# Patient Record
Sex: FEMALE | Race: WHITE | Employment: OTHER | URBAN - METROPOLITAN AREA
[De-identification: names, ages, dates, MRNs, and addresses within clinical notes are randomized per-mention and may not be internally consistent; named-entity substitution may affect disease eponyms.]

---

## 2017-11-05 ENCOUNTER — APPOINTMENT (OUTPATIENT)
Dept: GENERAL RADIOLOGY | Facility: CLINIC | Age: 82
End: 2017-11-05
Payer: MEDICARE

## 2017-11-05 ENCOUNTER — HOSPITAL ENCOUNTER (EMERGENCY)
Facility: CLINIC | Age: 82
Discharge: HOME | End: 2017-11-05
Attending: EMERGENCY MEDICINE
Payer: MEDICARE

## 2017-11-05 VITALS
SYSTOLIC BLOOD PRESSURE: 162 MMHG | RESPIRATION RATE: 16 BRPM | TEMPERATURE: 98.4 F | BODY MASS INDEX: 27.86 KG/M2 | OXYGEN SATURATION: 95 % | WEIGHT: 163.2 LBS | HEIGHT: 64 IN | DIASTOLIC BLOOD PRESSURE: 70 MMHG | HEART RATE: 75 BPM

## 2017-11-05 DIAGNOSIS — N39.0 URINARY TRACT INFECTION WITHOUT HEMATURIA, SITE UNSPECIFIED: Primary | ICD-10-CM

## 2017-11-05 LAB
-: ABNORMAL
ABSOLUTE EOS #: 0.3 K/UL (ref 0–0.4)
ABSOLUTE IMMATURE GRANULOCYTE: ABNORMAL K/UL (ref 0–0.3)
ABSOLUTE LYMPH #: 1.2 K/UL (ref 1–4.8)
ABSOLUTE MONO #: 0.6 K/UL (ref 0.1–1.2)
AMORPHOUS: ABNORMAL
ANION GAP SERPL CALCULATED.3IONS-SCNC: 12 MMOL/L (ref 9–17)
BACTERIA: ABNORMAL
BASOPHILS # BLD: 0 %
BASOPHILS ABSOLUTE: 0 K/UL (ref 0–0.2)
BILIRUBIN URINE: NEGATIVE
BUN BLDV-MCNC: 26 MG/DL (ref 8–23)
BUN/CREAT BLD: ABNORMAL (ref 9–20)
CALCIUM SERPL-MCNC: 8.7 MG/DL (ref 8.6–10.4)
CASTS UA: ABNORMAL /LPF (ref 0–2)
CHLORIDE BLD-SCNC: 102 MMOL/L (ref 98–107)
CO2: 26 MMOL/L (ref 20–31)
COLOR: YELLOW
COMMENT UA: ABNORMAL
CREAT SERPL-MCNC: 0.8 MG/DL (ref 0.5–0.9)
CRYSTALS, UA: ABNORMAL /HPF
DIFFERENTIAL TYPE: ABNORMAL
EOSINOPHILS RELATIVE PERCENT: 4 %
EPITHELIAL CELLS UA: ABNORMAL /HPF (ref 0–5)
GFR AFRICAN AMERICAN: >60 ML/MIN
GFR NON-AFRICAN AMERICAN: >60 ML/MIN
GFR SERPL CREATININE-BSD FRML MDRD: ABNORMAL ML/MIN/{1.73_M2}
GFR SERPL CREATININE-BSD FRML MDRD: ABNORMAL ML/MIN/{1.73_M2}
GLUCOSE BLD-MCNC: 165 MG/DL (ref 70–99)
GLUCOSE URINE: ABNORMAL
HCT VFR BLD CALC: 35.2 % (ref 36–46)
HEMOGLOBIN: 11.7 G/DL (ref 12–16)
IMMATURE GRANULOCYTES: ABNORMAL %
KETONES, URINE: NEGATIVE
LEUKOCYTE ESTERASE, URINE: ABNORMAL
LYMPHOCYTES # BLD: 18 %
MCH RBC QN AUTO: 28.8 PG (ref 26–34)
MCHC RBC AUTO-ENTMCNC: 33.2 G/DL (ref 31–37)
MCV RBC AUTO: 86.8 FL (ref 80–100)
MONOCYTES # BLD: 10 %
MUCUS: ABNORMAL
NITRITE, URINE: NEGATIVE
OTHER OBSERVATIONS UA: ABNORMAL
PDW BLD-RTO: 15.3 % (ref 12.5–15.4)
PH UA: 5.5 (ref 5–8)
PLATELET # BLD: 220 K/UL (ref 140–450)
PLATELET ESTIMATE: ABNORMAL
PMV BLD AUTO: 8.1 FL (ref 6–12)
POTASSIUM SERPL-SCNC: 4 MMOL/L (ref 3.7–5.3)
PROTEIN UA: ABNORMAL
RBC # BLD: 4.05 M/UL (ref 4–5.2)
RBC # BLD: ABNORMAL 10*6/UL
RBC UA: ABNORMAL /HPF (ref 0–2)
RENAL EPITHELIAL, UA: ABNORMAL /HPF
SEG NEUTROPHILS: 68 %
SEGMENTED NEUTROPHILS ABSOLUTE COUNT: 4.5 K/UL (ref 1.8–7.7)
SODIUM BLD-SCNC: 140 MMOL/L (ref 135–144)
SPECIFIC GRAVITY UA: 1.02 (ref 1–1.03)
TRICHOMONAS: ABNORMAL
TURBIDITY: ABNORMAL
URINE HGB: NEGATIVE
UROBILINOGEN, URINE: NORMAL
WBC # BLD: 6.6 K/UL (ref 3.5–11)
WBC # BLD: ABNORMAL 10*3/UL
WBC UA: ABNORMAL /HPF (ref 0–5)
YEAST: ABNORMAL

## 2017-11-05 PROCEDURE — 85025 COMPLETE CBC W/AUTO DIFF WBC: CPT

## 2017-11-05 PROCEDURE — 87086 URINE CULTURE/COLONY COUNT: CPT

## 2017-11-05 PROCEDURE — 74022 RADEX COMPL AQT ABD SERIES: CPT

## 2017-11-05 PROCEDURE — 36415 COLL VENOUS BLD VENIPUNCTURE: CPT

## 2017-11-05 PROCEDURE — 99283 EMERGENCY DEPT VISIT LOW MDM: CPT

## 2017-11-05 PROCEDURE — 80048 BASIC METABOLIC PNL TOTAL CA: CPT

## 2017-11-05 PROCEDURE — 6360000002 HC RX W HCPCS: Performed by: EMERGENCY MEDICINE

## 2017-11-05 PROCEDURE — 81001 URINALYSIS AUTO W/SCOPE: CPT

## 2017-11-05 PROCEDURE — 96372 THER/PROPH/DIAG INJ SC/IM: CPT

## 2017-11-05 RX ORDER — CEFTRIAXONE 500 MG/1
250 INJECTION, POWDER, FOR SOLUTION INTRAMUSCULAR; INTRAVENOUS ONCE
Status: COMPLETED | OUTPATIENT
Start: 2017-11-05 | End: 2017-11-05

## 2017-11-05 RX ADMIN — CEFTRIAXONE SODIUM 250 MG: 500 INJECTION, POWDER, FOR SOLUTION INTRAMUSCULAR; INTRAVENOUS at 14:36

## 2017-11-05 NOTE — ED TRIAGE NOTES
Pt presents to the ED with c/o \"possible infection\". She reports sx last night of chills overnight and increased urinary frequency/ urge. Pt denies any pain. Denies any acute injury. Pt is alert and oriented x4. Ambulatory. Respirations even and non-labored. Will continue to monitor.

## 2017-11-05 NOTE — ED NOTES
Discharge instructions reviewed and follow-up information discussed.      Melody Jerry RN  11/05/17 8595

## 2017-11-05 NOTE — ED PROVIDER NOTES
Result Value Ref Range    Color, UA YELLOW YEL    Turbidity UA SLIGHTLY CLOUDY (A) CLEAR    Glucose, Ur 2+ (A) NEG    Bilirubin Urine NEGATIVE NEG    Ketones, Urine NEGATIVE NEG    Specific Gravity, UA 1.025 1.005 - 1.030    Urine Hgb NEGATIVE NEG    pH, UA 5.5 5.0 - 8.0    Protein, UA TRACE (A) NEG    Urobilinogen, Urine Normal NORM    Nitrite, Urine NEGATIVE NEG    Leukocyte Esterase, Urine TRACE (A) NEG    Urinalysis Comments NOT REPORTED    CBC Auto Differential   Result Value Ref Range    WBC 6.6 3.5 - 11.0 k/uL    RBC 4.05 4.0 - 5.2 m/uL    Hemoglobin 11.7 (L) 12.0 - 16.0 g/dL    Hematocrit 35.2 (L) 36 - 46 %    MCV 86.8 80 - 100 fL    MCH 28.8 26 - 34 pg    MCHC 33.2 31 - 37 g/dL    RDW 15.3 12.5 - 15.4 %    Platelets 076 726 - 725 k/uL    MPV 8.1 6.0 - 12.0 fL    Differential Type NOT REPORTED     Seg Neutrophils 68 %    Lymphocytes 18 %    Monocytes 10 %    Eosinophils % 4 %    Basophils 0 %    Immature Granulocytes NOT REPORTED 0 %    Segs Absolute 4.50 1.8 - 7.7 k/uL    Absolute Lymph # 1.20 1.0 - 4.8 k/uL    Absolute Mono # 0.60 0.1 - 1.2 k/uL    Absolute Eos # 0.30 0.0 - 0.4 k/uL    Basophils # 0.00 0.0 - 0.2 k/uL    Absolute Immature Granulocyte NOT REPORTED 0.00 - 0.30 k/uL    WBC Morphology NOT REPORTED     RBC Morphology NOT REPORTED     Platelet Estimate NOT REPORTED    Basic Metabolic Panel   Result Value Ref Range    Glucose 165 (H) 70 - 99 mg/dL    BUN 26 (H) 8 - 23 mg/dL    CREATININE 0.80 0.50 - 0.90 mg/dL    Bun/Cre Ratio NOT REPORTED 9 - 20    Calcium 8.7 8.6 - 10.4 mg/dL    Sodium 140 135 - 144 mmol/L    Potassium 4.0 3.7 - 5.3 mmol/L    Chloride 102 98 - 107 mmol/L    CO2 26 20 - 31 mmol/L    Anion Gap 12 9 - 17 mmol/L    GFR Non-African American >60 >60 mL/min    GFR African American >60 >60 mL/min    GFR Comment          GFR Staging NOT REPORTED    Microscopic Urinalysis   Result Value Ref Range    -          WBC, UA 5 TO 10 0 - 5 /HPF    RBC, UA 0 TO 2 0 - 2 /HPF    Casts UA NOT REPORTED 0 - 2 /LPF    Crystals UA NOT REPORTED NONE /HPF    Epithelial Cells UA 10 TO 20 0 - 5 /HPF    Renal Epithelial, Urine NOT REPORTED 0 /HPF    Bacteria, UA MODERATE (A) NONE    Mucus, UA NOT REPORTED NONE    Trichomonas, UA NOT REPORTED NONE    Amorphous, UA 1+ (A) NONE    Other Observations UA Culture ordered based on defined criteria. (A) NREQ    Yeast, UA NOT REPORTED NONE         EMERGENCY DEPARTMENT COURSE:   Vitals:    Vitals:    11/05/17 1332   BP: (!) 162/70   Pulse: 75   Resp: 16   Temp: 98.4 °F (36.9 °C)   TempSrc: Oral   SpO2: 95%   Weight: 74 kg (163 lb 3.2 oz)   Height: 5' 4\" (1.626 m)     -------------------------  BP: (!) 162/70, Temp: 98.4 °F (36.9 °C), Pulse: 75, Resp: 16      Re-evaluation Notes    The findings of the urinalysis, coupled with the patient's urinary symptoms and chills prompt me to treat her with antibiotics. She says she does not wish take any oral antibiotics because it worsens or diarrhea. She would like an injection of Rocephin and then will see her doctor in the next day to see if they need to extend her treatment. This has been done before apparently. The patient is discharged in good condition. FINAL IMPRESSION      1.  Urinary tract infection without hematuria, site unspecified          DISPOSITION/PLAN   DISPOSITION Discharge - Pending Orders Complete    Condition on Disposition    good    PATIENT REFERRED TO:  Aileen Hardwick MD  [address] [phone]    In 1 day        DISCHARGE MEDICATIONS:  New Prescriptions    No medications on file       (Please note that portions of this note were completed with a voice recognition program.  Efforts were made to edit the dictations but occasionally words are mis-transcribed.)    Swann MD   Attending Emergency Physician       Rey Mcgowan MD  11/05/17 1982

## 2017-11-06 LAB
CULTURE: NORMAL
CULTURE: NORMAL
Lab: NORMAL
SPECIMEN DESCRIPTION: NORMAL
SPECIMEN DESCRIPTION: NORMAL
STATUS: NORMAL

## 2018-03-26 ENCOUNTER — APPOINTMENT (OUTPATIENT)
Dept: GENERAL RADIOLOGY | Facility: CLINIC | Age: 83
End: 2018-03-26
Payer: MEDICARE

## 2018-03-26 ENCOUNTER — APPOINTMENT (OUTPATIENT)
Dept: ULTRASOUND IMAGING | Facility: CLINIC | Age: 83
End: 2018-03-26
Payer: MEDICARE

## 2018-03-26 ENCOUNTER — HOSPITAL ENCOUNTER (EMERGENCY)
Facility: CLINIC | Age: 83
Discharge: HOME | End: 2018-03-26
Attending: EMERGENCY MEDICINE
Payer: MEDICARE

## 2018-03-26 VITALS
WEIGHT: 163 LBS | HEIGHT: 64 IN | HEART RATE: 75 BPM | TEMPERATURE: 98 F | SYSTOLIC BLOOD PRESSURE: 167 MMHG | BODY MASS INDEX: 27.83 KG/M2 | DIASTOLIC BLOOD PRESSURE: 111 MMHG | OXYGEN SATURATION: 96 % | RESPIRATION RATE: 22 BRPM

## 2018-03-26 DIAGNOSIS — M25.561 ACUTE PAIN OF RIGHT KNEE: Primary | ICD-10-CM

## 2018-03-26 LAB
ABSOLUTE EOS #: 0.2 K/UL (ref 0–0.4)
ABSOLUTE IMMATURE GRANULOCYTE: ABNORMAL K/UL (ref 0–0.3)
ABSOLUTE LYMPH #: 1 K/UL (ref 1–4.8)
ABSOLUTE MONO #: 0.7 K/UL (ref 0.1–1.2)
ANION GAP SERPL CALCULATED.3IONS-SCNC: 14 MMOL/L (ref 9–17)
BASOPHILS # BLD: 0 % (ref 0–2)
BASOPHILS ABSOLUTE: 0 K/UL (ref 0–0.2)
BUN BLDV-MCNC: 22 MG/DL (ref 8–23)
BUN/CREAT BLD: ABNORMAL (ref 9–20)
C-REACTIVE PROTEIN: 0.5 MG/L (ref 0–5)
CALCIUM SERPL-MCNC: 9 MG/DL (ref 8.6–10.4)
CHLORIDE BLD-SCNC: 101 MMOL/L (ref 98–107)
CO2: 26 MMOL/L (ref 20–31)
CREAT SERPL-MCNC: 0.7 MG/DL (ref 0.5–0.9)
D-DIMER QUANTITATIVE: 0.86 MG/L FEU
DIFFERENTIAL TYPE: ABNORMAL
EOSINOPHILS RELATIVE PERCENT: 2 % (ref 1–4)
GFR AFRICAN AMERICAN: >60 ML/MIN
GFR NON-AFRICAN AMERICAN: >60 ML/MIN
GFR SERPL CREATININE-BSD FRML MDRD: ABNORMAL ML/MIN/{1.73_M2}
GFR SERPL CREATININE-BSD FRML MDRD: ABNORMAL ML/MIN/{1.73_M2}
GLUCOSE BLD-MCNC: 226 MG/DL (ref 70–99)
HCT VFR BLD CALC: 37.7 % (ref 36–46)
HEMOGLOBIN: 12.1 G/DL (ref 12–16)
IMMATURE GRANULOCYTES: ABNORMAL %
LYMPHOCYTES # BLD: 14 % (ref 24–44)
MCH RBC QN AUTO: 28.6 PG (ref 26–34)
MCHC RBC AUTO-ENTMCNC: 32.2 G/DL (ref 31–37)
MCV RBC AUTO: 89 FL (ref 80–100)
MONOCYTES # BLD: 10 % (ref 2–11)
NRBC AUTOMATED: ABNORMAL PER 100 WBC
PDW BLD-RTO: 14.7 % (ref 12.5–15.4)
PLATELET # BLD: 214 K/UL (ref 140–450)
PLATELET ESTIMATE: ABNORMAL
PMV BLD AUTO: 8.3 FL (ref 6–12)
POTASSIUM SERPL-SCNC: 4.2 MMOL/L (ref 3.7–5.3)
RBC # BLD: 4.24 M/UL (ref 4–5.2)
RBC # BLD: ABNORMAL 10*6/UL
SEG NEUTROPHILS: 74 % (ref 36–66)
SEGMENTED NEUTROPHILS ABSOLUTE COUNT: 5.3 K/UL (ref 1.8–7.7)
SODIUM BLD-SCNC: 141 MMOL/L (ref 135–144)
WBC # BLD: 7.2 K/UL (ref 3.5–11)
WBC # BLD: ABNORMAL 10*3/UL

## 2018-03-26 PROCEDURE — 93971 EXTREMITY STUDY: CPT

## 2018-03-26 PROCEDURE — 73562 X-RAY EXAM OF KNEE 3: CPT

## 2018-03-26 PROCEDURE — 80048 BASIC METABOLIC PNL TOTAL CA: CPT

## 2018-03-26 PROCEDURE — 36415 COLL VENOUS BLD VENIPUNCTURE: CPT

## 2018-03-26 PROCEDURE — 99284 EMERGENCY DEPT VISIT MOD MDM: CPT

## 2018-03-26 PROCEDURE — 85379 FIBRIN DEGRADATION QUANT: CPT

## 2018-03-26 PROCEDURE — 86140 C-REACTIVE PROTEIN: CPT

## 2018-03-26 PROCEDURE — 85025 COMPLETE CBC W/AUTO DIFF WBC: CPT

## 2018-03-26 RX ORDER — PREDNISONE 20 MG/1
20 TABLET ORAL DAILY
Qty: 5 TABLET | Refills: 0 | Status: SHIPPED | OUTPATIENT
Start: 2018-03-26 | End: 2018-03-31

## 2018-03-26 ASSESSMENT — PAIN DESCRIPTION - LOCATION: LOCATION: KNEE

## 2018-03-26 ASSESSMENT — PAIN DESCRIPTION - FREQUENCY: FREQUENCY: INTERMITTENT

## 2018-03-26 ASSESSMENT — PAIN DESCRIPTION - ORIENTATION: ORIENTATION: RIGHT

## 2018-03-26 ASSESSMENT — PAIN SCALES - GENERAL: PAINLEVEL_OUTOF10: 8

## 2018-03-26 ASSESSMENT — PAIN DESCRIPTION - DESCRIPTORS: DESCRIPTORS: ACHING;POUNDING

## 2018-03-26 NOTE — ED PROVIDER NOTES
PO    Take 25 mg by mouth 2 times daily     MULTIPLE VITAMINS-MINERALS (THERAPEUTIC MULTIVITAMIN-MINERALS) TABLET    Take 1 tablet by mouth daily    OMEGA-3 FATTY ACIDS (FISH OIL PO)    Take by mouth    POTASSIUM CHLORIDE SA (K-DUR;KLOR-CON M) 10 MEQ TABLET    Take 1 tablet by mouth 2 times daily for 14 days    SITAGLIPTIN (JANUVIA) 100 MG TABLET    Take 100 mg by mouth daily       ALLERGIES     has No Known Allergies. FAMILY HISTORY     has no family status information on file. family history is not on file. SOCIAL HISTORY      reports that she has never smoked. She has never used smokeless tobacco. She reports that she does not drink alcohol or use drugs. PHYSICAL EXAM    (up to 7 for level 4, 8 or more for level 5)   INITIAL VITALS:  height is 5' 4\" (1.626 m) and weight is 73.9 kg (163 lb). Her oral temperature is 98 °F (36.7 °C). Her blood pressure is 167/111 (abnormal) and her pulse is 75. Her respiration is 22 and oxygen saturation is 96%. Physical Exam   Constitutional: She appears well-developed and well-nourished. HENT:   Head: Normocephalic and atraumatic. Eyes: Conjunctivae are normal.   Neck: Normal range of motion. Neck supple. Cardiovascular: Normal rate, regular rhythm and normal heart sounds. Pulmonary/Chest: Effort normal and breath sounds normal. No respiratory distress. She has no wheezes. She has no rales. Abdominal: Soft. Bowel sounds are normal. She exhibits no distension. There is no tenderness. There is no rebound and no guarding. Genitourinary:   Genitourinary Comments: The patient is noted to have a nonthrombosed external hemorrhoid   Musculoskeletal: Normal range of motion. The patient is noted to have swelling and tenderness to the right knee has trace edema of both lower extremities no calf swelling or tenderness appreciated   Neurological: She is alert. GCS of 15 with no focal deficits appreciated   Skin: Skin is warm and dry. No rash noted. Initial    FINDINGS:  Alignment is anatomic.  No fractures or destructive bony abnormalities are  seen.  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  A joint effusion  is noted.  Vascular calcifications.  Chondrocalcinosis seen in both menisci.                   LABS:  Results for orders placed or performed during the hospital encounter of 03/26/18   D-Dimer, Quantitative   Result Value Ref Range    D-Dimer, Quant 0.86 mg/L FEU   CBC Auto Differential   Result Value Ref Range    WBC 7.2 3.5 - 11.0 k/uL    RBC 4.24 4.0 - 5.2 m/uL    Hemoglobin 12.1 12.0 - 16.0 g/dL    Hematocrit 37.7 36 - 46 %    MCV 89.0 80 - 100 fL    MCH 28.6 26 - 34 pg    MCHC 32.2 31 - 37 g/dL    RDW 14.7 12.5 - 15.4 %    Platelets 679 740 - 283 k/uL    MPV 8.3 6.0 - 12.0 fL    NRBC Automated NOT REPORTED per 100 WBC    Differential Type NOT REPORTED     Seg Neutrophils 74 (H) 36 - 66 %    Lymphocytes 14 (L) 24 - 44 %    Monocytes 10 2 - 11 %    Eosinophils % 2 1 - 4 %    Basophils 0 0 - 2 %    Immature Granulocytes NOT REPORTED 0 %    Segs Absolute 5.30 1.8 - 7.7 k/uL    Absolute Lymph # 1.00 1.0 - 4.8 k/uL    Absolute Mono # 0.70 0.1 - 1.2 k/uL    Absolute Eos # 0.20 0.0 - 0.4 k/uL    Basophils # 0.00 0.0 - 0.2 k/uL    Absolute Immature Granulocyte NOT REPORTED 0.00 - 0.30 k/uL    WBC Morphology NOT REPORTED     RBC Morphology NOT REPORTED     Platelet Estimate NOT REPORTED    Basic Metabolic Panel   Result Value Ref Range    Glucose 226 (H) 70 - 99 mg/dL    BUN 22 8 - 23 mg/dL    CREATININE 0.70 0.50 - 0.90 mg/dL    Bun/Cre Ratio NOT REPORTED 9 - 20    Calcium 9.0 8.6 - 10.4 mg/dL    Sodium 141 135 - 144 mmol/L    Potassium 4.2 3.7 - 5.3 mmol/L    Chloride 101 98 - 107 mmol/L    CO2 26 20 - 31 mmol/L    Anion Gap 14 9 - 17 mmol/L    GFR Non-African American >60 >60 mL/min    GFR African American >60 >60 mL/min    GFR Comment          GFR Staging NOT REPORTED    C-Reactive Protein   Result Value Ref Range    CRP 0.5 0.0 - 5.0 mg/L The patient is noted to have an elevated d-dimer. Ultrasound shows no evidence for DVT    EMERGENCY DEPARTMENT COURSE:   Vitals:    Vitals:    03/26/18 1512   BP: (!) 167/111   Pulse: 75   Resp: 22   Temp: 98 °F (36.7 °C)   TempSrc: Oral   SpO2: 96%   Weight: 73.9 kg (163 lb)   Height: 5' 4\" (1.626 m)     -------------------------  BP: (!) 167/111, Temp: 98 °F (36.7 °C), Pulse: 75, Resp: 22      RE-EVALUATION:  The patient presents with right knee pain. I we will go ahead and Ace wrap the right knee. I'm recommending rest ice elevation as possible. Return to the ER for increasing pain numbness weakness or other concerns otherwise to follow-up with her family doctor or with her orthopedist within the next few days  At this time the patient is without objective evidence of an acute process requiring hospitalization or inpatient management. They have remained hemodynamically stable throughout their entire ED visit and are stable for discharge with outpatient follow-up. The plan has been discussed in detail and they are aware of the specific conditions for emergent return, as well as the importance of follow-up    I have reviewed the disposition diagnosis with the patient and or their family/guardian. I have answered their questions and given discharge instructions. They voiced understanding of these instructions and did not have any further questions or complaints. PROCEDURES:  None    FINAL IMPRESSION      1.  Acute pain of right knee          DISPOSITION/PLAN   DISPOSITION        CONDITION ON DISPOSITION:   Stable    PATIENT REFERRED TO:  Maryjo Bourne MD  [address] [phone]    Call in 2 days        DISCHARGE MEDICATIONS:  New Prescriptions    PREDNISONE (DELTASONE) 20 MG TABLET    Take 1 tablet by mouth daily for 5 days       (Please note that portions of this note were completed with a voice recognition program.  Efforts were made to edit the dictations but occasionally words are mis-transcribed.)    Blackmon MD, F.A.C.E.P.   Attending Emergency Medicine Physician       Ilir Seay MD  03/26/18 1718       Ilir Seay MD  03/26/18 1721

## 2020-03-31 ENCOUNTER — APPOINTMENT (OUTPATIENT)
Dept: GENERAL RADIOLOGY | Facility: CLINIC | Age: 85
End: 2020-03-31
Payer: MEDICARE

## 2020-03-31 ENCOUNTER — HOSPITAL ENCOUNTER (EMERGENCY)
Facility: CLINIC | Age: 85
Discharge: HOME | End: 2020-03-31
Attending: EMERGENCY MEDICINE
Payer: MEDICARE

## 2020-03-31 VITALS
HEART RATE: 72 BPM | WEIGHT: 163 LBS | SYSTOLIC BLOOD PRESSURE: 162 MMHG | OXYGEN SATURATION: 96 % | BODY MASS INDEX: 27.98 KG/M2 | RESPIRATION RATE: 17 BRPM | DIASTOLIC BLOOD PRESSURE: 65 MMHG | TEMPERATURE: 97.6 F

## 2020-03-31 LAB
ABSOLUTE EOS #: 0.1 K/UL (ref 0–0.4)
ABSOLUTE IMMATURE GRANULOCYTE: ABNORMAL K/UL (ref 0–0.3)
ABSOLUTE LYMPH #: 0.9 K/UL (ref 1–4.8)
ABSOLUTE MONO #: 0.6 K/UL (ref 0.1–1.2)
ANION GAP SERPL CALCULATED.3IONS-SCNC: 13 MMOL/L (ref 9–17)
BASOPHILS # BLD: 0 % (ref 0–2)
BASOPHILS ABSOLUTE: 0 K/UL (ref 0–0.2)
BNP INTERPRETATION: ABNORMAL
BUN BLDV-MCNC: 25 MG/DL (ref 8–23)
BUN/CREAT BLD: ABNORMAL (ref 9–20)
CALCIUM SERPL-MCNC: 9.5 MG/DL (ref 8.6–10.4)
CHLORIDE BLD-SCNC: 100 MMOL/L (ref 98–107)
CO2: 26 MMOL/L (ref 20–31)
CREAT SERPL-MCNC: 0.9 MG/DL (ref 0.5–0.9)
DIFFERENTIAL TYPE: ABNORMAL
EOSINOPHILS RELATIVE PERCENT: 2 % (ref 1–4)
GFR AFRICAN AMERICAN: >60 ML/MIN
GFR NON-AFRICAN AMERICAN: 59 ML/MIN
GFR SERPL CREATININE-BSD FRML MDRD: ABNORMAL ML/MIN/{1.73_M2}
GFR SERPL CREATININE-BSD FRML MDRD: ABNORMAL ML/MIN/{1.73_M2}
GLUCOSE BLD-MCNC: 293 MG/DL (ref 70–99)
HCT VFR BLD CALC: 37.4 % (ref 36–46)
HEMOGLOBIN: 12 G/DL (ref 12–16)
IMMATURE GRANULOCYTES: ABNORMAL %
LYMPHOCYTES # BLD: 18 % (ref 24–44)
MCH RBC QN AUTO: 29.2 PG (ref 26–34)
MCHC RBC AUTO-ENTMCNC: 32 G/DL (ref 31–37)
MCV RBC AUTO: 91.3 FL (ref 80–100)
MONOCYTES # BLD: 12 % (ref 2–11)
NRBC AUTOMATED: ABNORMAL PER 100 WBC
PDW BLD-RTO: 15.3 % (ref 12.5–15.4)
PLATELET # BLD: 185 K/UL (ref 140–450)
PLATELET ESTIMATE: ABNORMAL
PMV BLD AUTO: 8.4 FL (ref 6–12)
POTASSIUM SERPL-SCNC: 4.2 MMOL/L (ref 3.7–5.3)
PRO-BNP: 1751 PG/ML
RBC # BLD: 4.1 M/UL (ref 4–5.2)
RBC # BLD: ABNORMAL 10*6/UL
SEG NEUTROPHILS: 68 % (ref 36–66)
SEGMENTED NEUTROPHILS ABSOLUTE COUNT: 3.4 K/UL (ref 1.8–7.7)
SODIUM BLD-SCNC: 139 MMOL/L (ref 135–144)
TROPONIN INTERP: ABNORMAL
TROPONIN INTERP: ABNORMAL
TROPONIN T: ABNORMAL NG/ML
TROPONIN T: ABNORMAL NG/ML
TROPONIN, HIGH SENSITIVITY: 26 NG/L (ref 0–14)
TROPONIN, HIGH SENSITIVITY: 26 NG/L (ref 0–14)
WBC # BLD: 5.1 K/UL (ref 3.5–11)
WBC # BLD: ABNORMAL 10*3/UL

## 2020-03-31 PROCEDURE — 71045 X-RAY EXAM CHEST 1 VIEW: CPT

## 2020-03-31 PROCEDURE — 85025 COMPLETE CBC W/AUTO DIFF WBC: CPT

## 2020-03-31 PROCEDURE — 83880 ASSAY OF NATRIURETIC PEPTIDE: CPT

## 2020-03-31 PROCEDURE — 84484 ASSAY OF TROPONIN QUANT: CPT

## 2020-03-31 PROCEDURE — 6370000000 HC RX 637 (ALT 250 FOR IP): Performed by: EMERGENCY MEDICINE

## 2020-03-31 PROCEDURE — 80048 BASIC METABOLIC PNL TOTAL CA: CPT

## 2020-03-31 PROCEDURE — 36415 COLL VENOUS BLD VENIPUNCTURE: CPT

## 2020-03-31 PROCEDURE — 99284 EMERGENCY DEPT VISIT MOD MDM: CPT

## 2020-03-31 PROCEDURE — 93005 ELECTROCARDIOGRAM TRACING: CPT | Performed by: EMERGENCY MEDICINE

## 2020-03-31 RX ORDER — FUROSEMIDE 20 MG/1
20 TABLET ORAL DAILY
Qty: 7 TABLET | Refills: 0 | Status: SHIPPED | OUTPATIENT
Start: 2020-03-31

## 2020-03-31 RX ORDER — FUROSEMIDE 20 MG/1
20 TABLET ORAL ONCE
Status: COMPLETED | OUTPATIENT
Start: 2020-03-31 | End: 2020-03-31

## 2020-03-31 RX ORDER — POTASSIUM CHLORIDE 20 MEQ/1
20 TABLET, EXTENDED RELEASE ORAL DAILY
Qty: 7 TABLET | Refills: 0 | Status: SHIPPED | OUTPATIENT
Start: 2020-03-31

## 2020-03-31 RX ADMIN — FUROSEMIDE 20 MG: 20 TABLET ORAL at 21:01

## 2020-03-31 ASSESSMENT — PAIN DESCRIPTION - DESCRIPTORS: DESCRIPTORS: BURNING

## 2020-03-31 ASSESSMENT — PAIN DESCRIPTION - ONSET: ONSET: ON-GOING

## 2020-03-31 ASSESSMENT — PAIN DESCRIPTION - PAIN TYPE: TYPE: ACUTE PAIN

## 2020-03-31 ASSESSMENT — PAIN SCALES - GENERAL: PAINLEVEL_OUTOF10: 1

## 2020-03-31 ASSESSMENT — PAIN DESCRIPTION - FREQUENCY: FREQUENCY: INTERMITTENT

## 2020-03-31 ASSESSMENT — PAIN DESCRIPTION - ORIENTATION: ORIENTATION: LEFT;RIGHT

## 2020-03-31 ASSESSMENT — PAIN DESCRIPTION - LOCATION: LOCATION: LEG

## 2020-03-31 ASSESSMENT — PAIN DESCRIPTION - PROGRESSION: CLINICAL_PROGRESSION: GRADUALLY WORSENING

## 2020-03-31 NOTE — ED PROVIDER NOTES
[facility] ED  [address] [facility]  Phone: [address]      Pt Name: Mima Leiva  MRN: [id number]  [place] 10/23/1932  Date of evaluation: 3/31/2020    CHIEF COMPLAINT       Chief Complaint   Patient presents with    Leg Swelling     bilateral started 3 to 4 days ago with fluid filled vesciles pt. denies cp or sob. HISTORY OF PRESENT ILLNESS    Mima Leiva is a 80 y.o. female who presents to the emergency department with a 3 to 4-day history of peripheral edema worsening and vesicles on her right leg. She is a diabetic concerned about infection. History of congestive heart failure used to be on a diuretic but not currently. Describes a burning sensation in both of her legs. Denies any chest pain or shortness of breath. No other symptoms. The vesicle on her right leg feels and deflates. Better in the morning after she has been lying flat. REVIEW OF SYSTEMS       Constitutional: No fevers or chills   HEENT: No sore throat, rhinorrhea, or earache   Eyes: No blurry vision or double vision no drainage   Cardiovascular: No chest pain or tachycardia   Respiratory: No wheezing or shortness of breath no cough   Gastrointestinal: No nausea, vomiting, diarrhea, constipation, or abdominal pain   : No hematuria or dysuria   Musculoskeletal: Positive peripheral edema with burning sensation  Skin: Vesicle right lower extremity  Neurological: No focal neurologic complaints, paresthesias, weakness, or headache     PAST MEDICAL HISTORY    has a past medical history of Arthritis, Atrial fibrillation ([address] Cancer ([address] CHF (congestive heart failure) ([address] Diabetes mellitus ([address] Diverticulitis, Hypertension, Osteoporosis, and Sepsis ([address] SURGICAL HISTORY      has a past surgical history that includes Femur Surgery; Colon surgery; Cholecystectomy; and Appendectomy.     CURRENT MEDICATIONS       Previous Medications    CYANOCOBALAMIN (VITAMIN B-12 SL) Place under the tongue three times a week    GLIMEPIRIDE PO    Take by mouth daily     HYDRALAZINE HCL PO    Take 50 mg by mouth daily     LOSARTAN POTASSIUM PO    Take 50 mg by mouth daily     METOPROLOL TARTRATE PO    Take 25 mg by mouth 2 times daily     MULTIPLE VITAMINS-MINERALS (THERAPEUTIC MULTIVITAMIN-MINERALS) TABLET    Take 1 tablet by mouth daily    OMEGA-3 FATTY ACIDS (FISH OIL PO)    Take by mouth       ALLERGIES     has No Known Allergies. FAMILY HISTORY     has no family status information on file. family history is not on file. SOCIAL HISTORY      reports that she has never smoked. She has never used smokeless tobacco. She reports that she does not drink alcohol or use drugs. PHYSICAL EXAM       ED Triage Vitals [03/31/20 1925]   BP Temp Temp Source Pulse Resp SpO2 Height Weight   (!) 165/73 97.6 °F (36.4 °C) Temporal 77 17 96 % -- 163 lb (73.9 kg)       Constitutional: Alert, oriented x3, nontoxic, answering questions appropriately, acting properly for age, in no acute distress   HEENT: Extraocular muscles intact, mucus membranes moist,  Neck: Trachea midline   Cardiovascular: Irregular rhythm and normal rate no S3, S4, or murmurs   Respiratory: Clear to auscultation bilaterally no wheezes, rhonchi, rales, no respiratory distress no tachypnea no retractions no hypoxia  Gastrointestinal: Soft, nontender, nondistended, positive bowel sounds. No rebound, rigidity, or guarding. Musculoskeletal: Bilateral lower extremity peripheral edema no asymmetry. Proximal leg distally bilaterally. Pedal pulses intact and capillary refill less than 2 seconds. There is a 5 cm fluid-filled vesicle on the inner aspect of the midshaft of the tibia without signs of infection. Clear fluid. No lymphangitis. No erythema. No increased warmth.   Tense  Neurologic: Moving all 4 extremities without difficulty there are no gross focal neurologic deficits   Skin: Warm and dry       DIFFERENTIAL DIAGNOSIS/ 8 - 23 mg/dL    CREATININE 0.90 0.50 - 0.90 mg/dL    Bun/Cre Ratio NOT REPORTED 9 - 20    Calcium 9.5 8.6 - 10.4 mg/dL    Sodium 139 135 - 144 mmol/L    Potassium 4.2 3.7 - 5.3 mmol/L    Chloride 100 98 - 107 mmol/L    CO2 26 20 - 31 mmol/L    Anion Gap 13 9 - 17 mmol/L    GFR Non-African American 59 (L) >60 mL/min    GFR African American >60 >60 mL/min    GFR Comment          GFR Staging NOT REPORTED    Troponin   Result Value Ref Range    Troponin, High Sensitivity 26 (H) 0 - 14 ng/L    Troponin T NOT REPORTED <0.03 ng/mL    Troponin Interp NOT REPORTED    Troponin   Result Value Ref Range    Troponin, High Sensitivity 26 (H) 0 - 14 ng/L    Troponin T NOT REPORTED <0.03 ng/mL    Troponin Interp NOT REPORTED    EKG 12 Lead   Result Value Ref Range    Ventricular Rate 58 BPM    Atrial Rate 80 BPM    QRS Duration 144 ms    Q-T Interval 450 ms    QTc Calculation (Bazett) 441 ms    R Axis 180 degrees    T Axis 75 degrees   EKG 12 Lead   Result Value Ref Range    Ventricular Rate 58 BPM    Atrial Rate 65 BPM    QRS Duration 144 ms    Q-T Interval 470 ms    QTc Calculation (Bazett) 461 ms    R Axis -50 degrees    T Axis 69 degrees       Not indicated unless otherwise documented above    RADIOLOGY:   I reviewed the radiologist interpretations:    XR CHEST PORTABLE   Final Result   Cardiomegaly with pulmonary vascular congestion and trace right pleural fluid.              Not indicated unless otherwise documented above    EMERGENCY DEPARTMENT COURSE:     The patient was given the following medications:  Orders Placed This Encounter   Medications    furosemide (LASIX) tablet 20 mg    furosemide (LASIX) 20 MG tablet     Sig: Take 1 tablet by mouth daily     Dispense:  7 tablet     Refill:  0    potassium chloride (KLOR-CON M) 20 MEQ extended release tablet     Sig: Take 1 tablet by mouth daily     Dispense:  7 tablet     Refill:  0        Vitals:   -------------------------  BP (!) 162/65   Pulse 72   Temp 97.6 °F (36.4 °C) (Temporal)   Resp 17   Wt 73.9 kg (163 lb)   SpO2 96%   BMI 27.98 kg/m²     8:30 PM resting no acute distress first troponin slightly elevated 26 denies chest pain or shortness of breath. EKG unremarkable. BNP will take several hours but will not preclude her from going home chest x-ray shows some vascular congestion and trace pleural effusion on the right    955 PM repeat troponin unchanged as well as repeat EKG. will place on a short course of Lasix and potassium supplementation. Close follow-up with family physician. She is in no acute distress. No tachypnea retractions or hypoxia. BNP still pending but will not change disposition. Watch for signs of infection regarding vesicle. Return if worsening symptoms or other concerns. I have reviewed the disposition diagnosis with the patient and or their family/guardian. I have answered their questions and given discharge instructions. They voiced understanding of these instructions and did not have any furtherquestions or complaints. CRITICAL CARE:    None    CONSULTS:    None    PROCEDURES:    None      OARRS Report if indicated             FINAL IMPRESSION      1.  Peripheral edema          DISPOSITION/PLAN   DISPOSITION Decision To Discharge 03/31/2020 09:52:51 PM        CONDITION ON DISPOSITION: STABLE       PATIENT REFERRED TO:  Cisco Montaño MD  [address] [place] [address]  [phone]    Schedule an appointment as soon as possible for a visit in 1 day        DISCHARGE MEDICATIONS:  New Prescriptions    FUROSEMIDE (LASIX) 20 MG TABLET    Take 1 tablet by mouth daily    POTASSIUM CHLORIDE (KLOR-CON M) 20 MEQ EXTENDED RELEASE TABLET    Take 1 tablet by mouth daily       (Please note that portions of thisnote were completed with a voice recognition program.  Efforts were made to edit the dictations but occasionally words are mis-transcribed.)    Flaherty DO  Attending Emergency Physician        Olya Ohara DO  03/31/20 3104

## 2020-04-01 LAB
EKG ATRIAL RATE: 65 BPM
EKG ATRIAL RATE: 80 BPM
EKG Q-T INTERVAL: 450 MS
EKG Q-T INTERVAL: 470 MS
EKG QRS DURATION: 144 MS
EKG QRS DURATION: 144 MS
EKG QTC CALCULATION (BAZETT): 441 MS
EKG QTC CALCULATION (BAZETT): 461 MS
EKG R AXIS: -50 DEGREES
EKG R AXIS: 180 DEGREES
EKG T AXIS: 69 DEGREES
EKG T AXIS: 75 DEGREES
EKG VENTRICULAR RATE: 58 BPM
EKG VENTRICULAR RATE: 58 BPM

## 2020-04-01 NOTE — ED NOTES
Pt. Medicated as ordered. Pt. Updated on poc again 2nd trop and ekg around 2130. Pt. Denies any concerns. Will continue to monitor.      Ramez Amador RN  03/31/20 2103

## 2020-04-15 ENCOUNTER — HOSPITAL ENCOUNTER (OUTPATIENT)
Age: 85
Discharge: HOME | End: 2020-04-15
Payer: MEDICARE

## 2020-04-15 LAB
ALBUMIN SERPL-MCNC: 3.4 G/DL (ref 3.5–5.2)
ALBUMIN/GLOBULIN RATIO: 1.2 (ref 1–2.5)
ALP BLD-CCNC: 144 U/L (ref 35–104)
ALT SERPL-CCNC: 19 U/L (ref 5–33)
ANION GAP SERPL CALCULATED.3IONS-SCNC: 12 MMOL/L (ref 9–17)
AST SERPL-CCNC: 28 U/L
BILIRUB SERPL-MCNC: 0.27 MG/DL (ref 0.3–1.2)
BUN BLDV-MCNC: 43 MG/DL (ref 8–23)
BUN/CREAT BLD: ABNORMAL (ref 9–20)
CALCIUM SERPL-MCNC: 8.8 MG/DL (ref 8.6–10.4)
CHLORIDE BLD-SCNC: 98 MMOL/L (ref 98–107)
CO2: 27 MMOL/L (ref 20–31)
CREAT SERPL-MCNC: 1.12 MG/DL (ref 0.5–0.9)
GFR AFRICAN AMERICAN: 56 ML/MIN
GFR NON-AFRICAN AMERICAN: 46 ML/MIN
GFR SERPL CREATININE-BSD FRML MDRD: ABNORMAL ML/MIN/{1.73_M2}
GFR SERPL CREATININE-BSD FRML MDRD: ABNORMAL ML/MIN/{1.73_M2}
GLUCOSE BLD-MCNC: 298 MG/DL (ref 70–99)
POTASSIUM SERPL-SCNC: 4.9 MMOL/L (ref 3.7–5.3)
SODIUM BLD-SCNC: 137 MMOL/L (ref 135–144)
TOTAL PROTEIN: 6.3 G/DL (ref 6.4–8.3)

## 2020-04-15 PROCEDURE — 36415 COLL VENOUS BLD VENIPUNCTURE: CPT

## 2020-04-15 PROCEDURE — 80053 COMPREHEN METABOLIC PANEL: CPT

## 2022-01-28 ENCOUNTER — HOSPITAL ENCOUNTER (EMERGENCY)
Facility: CLINIC | Age: 87
Discharge: HOME | End: 2022-01-28
Attending: EMERGENCY MEDICINE
Payer: MEDICARE

## 2022-01-28 VITALS
SYSTOLIC BLOOD PRESSURE: 149 MMHG | HEART RATE: 94 BPM | OXYGEN SATURATION: 94 % | DIASTOLIC BLOOD PRESSURE: 86 MMHG | RESPIRATION RATE: 16 BRPM

## 2022-01-28 DIAGNOSIS — Z91.14 HISTORY OF MEDICATION NONCOMPLIANCE: ICD-10-CM

## 2022-01-28 DIAGNOSIS — M79.89 LEG SWELLING: ICD-10-CM

## 2022-01-28 DIAGNOSIS — R60.9 PERIPHERAL EDEMA: Primary | ICD-10-CM

## 2022-01-28 LAB
ABSOLUTE EOS #: 0.1 K/UL (ref 0–0.4)
ABSOLUTE IMMATURE GRANULOCYTE: ABNORMAL K/UL (ref 0–0.3)
ABSOLUTE LYMPH #: 0.6 K/UL (ref 1–4.8)
ABSOLUTE MONO #: 0.6 K/UL (ref 0.1–1.2)
ALBUMIN SERPL-MCNC: 3 G/DL (ref 3.5–5.2)
ALBUMIN/GLOBULIN RATIO: 1.2 (ref 1–2.5)
ALP BLD-CCNC: 153 U/L (ref 35–104)
ALT SERPL-CCNC: 7 U/L (ref 5–33)
ANION GAP SERPL CALCULATED.3IONS-SCNC: ABNORMAL MMOL/L (ref 9–17)
ANION GAP: 8 MMOL/L (ref 7–16)
AST SERPL-CCNC: 27 U/L
BASOPHILS # BLD: 0 % (ref 0–2)
BASOPHILS ABSOLUTE: 0 K/UL (ref 0–0.2)
BILIRUB SERPL-MCNC: 0.5 MG/DL (ref 0.3–1.2)
BNP INTERPRETATION: ABNORMAL
BUN BLDV-MCNC: ABNORMAL MG/DL (ref 8–23)
BUN/CREAT BLD: ABNORMAL (ref 9–20)
CALCIUM SERPL-MCNC: 8.2 MG/DL (ref 8.6–10.4)
CHLORIDE BLD-SCNC: ABNORMAL MMOL/L (ref 98–107)
CO2: ABNORMAL MMOL/L (ref 20–31)
CREAT SERPL-MCNC: ABNORMAL MG/DL (ref 0.5–0.9)
DIFFERENTIAL TYPE: ABNORMAL
EOSINOPHILS RELATIVE PERCENT: 1 % (ref 1–4)
GFR AFRICAN AMERICAN: ABNORMAL ML/MIN
GFR NON-AFRICAN AMERICAN: ABNORMAL ML/MIN
GFR NON-AFRICAN AMERICAN: ABNORMAL ML/MIN
GFR SERPL CREATININE-BSD FRML MDRD: ABNORMAL ML/MIN
GFR SERPL CREATININE-BSD FRML MDRD: ABNORMAL ML/MIN/{1.73_M2}
GLUCOSE BLD-MCNC: 194 MG/DL (ref 74–100)
GLUCOSE BLD-MCNC: ABNORMAL MG/DL (ref 70–99)
HCT VFR BLD CALC: 34.2 % (ref 36–46)
HEMOGLOBIN: 10.9 G/DL (ref 12–16)
IMMATURE GRANULOCYTES: ABNORMAL %
LYMPHOCYTES # BLD: 12 % (ref 24–44)
MCH RBC QN AUTO: 29.5 PG (ref 26–34)
MCHC RBC AUTO-ENTMCNC: 32 G/DL (ref 31–37)
MCV RBC AUTO: 92 FL (ref 80–100)
MONOCYTES # BLD: 12 % (ref 2–11)
NRBC AUTOMATED: ABNORMAL PER 100 WBC
PDW BLD-RTO: 16 % (ref 12.5–15.4)
PLATELET # BLD: 188 K/UL (ref 140–450)
PLATELET ESTIMATE: ABNORMAL
PMV BLD AUTO: 8.5 FL (ref 6–12)
POC BUN: 25 MG/DL (ref 8–26)
POC CHLORIDE: 109 MMOL/L (ref 98–107)
POC CREATININE: 1.34 MG/DL (ref 0.51–1.19)
POC POTASSIUM: 4.5 MMOL/L (ref 3.5–4.5)
POC SODIUM: 142 MMOL/L (ref 138–146)
POC TCO2: 26 MMOL/L (ref 22–30)
POTASSIUM SERPL-SCNC: ABNORMAL MMOL/L (ref 3.7–5.3)
PRO-BNP: 3915 PG/ML
RBC # BLD: 3.71 M/UL (ref 4–5.2)
RBC # BLD: ABNORMAL 10*6/UL
SEG NEUTROPHILS: 75 % (ref 36–66)
SEGMENTED NEUTROPHILS ABSOLUTE COUNT: 3.6 K/UL (ref 1.8–7.7)
SODIUM BLD-SCNC: ABNORMAL MMOL/L (ref 135–144)
TOTAL PROTEIN: 5.6 G/DL (ref 6.4–8.3)
WBC # BLD: 4.9 K/UL (ref 3.5–11)
WBC # BLD: ABNORMAL 10*3/UL

## 2022-01-28 PROCEDURE — 96376 TX/PRO/DX INJ SAME DRUG ADON: CPT

## 2022-01-28 PROCEDURE — 6360000002 HC RX W HCPCS: Performed by: EMERGENCY MEDICINE

## 2022-01-28 PROCEDURE — 84520 ASSAY OF UREA NITROGEN: CPT

## 2022-01-28 PROCEDURE — 82565 ASSAY OF CREATININE: CPT

## 2022-01-28 PROCEDURE — 99282 EMERGENCY DEPT VISIT SF MDM: CPT

## 2022-01-28 PROCEDURE — 83880 ASSAY OF NATRIURETIC PEPTIDE: CPT

## 2022-01-28 PROCEDURE — 80053 COMPREHEN METABOLIC PANEL: CPT

## 2022-01-28 PROCEDURE — 85025 COMPLETE CBC W/AUTO DIFF WBC: CPT

## 2022-01-28 PROCEDURE — 96374 THER/PROPH/DIAG INJ IV PUSH: CPT

## 2022-01-28 PROCEDURE — 80051 ELECTROLYTE PANEL: CPT

## 2022-01-28 PROCEDURE — 36415 COLL VENOUS BLD VENIPUNCTURE: CPT

## 2022-01-28 PROCEDURE — 82947 ASSAY GLUCOSE BLOOD QUANT: CPT

## 2022-01-28 RX ORDER — FUROSEMIDE 10 MG/ML
20 INJECTION INTRAMUSCULAR; INTRAVENOUS ONCE
Status: COMPLETED | OUTPATIENT
Start: 2022-01-28 | End: 2022-01-28

## 2022-01-28 RX ADMIN — FUROSEMIDE 20 MG: 10 INJECTION, SOLUTION INTRAVENOUS at 17:03

## 2022-01-28 RX ADMIN — FUROSEMIDE 20 MG: 10 INJECTION, SOLUTION INTRAVENOUS at 17:53

## 2022-01-28 NOTE — ED PROVIDER NOTES
[facility]  [address] [facility]  Phone: 57 Maude Gabriel      Pt Name: Lisa Muhammad  MRN: [id number]  [place] 10/23/1932  Date of evaluation: 1/28/2022    CHIEF COMPLAINT       Chief Complaint   Patient presents with    Leg Swelling     gradually over the last few weeks, hx of existing edema, noncompliant and irregular with lasix    Abdominal Pain     tenderness to lower abd and bloating        HISTORY OF PRESENT ILLNESS    Lisa Muhammad is a 80 y.o. female who presents leg swelling over the last 2 to 3 weeks. The patient admits that she has not been taking her Lasix as she has been prescribed she states the last time she took it was days ago and prior to that was approximately a week. He has not seen her family doctor concerning this chronic problem. Daughter states that she is concerned that there could be some swelling abdomen and that she has had a history of hemicolectomy when she was approximately 48years old. She has had no vomiting or diarrhea. The daughter relates that she has been wearing her high Jobst stockings  religiously and now notes swelling more in her thighs and lower abdomen. REVIEW OF SYSTEMS     Constitutional: No fevers or chills   HEENT: No sore throat, rhinorrhea, or earache   Eyes: No blurry vision or double vision no drainage   Cardiovascular: No chest pain or tachycardia   Respiratory: No wheezing or shortness of breath no cough   Gastrointestinal: No nausea, vomiting, diarrhea, constipation, or abdominal pain   : No hematuria or dysuria   Musculoskeletal: See above  Skin: No rash   Neurological: No focal neurologic complaints, paresthesias, weakness, or headache   PAST MEDICAL HISTORY    has a past medical history of Arthritis, Atrial fibrillation ([address] Cancer ([address] CHF (congestive heart failure) ([address] Diabetes mellitus ([address] Diverticulitis, Hypertension, Osteoporosis, and Sepsis ([address]     SURGICAL HISTORY      has a past surgical history that includes Femur Surgery; Colon surgery; Cholecystectomy; and Appendectomy. CURRENT MEDICATIONS       Previous Medications    CYANOCOBALAMIN (VITAMIN B-12 SL)    Place under the tongue three times a week    FUROSEMIDE (LASIX) 20 MG TABLET    Take 1 tablet by mouth daily    GLIMEPIRIDE PO    Take by mouth daily     HYDRALAZINE HCL PO    Take 50 mg by mouth daily     LOSARTAN POTASSIUM PO    Take 50 mg by mouth daily     METOPROLOL TARTRATE PO    Take 25 mg by mouth 2 times daily     MULTIPLE VITAMINS-MINERALS (THERAPEUTIC MULTIVITAMIN-MINERALS) TABLET    Take 1 tablet by mouth daily    OMEGA-3 FATTY ACIDS (FISH OIL PO)    Take by mouth    POTASSIUM CHLORIDE (KLOR-CON M) 20 MEQ EXTENDED RELEASE TABLET    Take 1 tablet by mouth daily       ALLERGIES     has No Known Allergies. FAMILY HISTORY     has no family status information on file. family history is not on file. SOCIAL HISTORY      reports that she has never smoked. She has never used smokeless tobacco. She reports that she does not drink alcohol and does not use drugs. PHYSICAL EXAM       ED Triage Vitals [01/28/22 1637]   BP Temp Temp src Pulse Resp SpO2 Height Weight   (!) 149/86 -- -- 94 16 94 % -- --     Constitutional: Alert, oriented x3, nontoxic, answering questions appropriately, acting properly for age, in no acute distress   HEENT: Extraocular muscles intact, mucus membranes moist, TMs clear bilaterally, no posterior pharyngeal erythema or exudates, Pupils equal, round, reactive to light,   Neck: Trachea midline   Cardiovascular: Regular rhythm and rate no murmurs   Respiratory: Clear to auscultation bilaterally no wheezes, rhonchi, rales, no respiratory distress no tachypnea no retractions no hypoxia  Gastrointestinal: Soft, nontender, nondistended, positive bowel sounds. No rebound, rigidity, or guarding.    Musculoskeletal: There is symmetrical swelling of both thighs and lower abdomen but no overlying abrasions contusions ecchymosis or secondary dermal or dermatitis. Neurologic: Moving all 4 extremities without difficulty there are no gross focal neurologic deficits   Skin: Warm and dry     DIFFERENTIAL DIAGNOSIS/ MDM:     I believe that she has significant peripheral edema she has had in the past we will give her Lasix we will check a BNP and electrolytes I do not feel that there is an intra-abdominal process exacerbating this problem I think it is more of a lack of compliance.     DIAGNOSTIC RESULTS     EKG: All EKG's are interpreted by the Emergency Department Physician who either signs or Co-signs this chart in the absence of a cardiologist.        Not indicated unless otherwise documented above    LABS:  Results for orders placed or performed during the hospital encounter of 01/28/22   CBC Auto Differential   Result Value Ref Range    WBC 4.9 3.5 - 11.0 k/uL    RBC 3.71 (L) 4.0 - 5.2 m/uL    Hemoglobin 10.9 (L) 12.0 - 16.0 g/dL    Hematocrit 34.2 (L) 36 - 46 %    MCV 92.0 80 - 100 fL    MCH 29.5 26 - 34 pg    MCHC 32.0 31 - 37 g/dL    RDW 16.0 (H) 12.5 - 15.4 %    Platelets 326 907 - 597 k/uL    MPV 8.5 6.0 - 12.0 fL    NRBC Automated NOT REPORTED per 100 WBC    Differential Type NOT REPORTED     Seg Neutrophils 75 (H) 36 - 66 %    Lymphocytes 12 (L) 24 - 44 %    Monocytes 12 (H) 2 - 11 %    Eosinophils % 1 1 - 4 %    Basophils 0 0 - 2 %    Immature Granulocytes NOT REPORTED 0 %    Segs Absolute 3.60 1.8 - 7.7 k/uL    Absolute Lymph # 0.60 (L) 1.0 - 4.8 k/uL    Absolute Mono # 0.60 0.1 - 1.2 k/uL    Absolute Eos # 0.10 0.0 - 0.4 k/uL    Basophils Absolute 0.00 0.0 - 0.2 k/uL    Absolute Immature Granulocyte NOT REPORTED 0.00 - 0.30 k/uL    WBC Morphology NOT REPORTED     RBC Morphology NOT REPORTED     Platelet Estimate NOT REPORTED    Comprehensive Metabolic Panel w/ Reflex to MG   Result Value Ref Range    Glucose NOT REPORTED 70 - 99 mg/dL    BUN NOT REPORTED 8 - 23 mg/dL    CREATININE NOT REPORTED 0.50 - 0.90 mg/dL    Bun/Cre Ratio NOT REPORTED 9 - 20    Calcium 8.2 (L) 8.6 - 10.4 mg/dL    Sodium NOT REPORTED 135 - 144 mmol/L    Potassium NOT REPORTED 3.7 - 5.3 mmol/L    Chloride NOT REPORTED 98 - 107 mmol/L    CO2 NOT REPORTED 20 - 31 mmol/L    Anion Gap Can not be calculated 9 - 17 mmol/L    Alkaline Phosphatase 153 (H) 35 - 104 U/L    ALT 7 5 - 33 U/L    AST 27 <32 U/L    Total Bilirubin 0.50 0.3 - 1.2 mg/dL    Total Protein 5.6 (L) 6.4 - 8.3 g/dL    Albumin 3.0 (L) 3.5 - 5.2 g/dL    Albumin/Globulin Ratio 1.2 1.0 - 2.5    GFR Non- Can not be calculated >60 mL/min    GFR  Can not be calculated >60 mL/min    GFR Comment          GFR Staging NOT REPORTED    Brain Natriuretic Peptide   Result Value Ref Range    Pro-BNP 3,915 (H) <300 pg/mL    BNP Interpretation NOT REPORTED    ELECTROLYTES PLUS   Result Value Ref Range    POC Sodium 142 138 - 146 mmol/L    POC Potassium 4.5 3.5 - 4.5 mmol/L    POC Chloride 109 (H) 98 - 107 mmol/L    POC TCO2 26 22 - 30 mmol/L    Anion Gap 8 7 - 16 mmol/L   Creatinine W/GFR Point of Care   Result Value Ref Range    POC Creatinine 1.34 (H) 0.51 - 1.19 mg/dL    GFR Comment NOT REPORTED >60 mL/min    GFR Non- NOT REPORTED >60 mL/min    GFR Comment         POCT urea (BUN)   Result Value Ref Range    POC BUN 25 8 - 26 mg/dL   POCT Glucose   Result Value Ref Range    POC Glucose 194 (H) 74 - 100 mg/dL       Not indicated unless otherwise documented above    RADIOLOGY:   I reviewed the radiologist interpretations:    No orders to display       Not indicated unless otherwise documented above    EMERGENCY DEPARTMENT COURSE:     The patient was given the following medications:  Orders Placed This Encounter   Medications    furosemide (LASIX) injection 20 mg    furosemide (LASIX) injection 20 mg        Vitals:   -------------------------  BP (!) 149/86   Pulse 94   Resp 16   SpO2 94%         I have reviewed the disposition diagnosis with the patient and or their family/guardian. I have answered their questions and given discharge instructions. They voiced understanding of these instructions and did not have any furtherquestions or complaints. CRITICAL CARE:    None    CONSULTS:    None    PROCEDURES:    None      OARRS Report if indicated             FINAL IMPRESSION      1. Peripheral edema    2. Leg swelling    3.  History of medication noncompliance          DISPOSITION/PLAN   DISPOSITION Decision To Discharge 01/28/2022 05:52:45 PM        CONDITION ON DISPOSITION: STABLE       PATIENT REFERRED TO:  Liang Alcaraz MD  [address] [place] [address]  [phone]    In 2 days        DISCHARGE MEDICATIONS:  New Prescriptions    No medications on file       (Please note that portions of thisnote were completed with a voice recognition program.  Efforts were made to edit the dictations but occasionally words are mis-transcribed.)    Onofre Tellez MD,, MD  Attending Emergency Physician        Onofre Tellez MD  01/28/22 8683

## 2022-02-09 ENCOUNTER — APPOINTMENT (OUTPATIENT)
Dept: CT IMAGING | Facility: CLINIC | Age: 87
End: 2022-02-09
Payer: MEDICARE

## 2022-02-09 ENCOUNTER — HOSPITAL ENCOUNTER (EMERGENCY)
Facility: CLINIC | Age: 87
Discharge: HOME | End: 2022-02-09
Attending: EMERGENCY MEDICINE
Payer: MEDICARE

## 2022-02-09 ENCOUNTER — APPOINTMENT (OUTPATIENT)
Dept: GENERAL RADIOLOGY | Facility: CLINIC | Age: 87
End: 2022-02-09
Payer: MEDICARE

## 2022-02-09 VITALS
TEMPERATURE: 98 F | HEART RATE: 76 BPM | DIASTOLIC BLOOD PRESSURE: 88 MMHG | WEIGHT: 160 LBS | OXYGEN SATURATION: 96 % | HEIGHT: 64 IN | RESPIRATION RATE: 20 BRPM | SYSTOLIC BLOOD PRESSURE: 166 MMHG | BODY MASS INDEX: 27.31 KG/M2

## 2022-02-09 DIAGNOSIS — I50.9 ACUTE DECOMPENSATED HEART FAILURE (HCC): Primary | ICD-10-CM

## 2022-02-09 LAB
ABSOLUTE EOS #: 0 K/UL (ref 0–0.4)
ABSOLUTE IMMATURE GRANULOCYTE: ABNORMAL K/UL (ref 0–0.3)
ABSOLUTE LYMPH #: 0.5 K/UL (ref 1–4.8)
ABSOLUTE MONO #: 0.5 K/UL (ref 0.1–1.2)
ALBUMIN SERPL-MCNC: 3.1 G/DL (ref 3.5–5.2)
ALBUMIN/GLOBULIN RATIO: 1.3 (ref 1–2.5)
ALP BLD-CCNC: 146 U/L (ref 35–104)
ALT SERPL-CCNC: 14 U/L (ref 5–33)
ANION GAP SERPL CALCULATED.3IONS-SCNC: 13 MMOL/L (ref 9–17)
AST SERPL-CCNC: 22 U/L
BASOPHILS # BLD: 1 % (ref 0–2)
BASOPHILS ABSOLUTE: 0 K/UL (ref 0–0.2)
BILIRUB SERPL-MCNC: 0.4 MG/DL (ref 0.3–1.2)
BNP INTERPRETATION: ABNORMAL
BUN BLDV-MCNC: 25 MG/DL (ref 8–23)
BUN/CREAT BLD: ABNORMAL (ref 9–20)
CALCIUM SERPL-MCNC: 8.1 MG/DL (ref 8.6–10.4)
CHLORIDE BLD-SCNC: 108 MMOL/L (ref 98–107)
CO2: 24 MMOL/L (ref 20–31)
CREAT SERPL-MCNC: 0.9 MG/DL (ref 0.5–0.9)
DIFFERENTIAL TYPE: ABNORMAL
EOSINOPHILS RELATIVE PERCENT: 1 % (ref 1–4)
GFR AFRICAN AMERICAN: >60 ML/MIN
GFR NON-AFRICAN AMERICAN: 59 ML/MIN
GFR SERPL CREATININE-BSD FRML MDRD: ABNORMAL ML/MIN/{1.73_M2}
GFR SERPL CREATININE-BSD FRML MDRD: ABNORMAL ML/MIN/{1.73_M2}
GLUCOSE BLD-MCNC: 216 MG/DL (ref 70–99)
HCT VFR BLD CALC: 33.6 % (ref 36–46)
HEMOGLOBIN: 10.6 G/DL (ref 12–16)
IMMATURE GRANULOCYTES: ABNORMAL %
LYMPHOCYTES # BLD: 12 % (ref 24–44)
MCH RBC QN AUTO: 28.5 PG (ref 26–34)
MCHC RBC AUTO-ENTMCNC: 31.5 G/DL (ref 31–37)
MCV RBC AUTO: 90.5 FL (ref 80–100)
MONOCYTES # BLD: 12 % (ref 2–11)
NRBC AUTOMATED: ABNORMAL PER 100 WBC
PDW BLD-RTO: 15 % (ref 12.5–15.4)
PLATELET # BLD: 217 K/UL (ref 140–450)
PLATELET ESTIMATE: ABNORMAL
PMV BLD AUTO: 8.2 FL (ref 6–12)
POTASSIUM SERPL-SCNC: 4.4 MMOL/L (ref 3.7–5.3)
PRO-BNP: 3922 PG/ML
RBC # BLD: 3.72 M/UL (ref 4–5.2)
RBC # BLD: ABNORMAL 10*6/UL
SARS-COV-2, RAPID: NOT DETECTED
SEG NEUTROPHILS: 74 % (ref 36–66)
SEGMENTED NEUTROPHILS ABSOLUTE COUNT: 3.4 K/UL (ref 1.8–7.7)
SODIUM BLD-SCNC: 145 MMOL/L (ref 135–144)
SPECIMEN DESCRIPTION: NORMAL
TOTAL PROTEIN: 5.5 G/DL (ref 6.4–8.3)
TROPONIN INTERP: ABNORMAL
TROPONIN T: ABNORMAL NG/ML
TROPONIN, HIGH SENSITIVITY: 41 NG/L (ref 0–14)
WBC # BLD: 4.6 K/UL (ref 3.5–11)
WBC # BLD: ABNORMAL 10*3/UL

## 2022-02-09 PROCEDURE — 74176 CT ABD & PELVIS W/O CONTRAST: CPT

## 2022-02-09 PROCEDURE — 71045 X-RAY EXAM CHEST 1 VIEW: CPT

## 2022-02-09 PROCEDURE — 83880 ASSAY OF NATRIURETIC PEPTIDE: CPT

## 2022-02-09 PROCEDURE — 96374 THER/PROPH/DIAG INJ IV PUSH: CPT

## 2022-02-09 PROCEDURE — 80053 COMPREHEN METABOLIC PANEL: CPT

## 2022-02-09 PROCEDURE — 36415 COLL VENOUS BLD VENIPUNCTURE: CPT

## 2022-02-09 PROCEDURE — 99283 EMERGENCY DEPT VISIT LOW MDM: CPT

## 2022-02-09 PROCEDURE — 85025 COMPLETE CBC W/AUTO DIFF WBC: CPT

## 2022-02-09 PROCEDURE — 6360000002 HC RX W HCPCS: Performed by: EMERGENCY MEDICINE

## 2022-02-09 PROCEDURE — 87635 SARS-COV-2 COVID-19 AMP PRB: CPT

## 2022-02-09 PROCEDURE — 84484 ASSAY OF TROPONIN QUANT: CPT

## 2022-02-09 PROCEDURE — 93005 ELECTROCARDIOGRAM TRACING: CPT | Performed by: EMERGENCY MEDICINE

## 2022-02-09 RX ORDER — FUROSEMIDE 10 MG/ML
40 INJECTION INTRAMUSCULAR; INTRAVENOUS ONCE
Status: COMPLETED | OUTPATIENT
Start: 2022-02-09 | End: 2022-02-09

## 2022-02-09 RX ADMIN — FUROSEMIDE 40 MG: 10 INJECTION, SOLUTION INTRAMUSCULAR; INTRAVENOUS at 17:22

## 2022-02-09 ASSESSMENT — ENCOUNTER SYMPTOMS
SORE THROAT: 0
DIARRHEA: 0
ABDOMINAL PAIN: 1
ABDOMINAL DISTENTION: 1
VOMITING: 0
SHORTNESS OF BREATH: 1

## 2022-02-09 NOTE — ED PROVIDER NOTES
[facility]  [facility]  Phone: [phone]        [facility] ED  EMERGENCY DEPARTMENT ENCOUNTER      Pt Name: Lala Dillard  MRN: [id number]  [place] 10/23/1932  Date of evaluation: 2/9/2022  Provider: Tirso Hawk DO    CHIEF COMPLAINT       Chief Complaint   Patient presents with    Abdominal Pain    Leg Swelling         HISTORY OF PRESENT ILLNESS   (Location/Symptom, Timing/Onset,Context/Setting, Quality, Duration, Modifying Factors, Severity)  Note limiting factors. Lala Dillard is a 80 y.o. female who presents to the emergency department for the evaluation of some abdominal bloating and tenderness per daughter who states she has a pediatrician. The mother was brought here approximately 2 weeks ago where she was evaluated for lower extremity edema as well as some swelling in the thighs and abdomen. Work-up revealed evidence of fluid overload and she was instructed to resume her Lasix which she had not been taking regularly as prescribed. Patient has not yet followed up with primary care physician. Patient has no history of heart failure. She does complain of some intermittent shortness of breath. No cough or fever. Denies chest pain. Complains of intermittent lower abdominal pain with chronic, nonbloody/nonbilious/normal color diarrhea. No dark tarry stools. She has nausea but no vomiting. Patient does have lower extremity edema and has been taking her Lasix more religiously. Patient has known history of A. fib. Nursing Notes were reviewed. REVIEW OF SYSTEMS    (2-9systems for level 4, 10 or more for level 5)     Review of Systems   Constitutional: Negative for fever. HENT: Negative for sore throat. Respiratory: Positive for shortness of breath. Cardiovascular: Positive for leg swelling. Negative for chest pain. Gastrointestinal: Positive for abdominal distention and abdominal pain. Negative for diarrhea and vomiting. Genitourinary: Negative for dysuria. Skin: Negative for rash. Neurological: Negative for weakness. All other systems reviewed and are negative. Except asnoted above the remainder of the review of systems was reviewed and negative. PAST MEDICAL HISTORY     Past Medical History:   Diagnosis Date    Arthritis     [facility]     r/t sepsis     Cancer ([facility]     colon, skin    CHF (congestive heart failure) (HCC)     Diabetes mellitus ([facility]     Diverticulitis     Hypertension     Osteoporosis     Sepsis ([facility]          SURGICAL HISTORY       Past Surgical History:   Procedure Laterality Date    APPENDECTOMY      CHOLECYSTECTOMY      COLON SURGERY      FEMUR SURGERY      s/p fracture         CURRENT MEDICATIONS     Previous Medications    CYANOCOBALAMIN (VITAMIN B-12 SL)    Place under the tongue three times a week    FUROSEMIDE (LASIX) 20 MG TABLET    Take 1 tablet by mouth daily    GLIMEPIRIDE PO    Take by mouth daily     HYDRALAZINE HCL PO    Take 50 mg by mouth daily     LOSARTAN POTASSIUM PO    Take 50 mg by mouth daily     METOPROLOL TARTRATE PO    Take 25 mg by mouth 2 times daily     MULTIPLE VITAMINS-MINERALS (THERAPEUTIC MULTIVITAMIN-MINERALS) TABLET    Take 1 tablet by mouth daily    OMEGA-3 FATTY ACIDS (FISH OIL PO)    Take by mouth    POTASSIUM CHLORIDE (KLOR-CON M) 20 MEQ EXTENDED RELEASE TABLET    Take 1 tablet by mouth daily       ALLERGIES     Patient has no known allergies. FAMILY HISTORY     No family history on file.        SOCIAL HISTORY       Social History     Socioeconomic History    Marital status:      Spouse name: Not on file    Number of children: Not on file    Years of education: Not on file    Highest education level: Not on file   Occupational History    Not on file   Tobacco Use    Smoking status: Never Smoker    Smokeless tobacco: Never Used   Substance and Sexual Activity    Alcohol use: No    Drug use: No    Sexual activity: Not on file   Other Topics Concern    Not on file   Social History Narrative    Not on file     Social Determinants of Health     Financial Resource Strain:     Difficulty of Paying Living Expenses: Not on file   Food Insecurity:     Worried About Running Out of Food in the Last Year: Not on file    Ricarda of Food in the Last Year: Not on file   Transportation Needs:     Lack of Transportation (Medical): Not on file    Lack of Transportation (Non-Medical): Not on file   Physical Activity:     Days of Exercise per Week: Not on file    Minutes of Exercise per Session: Not on file   Stress:     Feeling of Stress : Not on file   Social Connections:     Frequency of Communication with Friends and Family: Not on file    Frequency of Social Gatherings with Friends and Family: Not on file    Attends Denominational Services: Not on file    Active Member of [address] [place] [address] [employer] or Organizations: Not on file    Attends Club or Organization Meetings: Not on file    Marital Status: Not on file   Intimate Partner Violence:     Fear of Current or Ex-Partner: Not on file    Emotionally Abused: Not on file    Physically Abused: Not on file    Sexually Abused: Not on file   Housing Stability:     Unable to Pay for Housing in the Last Year: Not on file    Number of [place] in the Last Year: Not on file    Unstable Housing in the Last Year: Not on file       SCREENINGS    Alfredo Coma Scale  Eye Opening: Spontaneous  Best Verbal Response: Oriented  Best Motor Response: Obeys commands  [place] Coma Scale Score: 15        PHYSICAL EXAM    (up to 7 for level 4, 8 or more for level 5)     ED Triage Vitals   BP Temp Temp src Pulse Resp SpO2 Height Weight   -- -- -- -- -- -- -- --       Physical Exam  Vitals and nursing note reviewed. Constitutional:       General: She is not in acute distress. Appearance: Normal appearance. She is not ill-appearing or toxic-appearing. HENT:      Head: Normocephalic and atraumatic. Nose: Nose normal. No congestion. Mouth/Throat:      Mouth: Mucous membranes are moist.   Eyes:      General:         Right eye: No discharge. Left eye: No discharge. Conjunctiva/sclera: Conjunctivae normal.   Cardiovascular:      Rate and Rhythm: Normal rate. Rhythm irregular. Pulses: Normal pulses. Heart sounds: Normal heart sounds. No murmur heard. Pulmonary:      Effort: Pulmonary effort is normal. No respiratory distress. Breath sounds: Normal breath sounds. No wheezing. Abdominal:      General: Abdomen is flat and protuberant. Bowel sounds are normal. There is distension. Palpations: Abdomen is soft. Tenderness: There is abdominal tenderness in the suprapubic area. There is no right CVA tenderness, left CVA tenderness, guarding or rebound. Negative signs include McBurney's sign. Comments: Slightly protuberant/distended abdomen noted. There is some suprapubic tenderness. Bowel sounds are grossly normal.   Musculoskeletal:         General: No deformity or signs of injury. Normal range of motion. Cervical back: Normal range of motion. Skin:     General: Skin is warm and dry. Capillary Refill: Capillary refill takes less than 2 seconds. Findings: No rash. Neurological:      General: No focal deficit present. Mental Status: She is alert. Mental status is at baseline. Motor: No weakness. Comments: Speaking normally. No facial asymmetry. Moving all 4 extremities. Normal gait. Psychiatric:         Mood and Affect: Mood normal.         EMERGENCY DEPARTMENT COURSE and DIFFERENTIAL DIAGNOSIS/MDM:   Vitals:    Vitals:    02/09/22 1546   BP: (!) 158/79   Pulse: 76   Resp: 20   Temp: 98 °F (36.7 °C)   TempSrc: Oral   SpO2: 94%   Weight: 72.6 kg (160 lb)   Height: 5' 4\" (1.626 m)       Patient presents to the emergency department with a complaint described above.   Vital signs were grossly normal, she is nontoxic and resting comfortably. She had 3+ pitting edema bilaterally in the lower extremities. Brawny and appears chronic. She has a slightly distended/protuberant abdomen with a little suprapubic tenderness but no rigidity or guarding. She has history of hemicolectomy secondary to cancer many years ago. At this time the differential diagnosis is broad I did review the work-up from the last time she was in, it appears that they did a heart failure work-up, diagnosed her with acute on chronic heart failure and sent her home with instructions to take her Lasix as prescribed which she had not been doing. At this point she states she has been doing it without much improvement. They demonstrate most concern over the abdomen. Patient has no chest pain.   We will do twelve-lead EKG, routine blood work, cardiac enzymes, will get a chest x-ray and I am going to get a CT of the abdomen and pelvis without contrast and I will then reevaluate      DIAGNOSTIC RESULTS     LABS:  Labs Reviewed   CBC WITH AUTO DIFFERENTIAL - Abnormal; Notable for the following components:       Result Value    RBC 3.72 (*)     Hemoglobin 10.6 (*)     Hematocrit 33.6 (*)     Seg Neutrophils 74 (*)     Lymphocytes 12 (*)     Monocytes 12 (*)     Absolute Lymph # 0.50 (*)     All other components within normal limits   COMPREHENSIVE METABOLIC PANEL - Abnormal; Notable for the following components:    Glucose 216 (*)     BUN 25 (*)     Calcium 8.1 (*)     Sodium 145 (*)     Chloride 108 (*)     Alkaline Phosphatase 146 (*)     Total Protein 5.5 (*)     Albumin 3.1 (*)     GFR Non- 59 (*)     All other components within normal limits   BRAIN NATRIURETIC PEPTIDE - Abnormal; Notable for the following components:    Pro-BNP 3,922 (*)     All other components within normal limits   TROPONIN - Abnormal; Notable for the following components:    Troponin, High Sensitivity 41 (*)     All other components within normal limits   COVID-19, RAPID   URINALYSIS WITH MICROSCOPIC       All other labs were within normal range or not returned as of this dictation. RADIOLOGY:  XR CHEST PORTABLE   Final Result   1. Cardiomegaly, with mild CHF   2. Small right pleural effusion         CT ABDOMEN PELVIS WO CONTRAST Additional Contrast? None   Final Result   Diverticulosis without obvious evidence of diverticulitis      Anasarca and mild ascites      Chronic or recurrent right pleural effusion      Fluid-filled loops of mildly dilated small bowel possibly reflecting   enteritis. Developing partial small bowel obstruction cannot be excluded. Limited exam.  Patient motion artifact degrading image resolution               ED Course as of 02/09/22 1840   Wed Feb 09, 2022   1621 Twelve lead EKG interpreted by myself:  A 12 lead EKG done at 1601, interpreted by myself, showed a irregularly irregular rhythm at a rate of 61bpm.  The AR interval was not calculated. The QRS complex was normal.  There was no ST segment elevation or depression, T wave inversion not present. QRS progression through precordial leads was abnormal.  Interpretation: a fib, no evidence of acute ischemia or infarct [TS]   1652 Patient's laboratory results have some abnormalities but are grossly unchanged when compared with previous. She has a mild elevation in BUN which is actually improved. Her calcium is low which is not changed. Stable chronic anemia noted. [TS]   3768 Chest x-ray shows cardiomegaly with a small right pleural effusion [TS]   1653 Patient CT scan does reveal some dilated small bowel loops, it appears to be enteritis but a developing small bowel obstruction cannot be excluded. Anasarca with ascites noted [TS]   1700 It is unclear how regularly she has been taking her Lasix at home. Her daughter states that the patient takes it every other day and the patient states that she could take it with better regularity.   I did talk to her about the fact that I do think she needs to be admitted as she seems to have worsening heart failure when compared with her last visit. She is a little bit unsure if she wants to do that. They are going to talk about it. I have ordered some Lasix and will reevaluate [TS]   1728 Patient is amenable to admission, she is requesting [facility] as she has significant concerns about being admitted with other Covid patients. She states that if they cannot accept her at that hospital she is amenable to [facility] but she would like us to try [place] first [TS]   1838 I spoke with the hospitalist at [facility], he stated that he felt the patient had 2 large effusions to come to the hospital was requesting us to talk to another hospital.  I spoke with the radiologist, he states that there is no significant amount of fluid in the abdomen and no paracentesis would be indicated. He states that there is no large pleural effusion and only diagnostic thoracentesis would make much sense. I discussed this with the patient and her family but they stated at this point that she feels like she would just like to go home. Her daughter is a physician, she is going to increase from 20 mg of Lasix every other day to 20 mg of Lasix daily, she takes potassium daily to help supplement potassium loss, they are going to follow-up with their PCP and cardiologist within the next few days and she clearly understands that if the patient is worse they need to return immediately    At this time the patient is without objective evidence of an acute process requiring hospitalization or inpatient management. They have remained hemodynamically stable and are stable for discharge with outpatient follow-up. Standard anticipatory guidance given to patient upon discharge. Have given them a specific time frame in which to follow-up and who to follow-up with.   I have also advised them that they should return to the emergency department if they get worse, or not getting better or develop any new or concerning symptoms. Patient demonstrates understanding. [TS]      ED Course User Index  [TS] Shikha Bush DO         PROCEDURES:  Unless otherwise noted below, none     Procedures    FINAL IMPRESSION      1.  Acute decompensated heart failure [facility])          DISPOSITION/PLAN   DISPOSITION Decision To Discharge 02/09/2022 06:39:40 PM      PATIENT REFERRED TO:  Lupillo Salinas MD  [address] [place] [id number]  [phone]    In 3 days        DISCHARGE MEDICATIONS:  New Prescriptions    No medications on file          (Please note that portions of this note were completed with a voice recognition program.  Efforts were made to edit the dictations but occasionally words are mis-transcribed.)    Shikha Bush DO (electronically signed)  Board Certified Emergency Physician         Shikha Bush DO  02/09/22 1241

## 2022-02-09 NOTE — ED NOTES
Access called, informed to have Dr. Angelia Rogers paged for Dr. Yue Gonzalez RN  02/09/22 [place]  02/09/22 6324

## 2022-02-10 LAB
EKG ATRIAL RATE: 300 BPM
EKG Q-T INTERVAL: 448 MS
EKG QRS DURATION: 144 MS
EKG QTC CALCULATION (BAZETT): 450 MS
EKG R AXIS: -100 DEGREES
EKG T AXIS: 68 DEGREES
EKG VENTRICULAR RATE: 61 BPM

## 2022-10-20 ENCOUNTER — HOSPITAL ENCOUNTER (EMERGENCY)
Facility: CLINIC | Age: 87
Discharge: HOME | End: 2022-10-20
Attending: EMERGENCY MEDICINE
Payer: MEDICARE

## 2022-10-20 VITALS
HEIGHT: 64 IN | TEMPERATURE: 98.2 F | RESPIRATION RATE: 16 BRPM | BODY MASS INDEX: 25.61 KG/M2 | HEART RATE: 89 BPM | DIASTOLIC BLOOD PRESSURE: 91 MMHG | WEIGHT: 150 LBS | OXYGEN SATURATION: 97 % | SYSTOLIC BLOOD PRESSURE: 166 MMHG

## 2022-10-20 DIAGNOSIS — N39.0 ACUTE UTI: ICD-10-CM

## 2022-10-20 DIAGNOSIS — R53.83 OTHER FATIGUE: Primary | ICD-10-CM

## 2022-10-20 LAB
ABSOLUTE EOS #: 0.1 K/UL (ref 0–0.4)
ABSOLUTE LYMPH #: 1 K/UL (ref 1–4.8)
ABSOLUTE MONO #: 0.7 K/UL (ref 0.1–1.2)
ANION GAP SERPL CALCULATED.3IONS-SCNC: 10 MMOL/L (ref 9–17)
BACTERIA: ABNORMAL
BASOPHILS # BLD: 0 % (ref 0–2)
BASOPHILS ABSOLUTE: 0 K/UL (ref 0–0.2)
BILIRUBIN URINE: NEGATIVE
BUN BLDV-MCNC: 25 MG/DL (ref 8–23)
CALCIUM SERPL-MCNC: 8.1 MG/DL (ref 8.6–10.4)
CHLORIDE BLD-SCNC: 109 MMOL/L (ref 98–107)
CO2: 24 MMOL/L (ref 20–31)
COLOR: YELLOW
CREAT SERPL-MCNC: 0.9 MG/DL (ref 0.5–0.9)
EOSINOPHILS RELATIVE PERCENT: 2 % (ref 1–4)
EPITHELIAL CELLS UA: ABNORMAL /HPF (ref 0–5)
GFR SERPL CREATININE-BSD FRML MDRD: >60 ML/MIN/1.73M2
GLUCOSE BLD-MCNC: 166 MG/DL (ref 70–99)
GLUCOSE URINE: NEGATIVE
HCT VFR BLD CALC: 36.5 % (ref 36–46)
HEMOGLOBIN: 12 G/DL (ref 12–16)
KETONES, URINE: NEGATIVE
LEUKOCYTE ESTERASE, URINE: ABNORMAL
LYMPHOCYTES # BLD: 17 % (ref 24–44)
MCH RBC QN AUTO: 30.5 PG (ref 26–34)
MCHC RBC AUTO-ENTMCNC: 32.8 G/DL (ref 31–37)
MCV RBC AUTO: 93 FL (ref 80–100)
MONOCYTES # BLD: 13 % (ref 2–11)
NITRITE, URINE: NEGATIVE
OTHER OBSERVATIONS UA: ABNORMAL
PDW BLD-RTO: 16.7 % (ref 12.5–15.4)
PH UA: 5.5 (ref 5–8)
PLATELET # BLD: 189 K/UL (ref 140–450)
PMV BLD AUTO: 8.8 FL (ref 6–12)
POTASSIUM SERPL-SCNC: 4.3 MMOL/L (ref 3.7–5.3)
PROTEIN UA: ABNORMAL
RBC # BLD: 3.92 M/UL (ref 4–5.2)
RBC UA: ABNORMAL /HPF (ref 0–2)
SEG NEUTROPHILS: 68 % (ref 36–66)
SEGMENTED NEUTROPHILS ABSOLUTE COUNT: 3.8 K/UL (ref 1.8–7.7)
SODIUM BLD-SCNC: 143 MMOL/L (ref 135–144)
SPECIFIC GRAVITY UA: 1.02 (ref 1–1.03)
TROPONIN, HIGH SENSITIVITY: 37 NG/L (ref 0–14)
TURBIDITY: CLEAR
URINE HGB: ABNORMAL
UROBILINOGEN, URINE: NORMAL
WBC # BLD: 5.7 K/UL (ref 3.5–11)
WBC UA: ABNORMAL /HPF (ref 0–5)

## 2022-10-20 PROCEDURE — 84484 ASSAY OF TROPONIN QUANT: CPT

## 2022-10-20 PROCEDURE — 81001 URINALYSIS AUTO W/SCOPE: CPT

## 2022-10-20 PROCEDURE — 87088 URINE BACTERIA CULTURE: CPT

## 2022-10-20 PROCEDURE — 36415 COLL VENOUS BLD VENIPUNCTURE: CPT

## 2022-10-20 PROCEDURE — 6360000002 HC RX W HCPCS: Performed by: EMERGENCY MEDICINE

## 2022-10-20 PROCEDURE — 96372 THER/PROPH/DIAG INJ SC/IM: CPT

## 2022-10-20 PROCEDURE — 87086 URINE CULTURE/COLONY COUNT: CPT

## 2022-10-20 PROCEDURE — 85025 COMPLETE CBC W/AUTO DIFF WBC: CPT

## 2022-10-20 PROCEDURE — 99284 EMERGENCY DEPT VISIT MOD MDM: CPT

## 2022-10-20 PROCEDURE — 80048 BASIC METABOLIC PNL TOTAL CA: CPT

## 2022-10-20 PROCEDURE — 87186 SC STD MICRODIL/AGAR DIL: CPT

## 2022-10-20 RX ORDER — CEFTRIAXONE 1 G/1
1000 INJECTION, POWDER, FOR SOLUTION INTRAMUSCULAR; INTRAVENOUS ONCE
Status: COMPLETED | OUTPATIENT
Start: 2022-10-20 | End: 2022-10-20

## 2022-10-20 RX ADMIN — CEFTRIAXONE SODIUM 1000 MG: 1 INJECTION, POWDER, FOR SOLUTION INTRAMUSCULAR; INTRAVENOUS at 18:32

## 2022-10-20 NOTE — DISCHARGE INSTRUCTIONS
Return immediately if any worsening symptoms or any other concerns    Tell us how we did visit: [email or web address]   and let us know about your experience

## 2022-10-20 NOTE — ED PROVIDER NOTES
[facility] ED  [address] [facility]  Phone: 20 Maude Gabriel      Pt Name: Corinna Rivera  MRN: [id number]  [place] 10/23/1932  Date of evaluation: 10/20/2022    CHIEF COMPLAINT       Chief Complaint   Patient presents with    Fatigue     States she felt like this last time her K was low. HISTORY OF PRESENT ILLNESS    Corinna Rivera is a 80 y.o. female who presents to the emergency department with fatigue and shakiness. She says she feels like her insides are shaking. She has chronic bowel issues. She was on spironolactone and Lasix and had issues with her potassium in the past.  Daughter is with her who is a pediatrician. Patient denies any chest pain or shortness of breath. No fevers or chills. Chronic diarrhea. No other symptoms. REVIEW OF SYSTEMS       Constitutional: No fevers or chills positive shakiness  HEENT: No sore throat, rhinorrhea, or earache   Eyes: No blurry vision or double vision no drainage   Cardiovascular: No chest pain or tachycardia   Respiratory: No wheezing or shortness of breath no cough   Gastrointestinal: No nausea, vomiting, no constipation, chronic abdominal pain  : No hematuria or dysuria   Musculoskeletal: Chronic edema no pain  Skin: No rash   Neurological: No focal neurologic complaints, paresthesias, weakness, or headache     PAST MEDICAL HISTORY    has a past medical history of Arthritis, Atrial fibrillation ([address] Cancer ([address] CHF (congestive heart failure) ([address] Diabetes mellitus ([address] Diverticulitis, Hypertension, Osteoporosis, and Sepsis ([address] SURGICAL HISTORY      has a past surgical history that includes Femur Surgery; Colon surgery; Cholecystectomy; and Appendectomy.     CURRENT MEDICATIONS       Previous Medications    CYANOCOBALAMIN (VITAMIN B-12 SL)    Place under the tongue three times a week    FUROSEMIDE (LASIX) 20 MG TABLET    Take 1 tablet by mouth daily    GLIMEPIRIDE PO    Take by mouth daily HYDRALAZINE HCL PO    Take 50 mg by mouth daily     LOSARTAN POTASSIUM PO    Take 50 mg by mouth daily     METOPROLOL TARTRATE PO    Take 25 mg by mouth 2 times daily     MULTIPLE VITAMINS-MINERALS (THERAPEUTIC MULTIVITAMIN-MINERALS) TABLET    Take 1 tablet by mouth daily    OMEGA-3 FATTY ACIDS (FISH OIL PO)    Take by mouth    POTASSIUM CHLORIDE (KLOR-CON M) 20 MEQ EXTENDED RELEASE TABLET    Take 1 tablet by mouth daily       ALLERGIES     has No Known Allergies. FAMILY HISTORY     has no family status information on file. family history is not on file. SOCIAL HISTORY      reports that she has never smoked. She has never used smokeless tobacco. She reports that she does not drink alcohol and does not use drugs. PHYSICAL EXAM       ED Triage Vitals [10/20/22 1703]   BP Temp Temp Source Heart Rate Resp SpO2 Height Weight   (!) 166/91 98.2 °F (36.8 °C) Oral 89 16 97 % 5' 4\" (1.626 m) 150 lb (68 kg)       Constitutional: Alert, oriented x3, nontoxic, answering questions appropriately, acting properly for age, in no acute distress   HEENT: Extraocular muscles intact, mucus membranes moist,  no posterior pharyngeal erythema or exudates, Pupils equal, round, reactive to light,   Neck: Trachea midline   Cardiovascular: Regular rhythm and rate no murmurs   Respiratory: Clear to auscultation bilaterally no wheezes, rhonchi, rales, no respiratory distress no tachypnea no retractions no hypoxia  Gastrointestinal: Soft, nontender, nondistended, positive bowel sounds. No rebound, rigidity, or guarding. Musculoskeletal: Bilateral pedal edema left greater than right  Neurologic: Moving all 4 extremities without difficulty there are no gross focal neurologic deficits   Skin: Warm and dry       DIFFERENTIAL DIAGNOSIS/ MDM:     Labs. Urinalysis.     DIAGNOSTIC RESULTS     EKG: All EKG's are interpreted by the Emergency Department Physician who either signs or Co-signs this chart in the absence of a cardiologist.        Not indicated unless otherwise documented above    LABS:  Results for orders placed or performed during the hospital encounter of 10/20/22   CBC with Auto Differential   Result Value Ref Range    WBC 5.7 3.5 - 11.0 k/uL    RBC 3.92 (L) 4.0 - 5.2 m/uL    Hemoglobin 12.0 12.0 - 16.0 g/dL    Hematocrit 36.5 36 - 46 %    MCV 93.0 80 - 100 fL    MCH 30.5 26 - 34 pg    MCHC 32.8 31 - 37 g/dL    RDW 16.7 (H) 12.5 - 15.4 %    Platelets 346 091 - 627 k/uL    MPV 8.8 6.0 - 12.0 fL    Seg Neutrophils 68 (H) 36 - 66 %    Lymphocytes 17 (L) 24 - 44 %    Monocytes 13 (H) 2 - 11 %    Eosinophils % 2 1 - 4 %    Basophils 0 0 - 2 %    Segs Absolute 3.80 1.8 - 7.7 k/uL    Absolute Lymph # 1.00 1.0 - 4.8 k/uL    Absolute Mono # 0.70 0.1 - 1.2 k/uL    Absolute Eos # 0.10 0.0 - 0.4 k/uL    Basophils Absolute 0.00 0.0 - 0.2 k/uL   Basic Metabolic Panel   Result Value Ref Range    Glucose 166 (H) 70 - 99 mg/dL    BUN 25 (H) 8 - 23 mg/dL    Creatinine 0.90 0.50 - 0.90 mg/dL    Est, Glom Filt Rate >60 >60 mL/min/1.73m2    Calcium 8.1 (L) 8.6 - 10.4 mg/dL    Sodium 143 135 - 144 mmol/L    Potassium 4.3 3.7 - 5.3 mmol/L    Chloride 109 (H) 98 - 107 mmol/L    CO2 24 20 - 31 mmol/L    Anion Gap 10 9 - 17 mmol/L   Urinalysis with Reflex to Culture    Specimen: Urine, clean catch   Result Value Ref Range    Color, UA Yellow Yellow    Turbidity UA Clear Clear    Glucose, Ur NEGATIVE NEGATIVE    Bilirubin Urine NEGATIVE NEGATIVE    Ketones, Urine NEGATIVE NEGATIVE    Specific Gravity, UA 1.025 1.005 - 1.030    Urine Hgb SMALL (A) NEGATIVE    pH, UA 5.5 5.0 - 8.0    Protein, UA 1+ (A) NEGATIVE    Urobilinogen, Urine Normal Normal    Nitrite, Urine NEGATIVE NEGATIVE    Leukocyte Esterase, Urine SMALL (A) NEGATIVE   Troponin   Result Value Ref Range    Troponin, High Sensitivity 37 (H) 0 - 14 ng/L   Microscopic Urinalysis   Result Value Ref Range    WBC, UA 20 TO 50 0 - 5 /HPF    RBC, UA 2 TO 5 0 - 2 /HPF    Epithelial Cells UA 5 TO 10 0 - 5 /HPF    Bacteria, UA MANY (A) None    Other Observations UA Culture ordered based on defined criteria. (A) NOT REQ. Not indicated unless otherwise documented above    RADIOLOGY:   I reviewed the radiologist interpretations:    No orders to display       Not indicated unless otherwise documented above    EMERGENCY DEPARTMENT COURSE:     The patient was given the following medications:  Orders Placed This Encounter   Medications    cefTRIAXone (ROCEPHIN) injection 1,000 mg     Order Specific Question:   Antimicrobial Indications     Answer:   Urinary Tract Infection        Vitals:   -------------------------  BP (!) 166/91   Pulse 89   Temp 98.2 °F (36.8 °C) (Oral)   Resp 16   Ht 5' 4\" (1.626 m)   Wt 68 kg (150 lb)   SpO2 97%   BMI 25.75 kg/m²     6:15 PM no acute distress. Urine shows many bacteria 20-50 white blood cells troponin slightly elevated normal white blood cell count no anemia. Electrolytes unremarkable with exception of a slightly low calcium of 8.1 potassium is normal 4.3. Will discharge after dose of Rocephin. Talked with the patient's daughter who is pediatrician says that she does not do well with p.o. antibiotics because of her stomach. We will try an injection and discharged home. The patient understands that at this time there is no evidence for a more malignant underlying process, but also understands that early in the process of an illness or injury, an emergency department workup can be falsely reassuring. Routine discharge counseling was given, and it is understood that worsening, changing or persistent symptoms should prompt an immediate call or follow up with their primary physician or return to the emergency department. The importance of appropriate follow up was also discussed. I have reviewed the disposition diagnosis. I have answered the questions and given discharge instructions.   There was voiced understanding of these instructions and no further questions or complaints. CRITICAL CARE:    None    CONSULTS:    None    PROCEDURES:    None      OARRS Report if indicated             FINAL IMPRESSION      1. Other fatigue    2.  Acute UTI          DISPOSITION/PLAN   DISPOSITION Discharge - Pending Orders Complete 10/20/2022 06:15:16 PM        CONDITION ON DISPOSITION: STABLE       PATIENT REFERRED TO:  Annika Obando MD  [address] [place] [address]  [phone]    Schedule an appointment as soon as possible for a visit       DISCHARGE MEDICATIONS:  New Prescriptions    No medications on file       (Please note that portions of this note were completed with a voice recognition program.  Efforts were made to edit the dictations but occasionally words are mis-transcribed.)    Krishna Puente DO   Attending Emergency Physician      Krishna Puente [place]  10/20/22 7761

## 2022-10-20 NOTE — ED NOTES
Pt presented to the ED c/o fatigue and being shaky for the past few days. Denies N/V/D. Pt alert and oriented.       Magen Harris RN  10/20/22 3760

## 2022-10-22 LAB
CULTURE: ABNORMAL
SPECIMEN DESCRIPTION: ABNORMAL

## 2023-01-01 ENCOUNTER — APPOINTMENT (OUTPATIENT)
Dept: GENERAL RADIOLOGY | Facility: CLINIC | Age: 88
DRG: 207 | End: 2023-01-01
Payer: MEDICARE

## 2023-01-01 ENCOUNTER — APPOINTMENT (OUTPATIENT)
Dept: GENERAL RADIOLOGY | Age: 88
DRG: 207 | End: 2023-01-01
Payer: MEDICARE

## 2023-01-01 ENCOUNTER — APPOINTMENT (OUTPATIENT)
Dept: CT IMAGING | Age: 88
DRG: 207 | End: 2023-01-01
Payer: MEDICARE

## 2023-01-01 ENCOUNTER — APPOINTMENT (OUTPATIENT)
Dept: INTERVENTIONAL RADIOLOGY/VASCULAR | Age: 88
DRG: 207 | End: 2023-01-01
Payer: MEDICARE

## 2023-01-01 ENCOUNTER — HOSPITAL ENCOUNTER (INPATIENT)
Age: 88
LOS: 16 days | DRG: 207 | End: 2023-02-07
Attending: EMERGENCY MEDICINE | Admitting: FAMILY MEDICINE
Payer: MEDICARE

## 2023-01-01 VITALS
BODY MASS INDEX: 25.11 KG/M2 | WEIGHT: 147.1 LBS | SYSTOLIC BLOOD PRESSURE: 112 MMHG | DIASTOLIC BLOOD PRESSURE: 62 MMHG | TEMPERATURE: 98.6 F | HEIGHT: 64 IN | RESPIRATION RATE: 20 BRPM | HEART RATE: 90 BPM | OXYGEN SATURATION: 96 %

## 2023-01-01 DIAGNOSIS — I83.019 VENOUS ULCER OF RIGHT LEG (HCC): ICD-10-CM

## 2023-01-01 DIAGNOSIS — U07.1 COVID-19: ICD-10-CM

## 2023-01-01 DIAGNOSIS — E83.42 HYPOMAGNESEMIA: ICD-10-CM

## 2023-01-01 DIAGNOSIS — L97.919 VENOUS ULCER OF RIGHT LEG (HCC): ICD-10-CM

## 2023-01-01 DIAGNOSIS — I50.9 ACUTE ON CHRONIC CONGESTIVE HEART FAILURE, UNSPECIFIED HEART FAILURE TYPE (HCC): Primary | ICD-10-CM

## 2023-01-01 LAB
ABSOLUTE EOS #: 0 K/UL (ref 0–0.4)
ABSOLUTE EOS #: 0 K/UL (ref 0–0.44)
ABSOLUTE EOS #: 0.08 K/UL (ref 0–0.4)
ABSOLUTE EOS #: 0.08 K/UL (ref 0–0.44)
ABSOLUTE EOS #: <0.03 K/UL (ref 0–0.44)
ABSOLUTE IMMATURE GRANULOCYTE: 0 K/UL (ref 0–0.3)
ABSOLUTE IMMATURE GRANULOCYTE: 0.05 K/UL (ref 0–0.3)
ABSOLUTE IMMATURE GRANULOCYTE: 0.08 K/UL (ref 0–0.3)
ABSOLUTE IMMATURE GRANULOCYTE: 0.09 K/UL (ref 0–0.3)
ABSOLUTE IMMATURE GRANULOCYTE: 0.09 K/UL (ref 0–0.3)
ABSOLUTE IMMATURE GRANULOCYTE: 0.1 K/UL (ref 0–0.3)
ABSOLUTE IMMATURE GRANULOCYTE: 0.1 K/UL (ref 0–0.3)
ABSOLUTE IMMATURE GRANULOCYTE: 0.11 K/UL (ref 0–0.3)
ABSOLUTE IMMATURE GRANULOCYTE: 0.12 K/UL (ref 0–0.3)
ABSOLUTE IMMATURE GRANULOCYTE: 0.23 K/UL (ref 0–0.3)
ABSOLUTE LYMPH #: 0.08 K/UL (ref 1–4.8)
ABSOLUTE LYMPH #: 0.17 K/UL (ref 1–4.8)
ABSOLUTE LYMPH #: 0.24 K/UL (ref 1.1–3.7)
ABSOLUTE LYMPH #: 0.32 K/UL (ref 1.1–3.7)
ABSOLUTE LYMPH #: 0.32 K/UL (ref 1–4.8)
ABSOLUTE LYMPH #: 0.33 K/UL (ref 1–4.8)
ABSOLUTE LYMPH #: 0.35 K/UL (ref 1–4.8)
ABSOLUTE LYMPH #: 0.41 K/UL (ref 1.1–3.7)
ABSOLUTE LYMPH #: 0.42 K/UL (ref 1.1–3.7)
ABSOLUTE LYMPH #: 0.46 K/UL (ref 1–4.8)
ABSOLUTE LYMPH #: 0.47 K/UL (ref 1.1–3.7)
ABSOLUTE LYMPH #: 0.5 K/UL (ref 1.1–3.7)
ABSOLUTE LYMPH #: 0.71 K/UL (ref 1.1–3.7)
ABSOLUTE LYMPH #: 0.75 K/UL (ref 1–4.8)
ABSOLUTE MONO #: 0.32 K/UL (ref 0.2–0.8)
ABSOLUTE MONO #: 0.33 K/UL (ref 0.2–0.8)
ABSOLUTE MONO #: 0.38 K/UL (ref 0.1–0.8)
ABSOLUTE MONO #: 0.42 K/UL (ref 0.2–0.8)
ABSOLUTE MONO #: 0.44 K/UL (ref 0.2–0.8)
ABSOLUTE MONO #: 0.46 K/UL (ref 0.2–0.8)
ABSOLUTE MONO #: 0.51 K/UL (ref 0.1–1.2)
ABSOLUTE MONO #: 0.55 K/UL (ref 0.2–0.8)
ABSOLUTE MONO #: 0.55 K/UL (ref 0.2–0.8)
ABSOLUTE MONO #: 0.59 K/UL (ref 0.1–1.2)
ABSOLUTE MONO #: 0.64 K/UL (ref 0.1–1.2)
ABSOLUTE MONO #: 0.68 K/UL (ref 0.2–0.8)
ABSOLUTE MONO #: 0.69 K/UL (ref 0.1–1.2)
ABSOLUTE MONO #: 0.74 K/UL (ref 0.1–1.2)
ABSOLUTE MONO #: 0.95 K/UL (ref 0.1–1.2)
ABSOLUTE MONO #: 1.14 K/UL (ref 0.1–1.2)
ACTION: NORMAL
ACTION: NORMAL
ALBUMIN SERPL-MCNC: 2.2 G/DL (ref 3.5–5.2)
ALBUMIN SERPL-MCNC: 2.2 G/DL (ref 3.5–5.2)
ALBUMIN SERPL-MCNC: 2.5 G/DL (ref 3.5–5.2)
ALBUMIN SERPL-MCNC: 2.8 G/DL (ref 3.5–5.2)
ALBUMIN/GLOBULIN RATIO: 1.2 (ref 1–2.5)
ALP BLD-CCNC: 107 U/L (ref 35–104)
ALP BLD-CCNC: 148 U/L (ref 35–104)
ALP BLD-CCNC: 96 U/L (ref 35–104)
ALP SERPL-CCNC: 221 U/L (ref 35–104)
ALT SERPL-CCNC: 14 U/L (ref 5–33)
ALT SERPL-CCNC: 16 U/L (ref 5–33)
ALT SERPL-CCNC: 25 U/L (ref 5–33)
ALT SERPL-CCNC: 61 U/L (ref 5–33)
AMMONIA: 14 UMOL/L (ref 11–51)
AMORPHOUS: ABNORMAL
ANION GAP SERPL CALCULATED.3IONS-SCNC: 10 MMOL/L (ref 9–17)
ANION GAP SERPL CALCULATED.3IONS-SCNC: 10 MMOL/L (ref 9–17)
ANION GAP SERPL CALCULATED.3IONS-SCNC: 11 MMOL/L (ref 9–17)
ANION GAP SERPL CALCULATED.3IONS-SCNC: 12 MMOL/L (ref 9–17)
ANION GAP SERPL CALCULATED.3IONS-SCNC: 12 MMOL/L (ref 9–17)
ANION GAP SERPL CALCULATED.3IONS-SCNC: 13 MMOL/L (ref 9–17)
ANION GAP SERPL CALCULATED.3IONS-SCNC: 15 MMOL/L (ref 9–17)
ANION GAP SERPL CALCULATED.3IONS-SCNC: 17 MMOL/L (ref 9–17)
ANION GAP SERPL CALCULATED.3IONS-SCNC: 4 MMOL/L (ref 9–17)
ANION GAP SERPL CALCULATED.3IONS-SCNC: 4 MMOL/L (ref 9–17)
ANION GAP SERPL CALCULATED.3IONS-SCNC: 5 MMOL/L (ref 9–17)
ANION GAP SERPL CALCULATED.3IONS-SCNC: 6 MMOL/L (ref 9–17)
ANION GAP SERPL CALCULATED.3IONS-SCNC: 7 MMOL/L (ref 9–17)
ANION GAP SERPL CALCULATED.3IONS-SCNC: 7 MMOL/L (ref 9–17)
ANION GAP SERPL CALCULATED.3IONS-SCNC: 8 MMOL/L (ref 9–17)
AST SERPL-CCNC: 25 U/L
AST SERPL-CCNC: 30 U/L
AST SERPL-CCNC: 33 U/L
AST SERPL-CCNC: 36 U/L
BACTERIA: ABNORMAL
BASOPHILS # BLD: 0 %
BASOPHILS # BLD: 0 % (ref 0–2)
BASOPHILS # BLD: 1 %
BASOPHILS ABSOLUTE: 0 K/UL (ref 0–0.2)
BASOPHILS ABSOLUTE: 0.05 K/UL (ref 0–0.2)
BASOPHILS ABSOLUTE: 0.09 K/UL (ref 0–0.2)
BASOPHILS ABSOLUTE: <0.03 K/UL (ref 0–0.2)
BILIRUB SERPL-MCNC: 0.3 MG/DL (ref 0.3–1.2)
BILIRUB SERPL-MCNC: 0.4 MG/DL (ref 0.3–1.2)
BILIRUB SERPL-MCNC: 0.6 MG/DL (ref 0.3–1.2)
BILIRUB SERPL-MCNC: 0.7 MG/DL (ref 0.3–1.2)
BILIRUBIN DIRECT: 0.3 MG/DL
BILIRUBIN URINE: NEGATIVE
BILIRUBIN URINE: NEGATIVE
BILIRUBIN, INDIRECT: 0.4 MG/DL (ref 0–1)
BUN BLDV-MCNC: 36 MG/DL (ref 8–23)
BUN BLDV-MCNC: 38 MG/DL (ref 8–23)
BUN BLDV-MCNC: 38 MG/DL (ref 8–23)
BUN BLDV-MCNC: 39 MG/DL (ref 8–23)
BUN BLDV-MCNC: 40 MG/DL (ref 8–23)
BUN BLDV-MCNC: 41 MG/DL (ref 8–23)
BUN BLDV-MCNC: 42 MG/DL (ref 8–23)
BUN BLDV-MCNC: 43 MG/DL (ref 8–23)
BUN BLDV-MCNC: 46 MG/DL (ref 8–23)
BUN BLDV-MCNC: 47 MG/DL (ref 8–23)
BUN BLDV-MCNC: 49 MG/DL (ref 8–23)
BUN SERPL-MCNC: 38 MG/DL (ref 8–23)
BUN SERPL-MCNC: 46 MG/DL (ref 8–23)
BUN SERPL-MCNC: 50 MG/DL (ref 8–23)
BUN SERPL-MCNC: 55 MG/DL (ref 8–23)
BUN SERPL-MCNC: 58 MG/DL (ref 8–23)
BUN SERPL-MCNC: 59 MG/DL (ref 8–23)
BUN/CREAT BLD: 37 (ref 9–20)
BUN/CREAT BLD: 39 (ref 9–20)
BUN/CREAT BLD: 39 (ref 9–20)
BUN/CREAT BLD: 43 (ref 9–20)
BUN/CREAT BLD: 49 (ref 9–20)
BUN/CREAT BLD: 51 (ref 9–20)
BUN/CREAT BLD: 51 (ref 9–20)
BUN/CREAT BLD: 52 (ref 9–20)
BUN/CREAT BLD: 53 (ref 9–20)
BUN/CREAT BLD: 54 (ref 9–20)
BUN/CREAT BLD: 56 (ref 9–20)
BUN/CREAT BLD: 56 (ref 9–20)
BUN/CREAT BLD: 59 (ref 9–20)
BUN/CREAT BLD: 59 (ref 9–20)
BUN/CREAT BLD: 60 (ref 9–20)
BUN/CREAT BLD: 60 (ref 9–20)
C-REACTIVE PROTEIN: 22.2 MG/L (ref 0–5)
C-REACTIVE PROTEIN: 37.7 MG/L (ref 0–5)
CALCIUM SERPL-MCNC: 6.7 MG/DL (ref 8.6–10.4)
CALCIUM SERPL-MCNC: 6.7 MG/DL (ref 8.6–10.4)
CALCIUM SERPL-MCNC: 7 MG/DL (ref 8.6–10.4)
CALCIUM SERPL-MCNC: 7.8 MG/DL (ref 8.6–10.4)
CALCIUM SERPL-MCNC: 8 MG/DL (ref 8.6–10.4)
CALCIUM SERPL-MCNC: 8.2 MG/DL (ref 8.6–10.4)
CALCIUM SERPL-MCNC: 8.2 MG/DL (ref 8.6–10.4)
CALCIUM SERPL-MCNC: 8.3 MG/DL (ref 8.6–10.4)
CALCIUM SERPL-MCNC: 8.3 MG/DL (ref 8.6–10.4)
CALCIUM SERPL-MCNC: 8.4 MG/DL (ref 8.6–10.4)
CALCIUM SERPL-MCNC: 8.5 MG/DL (ref 8.6–10.4)
CALCIUM SERPL-MCNC: 8.6 MG/DL (ref 8.6–10.4)
CALCIUM SERPL-MCNC: 8.7 MG/DL (ref 8.6–10.4)
CALCIUM SERPL-MCNC: 8.8 MG/DL (ref 8.6–10.4)
CALCIUM SERPL-MCNC: 8.9 MG/DL (ref 8.6–10.4)
CHLORIDE BLD-SCNC: 102 MMOL/L (ref 98–107)
CHLORIDE BLD-SCNC: 103 MMOL/L (ref 98–107)
CHLORIDE BLD-SCNC: 107 MMOL/L (ref 98–107)
CHLORIDE BLD-SCNC: 107 MMOL/L (ref 98–107)
CHLORIDE BLD-SCNC: 108 MMOL/L (ref 98–107)
CHLORIDE BLD-SCNC: 110 MMOL/L (ref 98–107)
CHLORIDE BLD-SCNC: 110 MMOL/L (ref 98–107)
CHLORIDE BLD-SCNC: 112 MMOL/L (ref 98–107)
CHLORIDE BLD-SCNC: 112 MMOL/L (ref 98–107)
CHLORIDE BLD-SCNC: 113 MMOL/L (ref 98–107)
CHLORIDE BLD-SCNC: 113 MMOL/L (ref 98–107)
CHLORIDE SERPL-SCNC: 112 MMOL/L (ref 98–107)
CHLORIDE SERPL-SCNC: 112 MMOL/L (ref 98–107)
CHLORIDE SERPL-SCNC: 113 MMOL/L (ref 98–107)
CHLORIDE SERPL-SCNC: 113 MMOL/L (ref 98–107)
CHLORIDE SERPL-SCNC: 117 MMOL/L (ref 98–107)
CHLORIDE SERPL-SCNC: 118 MMOL/L (ref 98–107)
CK SERPL-CCNC: 33 U/L (ref 26–192)
CO2 SERPL-SCNC: 20 MMOL/L (ref 20–31)
CO2 SERPL-SCNC: 21 MMOL/L (ref 20–31)
CO2 SERPL-SCNC: 22 MMOL/L (ref 20–31)
CO2 SERPL-SCNC: 24 MMOL/L (ref 20–31)
CO2 SERPL-SCNC: 26 MMOL/L (ref 20–31)
CO2 SERPL-SCNC: 26 MMOL/L (ref 20–31)
CO2: 17 MMOL/L (ref 20–31)
CO2: 17 MMOL/L (ref 20–31)
CO2: 18 MMOL/L (ref 20–31)
CO2: 20 MMOL/L (ref 20–31)
CO2: 22 MMOL/L (ref 20–31)
CO2: 23 MMOL/L (ref 20–31)
CO2: 24 MMOL/L (ref 20–31)
CO2: 24 MMOL/L (ref 20–31)
CO2: 26 MMOL/L (ref 20–31)
COLOR: YELLOW
COLOR: YELLOW
CREAT SERPL-MCNC: 0.63 MG/DL (ref 0.5–0.9)
CREAT SERPL-MCNC: 0.64 MG/DL (ref 0.5–0.9)
CREAT SERPL-MCNC: 0.64 MG/DL (ref 0.5–0.9)
CREAT SERPL-MCNC: 0.75 MG/DL (ref 0.5–0.9)
CREAT SERPL-MCNC: 0.78 MG/DL (ref 0.5–0.9)
CREAT SERPL-MCNC: 0.8 MG/DL (ref 0.5–0.9)
CREAT SERPL-MCNC: 0.83 MG/DL (ref 0.5–0.9)
CREAT SERPL-MCNC: 0.85 MG/DL (ref 0.5–0.9)
CREAT SERPL-MCNC: 0.89 MG/DL (ref 0.5–0.9)
CREAT SERPL-MCNC: 0.98 MG/DL (ref 0.5–0.9)
CREAT SERPL-MCNC: 0.99 MG/DL (ref 0.5–0.9)
CREAT SERPL-MCNC: 1 MG/DL (ref 0.5–0.9)
CREAT SERPL-MCNC: 1 MG/DL (ref 0.5–0.9)
CREAT SERPL-MCNC: 1.04 MG/DL (ref 0.5–0.9)
CREAT SERPL-MCNC: 1.2 MG/DL (ref 0.5–0.9)
CREAT SERPL-MCNC: 1.25 MG/DL (ref 0.5–0.9)
CREAT SERPL-MCNC: 1.26 MG/DL (ref 0.5–0.9)
D-DIMER QUANTITATIVE: 1.81 MG/L FEU (ref 0–0.59)
DATE AND TIME: NORMAL
DATE AND TIME: NORMAL
EKG ATRIAL RATE: 258 BPM
EKG Q-T INTERVAL: 338 MS
EKG Q-T INTERVAL: 370 MS
EKG Q-T INTERVAL: 440 MS
EKG Q-T INTERVAL: 456 MS
EKG QRS DURATION: 114 MS
EKG QRS DURATION: 114 MS
EKG QRS DURATION: 138 MS
EKG QRS DURATION: 148 MS
EKG QTC CALCULATION (BAZETT): 453 MS
EKG QTC CALCULATION (BAZETT): 455 MS
EKG QTC CALCULATION (BAZETT): 457 MS
EKG QTC CALCULATION (BAZETT): 495 MS
EKG R AXIS: -38 DEGREES
EKG R AXIS: -81 DEGREES
EKG R AXIS: -82 DEGREES
EKG R AXIS: -87 DEGREES
EKG T AXIS: 117 DEGREES
EKG T AXIS: 122 DEGREES
EKG T AXIS: 67 DEGREES
EKG T AXIS: 90 DEGREES
EKG VENTRICULAR RATE: 109 BPM
EKG VENTRICULAR RATE: 64 BPM
EKG VENTRICULAR RATE: 71 BPM
EKG VENTRICULAR RATE: 92 BPM
EOSINOPHILS RELATIVE PERCENT: 0 % (ref 1–4)
EOSINOPHILS RELATIVE PERCENT: 1 % (ref 1–4)
EOSINOPHILS RELATIVE PERCENT: 1 % (ref 1–4)
EPITHELIAL CELLS UA: ABNORMAL /HPF (ref 0–5)
EPITHELIAL CELLS UA: ABNORMAL /HPF (ref 0–5)
ESTIMATED AVERAGE GLUCOSE: 203 MG/DL
FIO2: 30
FIO2: 35
FIO2: 60
GFR SERPL CREATININE-BSD FRML MDRD: 41 ML/MIN/1.73M2
GFR SERPL CREATININE-BSD FRML MDRD: 41 ML/MIN/1.73M2
GFR SERPL CREATININE-BSD FRML MDRD: 43 ML/MIN/1.73M2
GFR SERPL CREATININE-BSD FRML MDRD: 51 ML/MIN/1.73M2
GFR SERPL CREATININE-BSD FRML MDRD: 54 ML/MIN/1.73M2
GFR SERPL CREATININE-BSD FRML MDRD: 55 ML/MIN/1.73M2
GFR SERPL CREATININE-BSD FRML MDRD: >60 ML/MIN/1.73M2
GLUCOSE BLD-MCNC: 104 MG/DL (ref 65–105)
GLUCOSE BLD-MCNC: 122 MG/DL (ref 70–99)
GLUCOSE BLD-MCNC: 124 MG/DL (ref 65–105)
GLUCOSE BLD-MCNC: 134 MG/DL (ref 65–105)
GLUCOSE BLD-MCNC: 134 MG/DL (ref 65–105)
GLUCOSE BLD-MCNC: 143 MG/DL (ref 65–105)
GLUCOSE BLD-MCNC: 149 MG/DL (ref 65–105)
GLUCOSE BLD-MCNC: 176 MG/DL (ref 65–105)
GLUCOSE BLD-MCNC: 178 MG/DL (ref 65–105)
GLUCOSE BLD-MCNC: 179 MG/DL (ref 70–99)
GLUCOSE BLD-MCNC: 182 MG/DL (ref 65–105)
GLUCOSE BLD-MCNC: 182 MG/DL (ref 70–99)
GLUCOSE BLD-MCNC: 183 MG/DL (ref 70–99)
GLUCOSE BLD-MCNC: 185 MG/DL (ref 65–105)
GLUCOSE BLD-MCNC: 186 MG/DL (ref 65–105)
GLUCOSE BLD-MCNC: 189 MG/DL (ref 65–105)
GLUCOSE BLD-MCNC: 190 MG/DL (ref 65–105)
GLUCOSE BLD-MCNC: 193 MG/DL (ref 65–105)
GLUCOSE BLD-MCNC: 196 MG/DL (ref 65–105)
GLUCOSE BLD-MCNC: 196 MG/DL (ref 65–105)
GLUCOSE BLD-MCNC: 201 MG/DL (ref 65–105)
GLUCOSE BLD-MCNC: 203 MG/DL (ref 70–99)
GLUCOSE BLD-MCNC: 206 MG/DL (ref 65–105)
GLUCOSE BLD-MCNC: 208 MG/DL (ref 65–105)
GLUCOSE BLD-MCNC: 208 MG/DL (ref 70–99)
GLUCOSE BLD-MCNC: 209 MG/DL (ref 65–105)
GLUCOSE BLD-MCNC: 211 MG/DL (ref 65–105)
GLUCOSE BLD-MCNC: 212 MG/DL (ref 65–105)
GLUCOSE BLD-MCNC: 218 MG/DL (ref 65–105)
GLUCOSE BLD-MCNC: 219 MG/DL (ref 65–105)
GLUCOSE BLD-MCNC: 219 MG/DL (ref 65–105)
GLUCOSE BLD-MCNC: 220 MG/DL (ref 65–105)
GLUCOSE BLD-MCNC: 220 MG/DL (ref 70–99)
GLUCOSE BLD-MCNC: 221 MG/DL (ref 70–99)
GLUCOSE BLD-MCNC: 225 MG/DL (ref 65–105)
GLUCOSE BLD-MCNC: 233 MG/DL (ref 65–105)
GLUCOSE BLD-MCNC: 233 MG/DL (ref 65–105)
GLUCOSE BLD-MCNC: 235 MG/DL (ref 65–105)
GLUCOSE BLD-MCNC: 235 MG/DL (ref 65–105)
GLUCOSE BLD-MCNC: 236 MG/DL (ref 65–105)
GLUCOSE BLD-MCNC: 240 MG/DL (ref 65–105)
GLUCOSE BLD-MCNC: 241 MG/DL (ref 65–105)
GLUCOSE BLD-MCNC: 242 MG/DL (ref 65–105)
GLUCOSE BLD-MCNC: 245 MG/DL (ref 70–99)
GLUCOSE BLD-MCNC: 247 MG/DL (ref 65–105)
GLUCOSE BLD-MCNC: 249 MG/DL (ref 65–105)
GLUCOSE BLD-MCNC: 251 MG/DL (ref 65–105)
GLUCOSE BLD-MCNC: 253 MG/DL (ref 65–105)
GLUCOSE BLD-MCNC: 255 MG/DL (ref 65–105)
GLUCOSE BLD-MCNC: 258 MG/DL (ref 65–105)
GLUCOSE BLD-MCNC: 258 MG/DL (ref 65–105)
GLUCOSE BLD-MCNC: 259 MG/DL (ref 65–105)
GLUCOSE BLD-MCNC: 263 MG/DL (ref 65–105)
GLUCOSE BLD-MCNC: 263 MG/DL (ref 74–100)
GLUCOSE BLD-MCNC: 264 MG/DL (ref 70–99)
GLUCOSE BLD-MCNC: 266 MG/DL (ref 65–105)
GLUCOSE BLD-MCNC: 267 MG/DL (ref 65–105)
GLUCOSE BLD-MCNC: 269 MG/DL (ref 65–105)
GLUCOSE BLD-MCNC: 273 MG/DL (ref 65–105)
GLUCOSE BLD-MCNC: 275 MG/DL (ref 65–105)
GLUCOSE BLD-MCNC: 275 MG/DL (ref 65–105)
GLUCOSE BLD-MCNC: 290 MG/DL (ref 65–105)
GLUCOSE BLD-MCNC: 297 MG/DL (ref 65–105)
GLUCOSE BLD-MCNC: 306 MG/DL (ref 65–105)
GLUCOSE BLD-MCNC: 308 MG/DL (ref 65–105)
GLUCOSE BLD-MCNC: 318 MG/DL (ref 65–105)
GLUCOSE BLD-MCNC: 324 MG/DL (ref 65–105)
GLUCOSE BLD-MCNC: 352 MG/DL (ref 65–105)
GLUCOSE BLD-MCNC: 358 MG/DL (ref 65–105)
GLUCOSE BLD-MCNC: 377 MG/DL (ref 70–99)
GLUCOSE SERPL-MCNC: 148 MG/DL (ref 70–99)
GLUCOSE SERPL-MCNC: 204 MG/DL (ref 70–99)
GLUCOSE SERPL-MCNC: 208 MG/DL (ref 70–99)
GLUCOSE SERPL-MCNC: 221 MG/DL (ref 70–99)
GLUCOSE SERPL-MCNC: 239 MG/DL (ref 70–99)
GLUCOSE SERPL-MCNC: 249 MG/DL (ref 70–99)
GLUCOSE URINE: NEGATIVE
GLUCOSE URINE: NEGATIVE
HBA1C MFR BLD: 8.7 % (ref 4–6)
HCO3 VENOUS: 18.9 MMOL/L (ref 22–29)
HCO3 VENOUS: 24.4 MMOL/L (ref 22–29)
HCO3 VENOUS: 26.6 MMOL/L (ref 22–29)
HCT VFR BLD AUTO: 33 % (ref 36.3–47.1)
HCT VFR BLD AUTO: 34.2 % (ref 36.3–47.1)
HCT VFR BLD AUTO: 34.6 % (ref 36.3–47.1)
HCT VFR BLD AUTO: 35.1 % (ref 36.3–47.1)
HCT VFR BLD AUTO: 35.2 % (ref 36.3–47.1)
HCT VFR BLD AUTO: 36.3 % (ref 36.3–47.1)
HCT VFR BLD CALC: 32.2 % (ref 36.3–47.1)
HCT VFR BLD CALC: 33.3 % (ref 36.3–47.1)
HCT VFR BLD CALC: 33.5 % (ref 36.3–47.1)
HCT VFR BLD CALC: 35.8 % (ref 36.3–47.1)
HCT VFR BLD CALC: 35.8 % (ref 36–46)
HCT VFR BLD CALC: 36 % (ref 36.3–47.1)
HCT VFR BLD CALC: 36.4 % (ref 36.3–47.1)
HCT VFR BLD CALC: 37 % (ref 36.3–47.1)
HCT VFR BLD CALC: 38.1 % (ref 36.3–47.1)
HCT VFR BLD CALC: 39.4 % (ref 36.3–47.1)
HCT VFR BLD CALC: 40.5 % (ref 36.3–47.1)
HEMOGLOBIN: 10.1 G/DL (ref 11.9–15.1)
HEMOGLOBIN: 10.3 G/DL (ref 11.9–15.1)
HEMOGLOBIN: 10.5 G/DL (ref 11.9–15.1)
HEMOGLOBIN: 11.3 G/DL (ref 11.9–15.1)
HEMOGLOBIN: 11.3 G/DL (ref 11.9–15.1)
HEMOGLOBIN: 11.4 G/DL (ref 11.9–15.1)
HEMOGLOBIN: 11.6 G/DL (ref 12–16)
HEMOGLOBIN: 11.7 G/DL (ref 11.9–15.1)
HEMOGLOBIN: 11.8 G/DL (ref 11.9–15.1)
HEMOGLOBIN: 12.3 G/DL (ref 11.9–15.1)
HEMOGLOBIN: 12.4 G/DL (ref 11.9–15.1)
HGB BLD-MCNC: 10.5 G/DL (ref 11.9–15.1)
HGB BLD-MCNC: 10.5 G/DL (ref 11.9–15.1)
HGB BLD-MCNC: 10.9 G/DL (ref 11.9–15.1)
HGB BLD-MCNC: 11 G/DL (ref 11.9–15.1)
HGB BLD-MCNC: 11.1 G/DL (ref 11.9–15.1)
HGB BLD-MCNC: 11.1 G/DL (ref 11.9–15.1)
IMMATURE GRANULOCYTES: 0 %
IMMATURE GRANULOCYTES: 1 %
IMMATURE GRANULOCYTES: 2 %
INR BLD: 1.2
KETONES, URINE: NEGATIVE
KETONES, URINE: NEGATIVE
LEUKOCYTE ESTERASE, URINE: NEGATIVE
LEUKOCYTE ESTERASE, URINE: NEGATIVE
LV EF: 23 %
LVEF MODALITY: NORMAL
LYMPHOCYTES # BLD: 1 % (ref 24–44)
LYMPHOCYTES # BLD: 2 % (ref 24–43)
LYMPHOCYTES # BLD: 2 % (ref 24–44)
LYMPHOCYTES # BLD: 3 % (ref 24–43)
LYMPHOCYTES # BLD: 3 % (ref 24–44)
LYMPHOCYTES # BLD: 4 % (ref 24–43)
LYMPHOCYTES # BLD: 4 % (ref 24–44)
LYMPHOCYTES # BLD: 5 % (ref 24–43)
LYMPHOCYTES # BLD: 5 % (ref 24–44)
LYMPHOCYTES # BLD: 7 % (ref 24–43)
LYMPHOCYTES # BLD: 8 % (ref 24–44)
MAGNESIUM: 0.8 MG/DL (ref 1.6–2.6)
MAGNESIUM: 1.4 MG/DL (ref 1.6–2.6)
MAGNESIUM: 1.7 MG/DL (ref 1.6–2.6)
MAGNESIUM: 2 MG/DL (ref 1.6–2.6)
MCH RBC QN AUTO: 27.4 PG (ref 25.2–33.5)
MCH RBC QN AUTO: 27.5 PG (ref 25.2–33.5)
MCH RBC QN AUTO: 27.6 PG (ref 25.2–33.5)
MCH RBC QN AUTO: 27.6 PG (ref 25.2–33.5)
MCH RBC QN AUTO: 27.8 PG (ref 25.2–33.5)
MCH RBC QN AUTO: 27.8 PG (ref 25.2–33.5)
MCH RBC QN AUTO: 27.9 PG (ref 25.2–33.5)
MCH RBC QN AUTO: 27.9 PG (ref 25.2–33.5)
MCH RBC QN AUTO: 28 PG (ref 25.2–33.5)
MCH RBC QN AUTO: 28.1 PG (ref 25.2–33.5)
MCH RBC QN AUTO: 28.1 PG (ref 25.2–33.5)
MCH RBC QN AUTO: 28.2 PG (ref 25.2–33.5)
MCH RBC QN AUTO: 28.3 PG (ref 25.2–33.5)
MCH RBC QN AUTO: 28.3 PG (ref 25.2–33.5)
MCH RBC QN AUTO: 28.4 PG (ref 26–34)
MCH RBC QN AUTO: 28.5 PG (ref 25.2–33.5)
MCH RBC QN AUTO: 28.5 PG (ref 25.2–33.5)
MCHC RBC AUTO-ENTMCNC: 30.3 G/DL (ref 28.4–34.8)
MCHC RBC AUTO-ENTMCNC: 30.3 G/DL (ref 28.4–34.8)
MCHC RBC AUTO-ENTMCNC: 30.6 G/DL (ref 28.4–34.8)
MCHC RBC AUTO-ENTMCNC: 30.9 G/DL (ref 28.4–34.8)
MCHC RBC AUTO-ENTMCNC: 31 G/DL (ref 28.4–34.8)
MCHC RBC AUTO-ENTMCNC: 31 G/DL (ref 28.4–34.8)
MCHC RBC AUTO-ENTMCNC: 31.1 G/DL (ref 28.4–34.8)
MCHC RBC AUTO-ENTMCNC: 31.2 G/DL (ref 28.4–34.8)
MCHC RBC AUTO-ENTMCNC: 31.3 G/DL (ref 28.4–34.8)
MCHC RBC AUTO-ENTMCNC: 31.4 G/DL (ref 28.4–34.8)
MCHC RBC AUTO-ENTMCNC: 31.5 G/DL (ref 28.4–34.8)
MCHC RBC AUTO-ENTMCNC: 31.6 G/DL (ref 28.4–34.8)
MCHC RBC AUTO-ENTMCNC: 31.6 G/DL (ref 28.4–34.8)
MCHC RBC AUTO-ENTMCNC: 31.7 G/DL (ref 28.4–34.8)
MCHC RBC AUTO-ENTMCNC: 31.8 G/DL (ref 28.4–34.8)
MCHC RBC AUTO-ENTMCNC: 32.3 G/DL (ref 31–37)
MCHC RBC AUTO-ENTMCNC: 32.5 G/DL (ref 28.4–34.8)
MCV RBC AUTO: 86.8 FL (ref 82.6–102.9)
MCV RBC AUTO: 87 FL (ref 82.6–102.9)
MCV RBC AUTO: 88 FL (ref 80–100)
MCV RBC AUTO: 88.6 FL (ref 82.6–102.9)
MCV RBC AUTO: 88.8 FL (ref 82.6–102.9)
MCV RBC AUTO: 89.1 FL (ref 82.6–102.9)
MCV RBC AUTO: 89.3 FL (ref 82.6–102.9)
MCV RBC AUTO: 89.6 FL (ref 82.6–102.9)
MCV RBC AUTO: 89.6 FL (ref 82.6–102.9)
MCV RBC AUTO: 89.7 FL (ref 82.6–102.9)
MCV RBC AUTO: 90 FL (ref 82.6–102.9)
MCV RBC AUTO: 90 FL (ref 82.6–102.9)
MCV RBC AUTO: 90.3 FL (ref 82.6–102.9)
MCV RBC AUTO: 90.7 FL (ref 82.6–102.9)
MCV RBC AUTO: 90.7 FL (ref 82.6–102.9)
MCV RBC AUTO: 91 FL (ref 82.6–102.9)
MCV RBC AUTO: 91.8 FL (ref 82.6–102.9)
MICROORGANISM SPEC CULT: NORMAL
MICROORGANISM SPEC CULT: NORMAL
MONOCYTES # BLD: 11 % (ref 3–12)
MONOCYTES # BLD: 3 % (ref 1–7)
MONOCYTES # BLD: 4 % (ref 1–7)
MONOCYTES # BLD: 5 % (ref 1–7)
MONOCYTES # BLD: 5 % (ref 1–7)
MONOCYTES # BLD: 5 % (ref 3–12)
MONOCYTES # BLD: 5 % (ref 3–12)
MONOCYTES # BLD: 6 % (ref 1–7)
MONOCYTES # BLD: 6 % (ref 3–12)
MONOCYTES # BLD: 7 % (ref 3–12)
MONOCYTES # BLD: 7 % (ref 3–12)
MONOCYTES # BLD: 8 % (ref 1–7)
MONOCYTES # BLD: 8 % (ref 3–12)
MORPHOLOGY: ABNORMAL
MYOGLOBIN SERPL-MCNC: 96 NG/ML (ref 25–58)
NEGATIVE BASE EXCESS, ART: 2 (ref 0–2)
NEGATIVE BASE EXCESS, ART: 3 (ref 0–2)
NEGATIVE BASE EXCESS, ART: 6 (ref 0–2)
NEGATIVE BASE EXCESS, VEN: 1 (ref 0–2)
NEGATIVE BASE EXCESS, VEN: 1 (ref 0–2)
NEGATIVE BASE EXCESS, VEN: 2 (ref 0–2)
NITRITE, URINE: NEGATIVE
NITRITE, URINE: NEGATIVE
NOTIFY: NORMAL
NRBC AUTOMATED: 0 PER 100 WBC
O2 DEVICE/FLOW/%: ABNORMAL
O2 SAT, VEN: 90 % (ref 60–85)
O2 SAT, VEN: 91 % (ref 60–85)
O2 SAT, VEN: 99 % (ref 60–85)
PARTIAL THROMBOPLASTIN TIME: 34.1 SEC (ref 23.9–33.8)
PATIENT TEMP: 37
PATIENT TEMP: 37
PCO2, VEN: 18.8 MM HG (ref 41–51)
PCO2, VEN: 49 MM HG (ref 41–51)
PCO2, VEN: 58.5 MM HG (ref 41–51)
PDW BLD-RTO: 14.5 % (ref 11.8–14.4)
PDW BLD-RTO: 14.7 % (ref 11.8–14.4)
PDW BLD-RTO: 14.8 % (ref 11.8–14.4)
PDW BLD-RTO: 14.9 % (ref 11.8–14.4)
PDW BLD-RTO: 15 % (ref 11.8–14.4)
PDW BLD-RTO: 15 % (ref 11.8–14.4)
PDW BLD-RTO: 15.3 % (ref 11.8–14.4)
PDW BLD-RTO: 15.4 % (ref 12.5–15.4)
PDW BLD-RTO: 15.7 % (ref 11.8–14.4)
PDW BLD-RTO: 16.2 % (ref 11.8–14.4)
PH UA: 5.5 (ref 5–8)
PH UA: 5.5 (ref 5–8)
PH VENOUS: 7.26 (ref 7.32–7.43)
PH VENOUS: 7.3 (ref 7.32–7.43)
PH VENOUS: 7.61 (ref 7.32–7.43)
PHOSPHATE SERPL-MCNC: 2.2 MG/DL (ref 2.6–4.5)
PHOSPHATE SERPL-MCNC: 2.8 MG/DL (ref 2.6–4.5)
PHOSPHATE SERPL-MCNC: 3.1 MG/DL (ref 2.6–4.5)
PHOSPHORUS: 1.9 MG/DL (ref 2.6–4.5)
PHOSPHORUS: 2.3 MG/DL (ref 2.6–4.5)
PHOSPHORUS: 2.8 MG/DL (ref 2.6–4.5)
PLATELET # BLD AUTO: 146 K/UL (ref 138–453)
PLATELET # BLD AUTO: 161 K/UL (ref 138–453)
PLATELET # BLD AUTO: 164 K/UL (ref 138–453)
PLATELET # BLD AUTO: 171 K/UL (ref 138–453)
PLATELET # BLD AUTO: 174 K/UL (ref 138–453)
PLATELET # BLD AUTO: 183 K/UL (ref 138–453)
PLATELET # BLD: 151 K/UL (ref 138–453)
PLATELET # BLD: 162 K/UL (ref 140–450)
PLATELET # BLD: 179 K/UL (ref 138–453)
PLATELET # BLD: 184 K/UL (ref 138–453)
PLATELET # BLD: 186 K/UL (ref 138–453)
PLATELET # BLD: 202 K/UL (ref 138–453)
PLATELET # BLD: 217 K/UL (ref 138–453)
PLATELET # BLD: 227 K/UL (ref 138–453)
PLATELET # BLD: 232 K/UL (ref 138–453)
PLATELET # BLD: 233 K/UL (ref 138–453)
PLATELET # BLD: 237 K/UL (ref 138–453)
PMV BLD AUTO: 10 FL (ref 8.1–13.5)
PMV BLD AUTO: 10.3 FL (ref 8.1–13.5)
PMV BLD AUTO: 10.4 FL (ref 8.1–13.5)
PMV BLD AUTO: 10.5 FL (ref 8.1–13.5)
PMV BLD AUTO: 10.6 FL (ref 8.1–13.5)
PMV BLD AUTO: 10.7 FL (ref 8.1–13.5)
PMV BLD AUTO: 11.2 FL (ref 8.1–13.5)
PMV BLD AUTO: 11.3 FL (ref 8.1–13.5)
PMV BLD AUTO: 11.5 FL (ref 8.1–13.5)
PMV BLD AUTO: 11.6 FL (ref 8.1–13.5)
PMV BLD AUTO: 11.6 FL (ref 8.1–13.5)
PMV BLD AUTO: 11.7 FL (ref 8.1–13.5)
PMV BLD AUTO: 11.9 FL (ref 8.1–13.5)
PMV BLD AUTO: 8.8 FL (ref 6–12)
PO2, VEN: 67.8 MM HG (ref 30–50)
PO2, VEN: 69.3 MM HG (ref 30–50)
PO2, VEN: 96.5 MM HG (ref 30–50)
POC HCO3: 22 MMOL/L (ref 21–28)
POC HCO3: 22.4 MMOL/L (ref 21–28)
POC HCO3: 22.5 MMOL/L (ref 21–28)
POC HCO3: 29.1 MMOL/L (ref 21–28)
POC IONIZED CALCIUM: 1.23 MMOL/L (ref 1.15–1.33)
POC IONIZED CALCIUM: 1.6 MMOL/L (ref 1.15–1.33)
POC O2 SATURATION: 100 % (ref 94–98)
POC O2 SATURATION: 90 % (ref 94–98)
POC O2 SATURATION: 99 % (ref 94–98)
POC O2 SATURATION: 99 % (ref 94–98)
POC PCO2: 128.5 MM HG (ref 35–48)
POC PCO2: 26.4 MM HG (ref 35–48)
POC PCO2: 34 MM HG (ref 35–48)
POC PCO2: 39.3 MM HG (ref 35–48)
POC PH: 6.96 (ref 7.35–7.45)
POC PH: 7.37 (ref 7.35–7.45)
POC PH: 7.42 (ref 7.35–7.45)
POC PH: 7.54 (ref 7.35–7.45)
POC PO2: 132.3 MM HG (ref 83–108)
POC PO2: 148.7 MM HG (ref 83–108)
POC PO2: 169.9 MM HG (ref 83–108)
POC PO2: 98.3 MM HG (ref 83–108)
POC POTASSIUM: 5.1 MMOL/L (ref 3.5–4.5)
POC TCO2: 22 MMOL/L (ref 22–30)
POSITIVE BASE EXCESS, ART: 0 (ref 0–3)
POTASSIUM SERPL-SCNC: 3.4 MMOL/L (ref 3.7–5.3)
POTASSIUM SERPL-SCNC: 3.6 MMOL/L (ref 3.7–5.3)
POTASSIUM SERPL-SCNC: 3.8 MMOL/L (ref 3.7–5.3)
POTASSIUM SERPL-SCNC: 4.1 MMOL/L (ref 3.7–5.3)
POTASSIUM SERPL-SCNC: 4.5 MMOL/L (ref 3.7–5.3)
POTASSIUM SERPL-SCNC: 4.6 MMOL/L (ref 3.7–5.3)
POTASSIUM SERPL-SCNC: 4.8 MMOL/L (ref 3.7–5.3)
POTASSIUM SERPL-SCNC: 4.9 MMOL/L (ref 3.7–5.3)
POTASSIUM SERPL-SCNC: 4.9 MMOL/L (ref 3.7–5.3)
POTASSIUM SERPL-SCNC: 5 MMOL/L (ref 3.7–5.3)
POTASSIUM SERPL-SCNC: 5.1 MMOL/L (ref 3.7–5.3)
POTASSIUM SERPL-SCNC: 5.3 MMOL/L (ref 3.7–5.3)
PRO-BNP: 4026 PG/ML
PRO-BNP: 4334 PG/ML
PRO-BNP: 7046 PG/ML
PRO-BNP: 7057 PG/ML
PROCALCITONIN: 0.13 NG/ML
PROCALCITONIN: 0.55 NG/ML
PROT SERPL-MCNC: 5.1 G/DL (ref 6.4–8.3)
PROTEIN UA: ABNORMAL
PROTEIN UA: ABNORMAL
PROTHROMBIN TIME: 14.9 SEC (ref 11.5–14.2)
RBC # BLD: 3.55 M/UL (ref 3.95–5.11)
RBC # BLD: 3.7 M/UL (ref 3.95–5.11)
RBC # BLD: 3.71 M/UL (ref 3.95–5.11)
RBC # BLD: 3.77 M/UL (ref 3.95–5.11)
RBC # BLD: 3.8 M/UL (ref 3.95–5.11)
RBC # BLD: 3.93 M/UL (ref 3.95–5.11)
RBC # BLD: 3.95 M/UL (ref 3.95–5.11)
RBC # BLD: 3.96 M/UL (ref 3.95–5.11)
RBC # BLD: 3.99 M/UL (ref 3.95–5.11)
RBC # BLD: 4 M/UL (ref 3.95–5.11)
RBC # BLD: 4.03 M/UL (ref 3.95–5.11)
RBC # BLD: 4.06 M/UL (ref 3.95–5.11)
RBC # BLD: 4.07 M/UL (ref 4–5.2)
RBC # BLD: 4.13 M/UL (ref 3.95–5.11)
RBC # BLD: 4.2 M/UL (ref 3.95–5.11)
RBC # BLD: 4.41 M/UL (ref 3.95–5.11)
RBC # BLD: 4.52 M/UL (ref 3.95–5.11)
RBC # BLD: ABNORMAL 10*6/UL
RBC UA: ABNORMAL /HPF (ref 0–2)
RBC UA: ABNORMAL /HPF (ref 0–2)
READ BACK: NO
READ BACK: YES
SAMPLE SITE: ABNORMAL
SARS-COV-2, RAPID: DETECTED
SEG NEUTROPHILS: 81 % (ref 36–65)
SEG NEUTROPHILS: 87 % (ref 36–65)
SEG NEUTROPHILS: 88 % (ref 36–65)
SEG NEUTROPHILS: 88 % (ref 36–65)
SEG NEUTROPHILS: 88 % (ref 36–66)
SEG NEUTROPHILS: 88 % (ref 36–66)
SEG NEUTROPHILS: 89 % (ref 36–66)
SEG NEUTROPHILS: 90 % (ref 36–65)
SEG NEUTROPHILS: 91 % (ref 36–66)
SEG NEUTROPHILS: 93 % (ref 36–66)
SEGMENTED NEUTROPHILS ABSOLUTE COUNT: 10.01 K/UL (ref 1.8–7.7)
SEGMENTED NEUTROPHILS ABSOLUTE COUNT: 10.13 K/UL (ref 1.8–7.7)
SEGMENTED NEUTROPHILS ABSOLUTE COUNT: 10.26 K/UL (ref 1.5–8.1)
SEGMENTED NEUTROPHILS ABSOLUTE COUNT: 10.46 K/UL (ref 1.8–7.7)
SEGMENTED NEUTROPHILS ABSOLUTE COUNT: 10.52 K/UL (ref 1.5–8.1)
SEGMENTED NEUTROPHILS ABSOLUTE COUNT: 7.56 K/UL (ref 1.8–7.7)
SEGMENTED NEUTROPHILS ABSOLUTE COUNT: 7.56 K/UL (ref 1.8–7.7)
SEGMENTED NEUTROPHILS ABSOLUTE COUNT: 7.72 K/UL (ref 1.8–7.7)
SEGMENTED NEUTROPHILS ABSOLUTE COUNT: 8 K/UL (ref 1.8–7.7)
SEGMENTED NEUTROPHILS ABSOLUTE COUNT: 8.27 K/UL (ref 1.8–7.7)
SEGMENTED NEUTROPHILS ABSOLUTE COUNT: 8.52 K/UL (ref 1.5–8.1)
SEGMENTED NEUTROPHILS ABSOLUTE COUNT: 8.61 K/UL (ref 1.5–8.1)
SEGMENTED NEUTROPHILS ABSOLUTE COUNT: 9.18 K/UL (ref 1.5–8.1)
SEGMENTED NEUTROPHILS ABSOLUTE COUNT: 9.33 K/UL (ref 1.5–8.1)
SEGMENTED NEUTROPHILS ABSOLUTE COUNT: 9.53 K/UL (ref 1.5–8.1)
SEGMENTED NEUTROPHILS ABSOLUTE COUNT: 9.85 K/UL (ref 1.8–7.7)
SERVICE CMNT-IMP: NORMAL
SERVICE CMNT-IMP: NORMAL
SODIUM BLD-SCNC: 133 MMOL/L (ref 135–144)
SODIUM BLD-SCNC: 136 MMOL/L (ref 135–144)
SODIUM BLD-SCNC: 137 MMOL/L (ref 135–144)
SODIUM BLD-SCNC: 138 MMOL/L (ref 135–144)
SODIUM BLD-SCNC: 140 MMOL/L (ref 135–144)
SODIUM BLD-SCNC: 141 MMOL/L (ref 135–144)
SODIUM BLD-SCNC: 142 MMOL/L (ref 135–144)
SODIUM BLD-SCNC: 143 MMOL/L (ref 135–144)
SODIUM BLD-SCNC: 143 MMOL/L (ref 135–144)
SODIUM SERPL-SCNC: 144 MMOL/L (ref 135–144)
SODIUM SERPL-SCNC: 145 MMOL/L (ref 135–144)
SODIUM SERPL-SCNC: 146 MMOL/L (ref 135–144)
SODIUM SERPL-SCNC: 147 MMOL/L (ref 135–144)
SODIUM SERPL-SCNC: 149 MMOL/L (ref 135–144)
SODIUM SERPL-SCNC: 151 MMOL/L (ref 135–144)
SPECIFIC GRAVITY UA: 1.02 (ref 1–1.03)
SPECIFIC GRAVITY UA: 1.03 (ref 1–1.03)
SPECIMEN DESCRIPTION: ABNORMAL
SPECIMEN DESCRIPTION: NORMAL
SPECIMEN DESCRIPTION: NORMAL
TOTAL PROTEIN: 4.1 G/DL (ref 6.4–8.3)
TOTAL PROTEIN: 4.4 G/DL (ref 6.4–8.3)
TOTAL PROTEIN: 5.2 G/DL (ref 6.4–8.3)
TRIGL SERPL-MCNC: 112 MG/DL
TROPONIN I SERPL DL<=0.01 NG/ML-MCNC: 105 NG/L (ref 0–14)
TROPONIN I SERPL DL<=0.01 NG/ML-MCNC: 125 NG/L (ref 0–14)
TROPONIN, HIGH SENSITIVITY: 47 NG/L (ref 0–14)
TROPONIN, HIGH SENSITIVITY: 72 NG/L (ref 0–14)
TROPONIN, HIGH SENSITIVITY: NORMAL NG/L (ref 0–14)
TURBIDITY: CLEAR
TURBIDITY: CLEAR
URINE HGB: ABNORMAL
URINE HGB: NEGATIVE
UROBILINOGEN, URINE: NORMAL
UROBILINOGEN, URINE: NORMAL
WBC # BLD AUTO: 10.5 K/UL (ref 3.5–11.3)
WBC # BLD AUTO: 11.6 K/UL (ref 3.5–11.3)
WBC # BLD AUTO: 8.3 K/UL (ref 3.5–11.3)
WBC # BLD AUTO: 8.5 K/UL (ref 3.5–11.3)
WBC # BLD AUTO: 9.1 K/UL (ref 3.5–11.3)
WBC # BLD AUTO: 9.9 K/UL (ref 3.5–11.3)
WBC # BLD: 10.2 K/UL (ref 3.5–11.3)
WBC # BLD: 10.6 K/UL (ref 3.5–11.3)
WBC # BLD: 10.9 K/UL (ref 3.5–11.3)
WBC # BLD: 10.9 K/UL (ref 3.5–11.3)
WBC # BLD: 11 K/UL (ref 3.5–11.3)
WBC # BLD: 11.5 K/UL (ref 3.5–11.3)
WBC # BLD: 11.7 K/UL (ref 3.5–11.3)
WBC # BLD: 8.3 K/UL (ref 3.5–11.3)
WBC # BLD: 9.4 K/UL (ref 3.5–11)
WBC UA: ABNORMAL /HPF (ref 0–5)
WBC UA: ABNORMAL /HPF (ref 0–5)

## 2023-01-01 PROCEDURE — 83735 ASSAY OF MAGNESIUM: CPT

## 2023-01-01 PROCEDURE — 94003 VENT MGMT INPAT SUBQ DAY: CPT

## 2023-01-01 PROCEDURE — 36556 INSERT NON-TUNNEL CV CATH: CPT

## 2023-01-01 PROCEDURE — 2500000003 HC RX 250 WO HCPCS: Performed by: INTERNAL MEDICINE

## 2023-01-01 PROCEDURE — 6370000000 HC RX 637 (ALT 250 FOR IP): Performed by: INTERNAL MEDICINE

## 2023-01-01 PROCEDURE — 99232 SBSQ HOSP IP/OBS MODERATE 35: CPT | Performed by: NURSE PRACTITIONER

## 2023-01-01 PROCEDURE — 2580000003 HC RX 258: Performed by: NURSE PRACTITIONER

## 2023-01-01 PROCEDURE — 2500000003 HC RX 250 WO HCPCS: Performed by: FAMILY MEDICINE

## 2023-01-01 PROCEDURE — 36415 COLL VENOUS BLD VENIPUNCTURE: CPT

## 2023-01-01 PROCEDURE — 6360000002 HC RX W HCPCS: Performed by: FAMILY MEDICINE

## 2023-01-01 PROCEDURE — 93005 ELECTROCARDIOGRAM TRACING: CPT | Performed by: FAMILY MEDICINE

## 2023-01-01 PROCEDURE — 2580000003 HC RX 258: Performed by: FAMILY MEDICINE

## 2023-01-01 PROCEDURE — 82550 ASSAY OF CK (CPK): CPT

## 2023-01-01 PROCEDURE — 94761 N-INVAS EAR/PLS OXIMETRY MLT: CPT

## 2023-01-01 PROCEDURE — 6370000000 HC RX 637 (ALT 250 FOR IP): Performed by: NURSE PRACTITIONER

## 2023-01-01 PROCEDURE — 85025 COMPLETE CBC W/AUTO DIFF WBC: CPT

## 2023-01-01 PROCEDURE — 82947 ASSAY GLUCOSE BLOOD QUANT: CPT

## 2023-01-01 PROCEDURE — 2580000003 HC RX 258: Performed by: INTERNAL MEDICINE

## 2023-01-01 PROCEDURE — 97530 THERAPEUTIC ACTIVITIES: CPT

## 2023-01-01 PROCEDURE — 83880 ASSAY OF NATRIURETIC PEPTIDE: CPT

## 2023-01-01 PROCEDURE — 2000000000 HC ICU R&B

## 2023-01-01 PROCEDURE — 82374 ASSAY BLOOD CARBON DIOXIDE: CPT

## 2023-01-01 PROCEDURE — 99232 SBSQ HOSP IP/OBS MODERATE 35: CPT | Performed by: INTERNAL MEDICINE

## 2023-01-01 PROCEDURE — 97110 THERAPEUTIC EXERCISES: CPT

## 2023-01-01 PROCEDURE — 2060000000 HC ICU INTERMEDIATE R&B

## 2023-01-01 PROCEDURE — A4216 STERILE WATER/SALINE, 10 ML: HCPCS | Performed by: FAMILY MEDICINE

## 2023-01-01 PROCEDURE — 80048 BASIC METABOLIC PNL TOTAL CA: CPT

## 2023-01-01 PROCEDURE — 71045 X-RAY EXAM CHEST 1 VIEW: CPT

## 2023-01-01 PROCEDURE — 93010 ELECTROCARDIOGRAM REPORT: CPT | Performed by: INTERNAL MEDICINE

## 2023-01-01 PROCEDURE — C9113 INJ PANTOPRAZOLE SODIUM, VIA: HCPCS | Performed by: FAMILY MEDICINE

## 2023-01-01 PROCEDURE — 82330 ASSAY OF CALCIUM: CPT

## 2023-01-01 PROCEDURE — 36600 WITHDRAWAL OF ARTERIAL BLOOD: CPT

## 2023-01-01 PROCEDURE — 2700000000 HC OXYGEN THERAPY PER DAY

## 2023-01-01 PROCEDURE — 6360000002 HC RX W HCPCS: Performed by: INTERNAL MEDICINE

## 2023-01-01 PROCEDURE — 85027 COMPLETE CBC AUTOMATED: CPT

## 2023-01-01 PROCEDURE — 84443 ASSAY THYROID STIM HORMONE: CPT

## 2023-01-01 PROCEDURE — 6370000000 HC RX 637 (ALT 250 FOR IP): Performed by: FAMILY MEDICINE

## 2023-01-01 PROCEDURE — 93005 ELECTROCARDIOGRAM TRACING: CPT | Performed by: EMERGENCY MEDICINE

## 2023-01-01 PROCEDURE — 97112 NEUROMUSCULAR REEDUCATION: CPT

## 2023-01-01 PROCEDURE — 84484 ASSAY OF TROPONIN QUANT: CPT

## 2023-01-01 PROCEDURE — 76937 US GUIDE VASCULAR ACCESS: CPT

## 2023-01-01 PROCEDURE — 99222 1ST HOSP IP/OBS MODERATE 55: CPT

## 2023-01-01 PROCEDURE — 6360000002 HC RX W HCPCS: Performed by: NURSE PRACTITIONER

## 2023-01-01 PROCEDURE — 84145 PROCALCITONIN (PCT): CPT

## 2023-01-01 PROCEDURE — 2500000003 HC RX 250 WO HCPCS

## 2023-01-01 PROCEDURE — 80053 COMPREHEN METABOLIC PANEL: CPT

## 2023-01-01 PROCEDURE — 84132 ASSAY OF SERUM POTASSIUM: CPT

## 2023-01-01 PROCEDURE — 97535 SELF CARE MNGMENT TRAINING: CPT

## 2023-01-01 PROCEDURE — 5A1955Z RESPIRATORY VENTILATION, GREATER THAN 96 CONSECUTIVE HOURS: ICD-10-PCS | Performed by: INTERNAL MEDICINE

## 2023-01-01 PROCEDURE — 2500000003 HC RX 250 WO HCPCS: Performed by: NURSE PRACTITIONER

## 2023-01-01 PROCEDURE — 77001 FLUOROGUIDE FOR VEIN DEVICE: CPT

## 2023-01-01 PROCEDURE — 87635 SARS-COV-2 COVID-19 AMP PRB: CPT

## 2023-01-01 PROCEDURE — 94660 CPAP INITIATION&MGMT: CPT

## 2023-01-01 PROCEDURE — 02HV33Z INSERTION OF INFUSION DEVICE INTO SUPERIOR VENA CAVA, PERCUTANEOUS APPROACH: ICD-10-PCS | Performed by: FAMILY MEDICINE

## 2023-01-01 PROCEDURE — 70450 CT HEAD/BRAIN W/O DYE: CPT

## 2023-01-01 PROCEDURE — 99222 1ST HOSP IP/OBS MODERATE 55: CPT | Performed by: INTERNAL MEDICINE

## 2023-01-01 PROCEDURE — 2580000003 HC RX 258: Performed by: EMERGENCY MEDICINE

## 2023-01-01 PROCEDURE — 99222 1ST HOSP IP/OBS MODERATE 55: CPT | Performed by: NURSE PRACTITIONER

## 2023-01-01 PROCEDURE — 85379 FIBRIN DEGRADATION QUANT: CPT

## 2023-01-01 PROCEDURE — 83036 HEMOGLOBIN GLYCOSYLATED A1C: CPT

## 2023-01-01 PROCEDURE — 71250 CT THORAX DX C-: CPT

## 2023-01-01 PROCEDURE — 81001 URINALYSIS AUTO W/SCOPE: CPT

## 2023-01-01 PROCEDURE — 93306 TTE W/DOPPLER COMPLETE: CPT

## 2023-01-01 PROCEDURE — 93005 ELECTROCARDIOGRAM TRACING: CPT | Performed by: INTERNAL MEDICINE

## 2023-01-01 PROCEDURE — 82803 BLOOD GASES ANY COMBINATION: CPT

## 2023-01-01 PROCEDURE — 96375 TX/PRO/DX INJ NEW DRUG ADDON: CPT

## 2023-01-01 PROCEDURE — 84100 ASSAY OF PHOSPHORUS: CPT

## 2023-01-01 PROCEDURE — 6370000000 HC RX 637 (ALT 250 FOR IP): Performed by: PSYCHIATRY & NEUROLOGY

## 2023-01-01 PROCEDURE — 85730 THROMBOPLASTIN TIME PARTIAL: CPT

## 2023-01-01 PROCEDURE — 80076 HEPATIC FUNCTION PANEL: CPT

## 2023-01-01 PROCEDURE — 94640 AIRWAY INHALATION TREATMENT: CPT

## 2023-01-01 PROCEDURE — 87040 BLOOD CULTURE FOR BACTERIA: CPT

## 2023-01-01 PROCEDURE — 86140 C-REACTIVE PROTEIN: CPT

## 2023-01-01 PROCEDURE — 3E0336Z INTRODUCTION OF NUTRITIONAL SUBSTANCE INTO PERIPHERAL VEIN, PERCUTANEOUS APPROACH: ICD-10-PCS | Performed by: INTERNAL MEDICINE

## 2023-01-01 PROCEDURE — 0BH17EZ INSERTION OF ENDOTRACHEAL AIRWAY INTO TRACHEA, VIA NATURAL OR ARTIFICIAL OPENING: ICD-10-PCS | Performed by: INTERNAL MEDICINE

## 2023-01-01 PROCEDURE — 99232 SBSQ HOSP IP/OBS MODERATE 35: CPT

## 2023-01-01 PROCEDURE — 83874 ASSAY OF MYOGLOBIN: CPT

## 2023-01-01 PROCEDURE — 84478 ASSAY OF TRIGLYCERIDES: CPT

## 2023-01-01 PROCEDURE — C1894 INTRO/SHEATH, NON-LASER: HCPCS

## 2023-01-01 PROCEDURE — 92610 EVALUATE SWALLOWING FUNCTION: CPT

## 2023-01-01 PROCEDURE — 6360000002 HC RX W HCPCS: Performed by: EMERGENCY MEDICINE

## 2023-01-01 PROCEDURE — 97167 OT EVAL HIGH COMPLEX 60 MIN: CPT

## 2023-01-01 PROCEDURE — 85610 PROTHROMBIN TIME: CPT

## 2023-01-01 PROCEDURE — 97163 PT EVAL HIGH COMPLEX 45 MIN: CPT

## 2023-01-01 PROCEDURE — 97116 GAIT TRAINING THERAPY: CPT

## 2023-01-01 PROCEDURE — 51702 INSERT TEMP BLADDER CATH: CPT

## 2023-01-01 PROCEDURE — 99222 1ST HOSP IP/OBS MODERATE 55: CPT | Performed by: PSYCHIATRY & NEUROLOGY

## 2023-01-01 PROCEDURE — 99285 EMERGENCY DEPT VISIT HI MDM: CPT

## 2023-01-01 PROCEDURE — 99233 SBSQ HOSP IP/OBS HIGH 50: CPT | Performed by: NURSE PRACTITIONER

## 2023-01-01 PROCEDURE — 96365 THER/PROPH/DIAG IV INF INIT: CPT

## 2023-01-01 PROCEDURE — 94002 VENT MGMT INPAT INIT DAY: CPT

## 2023-01-01 PROCEDURE — 96366 THER/PROPH/DIAG IV INF ADDON: CPT

## 2023-01-01 PROCEDURE — 82140 ASSAY OF AMMONIA: CPT

## 2023-01-01 PROCEDURE — 2500000003 HC RX 250 WO HCPCS: Performed by: EMERGENCY MEDICINE

## 2023-01-01 RX ORDER — ALBUTEROL SULFATE 2.5 MG/3ML
2.5 SOLUTION RESPIRATORY (INHALATION) EVERY 6 HOURS PRN
Status: DISCONTINUED | OUTPATIENT
Start: 2023-01-01 | End: 2023-01-01

## 2023-01-01 RX ORDER — LORAZEPAM 2 MG/ML
0.25 INJECTION INTRAMUSCULAR EVERY 4 HOURS PRN
Status: DISCONTINUED | OUTPATIENT
Start: 2023-01-01 | End: 2023-01-01

## 2023-01-01 RX ORDER — HEPARIN SODIUM 1000 [USP'U]/ML
60 INJECTION, SOLUTION INTRAVENOUS; SUBCUTANEOUS ONCE
Status: DISCONTINUED | OUTPATIENT
Start: 2023-01-01 | End: 2023-01-01

## 2023-01-01 RX ORDER — SODIUM CHLORIDE 0.9 % (FLUSH) 0.9 %
5-40 SYRINGE (ML) INJECTION EVERY 12 HOURS SCHEDULED
Status: DISCONTINUED | OUTPATIENT
Start: 2023-01-01 | End: 2023-01-01 | Stop reason: HOSPADM

## 2023-01-01 RX ORDER — ENOXAPARIN SODIUM 100 MG/ML
40 INJECTION SUBCUTANEOUS DAILY
Status: DISCONTINUED | OUTPATIENT
Start: 2023-01-01 | End: 2023-01-01

## 2023-01-01 RX ORDER — INSULIN LISPRO 100 [IU]/ML
0-4 INJECTION, SOLUTION INTRAVENOUS; SUBCUTANEOUS NIGHTLY
Status: DISCONTINUED | OUTPATIENT
Start: 2023-01-01 | End: 2023-01-01

## 2023-01-01 RX ORDER — DEXTROSE MONOHYDRATE 100 MG/ML
INJECTION, SOLUTION INTRAVENOUS CONTINUOUS PRN
Status: DISCONTINUED | OUTPATIENT
Start: 2023-01-01 | End: 2023-01-01 | Stop reason: HOSPADM

## 2023-01-01 RX ORDER — GLIMEPIRIDE 2 MG/1
4 TABLET ORAL 2 TIMES DAILY WITH MEALS
Status: DISCONTINUED | OUTPATIENT
Start: 2023-01-01 | End: 2023-01-01 | Stop reason: HOSPADM

## 2023-01-01 RX ORDER — 0.9 % SODIUM CHLORIDE 0.9 %
500 INTRAVENOUS SOLUTION INTRAVENOUS ONCE
Status: COMPLETED | OUTPATIENT
Start: 2023-01-01 | End: 2023-01-01

## 2023-01-01 RX ORDER — BUMETANIDE 0.25 MG/ML
1 INJECTION, SOLUTION INTRAMUSCULAR; INTRAVENOUS DAILY
Status: DISCONTINUED | OUTPATIENT
Start: 2023-01-01 | End: 2023-01-01

## 2023-01-01 RX ORDER — EPINEPHRINE 0.1 MG/ML
SYRINGE (ML) INJECTION
Status: DISCONTINUED
Start: 2023-01-01 | End: 2023-01-01 | Stop reason: WASHOUT

## 2023-01-01 RX ORDER — ACETAMINOPHEN 325 MG/1
650 TABLET ORAL EVERY 6 HOURS PRN
Status: DISCONTINUED | OUTPATIENT
Start: 2023-01-01 | End: 2023-01-01 | Stop reason: HOSPADM

## 2023-01-01 RX ORDER — PANTOPRAZOLE SODIUM 40 MG/1
40 TABLET, DELAYED RELEASE ORAL
Status: DISCONTINUED | OUTPATIENT
Start: 2023-01-01 | End: 2023-01-01

## 2023-01-01 RX ORDER — VITAMIN B COMPLEX
2000 TABLET ORAL DAILY
Status: DISCONTINUED | OUTPATIENT
Start: 2023-01-01 | End: 2023-01-01

## 2023-01-01 RX ORDER — POTASSIUM CHLORIDE 7.45 MG/ML
10 INJECTION INTRAVENOUS PRN
Status: DISCONTINUED | OUTPATIENT
Start: 2023-01-01 | End: 2023-01-01

## 2023-01-01 RX ORDER — MAGNESIUM SULFATE IN WATER 40 MG/ML
2000 INJECTION, SOLUTION INTRAVENOUS ONCE
Status: COMPLETED | OUTPATIENT
Start: 2023-01-01 | End: 2023-01-01

## 2023-01-01 RX ORDER — ACETAMINOPHEN 650 MG/1
650 SUPPOSITORY RECTAL EVERY 6 HOURS PRN
Status: DISCONTINUED | OUTPATIENT
Start: 2023-01-01 | End: 2023-01-01 | Stop reason: HOSPADM

## 2023-01-01 RX ORDER — LOPERAMIDE HYDROCHLORIDE 2 MG/1
2 CAPSULE ORAL DAILY
Status: DISCONTINUED | OUTPATIENT
Start: 2023-01-01 | End: 2023-01-01

## 2023-01-01 RX ORDER — LANOLIN ALCOHOL/MO/W.PET/CERES
1000 CREAM (GRAM) TOPICAL DAILY
Status: DISCONTINUED | OUTPATIENT
Start: 2023-01-01 | End: 2023-01-01

## 2023-01-01 RX ORDER — HEPARIN SODIUM 1000 [USP'U]/ML
30 INJECTION, SOLUTION INTRAVENOUS; SUBCUTANEOUS PRN
Status: DISCONTINUED | OUTPATIENT
Start: 2023-01-01 | End: 2023-01-01

## 2023-01-01 RX ORDER — HEPARIN SODIUM 5000 [USP'U]/ML
5000 INJECTION, SOLUTION INTRAVENOUS; SUBCUTANEOUS 2 TIMES DAILY
Status: DISCONTINUED | OUTPATIENT
Start: 2023-01-01 | End: 2023-01-01 | Stop reason: HOSPADM

## 2023-01-01 RX ORDER — FUROSEMIDE 10 MG/ML
20 INJECTION INTRAMUSCULAR; INTRAVENOUS ONCE
Status: COMPLETED | OUTPATIENT
Start: 2023-01-01 | End: 2023-01-01

## 2023-01-01 RX ORDER — QUETIAPINE FUMARATE 25 MG/1
25 TABLET, FILM COATED ORAL 2 TIMES DAILY
Status: DISCONTINUED | OUTPATIENT
Start: 2023-01-01 | End: 2023-01-01 | Stop reason: HOSPADM

## 2023-01-01 RX ORDER — BUMETANIDE 1 MG/1
2 TABLET ORAL DAILY
Status: DISCONTINUED | OUTPATIENT
Start: 2023-01-01 | End: 2023-01-01 | Stop reason: SDUPTHER

## 2023-01-01 RX ORDER — OLANZAPINE 5 MG/1
10 TABLET ORAL ONCE
Status: COMPLETED | OUTPATIENT
Start: 2023-01-01 | End: 2023-01-01

## 2023-01-01 RX ORDER — DEXTROSE AND SODIUM CHLORIDE 5; .45 G/100ML; G/100ML
INJECTION, SOLUTION INTRAVENOUS CONTINUOUS
Status: ACTIVE | OUTPATIENT
Start: 2023-01-01 | End: 2023-01-01

## 2023-01-01 RX ORDER — CHOLESTYRAMINE LIGHT 4 G/5.7G
4 POWDER, FOR SUSPENSION ORAL 2 TIMES DAILY
Status: DISCONTINUED | OUTPATIENT
Start: 2023-01-01 | End: 2023-01-01 | Stop reason: HOSPADM

## 2023-01-01 RX ORDER — MIDAZOLAM HYDROCHLORIDE 1 MG/ML
1 INJECTION INTRAMUSCULAR; INTRAVENOUS EVERY 30 MIN PRN
Status: DISCONTINUED | OUTPATIENT
Start: 2023-01-01 | End: 2023-01-01

## 2023-01-01 RX ORDER — BUMETANIDE 0.25 MG/ML
1 INJECTION, SOLUTION INTRAMUSCULAR; INTRAVENOUS ONCE
Status: COMPLETED | OUTPATIENT
Start: 2023-01-01 | End: 2023-01-01

## 2023-01-01 RX ORDER — POTASSIUM CHLORIDE 20 MEQ/1
10 TABLET, EXTENDED RELEASE ORAL 2 TIMES DAILY
Status: DISCONTINUED | OUTPATIENT
Start: 2023-01-01 | End: 2023-01-01

## 2023-01-01 RX ORDER — INSULIN LISPRO 100 [IU]/ML
0-8 INJECTION, SOLUTION INTRAVENOUS; SUBCUTANEOUS EVERY 6 HOURS
Status: DISCONTINUED | OUTPATIENT
Start: 2023-01-01 | End: 2023-01-01 | Stop reason: HOSPADM

## 2023-01-01 RX ORDER — BUMETANIDE 0.25 MG/ML
1 INJECTION, SOLUTION INTRAMUSCULAR; INTRAVENOUS 2 TIMES DAILY
Status: DISCONTINUED | OUTPATIENT
Start: 2023-01-01 | End: 2023-01-01

## 2023-01-01 RX ORDER — SCOLOPAMINE TRANSDERMAL SYSTEM 1 MG/1
1 PATCH, EXTENDED RELEASE TRANSDERMAL
Status: DISCONTINUED | OUTPATIENT
Start: 2023-01-01 | End: 2023-01-01 | Stop reason: HOSPADM

## 2023-01-01 RX ORDER — POTASSIUM CHLORIDE 20 MEQ/1
40 TABLET, EXTENDED RELEASE ORAL PRN
Status: DISCONTINUED | OUTPATIENT
Start: 2023-01-01 | End: 2023-01-01

## 2023-01-01 RX ORDER — LEVOFLOXACIN 500 MG/1
750 TABLET, FILM COATED ORAL
Status: DISCONTINUED | OUTPATIENT
Start: 2023-01-01 | End: 2023-01-01

## 2023-01-01 RX ORDER — INSULIN LISPRO 100 [IU]/ML
0-8 INJECTION, SOLUTION INTRAVENOUS; SUBCUTANEOUS
Status: DISCONTINUED | OUTPATIENT
Start: 2023-01-01 | End: 2023-01-01 | Stop reason: DRUGHIGH

## 2023-01-01 RX ORDER — ONDANSETRON 2 MG/ML
4 INJECTION INTRAMUSCULAR; INTRAVENOUS EVERY 6 HOURS PRN
Status: DISCONTINUED | OUTPATIENT
Start: 2023-01-01 | End: 2023-01-01 | Stop reason: HOSPADM

## 2023-01-01 RX ORDER — LEVOFLOXACIN 5 MG/ML
500 INJECTION, SOLUTION INTRAVENOUS EVERY 24 HOURS
Status: DISCONTINUED | OUTPATIENT
Start: 2023-01-01 | End: 2023-01-01

## 2023-01-01 RX ORDER — POLYETHYLENE GLYCOL 3350 17 G/17G
17 POWDER, FOR SOLUTION ORAL DAILY PRN
Status: DISCONTINUED | OUTPATIENT
Start: 2023-01-01 | End: 2023-01-01 | Stop reason: HOSPADM

## 2023-01-01 RX ORDER — INSULIN LISPRO 100 [IU]/ML
0-4 INJECTION, SOLUTION INTRAVENOUS; SUBCUTANEOUS NIGHTLY
Status: DISCONTINUED | OUTPATIENT
Start: 2023-01-01 | End: 2023-01-01 | Stop reason: DRUGHIGH

## 2023-01-01 RX ORDER — HEPARIN SODIUM 10000 [USP'U]/100ML
5-30 INJECTION, SOLUTION INTRAVENOUS CONTINUOUS
Status: DISCONTINUED | OUTPATIENT
Start: 2023-01-01 | End: 2023-01-01

## 2023-01-01 RX ORDER — DEXAMETHASONE SODIUM PHOSPHATE 10 MG/ML
6 INJECTION, SOLUTION INTRAMUSCULAR; INTRAVENOUS DAILY
Status: COMPLETED | OUTPATIENT
Start: 2023-01-01 | End: 2023-01-01

## 2023-01-01 RX ORDER — LORAZEPAM 2 MG/ML
0.5 INJECTION INTRAMUSCULAR 3 TIMES DAILY PRN
Status: DISCONTINUED | OUTPATIENT
Start: 2023-01-01 | End: 2023-01-01 | Stop reason: HOSPADM

## 2023-01-01 RX ORDER — HALOPERIDOL 5 MG/ML
2 INJECTION INTRAMUSCULAR EVERY 4 HOURS PRN
Status: DISCONTINUED | OUTPATIENT
Start: 2023-01-01 | End: 2023-01-01

## 2023-01-01 RX ORDER — LEVOFLOXACIN 5 MG/ML
750 INJECTION, SOLUTION INTRAVENOUS ONCE
Status: COMPLETED | OUTPATIENT
Start: 2023-01-01 | End: 2023-01-01

## 2023-01-01 RX ORDER — MIDODRINE HYDROCHLORIDE 10 MG/1
10 TABLET ORAL ONCE
Status: COMPLETED | OUTPATIENT
Start: 2023-01-01 | End: 2023-01-01

## 2023-01-01 RX ORDER — GLYCOPYRROLATE 0.2 MG/ML
0.1 INJECTION INTRAMUSCULAR; INTRAVENOUS
Status: DISCONTINUED | OUTPATIENT
Start: 2023-01-01 | End: 2023-01-01 | Stop reason: HOSPADM

## 2023-01-01 RX ORDER — MAGNESIUM SULFATE 1 G/100ML
1000 INJECTION INTRAVENOUS PRN
Status: DISCONTINUED | OUTPATIENT
Start: 2023-01-01 | End: 2023-01-01 | Stop reason: HOSPADM

## 2023-01-01 RX ORDER — ONDANSETRON 4 MG/1
4 TABLET, ORALLY DISINTEGRATING ORAL EVERY 8 HOURS PRN
Status: DISCONTINUED | OUTPATIENT
Start: 2023-01-01 | End: 2023-01-01 | Stop reason: HOSPADM

## 2023-01-01 RX ORDER — INSULIN LISPRO 100 [IU]/ML
0-4 INJECTION, SOLUTION INTRAVENOUS; SUBCUTANEOUS
Status: DISCONTINUED | OUTPATIENT
Start: 2023-01-01 | End: 2023-01-01

## 2023-01-01 RX ORDER — FENTANYL CITRATE 0.05 MG/ML
50 INJECTION, SOLUTION INTRAMUSCULAR; INTRAVENOUS
Status: DISCONTINUED | OUTPATIENT
Start: 2023-01-01 | End: 2023-01-01 | Stop reason: HOSPADM

## 2023-01-01 RX ORDER — SODIUM CHLORIDE 0.9 % (FLUSH) 0.9 %
10 SYRINGE (ML) INJECTION PRN
Status: DISCONTINUED | OUTPATIENT
Start: 2023-01-01 | End: 2023-01-01 | Stop reason: HOSPADM

## 2023-01-01 RX ORDER — QUETIAPINE FUMARATE 25 MG/1
25 TABLET, FILM COATED ORAL ONCE
Status: COMPLETED | OUTPATIENT
Start: 2023-01-01 | End: 2023-01-01

## 2023-01-01 RX ORDER — CARVEDILOL 12.5 MG/1
12.5 TABLET ORAL 2 TIMES DAILY WITH MEALS
Status: DISCONTINUED | OUTPATIENT
Start: 2023-01-01 | End: 2023-01-01 | Stop reason: HOSPADM

## 2023-01-01 RX ORDER — DEXAMETHASONE SODIUM PHOSPHATE 4 MG/ML
4 INJECTION, SOLUTION INTRA-ARTICULAR; INTRALESIONAL; INTRAMUSCULAR; INTRAVENOUS; SOFT TISSUE ONCE
Status: COMPLETED | OUTPATIENT
Start: 2023-01-01 | End: 2023-01-01

## 2023-01-01 RX ORDER — HEPARIN SODIUM 1000 [USP'U]/ML
60 INJECTION, SOLUTION INTRAVENOUS; SUBCUTANEOUS PRN
Status: DISCONTINUED | OUTPATIENT
Start: 2023-01-01 | End: 2023-01-01

## 2023-01-01 RX ORDER — NOREPINEPHRINE BIT/0.9 % NACL 16MG/250ML
1-100 INFUSION BOTTLE (ML) INTRAVENOUS CONTINUOUS
Status: DISCONTINUED | OUTPATIENT
Start: 2023-01-01 | End: 2023-01-01 | Stop reason: HOSPADM

## 2023-01-01 RX ORDER — 0.9 % SODIUM CHLORIDE 0.9 %
250 INTRAVENOUS SOLUTION INTRAVENOUS ONCE
Status: COMPLETED | OUTPATIENT
Start: 2023-01-01 | End: 2023-01-01

## 2023-01-01 RX ORDER — SODIUM CHLORIDE 9 MG/ML
INJECTION, SOLUTION INTRAVENOUS PRN
Status: DISCONTINUED | OUTPATIENT
Start: 2023-01-01 | End: 2023-01-01 | Stop reason: HOSPADM

## 2023-01-01 RX ORDER — LEVOFLOXACIN 5 MG/ML
750 INJECTION, SOLUTION INTRAVENOUS
Status: DISCONTINUED | OUTPATIENT
Start: 2023-01-01 | End: 2023-01-01 | Stop reason: ALTCHOICE

## 2023-01-01 RX ORDER — NOREPINEPHRINE BIT/0.9 % NACL 16MG/250ML
1-100 INFUSION BOTTLE (ML) INTRAVENOUS CONTINUOUS
Status: DISCONTINUED | OUTPATIENT
Start: 2023-01-01 | End: 2023-01-01 | Stop reason: SDUPTHER

## 2023-01-01 RX ORDER — EPINEPHRINE 0.1 MG/ML
SYRINGE (ML) INJECTION
Status: COMPLETED | OUTPATIENT
Start: 2023-01-01 | End: 2023-01-01

## 2023-01-01 RX ORDER — DEXTROSE AND SODIUM CHLORIDE 5; .45 G/100ML; G/100ML
INJECTION, SOLUTION INTRAVENOUS CONTINUOUS
Status: DISCONTINUED | OUTPATIENT
Start: 2023-01-01 | End: 2023-01-01

## 2023-01-01 RX ADMIN — SODIUM CHLORIDE, PRESERVATIVE FREE 40 MG: 5 INJECTION INTRAVENOUS at 08:52

## 2023-01-01 RX ADMIN — GLIMEPIRIDE 4 MG: 2 TABLET ORAL at 10:42

## 2023-01-01 RX ADMIN — POTASSIUM PHOSPHATE, MONOBASIC POTASSIUM PHOSPHATE, DIBASIC: 224; 236 INJECTION, SOLUTION, CONCENTRATE INTRAVENOUS at 17:12

## 2023-01-01 RX ADMIN — Medication 2000 UNITS: at 08:55

## 2023-01-01 RX ADMIN — INSULIN LISPRO 4 UNITS: 100 INJECTION, SOLUTION INTRAVENOUS; SUBCUTANEOUS at 22:13

## 2023-01-01 RX ADMIN — INSULIN LISPRO 6 UNITS: 100 INJECTION, SOLUTION INTRAVENOUS; SUBCUTANEOUS at 18:42

## 2023-01-01 RX ADMIN — HEPARIN SODIUM 5000 UNITS: 5000 INJECTION INTRAVENOUS; SUBCUTANEOUS at 19:53

## 2023-01-01 RX ADMIN — MAGNESIUM SULFATE HEPTAHYDRATE 1000 MG: 1 INJECTION, SOLUTION INTRAVENOUS at 13:03

## 2023-01-01 RX ADMIN — INSULIN LISPRO 2 UNITS: 100 INJECTION, SOLUTION INTRAVENOUS; SUBCUTANEOUS at 17:21

## 2023-01-01 RX ADMIN — CARVEDILOL 12.5 MG: 12.5 TABLET, FILM COATED ORAL at 08:55

## 2023-01-01 RX ADMIN — LORAZEPAM 0.5 MG: 2 INJECTION INTRAMUSCULAR at 22:29

## 2023-01-01 RX ADMIN — DEXAMETHASONE 6 MG: 2 TABLET ORAL at 12:29

## 2023-01-01 RX ADMIN — SODIUM CHLORIDE, PRESERVATIVE FREE 10 ML: 5 INJECTION INTRAVENOUS at 20:51

## 2023-01-01 RX ADMIN — DEXTROSE AND SODIUM CHLORIDE: 5; 450 INJECTION, SOLUTION INTRAVENOUS at 03:04

## 2023-01-01 RX ADMIN — HEPARIN SODIUM 5000 UNITS: 5000 INJECTION INTRAVENOUS; SUBCUTANEOUS at 09:49

## 2023-01-01 RX ADMIN — SODIUM CHLORIDE, PRESERVATIVE FREE 10 ML: 5 INJECTION INTRAVENOUS at 22:00

## 2023-01-01 RX ADMIN — DEXTROSE AND SODIUM CHLORIDE: 5; 450 INJECTION, SOLUTION INTRAVENOUS at 18:30

## 2023-01-01 RX ADMIN — INSULIN LISPRO 4 UNITS: 100 INJECTION, SOLUTION INTRAVENOUS; SUBCUTANEOUS at 15:48

## 2023-01-01 RX ADMIN — SODIUM CHLORIDE, PRESERVATIVE FREE 10 ML: 5 INJECTION INTRAVENOUS at 00:33

## 2023-01-01 RX ADMIN — HEPARIN SODIUM 5000 UNITS: 5000 INJECTION INTRAVENOUS; SUBCUTANEOUS at 08:19

## 2023-01-01 RX ADMIN — SACUBITRIL AND VALSARTAN 1 TABLET: 24; 26 TABLET, FILM COATED ORAL at 09:34

## 2023-01-01 RX ADMIN — INSULIN LISPRO 4 UNITS: 100 INJECTION, SOLUTION INTRAVENOUS; SUBCUTANEOUS at 00:06

## 2023-01-01 RX ADMIN — FENTANYL CITRATE 50 MCG: 0.05 INJECTION, SOLUTION INTRAMUSCULAR; INTRAVENOUS at 19:46

## 2023-01-01 RX ADMIN — QUETIAPINE FUMARATE 25 MG: 25 TABLET ORAL at 09:13

## 2023-01-01 RX ADMIN — FENTANYL CITRATE 50 MCG: 0.05 INJECTION, SOLUTION INTRAMUSCULAR; INTRAVENOUS at 00:41

## 2023-01-01 RX ADMIN — GLIMEPIRIDE 4 MG: 2 TABLET ORAL at 19:03

## 2023-01-01 RX ADMIN — INSULIN LISPRO 2 UNITS: 100 INJECTION, SOLUTION INTRAVENOUS; SUBCUTANEOUS at 13:38

## 2023-01-01 RX ADMIN — SODIUM CHLORIDE, PRESERVATIVE FREE 10 ML: 5 INJECTION INTRAVENOUS at 09:14

## 2023-01-01 RX ADMIN — SACUBITRIL AND VALSARTAN 1 TABLET: 24; 26 TABLET, FILM COATED ORAL at 20:35

## 2023-01-01 RX ADMIN — INSULIN LISPRO 4 UNITS: 100 INJECTION, SOLUTION INTRAVENOUS; SUBCUTANEOUS at 12:13

## 2023-01-01 RX ADMIN — CARVEDILOL 12.5 MG: 12.5 TABLET, FILM COATED ORAL at 17:21

## 2023-01-01 RX ADMIN — QUETIAPINE FUMARATE 25 MG: 25 TABLET ORAL at 09:35

## 2023-01-01 RX ADMIN — POTASSIUM CHLORIDE 10 MEQ: 1500 TABLET, EXTENDED RELEASE ORAL at 08:45

## 2023-01-01 RX ADMIN — DEXAMETHASONE SODIUM PHOSPHATE 6 MG: 10 INJECTION INTRAMUSCULAR; INTRAVENOUS at 10:16

## 2023-01-01 RX ADMIN — CALCIUM GLUCONATE: 98 INJECTION, SOLUTION INTRAVENOUS at 17:25

## 2023-01-01 RX ADMIN — SODIUM CHLORIDE, PRESERVATIVE FREE 10 ML: 5 INJECTION INTRAVENOUS at 09:32

## 2023-01-01 RX ADMIN — SACUBITRIL AND VALSARTAN 1 TABLET: 24; 26 TABLET, FILM COATED ORAL at 20:13

## 2023-01-01 RX ADMIN — DEXAMETHASONE 6 MG: 2 TABLET ORAL at 21:02

## 2023-01-01 RX ADMIN — FENTANYL CITRATE 50 MCG: 0.05 INJECTION, SOLUTION INTRAMUSCULAR; INTRAVENOUS at 21:08

## 2023-01-01 RX ADMIN — INSULIN LISPRO 2 UNITS: 100 INJECTION, SOLUTION INTRAVENOUS; SUBCUTANEOUS at 17:35

## 2023-01-01 RX ADMIN — SODIUM CHLORIDE, PRESERVATIVE FREE 10 ML: 5 INJECTION INTRAVENOUS at 21:34

## 2023-01-01 RX ADMIN — DEXAMETHASONE SODIUM PHOSPHATE 6 MG: 10 INJECTION INTRAMUSCULAR; INTRAVENOUS at 08:52

## 2023-01-01 RX ADMIN — FENTANYL CITRATE 50 MCG: 0.05 INJECTION, SOLUTION INTRAMUSCULAR; INTRAVENOUS at 19:18

## 2023-01-01 RX ADMIN — SODIUM CHLORIDE, PRESERVATIVE FREE 10 ML: 5 INJECTION INTRAVENOUS at 12:33

## 2023-01-01 RX ADMIN — Medication 5 MCG/MIN: at 19:31

## 2023-01-01 RX ADMIN — CARVEDILOL 12.5 MG: 12.5 TABLET, FILM COATED ORAL at 08:45

## 2023-01-01 RX ADMIN — SODIUM PHOSPHATE, MONOBASIC, MONOHYDRATE AND SODIUM PHOSPHATE, DIBASIC, ANHYDROUS 15 MMOL: 276; 142 INJECTION, SOLUTION INTRAVENOUS at 09:18

## 2023-01-01 RX ADMIN — SODIUM CHLORIDE, PRESERVATIVE FREE 10 ML: 5 INJECTION INTRAVENOUS at 09:55

## 2023-01-01 RX ADMIN — OLIVE OIL AND SOYBEAN OIL 100 ML: 16; 4 INJECTION, EMULSION INTRAVENOUS at 17:33

## 2023-01-01 RX ADMIN — SACUBITRIL AND VALSARTAN 1 TABLET: 24; 26 TABLET, FILM COATED ORAL at 21:02

## 2023-01-01 RX ADMIN — INSULIN LISPRO 4 UNITS: 100 INJECTION, SOLUTION INTRAVENOUS; SUBCUTANEOUS at 10:40

## 2023-01-01 RX ADMIN — FUROSEMIDE 20 MG: 10 INJECTION, SOLUTION INTRAMUSCULAR; INTRAVENOUS at 16:15

## 2023-01-01 RX ADMIN — SODIUM CHLORIDE, PRESERVATIVE FREE 40 MG: 5 INJECTION INTRAVENOUS at 09:13

## 2023-01-01 RX ADMIN — INSULIN LISPRO 6 UNITS: 100 INJECTION, SOLUTION INTRAVENOUS; SUBCUTANEOUS at 04:32

## 2023-01-01 RX ADMIN — HEPARIN SODIUM 5000 UNITS: 5000 INJECTION INTRAVENOUS; SUBCUTANEOUS at 20:26

## 2023-01-01 RX ADMIN — INSULIN LISPRO 2 UNITS: 100 INJECTION, SOLUTION INTRAVENOUS; SUBCUTANEOUS at 08:44

## 2023-01-01 RX ADMIN — INSULIN LISPRO 6 UNITS: 100 INJECTION, SOLUTION INTRAVENOUS; SUBCUTANEOUS at 15:06

## 2023-01-01 RX ADMIN — SACUBITRIL AND VALSARTAN 1 TABLET: 24; 26 TABLET, FILM COATED ORAL at 12:29

## 2023-01-01 RX ADMIN — LEVOFLOXACIN 750 MG: 5 INJECTION, SOLUTION INTRAVENOUS at 15:04

## 2023-01-01 RX ADMIN — SODIUM CHLORIDE, PRESERVATIVE FREE 40 MG: 5 INJECTION INTRAVENOUS at 07:33

## 2023-01-01 RX ADMIN — INSULIN LISPRO 4 UNITS: 100 INJECTION, SOLUTION INTRAVENOUS; SUBCUTANEOUS at 18:17

## 2023-01-01 RX ADMIN — SACUBITRIL AND VALSARTAN 1 TABLET: 24; 26 TABLET, FILM COATED ORAL at 20:00

## 2023-01-01 RX ADMIN — SODIUM CHLORIDE, PRESERVATIVE FREE 10 ML: 5 INJECTION INTRAVENOUS at 09:10

## 2023-01-01 RX ADMIN — FENTANYL CITRATE 50 MCG: 0.05 INJECTION, SOLUTION INTRAMUSCULAR; INTRAVENOUS at 04:46

## 2023-01-01 RX ADMIN — SODIUM CHLORIDE, PRESERVATIVE FREE 40 MG: 5 INJECTION INTRAVENOUS at 08:19

## 2023-01-01 RX ADMIN — HEPARIN SODIUM 5000 UNITS: 5000 INJECTION INTRAVENOUS; SUBCUTANEOUS at 08:40

## 2023-01-01 RX ADMIN — SODIUM CHLORIDE 500 ML: 9 INJECTION, SOLUTION INTRAVENOUS at 01:32

## 2023-01-01 RX ADMIN — HEPARIN SODIUM 5000 UNITS: 5000 INJECTION INTRAVENOUS; SUBCUTANEOUS at 08:30

## 2023-01-01 RX ADMIN — QUETIAPINE FUMARATE 25 MG: 25 TABLET ORAL at 08:40

## 2023-01-01 RX ADMIN — FENTANYL CITRATE 50 MCG: 0.05 INJECTION, SOLUTION INTRAMUSCULAR; INTRAVENOUS at 18:58

## 2023-01-01 RX ADMIN — CEFEPIME 2000 MG: 2 INJECTION, POWDER, FOR SOLUTION INTRAVENOUS at 04:24

## 2023-01-01 RX ADMIN — HEPARIN SODIUM 5000 UNITS: 5000 INJECTION INTRAVENOUS; SUBCUTANEOUS at 21:13

## 2023-01-01 RX ADMIN — DEXAMETHASONE SODIUM PHOSPHATE 6 MG: 10 INJECTION INTRAMUSCULAR; INTRAVENOUS at 13:25

## 2023-01-01 RX ADMIN — CARVEDILOL 12.5 MG: 12.5 TABLET, FILM COATED ORAL at 17:35

## 2023-01-01 RX ADMIN — NOREPINEPHRINE BITARTRATE 10 MCG/MIN: 1 SOLUTION INTRAVENOUS at 13:42

## 2023-01-01 RX ADMIN — SACUBITRIL AND VALSARTAN 1 TABLET: 24; 26 TABLET, FILM COATED ORAL at 20:51

## 2023-01-01 RX ADMIN — GLYCOPYRROLATE 0.1 MG: 0.2 INJECTION INTRAMUSCULAR; INTRAVENOUS at 23:20

## 2023-01-01 RX ADMIN — MAGNESIUM SULFATE HEPTAHYDRATE 2000 MG: 40 INJECTION, SOLUTION INTRAVENOUS at 02:00

## 2023-01-01 RX ADMIN — SACUBITRIL AND VALSARTAN 1 TABLET: 24; 26 TABLET, FILM COATED ORAL at 21:13

## 2023-01-01 RX ADMIN — DEXAMETHASONE 6 MG: 2 TABLET ORAL at 08:45

## 2023-01-01 RX ADMIN — DEXAMETHASONE SODIUM PHOSPHATE 4 MG: 4 INJECTION, SOLUTION INTRAMUSCULAR; INTRAVENOUS at 01:58

## 2023-01-01 RX ADMIN — Medication 20 MCG/MIN: at 13:47

## 2023-01-01 RX ADMIN — Medication 10 MCG/MIN: at 02:11

## 2023-01-01 RX ADMIN — SODIUM CHLORIDE, PRESERVATIVE FREE 10 ML: 5 INJECTION INTRAVENOUS at 19:47

## 2023-01-01 RX ADMIN — MIDODRINE HYDROCHLORIDE 10 MG: 10 TABLET ORAL at 14:07

## 2023-01-01 RX ADMIN — GLIMEPIRIDE 4 MG: 2 TABLET ORAL at 21:02

## 2023-01-01 RX ADMIN — FENTANYL CITRATE 50 MCG: 0.05 INJECTION, SOLUTION INTRAMUSCULAR; INTRAVENOUS at 13:20

## 2023-01-01 RX ADMIN — GLIMEPIRIDE 4 MG: 2 TABLET ORAL at 17:17

## 2023-01-01 RX ADMIN — Medication 2 MG/HR: at 16:20

## 2023-01-01 RX ADMIN — SACUBITRIL AND VALSARTAN 1 TABLET: 24; 26 TABLET, FILM COATED ORAL at 09:13

## 2023-01-01 RX ADMIN — OLIVE OIL AND SOYBEAN OIL 100 ML: 16; 4 INJECTION, EMULSION INTRAVENOUS at 17:28

## 2023-01-01 RX ADMIN — SODIUM CHLORIDE, PRESERVATIVE FREE 10 ML: 5 INJECTION INTRAVENOUS at 21:00

## 2023-01-01 RX ADMIN — SODIUM CHLORIDE: 9 INJECTION, SOLUTION INTRAVENOUS at 22:25

## 2023-01-01 RX ADMIN — SODIUM CHLORIDE, PRESERVATIVE FREE 40 MG: 5 INJECTION INTRAVENOUS at 08:43

## 2023-01-01 RX ADMIN — QUETIAPINE FUMARATE 25 MG: 25 TABLET ORAL at 14:59

## 2023-01-01 RX ADMIN — SODIUM CHLORIDE, PRESERVATIVE FREE 10 ML: 5 INJECTION INTRAVENOUS at 08:54

## 2023-01-01 RX ADMIN — CARVEDILOL 12.5 MG: 12.5 TABLET, FILM COATED ORAL at 17:17

## 2023-01-01 RX ADMIN — SODIUM CHLORIDE, PRESERVATIVE FREE 10 ML: 5 INJECTION INTRAVENOUS at 08:00

## 2023-01-01 RX ADMIN — INSULIN LISPRO 2 UNITS: 100 INJECTION, SOLUTION INTRAVENOUS; SUBCUTANEOUS at 17:17

## 2023-01-01 RX ADMIN — DEXAMETHASONE SODIUM PHOSPHATE 6 MG: 10 INJECTION INTRAMUSCULAR; INTRAVENOUS at 08:40

## 2023-01-01 RX ADMIN — Medication 2 MG/HR: at 21:30

## 2023-01-01 RX ADMIN — Medication 2000 UNITS: at 08:45

## 2023-01-01 RX ADMIN — DEXAMETHASONE 6 MG: 2 TABLET ORAL at 08:48

## 2023-01-01 RX ADMIN — ACETAMINOPHEN 650 MG: 325 TABLET ORAL at 20:13

## 2023-01-01 RX ADMIN — FENTANYL CITRATE 50 MCG: 0.05 INJECTION, SOLUTION INTRAMUSCULAR; INTRAVENOUS at 03:53

## 2023-01-01 RX ADMIN — FENTANYL CITRATE 50 MCG: 0.05 INJECTION, SOLUTION INTRAMUSCULAR; INTRAVENOUS at 06:30

## 2023-01-01 RX ADMIN — BUMETANIDE 1 MG: 0.25 INJECTION INTRAMUSCULAR; INTRAVENOUS at 08:56

## 2023-01-01 RX ADMIN — SACUBITRIL AND VALSARTAN 1 TABLET: 24; 26 TABLET, FILM COATED ORAL at 00:33

## 2023-01-01 RX ADMIN — CHOLESTYRAMINE 4 G: 4 POWDER, FOR SUSPENSION ORAL at 00:47

## 2023-01-01 RX ADMIN — PROBIOTIC PRODUCT - TAB 1 TABLET: TAB at 08:45

## 2023-01-01 RX ADMIN — ACETAMINOPHEN 650 MG: 325 TABLET ORAL at 09:07

## 2023-01-01 RX ADMIN — GLIMEPIRIDE 4 MG: 2 TABLET ORAL at 08:45

## 2023-01-01 RX ADMIN — CHOLESTYRAMINE 4 G: 4 POWDER, FOR SUSPENSION ORAL at 20:13

## 2023-01-01 RX ADMIN — SACUBITRIL AND VALSARTAN 1 TABLET: 24; 26 TABLET, FILM COATED ORAL at 08:52

## 2023-01-01 RX ADMIN — SODIUM CHLORIDE, PRESERVATIVE FREE 10 ML: 5 INJECTION INTRAVENOUS at 08:31

## 2023-01-01 RX ADMIN — FENTANYL CITRATE 50 MCG: 0.05 INJECTION, SOLUTION INTRAMUSCULAR; INTRAVENOUS at 09:30

## 2023-01-01 RX ADMIN — CYANOCOBALAMIN TAB 1000 MCG 1000 MCG: 1000 TAB at 17:25

## 2023-01-01 RX ADMIN — CARVEDILOL 12.5 MG: 12.5 TABLET, FILM COATED ORAL at 12:29

## 2023-01-01 RX ADMIN — FENTANYL CITRATE 50 MCG: 0.05 INJECTION, SOLUTION INTRAMUSCULAR; INTRAVENOUS at 19:59

## 2023-01-01 RX ADMIN — INSULIN LISPRO 2 UNITS: 100 INJECTION, SOLUTION INTRAVENOUS; SUBCUTANEOUS at 03:21

## 2023-01-01 RX ADMIN — FENTANYL CITRATE 50 MCG: 0.05 INJECTION, SOLUTION INTRAMUSCULAR; INTRAVENOUS at 19:21

## 2023-01-01 RX ADMIN — SODIUM CHLORIDE: 9 INJECTION, SOLUTION INTRAVENOUS at 20:57

## 2023-01-01 RX ADMIN — SODIUM CHLORIDE, PRESERVATIVE FREE 10 ML: 5 INJECTION INTRAVENOUS at 08:22

## 2023-01-01 RX ADMIN — CYANOCOBALAMIN TAB 1000 MCG 1000 MCG: 1000 TAB at 08:48

## 2023-01-01 RX ADMIN — DEXMEDETOMIDINE 0.2 MCG/KG/HR: 100 INJECTION, SOLUTION INTRAVENOUS at 12:43

## 2023-01-01 RX ADMIN — FENTANYL CITRATE 50 MCG: 0.05 INJECTION, SOLUTION INTRAMUSCULAR; INTRAVENOUS at 08:00

## 2023-01-01 RX ADMIN — SODIUM CHLORIDE, PRESERVATIVE FREE 40 MG: 5 INJECTION INTRAVENOUS at 10:16

## 2023-01-01 RX ADMIN — DEXAMETHASONE SODIUM PHOSPHATE 6 MG: 10 INJECTION INTRAMUSCULAR; INTRAVENOUS at 08:44

## 2023-01-01 RX ADMIN — DEXAMETHASONE SODIUM PHOSPHATE 6 MG: 10 INJECTION INTRAMUSCULAR; INTRAVENOUS at 09:49

## 2023-01-01 RX ADMIN — SACUBITRIL AND VALSARTAN 1 TABLET: 24; 26 TABLET, FILM COATED ORAL at 08:40

## 2023-01-01 RX ADMIN — SODIUM CHLORIDE 500 ML: 0.9 INJECTION, SOLUTION INTRAVENOUS at 14:15

## 2023-01-01 RX ADMIN — PANTOPRAZOLE SODIUM 40 MG: 40 TABLET, DELAYED RELEASE ORAL at 05:34

## 2023-01-01 RX ADMIN — INSULIN LISPRO 2 UNITS: 100 INJECTION, SOLUTION INTRAVENOUS; SUBCUTANEOUS at 12:24

## 2023-01-01 RX ADMIN — INSULIN LISPRO 4 UNITS: 100 INJECTION, SOLUTION INTRAVENOUS; SUBCUTANEOUS at 22:20

## 2023-01-01 RX ADMIN — OLIVE OIL AND SOYBEAN OIL 100 ML: 16; 4 INJECTION, EMULSION INTRAVENOUS at 17:35

## 2023-01-01 RX ADMIN — INSULIN LISPRO 4 UNITS: 100 INJECTION, SOLUTION INTRAVENOUS; SUBCUTANEOUS at 10:23

## 2023-01-01 RX ADMIN — SODIUM CHLORIDE: 9 INJECTION, SOLUTION INTRAVENOUS at 03:57

## 2023-01-01 RX ADMIN — Medication 2000 UNITS: at 17:26

## 2023-01-01 RX ADMIN — Medication 5 MG/HR: at 03:23

## 2023-01-01 RX ADMIN — SODIUM CHLORIDE, PRESERVATIVE FREE 40 MG: 5 INJECTION INTRAVENOUS at 08:17

## 2023-01-01 RX ADMIN — FENTANYL CITRATE 50 MCG: 0.05 INJECTION, SOLUTION INTRAMUSCULAR; INTRAVENOUS at 22:18

## 2023-01-01 RX ADMIN — INSULIN LISPRO 4 UNITS: 100 INJECTION, SOLUTION INTRAVENOUS; SUBCUTANEOUS at 12:03

## 2023-01-01 RX ADMIN — INSULIN LISPRO 2 UNITS: 100 INJECTION, SOLUTION INTRAVENOUS; SUBCUTANEOUS at 12:31

## 2023-01-01 RX ADMIN — SODIUM CHLORIDE, PRESERVATIVE FREE 40 MG: 5 INJECTION INTRAVENOUS at 17:20

## 2023-01-01 RX ADMIN — CHOLESTYRAMINE 4 G: 4 POWDER, FOR SUSPENSION ORAL at 09:00

## 2023-01-01 RX ADMIN — CYANOCOBALAMIN TAB 1000 MCG 1000 MCG: 1000 TAB at 08:45

## 2023-01-01 RX ADMIN — QUETIAPINE FUMARATE 25 MG: 25 TABLET ORAL at 20:00

## 2023-01-01 RX ADMIN — POTASSIUM CHLORIDE 10 MEQ: 1500 TABLET, EXTENDED RELEASE ORAL at 21:02

## 2023-01-01 RX ADMIN — QUETIAPINE FUMARATE 25 MG: 25 TABLET ORAL at 19:46

## 2023-01-01 RX ADMIN — QUETIAPINE FUMARATE 25 MG: 25 TABLET ORAL at 20:51

## 2023-01-01 RX ADMIN — PROBIOTIC PRODUCT - TAB 1 TABLET: TAB at 17:25

## 2023-01-01 RX ADMIN — LEVOFLOXACIN 750 MG: 750 TABLET, FILM COATED ORAL at 15:33

## 2023-01-01 RX ADMIN — INSULIN LISPRO 4 UNITS: 100 INJECTION, SOLUTION INTRAVENOUS; SUBCUTANEOUS at 09:01

## 2023-01-01 RX ADMIN — HEPARIN SODIUM 5000 UNITS: 5000 INJECTION INTRAVENOUS; SUBCUTANEOUS at 22:36

## 2023-01-01 RX ADMIN — GLIMEPIRIDE 4 MG: 2 TABLET ORAL at 08:55

## 2023-01-01 RX ADMIN — FENTANYL CITRATE 50 MCG: 0.05 INJECTION, SOLUTION INTRAMUSCULAR; INTRAVENOUS at 15:35

## 2023-01-01 RX ADMIN — VANCOMYCIN HYDROCHLORIDE 1000 MG: 1 INJECTION, POWDER, LYOPHILIZED, FOR SOLUTION INTRAVENOUS at 04:03

## 2023-01-01 RX ADMIN — SODIUM CHLORIDE: 9 INJECTION, SOLUTION INTRAVENOUS at 22:18

## 2023-01-01 RX ADMIN — OLANZAPINE 10 MG: 5 TABLET, FILM COATED ORAL at 03:24

## 2023-01-01 RX ADMIN — SACUBITRIL AND VALSARTAN 1 TABLET: 24; 26 TABLET, FILM COATED ORAL at 08:45

## 2023-01-01 RX ADMIN — NOREPINEPHRINE BITARTRATE 5 MCG/MIN: 1 SOLUTION INTRAVENOUS at 01:02

## 2023-01-01 RX ADMIN — QUETIAPINE FUMARATE 25 MG: 25 TABLET ORAL at 08:43

## 2023-01-01 RX ADMIN — SODIUM CHLORIDE, PRESERVATIVE FREE 10 ML: 5 INJECTION INTRAVENOUS at 08:50

## 2023-01-01 RX ADMIN — SODIUM CHLORIDE, PRESERVATIVE FREE 10 ML: 5 INJECTION INTRAVENOUS at 20:10

## 2023-01-01 RX ADMIN — DEXAMETHASONE SODIUM PHOSPHATE 6 MG: 10 INJECTION INTRAMUSCULAR; INTRAVENOUS at 09:13

## 2023-01-01 RX ADMIN — LORAZEPAM 0.25 MG: 2 INJECTION INTRAMUSCULAR at 16:16

## 2023-01-01 RX ADMIN — INSULIN LISPRO 2 UNITS: 100 INJECTION, SOLUTION INTRAVENOUS; SUBCUTANEOUS at 11:25

## 2023-01-01 RX ADMIN — EPINEPHRINE 1 MG: 0.1 INJECTION INTRACARDIAC; INTRAVENOUS at 12:48

## 2023-01-01 RX ADMIN — SACUBITRIL AND VALSARTAN 1 TABLET: 24; 26 TABLET, FILM COATED ORAL at 08:48

## 2023-01-01 RX ADMIN — CHOLESTYRAMINE 4 G: 4 POWDER, FOR SUSPENSION ORAL at 21:55

## 2023-01-01 RX ADMIN — SODIUM CHLORIDE, PRESERVATIVE FREE 10 ML: 5 INJECTION INTRAVENOUS at 20:00

## 2023-01-01 RX ADMIN — LEVOFLOXACIN 750 MG: 5 INJECTION, SOLUTION INTRAVENOUS at 11:02

## 2023-01-01 RX ADMIN — BUMETANIDE 1 MG: 0.25 INJECTION INTRAMUSCULAR; INTRAVENOUS at 09:30

## 2023-01-01 RX ADMIN — ONDANSETRON 4 MG: 4 TABLET, ORALLY DISINTEGRATING ORAL at 20:13

## 2023-01-01 RX ADMIN — SODIUM CHLORIDE: 9 INJECTION, SOLUTION INTRAVENOUS at 04:23

## 2023-01-01 RX ADMIN — GLIMEPIRIDE 4 MG: 2 TABLET ORAL at 17:35

## 2023-01-01 RX ADMIN — BUMETANIDE 2 MG: 1 TABLET ORAL at 17:25

## 2023-01-01 RX ADMIN — SODIUM CHLORIDE 250 ML: 9 INJECTION, SOLUTION INTRAVENOUS at 04:04

## 2023-01-01 RX ADMIN — ALBUTEROL SULFATE 2.5 MG: 2.5 SOLUTION RESPIRATORY (INHALATION) at 16:15

## 2023-01-01 RX ADMIN — SODIUM CHLORIDE, PRESERVATIVE FREE 10 ML: 5 INJECTION INTRAVENOUS at 22:26

## 2023-01-01 RX ADMIN — DEXAMETHASONE 6 MG: 2 TABLET ORAL at 10:44

## 2023-01-01 RX ADMIN — MAGNESIUM SULFATE HEPTAHYDRATE 1000 MG: 1 INJECTION, SOLUTION INTRAVENOUS at 16:17

## 2023-01-01 RX ADMIN — INSULIN LISPRO 4 UNITS: 100 INJECTION, SOLUTION INTRAVENOUS; SUBCUTANEOUS at 21:49

## 2023-01-01 RX ADMIN — BUMETANIDE 1 MG: 0.25 INJECTION INTRAMUSCULAR; INTRAVENOUS at 01:52

## 2023-01-01 RX ADMIN — HEPARIN SODIUM 5000 UNITS: 5000 INJECTION INTRAVENOUS; SUBCUTANEOUS at 20:50

## 2023-01-01 RX ADMIN — MAGNESIUM SULFATE HEPTAHYDRATE 2000 MG: 40 INJECTION, SOLUTION INTRAVENOUS at 22:33

## 2023-01-01 RX ADMIN — SACUBITRIL AND VALSARTAN 1 TABLET: 24; 26 TABLET, FILM COATED ORAL at 08:43

## 2023-01-01 RX ADMIN — SODIUM CHLORIDE, PRESERVATIVE FREE 10 ML: 5 INJECTION INTRAVENOUS at 20:35

## 2023-01-01 RX ADMIN — DEXTROSE AND SODIUM CHLORIDE: 5; 450 INJECTION, SOLUTION INTRAVENOUS at 11:49

## 2023-01-01 RX ADMIN — EPINEPHRINE 0.01 MCG/KG/MIN: 1 INJECTION INTRAMUSCULAR; INTRAVENOUS; SUBCUTANEOUS at 13:10

## 2023-01-01 RX ADMIN — SODIUM BICARBONATE 50 MEQ: 84 INJECTION, SOLUTION INTRAVENOUS at 12:48

## 2023-01-01 RX ADMIN — SODIUM CHLORIDE, PRESERVATIVE FREE 10 ML: 5 INJECTION INTRAVENOUS at 09:22

## 2023-01-01 RX ADMIN — SACUBITRIL AND VALSARTAN 1 TABLET: 24; 26 TABLET, FILM COATED ORAL at 10:43

## 2023-01-01 RX ADMIN — HEPARIN SODIUM 5000 UNITS: 5000 INJECTION INTRAVENOUS; SUBCUTANEOUS at 08:44

## 2023-01-01 RX ADMIN — SODIUM CHLORIDE, PRESERVATIVE FREE 10 ML: 5 INJECTION INTRAVENOUS at 21:09

## 2023-01-01 RX ADMIN — SODIUM CHLORIDE, PRESERVATIVE FREE 10 ML: 5 INJECTION INTRAVENOUS at 22:16

## 2023-01-01 RX ADMIN — DEXAMETHASONE SODIUM PHOSPHATE 6 MG: 10 INJECTION INTRAMUSCULAR; INTRAVENOUS at 17:06

## 2023-01-01 RX ADMIN — POTASSIUM CHLORIDE 10 MEQ: 1500 TABLET, EXTENDED RELEASE ORAL at 08:55

## 2023-01-01 RX ADMIN — CALCIUM GLUCONATE: 98 INJECTION, SOLUTION INTRAVENOUS at 17:34

## 2023-01-01 RX ADMIN — FENTANYL CITRATE 50 MCG: 0.05 INJECTION, SOLUTION INTRAMUSCULAR; INTRAVENOUS at 01:19

## 2023-01-01 RX ADMIN — CARVEDILOL 12.5 MG: 12.5 TABLET, FILM COATED ORAL at 10:44

## 2023-01-01 RX ADMIN — HEPARIN SODIUM 5000 UNITS: 5000 INJECTION INTRAVENOUS; SUBCUTANEOUS at 21:25

## 2023-01-01 RX ADMIN — HEPARIN SODIUM 5000 UNITS: 5000 INJECTION INTRAVENOUS; SUBCUTANEOUS at 08:53

## 2023-01-01 RX ADMIN — POTASSIUM CHLORIDE 10 MEQ: 1500 TABLET, EXTENDED RELEASE ORAL at 20:14

## 2023-01-01 RX ADMIN — HEPARIN SODIUM 5000 UNITS: 5000 INJECTION INTRAVENOUS; SUBCUTANEOUS at 21:59

## 2023-01-01 RX ADMIN — SODIUM CHLORIDE, PRESERVATIVE FREE 10 ML: 5 INJECTION INTRAVENOUS at 12:47

## 2023-01-01 RX ADMIN — DEXAMETHASONE 6 MG: 2 TABLET ORAL at 09:32

## 2023-01-01 RX ADMIN — CHOLESTYRAMINE 4 G: 4 POWDER, FOR SUSPENSION ORAL at 20:35

## 2023-01-01 RX ADMIN — CHOLESTYRAMINE 4 G: 4 POWDER, FOR SUSPENSION ORAL at 08:48

## 2023-01-01 RX ADMIN — FENTANYL CITRATE 50 MCG: 0.05 INJECTION, SOLUTION INTRAMUSCULAR; INTRAVENOUS at 12:46

## 2023-01-01 RX ADMIN — QUETIAPINE FUMARATE 25 MG: 25 TABLET ORAL at 08:52

## 2023-01-01 RX ADMIN — HEPARIN SODIUM 5000 UNITS: 5000 INJECTION INTRAVENOUS; SUBCUTANEOUS at 09:13

## 2023-01-01 RX ADMIN — FENTANYL CITRATE 50 MCG: 0.05 INJECTION, SOLUTION INTRAMUSCULAR; INTRAVENOUS at 08:24

## 2023-01-01 RX ADMIN — SODIUM PHOSPHATE, MONOBASIC, MONOHYDRATE AND SODIUM PHOSPHATE, DIBASIC, ANHYDROUS 15 MMOL: 276; 142 INJECTION, SOLUTION INTRAVENOUS at 04:51

## 2023-01-01 RX ADMIN — GLYCOPYRROLATE 0.1 MG: 0.2 INJECTION INTRAMUSCULAR; INTRAVENOUS at 20:09

## 2023-01-01 RX ADMIN — LEVOFLOXACIN 750 MG: 5 INJECTION, SOLUTION INTRAVENOUS at 17:25

## 2023-01-01 RX ADMIN — SODIUM CHLORIDE, PRESERVATIVE FREE 30 ML: 5 INJECTION INTRAVENOUS at 08:47

## 2023-01-01 RX ADMIN — DEXMEDETOMIDINE 0.2 MCG/KG/HR: 100 INJECTION, SOLUTION INTRAVENOUS at 23:22

## 2023-01-01 RX ADMIN — SODIUM PHOSPHATE, MONOBASIC, MONOHYDRATE AND SODIUM PHOSPHATE, DIBASIC, ANHYDROUS 10 MMOL: 276; 142 INJECTION, SOLUTION INTRAVENOUS at 07:00

## 2023-01-01 RX ADMIN — SODIUM CHLORIDE, PRESERVATIVE FREE 10 ML: 5 INJECTION INTRAVENOUS at 16:16

## 2023-01-01 RX ADMIN — Medication 2 MG/HR: at 01:26

## 2023-01-01 RX ADMIN — SODIUM CHLORIDE, PRESERVATIVE FREE 40 MG: 5 INJECTION INTRAVENOUS at 08:40

## 2023-01-01 RX ADMIN — SODIUM CHLORIDE 25 ML: 9 INJECTION, SOLUTION INTRAVENOUS at 14:55

## 2023-01-01 RX ADMIN — SODIUM CHLORIDE, PRESERVATIVE FREE 10 ML: 5 INJECTION INTRAVENOUS at 19:25

## 2023-01-01 RX ADMIN — DEXAMETHASONE SODIUM PHOSPHATE 6 MG: 10 INJECTION INTRAMUSCULAR; INTRAVENOUS at 07:33

## 2023-01-01 RX ADMIN — PANTOPRAZOLE SODIUM 40 MG: 40 TABLET, DELAYED RELEASE ORAL at 08:00

## 2023-01-01 RX ADMIN — CARVEDILOL 12.5 MG: 12.5 TABLET, FILM COATED ORAL at 17:26

## 2023-01-01 RX ADMIN — SODIUM CHLORIDE, PRESERVATIVE FREE 40 MG: 5 INJECTION INTRAVENOUS at 09:49

## 2023-01-01 RX ADMIN — PROBIOTIC PRODUCT - TAB 1 TABLET: TAB at 08:55

## 2023-01-01 ASSESSMENT — PULMONARY FUNCTION TESTS
PIF_VALUE: 24
PIF_VALUE: 25
PIF_VALUE: 23
PIF_VALUE: 22
PIF_VALUE: 35
PIF_VALUE: 22
PIF_VALUE: 22
PIF_VALUE: 25
PIF_VALUE: 25
PIF_VALUE: 18
PIF_VALUE: 38
PIF_VALUE: 26
PIF_VALUE: 32
PIF_VALUE: 44
PIF_VALUE: 24
PIF_VALUE: 22
PIF_VALUE: 22
PIF_VALUE: 32
PIF_VALUE: 22
PIF_VALUE: 44
PIF_VALUE: 25
PIF_VALUE: 22
PIF_VALUE: 26
PIF_VALUE: 18
PIF_VALUE: 23
PIF_VALUE: 18
PIF_VALUE: 24
PIF_VALUE: 23
PIF_VALUE: 44
PIF_VALUE: 22
PIF_VALUE: 27
PIF_VALUE: 24
PIF_VALUE: 22
PIF_VALUE: 44
PIF_VALUE: 44
PIF_VALUE: 25
PIF_VALUE: 25
PIF_VALUE: 23
PIF_VALUE: 23
PIF_VALUE: 44
PIF_VALUE: 22
PIF_VALUE: 31
PIF_VALUE: 25
PIF_VALUE: 18
PIF_VALUE: 45
PIF_VALUE: 40
PIF_VALUE: 22
PIF_VALUE: 24
PIF_VALUE: 18
PIF_VALUE: 24
PIF_VALUE: 25
PIF_VALUE: 23
PIF_VALUE: 39
PIF_VALUE: 27
PIF_VALUE: 24
PIF_VALUE: 44
PIF_VALUE: 49
PIF_VALUE: 22
PIF_VALUE: 22
PIF_VALUE: 25
PIF_VALUE: 25
PIF_VALUE: 23
PIF_VALUE: 24
PIF_VALUE: 6
PIF_VALUE: 18
PIF_VALUE: 25
PIF_VALUE: 23
PIF_VALUE: 24

## 2023-01-01 ASSESSMENT — PAIN SCALES - WONG BAKER

## 2023-01-01 ASSESSMENT — PAIN DESCRIPTION - DESCRIPTORS
DESCRIPTORS: PATIENT UNABLE TO DESCRIBE

## 2023-01-01 ASSESSMENT — PAIN SCALES - GENERAL
PAINLEVEL_OUTOF10: 0
PAINLEVEL_OUTOF10: 7
PAINLEVEL_OUTOF10: 0
PAINLEVEL_OUTOF10: 5
PAINLEVEL_OUTOF10: 0
PAINLEVEL_OUTOF10: 5
PAINLEVEL_OUTOF10: 5
PAINLEVEL_OUTOF10: 0
PAINLEVEL_OUTOF10: 0
PAINLEVEL_OUTOF10: 2
PAINLEVEL_OUTOF10: 7
PAINLEVEL_OUTOF10: 0
PAINLEVEL_OUTOF10: 5
PAINLEVEL_OUTOF10: 5
PAINLEVEL_OUTOF10: 4
PAINLEVEL_OUTOF10: 7
PAINLEVEL_OUTOF10: 0

## 2023-01-01 ASSESSMENT — ENCOUNTER SYMPTOMS
GASTROINTESTINAL NEGATIVE: 1
NAUSEA: 0
COUGH: 1
WHEEZING: 0
EYE PAIN: 0
CONSTIPATION: 0
SHORTNESS OF BREATH: 1
GASTROINTESTINAL NEGATIVE: 1
SHORTNESS OF BREATH: 1
COUGH: 1
VOMITING: 0
DIARRHEA: 0
STRIDOR: 0
SHORTNESS OF BREATH: 1
GASTROINTESTINAL NEGATIVE: 1
COUGH: 1
EYE REDNESS: 0
SORE THROAT: 0
COLOR CHANGE: 0
COUGH: 1
EYE DISCHARGE: 0
SHORTNESS OF BREATH: 1
ABDOMINAL PAIN: 0

## 2023-01-01 ASSESSMENT — PAIN DESCRIPTION - LOCATION
LOCATION: THROAT
LOCATION: OTHER (COMMENT)

## 2023-01-01 ASSESSMENT — PAIN DESCRIPTION - PAIN TYPE: TYPE: ACUTE PAIN;CHRONIC PAIN

## 2023-01-01 ASSESSMENT — PAIN DESCRIPTION - ONSET: ONSET: ON-GOING

## 2023-01-01 ASSESSMENT — PAIN DESCRIPTION - ORIENTATION
ORIENTATION: OTHER (COMMENT)

## 2023-01-01 ASSESSMENT — PAIN DESCRIPTION - FREQUENCY: FREQUENCY: OTHER (COMMENT)

## 2023-01-10 ENCOUNTER — HOSPITAL ENCOUNTER (INPATIENT)
Age: 88
LOS: 3 days | Discharge: HOME HEALTH | End: 2023-01-13
Attending: SPECIALIST | Admitting: INTERNAL MEDICINE
Payer: MEDICARE

## 2023-01-10 ENCOUNTER — APPOINTMENT (OUTPATIENT)
Dept: GENERAL RADIOLOGY | Facility: CLINIC | Age: 88
End: 2023-01-10
Payer: MEDICARE

## 2023-01-10 DIAGNOSIS — R19.5 OCCULT BLOOD POSITIVE STOOL: ICD-10-CM

## 2023-01-10 DIAGNOSIS — R19.7 DIARRHEA, UNSPECIFIED TYPE: Primary | ICD-10-CM

## 2023-01-10 DIAGNOSIS — E86.0 DEHYDRATION: ICD-10-CM

## 2023-01-10 PROBLEM — K92.2 LOWER GI BLEED: Status: ACTIVE | Noted: 2023-01-01

## 2023-01-10 LAB
ABSOLUTE EOS #: 0.1 K/UL (ref 0–0.4)
ABSOLUTE LYMPH #: 0.5 K/UL (ref 1–4.8)
ABSOLUTE MONO #: 0.7 K/UL (ref 0.1–1.2)
ALBUMIN SERPL-MCNC: 3 G/DL (ref 3.5–5.2)
ALBUMIN/GLOBULIN RATIO: 1.2 (ref 1–2.5)
ALP BLD-CCNC: 168 U/L (ref 35–104)
ALT SERPL-CCNC: 21 U/L (ref 5–33)
ANION GAP SERPL CALCULATED.3IONS-SCNC: 10 MMOL/L (ref 9–17)
AST SERPL-CCNC: 26 U/L
BASOPHILS # BLD: 1 % (ref 0–2)
BASOPHILS ABSOLUTE: 0.1 K/UL (ref 0–0.2)
BILIRUB SERPL-MCNC: 0.3 MG/DL (ref 0.3–1.2)
BILIRUBIN URINE: NEGATIVE
BUN BLDV-MCNC: 48 MG/DL (ref 8–23)
CALCIUM SERPL-MCNC: 8.2 MG/DL (ref 8.6–10.4)
CHLORIDE BLD-SCNC: 100 MMOL/L (ref 98–107)
CO2: 29 MMOL/L (ref 20–31)
COLOR: YELLOW
COMMENT UA: NORMAL
CREAT SERPL-MCNC: 1.1 MG/DL (ref 0.5–0.9)
EOSINOPHILS RELATIVE PERCENT: 2 % (ref 1–4)
GFR SERPL CREATININE-BSD FRML MDRD: 48 ML/MIN/1.73M2
GLUCOSE BLD-MCNC: 146 MG/DL (ref 65–105)
GLUCOSE BLD-MCNC: 177 MG/DL (ref 65–105)
GLUCOSE BLD-MCNC: 193 MG/DL (ref 70–99)
GLUCOSE URINE: NEGATIVE
HCT VFR BLD CALC: 33.5 % (ref 36–46)
HCT VFR BLD CALC: 34.6 % (ref 36.3–47.1)
HEMOGLOBIN: 10.7 G/DL (ref 11.9–15.1)
HEMOGLOBIN: 10.9 G/DL (ref 12–16)
KETONES, URINE: NEGATIVE
LEUKOCYTE ESTERASE, URINE: NEGATIVE
LYMPHOCYTES # BLD: 10 % (ref 24–44)
MCH RBC QN AUTO: 29 PG (ref 26–34)
MCHC RBC AUTO-ENTMCNC: 32.5 G/DL (ref 31–37)
MCV RBC AUTO: 89.1 FL (ref 80–100)
MONOCYTES # BLD: 13 % (ref 2–11)
NITRITE, URINE: NEGATIVE
PDW BLD-RTO: 14.9 % (ref 12.5–15.4)
PH UA: 5.5 (ref 5–8)
PLATELET # BLD: 215 K/UL (ref 140–450)
PMV BLD AUTO: 8.7 FL (ref 6–12)
POTASSIUM SERPL-SCNC: 5 MMOL/L (ref 3.7–5.3)
PROTEIN UA: NEGATIVE
RBC # BLD: 3.76 M/UL (ref 4–5.2)
SEG NEUTROPHILS: 74 % (ref 36–66)
SEGMENTED NEUTROPHILS ABSOLUTE COUNT: 3.9 K/UL (ref 1.8–7.7)
SODIUM BLD-SCNC: 139 MMOL/L (ref 135–144)
SPECIFIC GRAVITY UA: 1.02 (ref 1–1.03)
TOTAL PROTEIN: 5.5 G/DL (ref 6.4–8.3)
TURBIDITY: CLEAR
URINE HGB: NEGATIVE
UROBILINOGEN, URINE: NORMAL
WBC # BLD: 5.2 K/UL (ref 3.5–11)

## 2023-01-10 PROCEDURE — 71045 X-RAY EXAM CHEST 1 VIEW: CPT

## 2023-01-10 PROCEDURE — 82947 ASSAY GLUCOSE BLOOD QUANT: CPT

## 2023-01-10 PROCEDURE — C9113 INJ PANTOPRAZOLE SODIUM, VIA: HCPCS | Performed by: SPECIALIST

## 2023-01-10 PROCEDURE — 1200000000 HC SEMI PRIVATE

## 2023-01-10 PROCEDURE — 85025 COMPLETE CBC W/AUTO DIFF WBC: CPT

## 2023-01-10 PROCEDURE — 6360000002 HC RX W HCPCS: Performed by: SPECIALIST

## 2023-01-10 PROCEDURE — 2580000003 HC RX 258: Performed by: NURSE PRACTITIONER

## 2023-01-10 PROCEDURE — 2580000003 HC RX 258: Performed by: SPECIALIST

## 2023-01-10 PROCEDURE — 6370000000 HC RX 637 (ALT 250 FOR IP): Performed by: NURSE PRACTITIONER

## 2023-01-10 PROCEDURE — 36415 COLL VENOUS BLD VENIPUNCTURE: CPT

## 2023-01-10 PROCEDURE — 99222 1ST HOSP IP/OBS MODERATE 55: CPT | Performed by: NURSE PRACTITIONER

## 2023-01-10 PROCEDURE — 99285 EMERGENCY DEPT VISIT HI MDM: CPT

## 2023-01-10 PROCEDURE — 80053 COMPREHEN METABOLIC PANEL: CPT

## 2023-01-10 PROCEDURE — 96361 HYDRATE IV INFUSION ADD-ON: CPT

## 2023-01-10 PROCEDURE — 85018 HEMOGLOBIN: CPT

## 2023-01-10 PROCEDURE — 96365 THER/PROPH/DIAG IV INF INIT: CPT

## 2023-01-10 PROCEDURE — 96366 THER/PROPH/DIAG IV INF ADDON: CPT

## 2023-01-10 PROCEDURE — 85014 HEMATOCRIT: CPT

## 2023-01-10 PROCEDURE — 81003 URINALYSIS AUTO W/O SCOPE: CPT

## 2023-01-10 PROCEDURE — 6370000000 HC RX 637 (ALT 250 FOR IP): Performed by: SPECIALIST

## 2023-01-10 RX ORDER — LOSARTAN POTASSIUM 50 MG/1
50 TABLET ORAL DAILY
Status: DISCONTINUED | OUTPATIENT
Start: 2023-01-11 | End: 2023-01-11

## 2023-01-10 RX ORDER — SACUBITRIL AND VALSARTAN 24; 26 MG/1; MG/1
1 TABLET, FILM COATED ORAL 2 TIMES DAILY
Status: ON HOLD | COMMUNITY

## 2023-01-10 RX ORDER — SODIUM CHLORIDE 0.9 % (FLUSH) 0.9 %
3 SYRINGE (ML) INJECTION EVERY 8 HOURS
Status: DISCONTINUED | OUTPATIENT
Start: 2023-01-10 | End: 2023-01-11

## 2023-01-10 RX ORDER — HYDRALAZINE HYDROCHLORIDE 50 MG/1
50 TABLET, FILM COATED ORAL DAILY
Status: DISCONTINUED | OUTPATIENT
Start: 2023-01-11 | End: 2023-01-11

## 2023-01-10 RX ORDER — SPIRONOLACTONE 25 MG/1
12.5 TABLET ORAL DAILY
Status: ON HOLD | COMMUNITY
End: 2023-01-13 | Stop reason: HOSPADM

## 2023-01-10 RX ORDER — SODIUM CHLORIDE 9 MG/ML
INJECTION, SOLUTION INTRAVENOUS CONTINUOUS
Status: DISCONTINUED | OUTPATIENT
Start: 2023-01-10 | End: 2023-01-11

## 2023-01-10 RX ORDER — GLIMEPIRIDE 2 MG/1
4 TABLET ORAL
Status: DISCONTINUED | OUTPATIENT
Start: 2023-01-11 | End: 2023-01-13 | Stop reason: HOSPADM

## 2023-01-10 RX ORDER — SODIUM CHLORIDE 0.9 % (FLUSH) 0.9 %
5-40 SYRINGE (ML) INJECTION PRN
Status: DISCONTINUED | OUTPATIENT
Start: 2023-01-10 | End: 2023-01-13 | Stop reason: HOSPADM

## 2023-01-10 RX ORDER — SODIUM CHLORIDE 0.9 % (FLUSH) 0.9 %
5-40 SYRINGE (ML) INJECTION EVERY 12 HOURS SCHEDULED
Status: DISCONTINUED | OUTPATIENT
Start: 2023-01-10 | End: 2023-01-13 | Stop reason: HOSPADM

## 2023-01-10 RX ORDER — LOPERAMIDE HYDROCHLORIDE 2 MG/1
2 CAPSULE ORAL DAILY
Status: ON HOLD | COMMUNITY
End: 2023-01-22

## 2023-01-10 RX ORDER — INSULIN LISPRO 100 [IU]/ML
0-4 INJECTION, SOLUTION INTRAVENOUS; SUBCUTANEOUS NIGHTLY
Status: DISCONTINUED | OUTPATIENT
Start: 2023-01-10 | End: 2023-01-13 | Stop reason: HOSPADM

## 2023-01-10 RX ORDER — BUMETANIDE 2 MG/1
2 TABLET ORAL DAILY
Status: ON HOLD | COMMUNITY

## 2023-01-10 RX ORDER — IBUPROFEN 200 MG
400 TABLET ORAL EVERY 6 HOURS PRN
Status: ON HOLD | COMMUNITY

## 2023-01-10 RX ORDER — ONDANSETRON 4 MG/1
4 TABLET, ORALLY DISINTEGRATING ORAL EVERY 8 HOURS PRN
Status: DISCONTINUED | OUTPATIENT
Start: 2023-01-10 | End: 2023-01-13 | Stop reason: HOSPADM

## 2023-01-10 RX ORDER — ONDANSETRON 2 MG/ML
4 INJECTION INTRAMUSCULAR; INTRAVENOUS EVERY 6 HOURS PRN
Status: DISCONTINUED | OUTPATIENT
Start: 2023-01-10 | End: 2023-01-13 | Stop reason: HOSPADM

## 2023-01-10 RX ORDER — PANTOPRAZOLE SODIUM 40 MG/1
40 TABLET, DELAYED RELEASE ORAL
Status: DISCONTINUED | OUTPATIENT
Start: 2023-01-11 | End: 2023-01-12

## 2023-01-10 RX ORDER — INSULIN LISPRO 100 [IU]/ML
0-4 INJECTION, SOLUTION INTRAVENOUS; SUBCUTANEOUS
Status: DISCONTINUED | OUTPATIENT
Start: 2023-01-11 | End: 2023-01-13 | Stop reason: HOSPADM

## 2023-01-10 RX ORDER — SODIUM CHLORIDE 9 MG/ML
INJECTION, SOLUTION INTRAVENOUS PRN
Status: DISCONTINUED | OUTPATIENT
Start: 2023-01-10 | End: 2023-01-13 | Stop reason: HOSPADM

## 2023-01-10 RX ORDER — ACETAMINOPHEN 650 MG/1
650 SUPPOSITORY RECTAL EVERY 6 HOURS PRN
Status: DISCONTINUED | OUTPATIENT
Start: 2023-01-10 | End: 2023-01-13 | Stop reason: HOSPADM

## 2023-01-10 RX ORDER — 0.9 % SODIUM CHLORIDE 0.9 %
250 INTRAVENOUS SOLUTION INTRAVENOUS ONCE
Status: COMPLETED | OUTPATIENT
Start: 2023-01-10 | End: 2023-01-10

## 2023-01-10 RX ORDER — DIPHENOXYLATE HYDROCHLORIDE AND ATROPINE SULFATE 2.5; .025 MG/1; MG/1
1 TABLET ORAL ONCE
Status: COMPLETED | OUTPATIENT
Start: 2023-01-10 | End: 2023-01-10

## 2023-01-10 RX ORDER — CARVEDILOL 12.5 MG/1
12.5 TABLET ORAL 2 TIMES DAILY WITH MEALS
Status: ON HOLD | COMMUNITY

## 2023-01-10 RX ORDER — POTASSIUM CHLORIDE 20 MEQ/1
20 TABLET, EXTENDED RELEASE ORAL DAILY
Status: DISCONTINUED | OUTPATIENT
Start: 2023-01-11 | End: 2023-01-13 | Stop reason: HOSPADM

## 2023-01-10 RX ORDER — ACETAMINOPHEN 325 MG/1
650 TABLET ORAL EVERY 6 HOURS PRN
Status: DISCONTINUED | OUTPATIENT
Start: 2023-01-10 | End: 2023-01-13 | Stop reason: HOSPADM

## 2023-01-10 RX ORDER — DEXTROSE MONOHYDRATE 100 MG/ML
INJECTION, SOLUTION INTRAVENOUS CONTINUOUS PRN
Status: DISCONTINUED | OUTPATIENT
Start: 2023-01-10 | End: 2023-01-13 | Stop reason: HOSPADM

## 2023-01-10 RX ADMIN — SODIUM CHLORIDE: 9 INJECTION, SOLUTION INTRAVENOUS at 23:34

## 2023-01-10 RX ADMIN — SODIUM CHLORIDE, PRESERVATIVE FREE 10 ML: 5 INJECTION INTRAVENOUS at 23:29

## 2023-01-10 RX ADMIN — POLYETHYLENE GLYCOL, PROPYLENE GLYCOL 1 DROP: .4; .3 LIQUID OPHTHALMIC at 23:32

## 2023-01-10 RX ADMIN — ACETAMINOPHEN 650 MG: 325 TABLET ORAL at 10:13

## 2023-01-10 RX ADMIN — SODIUM CHLORIDE 250 ML: 9 INJECTION, SOLUTION INTRAVENOUS at 05:25

## 2023-01-10 RX ADMIN — SODIUM CHLORIDE 8 MG/HR: 9 INJECTION, SOLUTION INTRAVENOUS at 08:53

## 2023-01-10 RX ADMIN — SODIUM CHLORIDE 8 MG/HR: 9 INJECTION, SOLUTION INTRAVENOUS at 23:34

## 2023-01-10 RX ADMIN — SODIUM CHLORIDE 80 MG: 9 INJECTION, SOLUTION INTRAVENOUS at 07:16

## 2023-01-10 RX ADMIN — DIPHENOXYLATE HYDROCHLORIDE AND ATROPINE SULFATE 1 TABLET: 2.5; .025 TABLET ORAL at 05:21

## 2023-01-10 ASSESSMENT — PAIN DESCRIPTION - LOCATION
LOCATION: ANKLE
LOCATION: ANKLE

## 2023-01-10 ASSESSMENT — PAIN SCALES - GENERAL
PAINLEVEL_OUTOF10: 4
PAINLEVEL_OUTOF10: 6
PAINLEVEL_OUTOF10: 0

## 2023-01-10 ASSESSMENT — PAIN - FUNCTIONAL ASSESSMENT
PAIN_FUNCTIONAL_ASSESSMENT: NONE - DENIES PAIN
PAIN_FUNCTIONAL_ASSESSMENT: NONE - DENIES PAIN
PAIN_FUNCTIONAL_ASSESSMENT: 0-10

## 2023-01-10 ASSESSMENT — PAIN DESCRIPTION - ORIENTATION: ORIENTATION: LEFT

## 2023-01-10 ASSESSMENT — ENCOUNTER SYMPTOMS
ABDOMINAL PAIN: 0
DIARRHEA: 1

## 2023-01-10 NOTE — ED NOTES
Pt sitting on the side of the bed. She was able to consume the cup of broth, another cup provided.       Jennifer Purcell RN  01/10/23 2153

## 2023-01-10 NOTE — ED NOTES
Spoke with daughter Bethany Skelton, she will be heading over to pt house to grab her medications. Tentative medication list updated in chart at this time, per daughter over phone. Daughter is Ltanya Pill with starting protonix.       Rick Jett RN  01/10/23 8226

## 2023-01-10 NOTE — ED NOTES
Patient incontinent of stool. Patient cleaned with new adult brief placed. Bilateral ace wraps changed. Quarter size open area to right inner calf. New dressing applied. Patient tolerated.       Shannan Cruz 139, RN  01/10/23 [address] Elizabeth Felix, RN  01/10/23 8331

## 2023-01-10 NOTE — ED NOTES
POCT blood sugar 146.   Home med of Entresto, carvedilol, given, ok by dr. Vivian Johnson, RN  01/10/23 1547 [facility], RN  01/10/23 101

## 2023-01-10 NOTE — ED NOTES
Ask if pt wanted to start the protonix at this time. She states that they recently started her on new medications that she is unsure of, only her daughter knows, so she would like her daughter to be contacted prior to starting protonix drip. Attempted to call daughter again. No answer, unable to leave .         Rosa Shaw RN  01/10/23 9245

## 2023-01-10 NOTE — ED NOTES
Attempted to call daughter again. No answer, unable to leave VM.      Emiliano Edouard RN  01/10/23 8456

## 2023-01-10 NOTE — ED PROVIDER NOTES
[facility]  [address] [facility]  Phone: [phone]      Pt Name: Razia Devries  MRN: [id number]  [place] 10/23/1932  Date of evaluation: 1/10/2023      CHIEF COMPLAINT       Chief Complaint   Patient presents with    Diarrhea     Patient states she was released from [facility] on Sunday for improved diarrhea. Patient states she has diarrhea just pouring out of her. HISTORY OF PRESENT ILLNESS    Razia Devries is a 80 y.o. female who presents   Chief Complaint   Patient presents with    Diarrhea     Patient states she was released from [facility] on Sunday for improved diarrhea. Patient states she has diarrhea just pouring out of her. .    30-year-old female patient presents to the emergency department brought in via EMS for evaluation of diarrhea which has been going on off and on for last 2 months. Patient has had 3 episodes of diarrhea on the day of evaluation. She denies any abdominal pain, nausea, vomiting, fever or chills. She denies any melena or hematochezia. She has history of colon cancer and has undergone hemicolectomy 1980s and normally has frequent bowel movements but it has been liquid brown stools at this time. She denies any urinary frequency, urgency, dysuria or hematuria. Patient was admitted at [facility] last week and was in fact discharged home 2 days ago. Patient states her medications were changed and she was put on new medicine and diarrhea has gotten worse since then. Patient lives at home alone. She has not been on any antibiotics recently. Her Lasix has been on hold since last 1 week. She does not take any blood thinners. REVIEW OF SYSTEMS     Review of Systems   Constitutional:  Positive for appetite change and fatigue. Negative for chills and fever. Gastrointestinal:  Positive for diarrhea. Negative for abdominal pain. Genitourinary:  Negative for dysuria and frequency.    Neurological:  Negative for syncope and headaches. All other systems reviewed and are negative. PAST MEDICAL HISTORY    has a past medical history of Arthritis, Atrial fibrillation ([facility] [facility] Cancer ([facility] CHF (congestive heart failure) ([facility] Diabetes mellitus ([facility] Diverticulitis, Hypertension, Osteoporosis, and Sepsis ([facility] SURGICAL HISTORY      has a past surgical history that includes Femur Surgery; Colon surgery; Cholecystectomy; and Appendectomy. CURRENT MEDICATIONS       Previous Medications    CYANOCOBALAMIN (VITAMIN B-12 SL)    Place under the tongue three times a week    FUROSEMIDE (LASIX) 20 MG TABLET    Take 1 tablet by mouth daily    GLIMEPIRIDE PO    Take by mouth daily     HYDRALAZINE HCL PO    Take 50 mg by mouth daily     LOSARTAN POTASSIUM PO    Take 50 mg by mouth daily     METOPROLOL TARTRATE PO    Take 25 mg by mouth 2 times daily     MULTIPLE VITAMINS-MINERALS (THERAPEUTIC MULTIVITAMIN-MINERALS) TABLET    Take 1 tablet by mouth daily    OMEGA-3 FATTY ACIDS (FISH OIL PO)    Take by mouth    POTASSIUM CHLORIDE (KLOR-CON M) 20 MEQ EXTENDED RELEASE TABLET    Take 1 tablet by mouth daily       ALLERGIES     has No Known Allergies. FAMILY HISTORY     has no family status information on file. family history is not on file. SOCIAL HISTORY      reports that she has never smoked. She has never used smokeless tobacco. She reports that she does not drink alcohol and does not use drugs. PHYSICAL EXAM     INITIAL VITALS:  height is 5' 4\" (1.626 m) and weight is 67.6 kg (149 lb). Her oral temperature is 97.5 °F (36.4 °C). Her blood pressure is 137/54 (abnormal) and her pulse is 54. Her respiration is 16 and oxygen saturation is 92%. Physical Exam  Vitals and nursing note reviewed. Constitutional:       General: She is not in acute distress. Appearance: She is well-developed. HENT:      Head: Normocephalic and atraumatic. Nose: Nose normal.      Mouth/Throat:      Mouth: Mucous membranes are pale and dry. Eyes:      Extraocular Movements: Extraocular movements intact. Pupils: Pupils are equal, round, and reactive to light. Cardiovascular:      Rate and Rhythm: Normal rate and regular rhythm. Heart sounds: Normal heart sounds. No murmur heard. Pulmonary:      Effort: Pulmonary effort is normal. No respiratory distress. Breath sounds: Normal breath sounds. Abdominal:      General: Bowel sounds are normal. There is no distension. Palpations: Abdomen is soft. Tenderness: There is no abdominal tenderness. Genitourinary:     Rectum: Guaiac result positive. No tenderness or external hemorrhoid. Musculoskeletal:      Cervical back: Normal range of motion and neck supple. Skin:     General: Skin is warm and dry. Neurological:      General: No focal deficit present. Mental Status: She is alert and oriented to person, place, and time. Psychiatric:         Behavior: Behavior normal.         Thought Content: Thought content normal.         DIFFERENTIAL DIAGNOSIS/ MDM:     Differential diagnosis considered: C. difficile colitis, dehydration, acute kidney injury, viral gastroenteritis, Hemoccult positive stools    Chronic Conditions affecting care (DM,HTN,CA, etc): Atrial fibrillation, history of colon cancer, congestive heart failure, diabetes mellitus, hypertension, diverticulitis    Social Determinants of Health affecting care (unable to care for self, lives alone, unemployed, homeless,etc): Patient lives at home alone and probably unable to take care of herself at this time.     History source(s) (patient,spouse,parent,family,friend,EMS,etc): Patient and EMS    Review of external sources (ECF,Hospital records,EMS report, radiology reports, etc): Hospital records    Tests considered but not ordered: Not applicable    Independent interpretation of tests (eg.  X-ray, CAT scan, Doppler studies, EKG): None    Discussion of x-ray results with radiology: None    Consults: Hospitalist    Consideration for admission/observation (even if discharged): Patient will need hospitalization for dehydration, diarrhea, Hemoccult positive stool    Prescription considerations: None required as she will be admitted    Sepsis considered: No evidence of bacteremia or sepsis at this time    Critical Care note written: None    DIAGNOSTIC RESULTS     EKG: All EKG's are interpreted by the Emergency Department Physician who either signs or Co-signs this chart in the absence of a cardiologist.    None obtained    RADIOLOGY:   I reviewed the radiologist interpretations:  XR CHEST PORTABLE   Final Result   Stable cardiomegaly. Mild pulmonary vascular congestion. No results found. LABS:  Labs Reviewed   CBC WITH AUTO DIFFERENTIAL - Abnormal; Notable for the following components:       Result Value    RBC 3.76 (*)     Hemoglobin 10.9 (*)     Hematocrit 33.5 (*)     Seg Neutrophils 74 (*)     Lymphocytes 10 (*)     Monocytes 13 (*)     Absolute Lymph # 0.50 (*)     All other components within normal limits   COMPREHENSIVE METABOLIC PANEL - Abnormal; Notable for the following components:    Glucose 193 (*)     BUN 48 (*)     Creatinine 1.10 (*)     Est, Glom Filt Rate 48 (*)     Calcium 8.2 (*)     Alkaline Phosphatase 168 (*)     Total Protein 5.5 (*)     Albumin 3.0 (*)     All other components within normal limits   C. DIFFICILE TOXIN MOLECULAR   URINALYSIS WITH REFLEX TO CULTURE       Patient has mild anemia and acute kidney injury.     EMERGENCY DEPARTMENT COURSE:   Vitals:    Vitals:    01/10/23 0507 01/10/23 0537 01/10/23 0607 01/10/23 0617   BP:       Pulse:    54   Resp:    16   Temp:       TempSrc:       SpO2: 97% 93% (!) 87% 92%   Weight:       Height:         -------------------------  BP: (!) 137/54, Temp: 97.5 °F (36.4 °C), Heart Rate: 54, Resp: 16    Orders Placed This Encounter   Medications    sodium chloride flush 0.9 % injection 3 mL    0.9 % sodium chloride bolus diphenoxylate-atropine (LOMOTIL) 2.5-0.025 MG per tablet 1 tablet    pantoprazole (PROTONIX) 80 mg in sodium chloride 0.9 % 50 mL bolus    pantoprazole (PROTONIX) 80 mg in sodium chloride 0.9 % 100 mL infusion       During the emergency department course, patient was given normal saline 250 mL bolus, Protonix 80 mg bolus followed by 8 mg/h infusion. I have reviewed the disposition diagnosis with the patient and or their family/guardian. I have answered their questions and given discharge instructions. They voiced understanding of these instructions and did not have any further questions or complaints. Re-evaluation Notes    Patient is resting comfortably and does not appear to be in any pain or distress at this time. Patient will benefit from hospitalization for further evaluation of Hemoccult positive stools, diarrhea and dehydration. I have paged hospitalist at [facility] for consultation. I discussed the case with Kimberly Young covering for Dr. Ledezma who kindly accepted the patient as a direct admit on stepdown unit. We are awaiting bed assignment and then transfer arrangements will be made. PROCEDURES:  None    FINAL IMPRESSION      1. Diarrhea, unspecified type    2. Dehydration    3. Occult blood positive stool          DISPOSITION/PLAN   DISPOSITION Decision To Admit 01/10/2023 07:03:33 AM      Condition on Disposition    Stable    PATIENT REFERRED TO:  No follow-up provider specified. DISCHARGE MEDICATIONS:  New Prescriptions    No medications on file       (Please note that portions of this note were completed with a voice recognition program.  Efforts were made to edit the dictations but occasionally words are mis-transcribed.)    Evelyne Pool MD,, MD, F.A.C.E.P.   Attending Emergency Physician       Evelyne Pool MD  01/10/23 Lonie Felty, MD  01/10/23 7557       Evelyne Pool MD  01/10/23 4464

## 2023-01-10 NOTE — ED NOTES
Dr. Paul Roldan and Carolyn Ambrocio RN at bedside for rectal exam. Patient tolerated.       Shannan Cruz 139, RN  01/10/23 5750

## 2023-01-10 NOTE — ED NOTES
Attempted to call daughter Jayden Aleman, [phone], no answer, unable to leave .      Betzy Santiago RN  01/10/23 9762

## 2023-01-10 NOTE — ED NOTES
Spoke with Lon Sutton at [facility]. Updated that they are discharge dependent at this time.       Dena Mohan RN  01/10/23 9333

## 2023-01-10 NOTE — ED NOTES
Called [facility] to get update in bed status. States there is no bed assignment at this time, she is unsure is they are discharge dependent or not.       Yasmeen Maciel RN  01/10/23 1498

## 2023-01-10 NOTE — ED NOTES
Called [facility] for transport. Report they do not have any crew available at this time.       My Cabrera RN  01/10/23 6159

## 2023-01-10 NOTE — ED NOTES
Pt is refusing the protonix drip at this time. States that she does not want anything done to her until her daughter gives the Laruth Cyril, since her daughter is the one taking care of her. Have been unable to get in contact with daughter all night and this morning. Explained that to pt. She is still adamant that we wait til we speak to her daughter before the medication is started.       Wells Koyanagi, RN  01/10/23 9541

## 2023-01-10 NOTE — ED PROVIDER NOTES
ADDENDUM:        The patient was seen for Diarrhea (Patient states she was released from [facility] on Sunday for improved diarrhea./Patient states she has diarrhea just pouring out of her. )  . The patient's initial evaluation and plan have been discussed with the prior provider who initially evaluated the patient. Nursing Notes, Past Medical Hx, Past Surgical Hx, Social Hx, Allergies, and Family Hx were all reviewed. PAST MEDICAL HISTORY    has a past medical history of Arthritis, Atrial fibrillation ([address] Cancer ([address] CHF (congestive heart failure) ([address] Diabetes mellitus ([address] Diverticulitis, Hypertension, Osteoporosis, and Sepsis ([address] SURGICAL HISTORY      has a past surgical history that includes Femur Surgery; Colon surgery; Cholecystectomy; and Appendectomy. CURRENT MEDICATIONS       Previous Medications    BUMETANIDE (BUMEX) 0.5 MG TABLET    Take 2 mg by mouth daily    CARVEDILOL (COREG) 6.25 MG TABLET    Take 12.5 mg by mouth 2 times daily (with meals)    CYANOCOBALAMIN (VITAMIN B-12 SL)    Place under the tongue three times a week    GLIMEPIRIDE PO    Take 4 mg by mouth daily    OMEGA-3 FATTY ACIDS (FISH OIL PO)    Take by mouth    POTASSIUM CHLORIDE (KLOR-CON M) 20 MEQ EXTENDED RELEASE TABLET    Take 1 tablet by mouth daily    SACUBITRIL-VALSARTAN (ENTRESTO) 24-26 MG PER TABLET    Take 1 tablet by mouth 2 times daily    SPIRONOLACTONE (ALDACTONE) 25 MG TABLET    Take 25 mg by mouth daily       ALLERGIES     has No Known Allergies. FAMILY HISTORY     has no family status information on file. family history is not on file. SOCIAL HISTORY      reports that she has never smoked. She has never used smokeless tobacco. She reports that she does not drink alcohol and does not use drugs.     Diagnostic Results     EKG         LABS:   Results for orders placed or performed during the hospital encounter of 01/10/23   CBC with Diff   Result Value Ref Range    WBC 5.2 3.5 - 11.0 k/uL    RBC 3.76 (L) 4.0 - 5.2 m/uL    Hemoglobin 10.9 (L) 12.0 - 16.0 g/dL    Hematocrit 33.5 (L) 36 - 46 %    MCV 89.1 80 - 100 fL    MCH 29.0 26 - 34 pg    MCHC 32.5 31 - 37 g/dL    RDW 14.9 12.5 - 15.4 %    Platelets 414 189 - 448 k/uL    MPV 8.7 6.0 - 12.0 fL    Seg Neutrophils 74 (H) 36 - 66 %    Lymphocytes 10 (L) 24 - 44 %    Monocytes 13 (H) 2 - 11 %    Eosinophils % 2 1 - 4 %    Basophils 1 0 - 2 %    Segs Absolute 3.90 1.8 - 7.7 k/uL    Absolute Lymph # 0.50 (L) 1.0 - 4.8 k/uL    Absolute Mono # 0.70 0.1 - 1.2 k/uL    Absolute Eos # 0.10 0.0 - 0.4 k/uL    Basophils Absolute 0.10 0.0 - 0.2 k/uL   CMP   Result Value Ref Range    Glucose 193 (H) 70 - 99 mg/dL    BUN 48 (H) 8 - 23 mg/dL    Creatinine 1.10 (H) 0.50 - 0.90 mg/dL    Est, Glom Filt Rate 48 (L) >60 mL/min/1.73m2    Calcium 8.2 (L) 8.6 - 10.4 mg/dL    Sodium 139 135 - 144 mmol/L    Potassium 5.0 3.7 - 5.3 mmol/L    Chloride 100 98 - 107 mmol/L    CO2 29 20 - 31 mmol/L    Anion Gap 10 9 - 17 mmol/L    Alkaline Phosphatase 168 (H) 35 - 104 U/L    ALT 21 5 - 33 U/L    AST 26 <32 U/L    Total Bilirubin 0.3 0.3 - 1.2 mg/dL    Total Protein 5.5 (L) 6.4 - 8.3 g/dL    Albumin 3.0 (L) 3.5 - 5.2 g/dL    Albumin/Globulin Ratio 1.2 1.0 - 2.5   Urinalysis with Reflex to Culture    Specimen: Urine   Result Value Ref Range    Color, UA Yellow Yellow    Turbidity UA Clear Clear    Glucose, Ur NEGATIVE NEGATIVE    Bilirubin Urine NEGATIVE NEGATIVE    Ketones, Urine NEGATIVE NEGATIVE    Specific Gravity, UA 1.020 1.005 - 1.030    Urine Hgb NEGATIVE NEGATIVE    pH, UA 5.5 5.0 - 8.0    Protein, UA NEGATIVE NEGATIVE    Urobilinogen, Urine Normal Normal    Nitrite, Urine NEGATIVE NEGATIVE    Leukocyte Esterase, Urine NEGATIVE NEGATIVE    Urinalysis Comments       Microscopic exam not performed based on chemical results unless requested in original order.     Urinalysis Comments          Urinalysis Comments       Utilizing a urinalysis as the only screening method to exclude a potential uropathogen can be unreliable in many patient populations. Rapid screening tests are less sensitive than culture and if UTI is a clinical possibility, culture should be considered despite a negative urinalysis. POC Glucose Fingerstick   Result Value Ref Range    POC Glucose 146 (H) 65 - 105 mg/dL       RADIOLOGY:  XR CHEST PORTABLE   Final Result   Stable cardiomegaly. Mild pulmonary vascular congestion. ED Course     The patient was given the following medications:  Orders Placed This Encounter   Medications    sodium chloride flush 0.9 % injection 3 mL    0.9 % sodium chloride bolus    diphenoxylate-atropine (LOMOTIL) 2.5-0.025 MG per tablet 1 tablet    pantoprazole (PROTONIX) 80 mg in sodium chloride 0.9 % 50 mL bolus    pantoprazole (PROTONIX) 80 mg in sodium chloride 0.9 % 100 mL infusion    OR Linked Order Group     acetaminophen (TYLENOL) tablet 650 mg     acetaminophen (TYLENOL) suppository 650 mg         RECENT VITALS:  /61   Pulse 61   Temp 97.6 °F (36.4 °C) (Oral)   Resp 16   Ht 5' 4\" (1.626 m)   Wt 67.6 kg (149 lb)   SpO2 93%   BMI 25.58 kg/m²       Care taken over from [address] at 7:15 AM.      Patient remained stable throughout shift. Not requiring any intervention with exception of taking her home medications. As of 715 bed assignment has been given and we are awaiting transport. Care transferred    [facility] if indicated             I have reviewed the disposition diagnosis with the patient and or their family/guardian. I have answered their questions and given discharge instructions. They voiced understanding of these instructions and did not have any further questions or complaints. Disposition     CLINICAL IMPRESSION:  1. Diarrhea, unspecified type    2. Dehydration    3. Occult blood positive stool        PATIENT REFERRED TO:  No follow-up provider specified.     DISCHARGE MEDICATIONS:  New Prescriptions    No medications on file DISPOSITION:   DISPOSITION Admitted 01/10/2023 02:33:46 PM      (Please note that portions of this note were completed with a voice recognition program.  Efforts were made to edit the dictations but occasionally words are mis-transcribed.)    DO MIMI Richardson DO  01/10/23 1857

## 2023-01-10 NOTE — ED TRIAGE NOTES
Patient states she was released from [facility] on Sunday for improved diarrhea. Patient states she has diarrhea just pouring out of her.

## 2023-01-11 PROBLEM — L97.222 NON-PRESSURE CHRONIC ULCER OF LEFT CALF WITH FAT LAYER EXPOSED (HCC): Status: ACTIVE | Noted: 2022-01-01

## 2023-01-11 PROBLEM — R19.7 INTRACTABLE DIARRHEA: Status: ACTIVE | Noted: 2023-01-01

## 2023-01-11 PROBLEM — L97.919 VENOUS ULCER OF RIGHT LEG (HCC): Status: ACTIVE | Noted: 2023-01-01

## 2023-01-11 PROBLEM — J30.0 VASOMOTOR RHINITIS: Status: ACTIVE | Noted: 2023-01-01

## 2023-01-11 PROBLEM — M11.261 PSEUDOGOUT OF RIGHT KNEE: Status: ACTIVE | Noted: 2018-04-04

## 2023-01-11 PROBLEM — I10 ESSENTIAL HYPERTENSION: Status: ACTIVE | Noted: 2023-01-11

## 2023-01-11 PROBLEM — E11.9 DIABETES MELLITUS (HCC): Status: ACTIVE | Noted: 2023-01-11

## 2023-01-11 PROBLEM — R79.89 SERUM CREATININE RAISED: Status: ACTIVE | Noted: 2023-01-01

## 2023-01-11 PROBLEM — I50.42 CHRONIC COMBINED SYSTOLIC (CONGESTIVE) AND DIASTOLIC (CONGESTIVE) HEART FAILURE (HCC): Status: ACTIVE | Noted: 2023-01-01

## 2023-01-11 PROBLEM — I50.33 ACUTE ON CHRONIC DIASTOLIC CONGESTIVE HEART FAILURE (HCC): Status: ACTIVE | Noted: 2023-01-01

## 2023-01-11 PROBLEM — E66.3 OVERWEIGHT: Status: ACTIVE | Noted: 2023-01-01

## 2023-01-11 PROBLEM — I83.019 VENOUS ULCER OF RIGHT LEG (HCC): Status: ACTIVE | Noted: 2023-01-01

## 2023-01-11 PROBLEM — H10.45 CHRONIC ALLERGIC CONJUNCTIVITIS: Status: ACTIVE | Noted: 2023-01-01

## 2023-01-11 PROBLEM — H93.13 TINNITUS OF BOTH EARS: Status: ACTIVE | Noted: 2019-05-15

## 2023-01-11 PROBLEM — I48.0 PAROXYSMAL ATRIAL FIBRILLATION (HCC): Status: ACTIVE | Noted: 2023-01-11

## 2023-01-11 PROBLEM — I50.9 CONGESTIVE HEART FAILURE (HCC): Status: ACTIVE | Noted: 2023-01-01

## 2023-01-11 PROBLEM — M17.11 ARTHRITIS OF RIGHT KNEE: Status: ACTIVE | Noted: 2018-04-04

## 2023-01-11 PROBLEM — H90.3 SENSORINEURAL HEARING LOSS (SNHL), BILATERAL: Status: ACTIVE | Noted: 2019-05-15

## 2023-01-11 PROBLEM — J30.9 ALLERGIC RHINITIS: Status: ACTIVE | Noted: 2023-01-11

## 2023-01-11 PROBLEM — R60.0 BILATERAL LOWER EXTREMITY EDEMA: Status: ACTIVE | Noted: 2022-02-25

## 2023-01-11 LAB
ANION GAP SERPL CALCULATED.3IONS-SCNC: 9 MMOL/L (ref 9–17)
BUN BLDV-MCNC: 38 MG/DL (ref 8–23)
BUN/CREAT BLD: 43 (ref 9–20)
CALCIUM SERPL-MCNC: 8 MG/DL (ref 8.6–10.4)
CHLORIDE BLD-SCNC: 102 MMOL/L (ref 98–107)
CO2: 27 MMOL/L (ref 20–31)
CREAT SERPL-MCNC: 0.88 MG/DL (ref 0.5–0.9)
GFR SERPL CREATININE-BSD FRML MDRD: >60 ML/MIN/1.73M2
GLUCOSE BLD-MCNC: 176 MG/DL (ref 65–105)
GLUCOSE BLD-MCNC: 192 MG/DL (ref 70–99)
GLUCOSE BLD-MCNC: 251 MG/DL (ref 65–105)
GLUCOSE BLD-MCNC: 253 MG/DL (ref 65–105)
GLUCOSE BLD-MCNC: 323 MG/DL (ref 65–105)
HCT VFR BLD CALC: 34.2 % (ref 36.3–47.1)
HCT VFR BLD CALC: 34.3 % (ref 36.3–47.1)
HCT VFR BLD CALC: 35 % (ref 36.3–47.1)
HEMOGLOBIN: 10.5 G/DL (ref 11.9–15.1)
HEMOGLOBIN: 10.6 G/DL (ref 11.9–15.1)
HEMOGLOBIN: 10.8 G/DL (ref 11.9–15.1)
INR BLD: 1.1
IRON SATURATION: 24 % (ref 20–55)
IRON: 70 UG/DL (ref 37–145)
PARTIAL THROMBOPLASTIN TIME: 31.8 SEC (ref 23.9–33.8)
POTASSIUM SERPL-SCNC: 4.3 MMOL/L (ref 3.7–5.3)
PROTHROMBIN TIME: 14.4 SEC (ref 11.5–14.2)
SODIUM BLD-SCNC: 138 MMOL/L (ref 135–144)
TOTAL IRON BINDING CAPACITY: 289 UG/DL (ref 250–450)
UNSATURATED IRON BINDING CAPACITY: 219 UG/DL (ref 112–347)

## 2023-01-11 PROCEDURE — 80048 BASIC METABOLIC PNL TOTAL CA: CPT

## 2023-01-11 PROCEDURE — 2580000003 HC RX 258: Performed by: SPECIALIST

## 2023-01-11 PROCEDURE — 83540 ASSAY OF IRON: CPT

## 2023-01-11 PROCEDURE — 87506 IADNA-DNA/RNA PROBE TQ 6-11: CPT

## 2023-01-11 PROCEDURE — 6360000002 HC RX W HCPCS: Performed by: SPECIALIST

## 2023-01-11 PROCEDURE — 85610 PROTHROMBIN TIME: CPT

## 2023-01-11 PROCEDURE — 2580000003 HC RX 258: Performed by: NURSE PRACTITIONER

## 2023-01-11 PROCEDURE — 97535 SELF CARE MNGMENT TRAINING: CPT

## 2023-01-11 PROCEDURE — 97530 THERAPEUTIC ACTIVITIES: CPT

## 2023-01-11 PROCEDURE — 97162 PT EVAL MOD COMPLEX 30 MIN: CPT

## 2023-01-11 PROCEDURE — 99222 1ST HOSP IP/OBS MODERATE 55: CPT

## 2023-01-11 PROCEDURE — C9113 INJ PANTOPRAZOLE SODIUM, VIA: HCPCS | Performed by: SPECIALIST

## 2023-01-11 PROCEDURE — 82947 ASSAY GLUCOSE BLOOD QUANT: CPT

## 2023-01-11 PROCEDURE — 6370000000 HC RX 637 (ALT 250 FOR IP): Performed by: NURSE PRACTITIONER

## 2023-01-11 PROCEDURE — 99223 1ST HOSP IP/OBS HIGH 75: CPT | Performed by: INTERNAL MEDICINE

## 2023-01-11 PROCEDURE — 85730 THROMBOPLASTIN TIME PARTIAL: CPT

## 2023-01-11 PROCEDURE — 36415 COLL VENOUS BLD VENIPUNCTURE: CPT

## 2023-01-11 PROCEDURE — 1200000000 HC SEMI PRIVATE

## 2023-01-11 PROCEDURE — 83550 IRON BINDING TEST: CPT

## 2023-01-11 PROCEDURE — 99231 SBSQ HOSP IP/OBS SF/LOW 25: CPT | Performed by: NURSE PRACTITIONER

## 2023-01-11 PROCEDURE — 87493 C DIFF AMPLIFIED PROBE: CPT

## 2023-01-11 PROCEDURE — 85018 HEMOGLOBIN: CPT

## 2023-01-11 PROCEDURE — 85014 HEMATOCRIT: CPT

## 2023-01-11 PROCEDURE — 97166 OT EVAL MOD COMPLEX 45 MIN: CPT

## 2023-01-11 RX ORDER — TRAMADOL HYDROCHLORIDE 50 MG/1
50 TABLET ORAL EVERY 6 HOURS PRN
Status: DISCONTINUED | OUTPATIENT
Start: 2023-01-11 | End: 2023-01-13 | Stop reason: HOSPADM

## 2023-01-11 RX ORDER — CHOLESTYRAMINE LIGHT 4 G/5.7G
4 POWDER, FOR SUSPENSION ORAL 2 TIMES DAILY
Status: DISCONTINUED | OUTPATIENT
Start: 2023-01-11 | End: 2023-01-13 | Stop reason: HOSPADM

## 2023-01-11 RX ORDER — LOSARTAN POTASSIUM 50 MG/1
50 TABLET ORAL DAILY
Status: DISCONTINUED | OUTPATIENT
Start: 2023-01-11 | End: 2023-01-11

## 2023-01-11 RX ORDER — FEXOFENADINE HCL 180 MG/1
180 TABLET ORAL DAILY
Status: ON HOLD | COMMUNITY

## 2023-01-11 RX ORDER — CARVEDILOL 12.5 MG/1
12.5 TABLET ORAL 2 TIMES DAILY WITH MEALS
Status: DISCONTINUED | OUTPATIENT
Start: 2023-01-11 | End: 2023-01-13 | Stop reason: HOSPADM

## 2023-01-11 RX ORDER — OLMESARTAN MEDOXOMIL 20 MG/1
20 TABLET ORAL DAILY
Status: ON HOLD | COMMUNITY
End: 2023-01-11

## 2023-01-11 RX ORDER — VITAMIN B COMPLEX
2000 TABLET ORAL DAILY
Status: DISCONTINUED | OUTPATIENT
Start: 2023-01-11 | End: 2023-01-13 | Stop reason: HOSPADM

## 2023-01-11 RX ADMIN — POLYETHYLENE GLYCOL, PROPYLENE GLYCOL 1 DROP: .4; .3 LIQUID OPHTHALMIC at 08:28

## 2023-01-11 RX ADMIN — CARVEDILOL 12.5 MG: 12.5 TABLET, FILM COATED ORAL at 08:26

## 2023-01-11 RX ADMIN — SACUBITRIL AND VALSARTAN 1 TABLET: 24; 26 TABLET, FILM COATED ORAL at 20:05

## 2023-01-11 RX ADMIN — CHOLESTYRAMINE 4 G: 4 POWDER, FOR SUSPENSION ORAL at 20:05

## 2023-01-11 RX ADMIN — SODIUM CHLORIDE, PRESERVATIVE FREE 10 ML: 5 INJECTION INTRAVENOUS at 08:26

## 2023-01-11 RX ADMIN — Medication 2000 UNITS: at 12:35

## 2023-01-11 RX ADMIN — SODIUM CHLORIDE 8 MG/HR: 9 INJECTION, SOLUTION INTRAVENOUS at 10:43

## 2023-01-11 RX ADMIN — CARVEDILOL 12.5 MG: 12.5 TABLET, FILM COATED ORAL at 17:50

## 2023-01-11 RX ADMIN — SODIUM CHLORIDE 8 MG/HR: 9 INJECTION, SOLUTION INTRAVENOUS at 17:43

## 2023-01-11 RX ADMIN — SACUBITRIL AND VALSARTAN 1 TABLET: 24; 26 TABLET, FILM COATED ORAL at 12:02

## 2023-01-11 ASSESSMENT — ENCOUNTER SYMPTOMS
VOMITING: 0
BACK PAIN: 0
RHINORRHEA: 0
COUGH: 0
SORE THROAT: 0
EYE PAIN: 0
DIARRHEA: 1
ABDOMINAL PAIN: 0
COUGH: 1
PHOTOPHOBIA: 0
SHORTNESS OF BREATH: 0
CHEST TIGHTNESS: 0
CONSTIPATION: 0
NAUSEA: 0
SHORTNESS OF BREATH: 1
NAUSEA: 1

## 2023-01-11 ASSESSMENT — PAIN SCALES - GENERAL: PAINLEVEL_OUTOF10: 0

## 2023-01-11 NOTE — PROGRESS NOTES
[facility]  Office: [address], DO, Alguicho Blood, DO, Saul Girt, DO, Morgan Oliver Blood, DO, Micaela Gonzalez MD, Lindsey Busby MD, Diana Velazquez MD, Victorino Mason MD,  Dmitry Johnson MD, Cali Nava MD, Jocelynn Chi, DO, Sheldon Pastor MD,  Belem Gaspar MD, Belia Villatoro MD, Amado Chiang DO, Myrna Cai MD, Rigo Magana MD, Porsche Mabry, DO, Jaycee Valero MD, Tori Willis MD, Fabio Louis MD, Kojo Parry MD, Heather Beltrán DO, Walter Golden MD, Paris Ordoñez MD, Melody Sapp, Prem Meza, CNP, Enrique Lara, CNP, Steph Machuca, CNP,  Talia Lyle, [facility], Thomas Nolasco, CNP, Phoebe Rouse, CNP, Valdemar Gastelum, CNP, Chauncey Jones, CNP, Sakshi Sevilla, CNP, Deborah Lyons PA-C, Alannah Donovan, CNS, Ankita Burgos, CNP, Deb Ramon, [facility]    Progress Note    1/11/2023    11:35 AM    Name:   Katlin Sommers  MRN:     [id number]     Acct:      [de-identified]   Room:   [facility] [place] [address] Day:  1  Admit Date:  1/10/2023  4:39 AM    PCP:   Vernell Dale MD  Code Status:  Full Code    Subjective:     C/C:   Chief Complaint   Patient presents with    Diarrhea     Patient states she was released from [facility] on Sunday for improved diarrhea. Patient states she has diarrhea just pouring out of her. Interval History Status: not changed. Patient continues to experience multiple episodes of diarrhea. She does have chronic diarrhea, but this has been significantly worse than her normal despite taking her Imodium as scheduled. Stools studies have been sent. Awaiting results. Patient tolerating clear liquid diet. Brief History:     Patient admitted with GI bleed and intractable diarrhea and dehydration. Patient has a past medical history of: CVA, atrial fibrillation, CHF, diabetes, hypertension and a venous stasis ulcer to her right lower leg. Patient states she has chronic diarrhea due to her colon cancer but this excessive diarrhea has caused her to experience fatigue. Review of Systems:     Review of Systems   Constitutional:  Positive for fatigue. Negative for activity change, chills and fever. HENT:  Negative for congestion. Respiratory:  Negative for cough, chest tightness and shortness of breath. Cardiovascular: Negative. Gastrointestinal:  Positive for diarrhea. Negative for abdominal pain, constipation, nausea and vomiting. Endocrine: Negative for cold intolerance and polyuria. Genitourinary:  Negative for difficulty urinating. Musculoskeletal:  Positive for arthralgias. Negative for myalgias. Skin:  Positive for wound. Neurological:  Negative for dizziness and numbness. Psychiatric/Behavioral:  Negative for confusion. Medications: Allergies:     Allergies   Allergen Reactions    Acetaminophen-Codeine Nausea Only    Codeine Nausea Only    Furosemide      Other reaction(s): GI Disturbance    Linagliptin      Other reaction(s): SOB    Sitagliptin      Other reaction(s): felt poorly    Other Itching     Animal Dander       Current Meds:   Scheduled Meds:    carvedilol  12.5 mg Oral BID WC    sacubitril-valsartan  1 tablet Oral BID    sodium chloride flush  3 mL IntraVENous Q8H    [Held by provider] losartan  50 mg Oral Daily    [Held by provider] glimepiride  4 mg Oral Daily with breakfast    [Held by provider] potassium chloride  20 mEq Oral Daily    sodium chloride flush  5-40 mL IntraVENous 2 times per day    [Held by provider] pantoprazole  40 mg Oral BID AC    insulin lispro  0-4 Units SubCUTAneous TID WC    insulin lispro  0-4 Units SubCUTAneous Nightly     Continuous Infusions:    pantoprozole (PROTONIX) infusion 8 mg/hr (01/11/23 1043)    sodium chloride 40 mL/hr at 01/11/23 1026    sodium chloride      dextrose       PRN Meds: traMADol, sodium chloride flush, sodium chloride, ondansetron **OR** ondansetron, acetaminophen **OR** acetaminophen, glucose, dextrose bolus **OR** dextrose bolus, glucagon (rDNA), dextrose, polyethyl glycol-propyl glycol 0.4-0.3 %    Data:     Past Medical History:   has a past medical history of Adenocarcinoma ([facility] Arthritis, Atrial fibrillation ([facility] Cancer ([facility] CHF (congestive heart failure) ([facility] Chronic anticoagulation, Diabetes mellitus ([facility] Diverticulitis, Femur fracture ([facility] Hypertension, Melanoma ([facility] Osteoporosis, Overweight, Sepsis ([facility] and Serum creatinine raised. Social History:   reports that she has never smoked. She has never used smokeless tobacco. She reports that she does not drink alcohol and does not use drugs. Family History: History reviewed. No pertinent family history. Vitals:  BP (!) 125/51   Pulse 56   Temp 97.3 °F (36.3 °C)   Resp 16   Ht 5' 4\" (1.626 m)   Wt 149 lb (67.6 kg)   SpO2 93%   BMI 25.58 kg/m²   Temp (24hrs), Av.9 °F (36.6 °C), Min:97.3 °F (36.3 °C), Max:98.2 °F (36.8 °C)    Recent Labs     01/10/23  1010 01/10/23  2331 23  0648   POCGLU 146* 177* 176*       I/O (24Hr):     Intake/Output Summary (Last 24 hours) at 2023 1135  Last data filed at 2023 1026  Gross per 24 hour   Intake 659.85 ml   Output 140 ml   Net 519.85 ml       Labs:  Hematology:  Recent Labs     01/10/23  0531 01/10/23  2146 23  0330 23  0935   WBC 5.2  --   --   --    RBC 3.76*  --   --   --    HGB 10.9* 10.7* 10.6* 10.8*   HCT 33.5* 34.6* 34.3* 35.0*   MCV 89.1  --   --   --    MCH 29.0  --   --   --    MCHC 32.5  --   --   --    RDW 14.9  --   --   --      --   --   --    MPV 8.7  --   --   --    INR  --   --  1.1  --      Chemistry:  Recent Labs     01/10/23  0531 23  0330    138   K 5.0 4.3    102   CO2 29 27   GLUCOSE 193* 192*   BUN 48* 38*   CREATININE 1.10* 0.88   ANIONGAP 10 9   LABGLOM 48* >60   CALCIUM 8.2* 8.0*     Recent Labs     01/10/23  0531 01/10/23  1010 01/10/23  2331 23  2521 PROT 5.5*  --   --   --    LABALBU 3.0*  --   --   --    AST 26  --   --   --    ALT 21  --   --   --    ALKPHOS 168*  --   --   --    BILITOT 0.3  --   --   --    POCGLU  --  146* 177* 176*     ABG:No results found for: POCPH, PHART, PH, POCPCO2, QEA5COY, PCO2, POCPO2, PO2ART, PO2, POCHCO3, HRV5XDI, HCO3, NBEA, PBEA, BEART, BE, THGBART, THB, ZTO2RMO, CZGV8WJD, I6CEZBFL, O2SAT, FIO2  Lab Results   Component Value Date/Time    SPECIAL NOT REPORTED 11/05/2017 01:55 PM     Lab Results   Component Value Date/Time    CULTURE ESCHERICHIA COLI >[id number] CFU/ML (A) 10/20/2022 05:48 PM       Radiology:  XR CHEST PORTABLE    Result Date: 1/10/2023  Stable cardiomegaly. Mild pulmonary vascular congestion. ECHO:   Echo complete W/O contrast    Result Date: 1/3/2023   Left Ventricle: Systolic function is moderately to severely decreased with an ejection fraction of 30-35%. Tricuspid Valve: The right ventricular systolic pressure is severely elevated. RVSP calculated at 61 mmHg. RVSP is based on RA pressure of 8 mmHg. There is severe pulmonary hypertension. RV pressure overload noted. Mitral Valve: There is moderate to severe regurgitation. There is no evidence of mitral valve stenosis. Echo complete W/O contrast    Result Date: 2/25/2022   Left Ventricle: There is mild concentric increased wall thickness/hypertrophy. Systolic function is low normal to mildly decreased with an ejection fraction of 50-55%. Unable to assess diastolic function due to arrhythmia. Medial E' is 5.87 cm/s. Right Ventricle: Systolic function is mildly reduced. Mitral Valve: There is moderate posterior annular calcification. There is moderate to severe regurgitation. There is no evidence of mitral valve stenosis. Tricuspid Valve: Tricuspid valve appears to be normal. There is moderate regurgitation. There is no evidence of tricuspid valve stenosis. There is mild pulmonary hypertension. Aortic Valve: The aortic valve is trileaflet.  There is mild sclerosis. There is trace regurgitation. There is no evidence of aortic valve stenosis. Pulmonic Valve: There is mild regurgitation. There is no evidence of pulmonic valve stenosis. Left Atrium: Left atrium is mildly dilated. The left atrial volume index is 34.6 mL/m2. Consider referral to structural heart team for BRIANNA for better evaluation of mitral tricuspid valve disease. Physical Examination:        Physical Exam  Vitals and nursing note reviewed. Constitutional:       General: She is not in acute distress. Appearance: Normal appearance. HENT:      Head: Normocephalic. Mouth/Throat:      Mouth: Mucous membranes are moist.   Eyes:      Pupils: Pupils are equal, round, and reactive to light. Cardiovascular:      Rate and Rhythm: Normal rate. Rhythm irregular. Pulses: Normal pulses. Heart sounds: Normal heart sounds. No murmur heard. No friction rub. No gallop. Pulmonary:      Effort: Pulmonary effort is normal. No respiratory distress. Breath sounds: Normal breath sounds. No wheezing, rhonchi or rales. Abdominal:      General: Bowel sounds are increased. There is no distension. Palpations: Abdomen is soft. Tenderness: There is no abdominal tenderness. There is no guarding. Comments: Multiple BMs noted   Musculoskeletal:         General: Normal range of motion. Cervical back: Normal range of motion. Right lower leg: Edema (trace) present. Left lower leg: Edema (trace) present. Skin:     General: Skin is warm and dry. Capillary Refill: Capillary refill takes less than 2 seconds. Findings: Wound (right lower leg. follows with wound care) present. Neurological:      General: No focal deficit present. Mental Status: She is alert and oriented to person, place, and time. Psychiatric:         Mood and Affect: Mood normal.         Behavior: Behavior normal.         Thought Content:  Thought content normal.         Judgment: Judgment normal.       Assessment:        Hospital Problems             Last Modified POA    * (Principal) Lower GI bleed 1/11/2023 Yes    Congestive heart failure ([facility] 1/11/2023 Yes    Diabetes mellitus ([facility] 1/11/2023 Yes    Essential hypertension 1/11/2023 Yes    Paroxysmal atrial fibrillation ([facility] 1/11/2023 Yes    Venous ulcer of right leg ([facility] 1/11/2023 Yes    Intractable diarrhea 1/11/2023 Yes    Chronic combined systolic (congestive) and diastolic (congestive) heart failure ([facility] 1/11/2023 Yes       Plan:        Lower GI bleed  Awaiting GIs recommendations  Continue on Protonix drip  Advance diet when cleared by GI  Monitor H&H every 12 hours  Intractable diarrhea  Stool sent. Awaiting results  Gentle IV hydration  Antidiarrheals continued to be held until results are obtained  Chronic CHF  Continue Entresto. Other selected diuretics are on hold at this time. Diabetes  Monitor blood sugar levels before meals and at bedtime with sliding scale coverage  Hypertension  Continue selected home medications with holding parameters  Atrial fibrillation  Continuous telemetry monitoring  Patient refuses anticoagulation due to history of GI bleeds  Venous ulcer right leg  Wound and ostomy eval and treat  PT/OT  Continue to monitor labs  APCs for DVT prophylaxis    On this date 1/11/2023 I have spent 26 minutes reviewing previous notes, test results and face to face with the patient discussing the diagnosis and importance of compliance with the treatment plan as well as documenting on the day of the visit. At least 50% of the time documented was spent with the patient to provide counseling and/or coordination of care.     PATRICK Hightower CNP  1/11/2023  11:35 AM

## 2023-01-11 NOTE — ED NOTES
Report given to [facility] to transport patient to [facility].      Shannan Cruz 139, RN  01/10/23 6924

## 2023-01-11 NOTE — CARE COORDINATION
01/11/23 1629   Service Assessment   Patient Orientation Alert and Oriented   Cognition Alert   History Provided By Patient; Child/Family   Primary Caregiver Self   Accompanied By/Relationship dtr [facility]   PCP Verified by CM Yes  (1 month ago had appt today)   Last Visit to PCP Within last 3 months   Prior Functional Level Assistance with the following:;Cooking; Toileting;Housework; Shopping;Mobility   Current Functional Level Independent in ADLs/IADLs;Housework; Shopping;Cooking; Toileting;Mobility   Can patient return to prior living arrangement Unknown at present   Ability to make needs known: Fair   Family able to assist with home care needs:   (limited)   Financial Resources Medicare  ([facility])   Social/Functional History   Lives With [address] [place] [address] One level   Home Access Stairs to enter with rails   Entrance Stairs - Number of [address] chair; Toilet raiser;Grab bars in [facility], standard;Cane   [address] Help From Family   ADL Assistance Needs assistance   [address] Needs assistance   Ambulation Assistance Needs assistance   Transfer Assistance Needs assistance   Active  No   Occupation Retired   Discharge Planning   Type of [facility] Prior To Admission [facility]   Current DME Prior to [employer]; Shower Chair   Potential Assistance Needed Skilled Nursing Facility;Home Care   DME Ordered?  No   Type of Home Care Services PT;OT;Nursing Services  [facility])   Patient expects to be discharged to: House   One/Two Story Residence One story   Services At/After Discharge   Transition of Care Consult (CM Consult) [facility] was unable to [address] [facility] hospitalization)   Mode of Transport at Discharge   (will probably need transport stretcher home)   Condition of Participation: Discharge Planning   The Patient and/or Patient Representative was provided with a Choice of Provider? Patient;Patient Representative   The Patient and/Or Patient Representative agree with the Discharge Plan? Yes   Freedom of Choice list was provided with basic dialogue that supports the patient's individualized plan of care/goals, treatment preferences, and shares the quality data associated with the providers? Yes       Spoke with pt and dtr Duy Soriano at bedside, pt is a+o, lives home alone. Pt has been weak uses 2 canes and or walker to get around home. Left [facility] this past [facility] was arranged however pt was hospitalized here on day they were scheduled to come out. Dtr is supportive takes pt to appts/groceries/cooking. PT/OT recs snf however pt is refusing-attempted to convince with dtr at bedside however pt cont to refuse and dtr went home. Pt has been to [facility] 7-8 years ago and had a bad experience. Advised there are plenty of other options. Called [facility] spoke with Neville Randolph -a whole new referral will need to be started since pt has been hospitalized again prior to opening her case. Faxed referral-they cannot guarantee they will accept. Spoke with Duy Soriano over the phone- updated pt is adamant about going home. Pt sons have little involvement with care. Will arrange for hhc and discussed non skilled agencies for  care/respite care at home. Advised on Assisted Living facilities. Will ask LSW to visit with pt again tomorrow. If home pt will need transport home via ambulance stretcher.

## 2023-01-11 NOTE — PROGRESS NOTES
Physical Therapy  Facility/Department: [facility] MED SURG  Physical Therapy Initial Assessment    Name: Kristin Hernandez  : 10/23/1932  MRN: [id number]  Date of Service: 2023  RN Melinda Ervin reports patient is medically stable for therapy treatment this date. Chart reviewed prior to treatment and patient is agreeable for therapy. All lines intact and patient positioned comfortably at end of treatment. All patient needs addressed prior to ending therapy session. Discharge Recommendations:  Pt currently functioning below baseline. Recommend daily inpatient skilled therapy at time of discharge to maximize long term outcomes and prevent re-admission. Please refer to AM-PAC score for current level of function. Patient would benefit from continued therapy after discharge        Per H&P:Tammy Mendez is a 80 y.o. Non- / non  female who presents with Diarrhea (Patient states she was released from [facility] on  for improved diarrhea./Patient states she has diarrhea just pouring out of her. ) and is admitted to the hospital for the management of Lower GI bleed. Patient has a known history of adenocarcinoma of the colon. She says because of this she has chronic diarrhea. She says her diarrhea has been very bad for the last couple of days. She denies abdominal pain and fever and chills, however she has felt short of breath has been experiencing cough and has felt fatigued. She has also had some nausea although she has no vomiting. She has a past medical history of diabetes, hypertension, stasis ulcer to the right lower leg, proximal A. fib on Eliquis, arthritis, and CHF. Patient Diagnosis(es): The primary encounter diagnosis was Diarrhea, unspecified type. Diagnoses of Dehydration and Occult blood positive stool were also pertinent to this visit.   Past Medical History:  has a past medical history of Adenocarcinoma ([facility] [address] Arthritis, Atrial fibrillation ([facility] [address] Cancer ([facility] [address] CHF (congestive heart failure) ([facility] [address] Chronic anticoagulation, Diabetes mellitus ([facility] [address] Diverticulitis, Femur fracture ([facility] [address] Hypertension, Melanoma ([facility] [address] Osteoporosis, Overweight, Sepsis ([facility] [address] and Serum creatinine raised. Past Surgical History:  has a past surgical history that includes Femur Surgery; Colon surgery; Cholecystectomy; and Appendectomy. Assessment   Body Structures, Functions, Activity Limitations Requiring Skilled Therapeutic Intervention: Decreased functional mobility ; Decreased ADL status; Decreased balance;Decreased sensation;Decreased endurance;Decreased cognition;Decreased safe awareness;Decreased strength;Decreased high-level IADLs;Decreased coordination;Decreased posture  Assessment: Pt tolerated PT eval poor. Activity limited this date d/t pt's agitation & decreased cognition. Pt currently presenting w/ deficits in strength, balance, mobility, cognition, and endurance. Pt is a HIGH fall risk given current deficits & decreased safety awareness. Pt requires skilled 2 person assist for safe functional mobility at this time. Pt would benefit from continued skilled PT to address deficits in order to maximize independence w/ functional mobility and return to OF as able.   Therapy Prognosis: Fair  Decision Making: Medium Complexity  Requires PT Follow-Up: Yes  Activity Tolerance  Activity Tolerance: Treatment limited secondary to agitation;Treatment limited secondary to decreased cognition     Plan   Physcial Therapy Plan  General Plan: 5-7 times per week  Current Treatment Recommendations: Strengthening, Balance training, Functional mobility training, Transfer training, Neuromuscular re-education, Gait training, Endurance training, Cognitive reorientation, Home exercise program, Equipment evaluation, education, & procurement, Patient/Caregiver education & training, Safety education & training, Therapeutic activities  Safety Devices  Type of Devices: Bed alarm in place, Call light within reach, Left in bed, Gait belt, Nurse notified, All ana prominences offloaded  Restraints  Restraints Initially in Place: No     Restrictions  Restrictions/Precautions  Restrictions/Precautions: General Precautions, Fall Risk, Contact Precautions, Bedrest with Bathroom Privileges  Required Braces or Orthoses?: No  Position Activity Restriction  Other position/activity restrictions: ext cath, c-diff rule out, RUE IV     Subjective   General  Patient assessed for rehabilitation services?: Yes  Response To Previous Treatment: Not applicable  Family / Caregiver Present: Yes (Pt's daughter present at bedside at time of PT eval however left room a few times throughout session.)  Follows Commands: Within Functional Limits  General Comment  Comments: RN and pt agreeable to therapy. Pt supine in bed upon arrival.  Pt initially very pleasant upon arrival however becoming very agitated and raising voice to therapy staff throughout. Subjective  Subjective: Pt stating \"I'm so glad someone understands that I can't lay in this bed all day. I want to get strong and stand again. \"         Social/Functional History  Social/Functional History  Additional Comments: Unable to obtain social/functional history this date d/t pt's agitiation during session. Daughter speaking w/ NP at end of session, unable to discuss hx w/ daughter this date.   Vision/Hearing  Hearing  Hearing: Exceptions to [facility]  Hearing Exceptions: Hard of hearing/hearing concerns    Cognition   Orientation  Orientation Level: Oriented to person;Oriented to situation  Cognition  Overall Cognitive Status: Exceptions  Arousal/Alertness: Appropriate responses to stimuli  Following Commands: Inconsistently follows commands  Attention Span: Difficulty attending to directions  Memory: Decreased short term memory;Decreased recall of recent events  Safety Judgement: Decreased awareness of need for safety;Decreased awareness of need for assistance  Problem Solving: Decreased awareness of errors;Assistance required to identify errors made;Assistance required to correct errors made;Assistance required to implement solutions;Assistance required to generate solutions  Insights: Not aware of deficits  Initiation: Requires cues for all  Sequencing: Requires cues for all  Cognition Comment: Pt appearing confused throughout session w/ significant short term memory deficits noted. Pt initially very pleasant and cooperative and quickly becoming easily agitated and upset w/ staff throughout. Objective   Observation/Palpation  Posture: Poor  Observation: fragile skin, R lower leg ace wrapped  Edema: mild BLE  Gross Assessment  Sensation: Impaired (Pt reporting chronic numbness/tingling in R hand through digits 1-3)     AROM RLE (degrees)  RLE AROM: WFL  AROM LLE (degrees)  LLE AROM : WFL  AROM RUE (degrees)  RUE AROM : WFL  AROM LUE (degrees)  LUE AROM : WFL  Strength RLE  Comment: Grossly 3+/5  Strength LLE  Comment: Grossly 3+/5  Strength RUE  Comment: See OT assessment for detail  Strength LUE  Comment: See OT assessment for detail          Bed mobility  Rolling to Left: Maximum assistance  Rolling to Right: Maximum assistance  Supine to Sit: Maximum assistance;2 Person assistance  Sit to Supine: Maximum assistance;2 Person assistance  Scooting: Maximal assistance;2 Person assistance  Bed Mobility Comments: Pt w/ significant difficulty throughout bed mobility this date. Pt requiring max verbal and tactile cueing for initiation, progression, and sequencing of BLE & trunk throughout. Pt requiring hand-over-hand cueing for use of bedrails for UE assist as pt attempting to pull from staff instead w/ poor return demo. Upon sitting at EOB, pt requiring CGA-Fabio for static sitting balance throughout. Pt denying any dizziness/lightheadedness throughout position changes.   Upon return supine, pt rolled in both directions multiple times in order to change brief and perform eber care requiring assist to maintain sidelying throughout. Transfers  Sit to Stand: Maximum Assistance;2 Person Assistance  Stand to Sit: Maximum Assistance;2 Person Assistance  Comment: Pt demonstrating poor steadiness throughout STS transfers this date requiring skilled 2 person assist to maintain safety & balance throughout. Pt requiring use of rocking motion to initiate transfers & 2 person handheld assist (per RN, pt refuses to use RW stating that she uses 2 SPC at baseline). Pt demonstrating poor posture upon standing requiring max verbal cueing for activation of hip extensors in order to correct forward flexed posture w/ poor return demo. Pt also demonstrating poor eccentric quad control throughout stand>sit transfers. Ambulation  Surface: Level tile  Device: Hand-Held Assist (2 person handheld assist)  Assistance: Maximum assistance;2 Person assistance  Quality of Gait: unsteady, staggering steps, narrow KATARINA  Gait Deviations: Slow Ashley;Decreased step length;Decreased step height;Staggers  Distance: 3 steps laterally along EOB  Comments: Pt demonstrating poor steadiness throughout minimal ambulation at bedside this date requiring skilled 2 person assist to maintain balance & safety throughout. Pt ambulating w/ staggering steps and heavy reliance on staff for UE support throughout. More Ambulation?: No  Stairs/Curb  Stairs?: No     Balance  Posture: Poor  Sitting - Static: Fair;+  Sitting - Dynamic: Fair;-  Standing - Static: Poor  Standing - Dynamic: Poor;-  Single Leg Stance R Le  Single Leg Stance L Le  Comments: Standing balance assessed w/ [facility] assist           AM-PAC Score  AM-PAC Inpatient Mobility Raw Score : 9 (23)  AM-PAC Inpatient T-Scale Score : 30.55 (23)  Mobility Inpatient CMS 0-100% Score: 81.38 (23)  Mobility Inpatient CMS G-Code Modifier : CM (23)          Functional Outcome Measure-   Single Leg Stance Test:  0 sec.  (<5 sec.= fall risk)      Goals  Short Term Goals  Time Frame for Short Term Goals: 12 visits  Short Term Goal 1: Pt to demonstrate bed mobility ModA  Short Term Goal 2: Pt to perform STS transfers w/ most appropriate AD ModA  Short Term Goal 3: Pt to ambulate at least 25ft w/ most appropriate AD ModA  Short Term Goal 4: Pt to actively participate in at least 30 minutes of physical therapy for ther act, ther ex, balance, gait, and endurance training  Patient Goals   Patient Goals : To get stronger, to stand, to go home, \"to fix my colon\"       Education  Patient Education  Education Given To: Patient  Education Provided: Role of Therapy;Plan of Care;Transfer Training  Education Provided Comments: Writer attempted to educate pt on: purpose of acute PT eval, importance of continued mobility throughout admission, general safety awareness, fall risk prevention, prevention of sedentary complications, and PT POC. Pt not receptive to education this date. Pt becoming agitated w/ writer & raising voice. Pt requires continued reinforcement of education. Education Method: Verbal  Barriers to Learning: Cognition  Education Outcome: Continued education needed      Therapy Time   Individual Concurrent Group Co-treatment   Time In 1310         Time Out 5421         Minutes 44         Treatment time: 28 minutes     Co-treatment with OT warranted secondary to decreased safety and independence requiring 2 skilled therapy professionals to address individual discipline's goals. PT addressing pre gait trunk strengthening, weight shifting prior to transfers, and transfer training.       Cirilo Badillo, PT

## 2023-01-11 NOTE — PLAN OF CARE
Problem: Discharge Planning  Goal: Discharge to home or other facility with appropriate resources  1/11/2023 1018 by Deven Baca RN  Outcome: Progressing  Flowsheets (Taken 1/11/2023 2957)  Discharge to home or other facility with appropriate resources:   Identify barriers to discharge with patient and caregiver   Arrange for needed discharge resources and transportation as appropriate   Identify discharge learning needs (meds, wound care, etc)  1/11/2023 0207 by Jaylyn Alvares RN  Outcome: Progressing     Problem: Pain  Goal: Verbalizes/displays adequate comfort level or baseline comfort level  1/11/2023 1018 by Deven Baca RN  Outcome: Progressing  1/11/2023 0207 by Jaylyn Alvares RN  Outcome: Progressing     Problem: Skin/Tissue Integrity  Goal: Absence of new skin breakdown  Description: 1. Monitor for areas of redness and/or skin breakdown  2. Assess vascular access sites hourly  3. Every 4-6 hours minimum:  Change oxygen saturation probe site  4. Every 4-6 hours:  If on nasal continuous positive airway pressure, respiratory therapy assess nares and determine need for appliance change or resting period.   1/11/2023 1018 by Deven Baca RN  Outcome: Progressing  1/11/2023 0207 by Jaylyn Alvares RN  Outcome: Progressing     Problem: Safety - Adult  Goal: Free from fall injury  1/11/2023 1018 by Deven Baca RN  Outcome: Progressing  1/11/2023 0207 by Jaylyn Alvares RN  Outcome: Progressing     Problem: ABCDS Injury Assessment  Goal: Absence of physical injury  1/11/2023 1018 by Deven Baca RN  Outcome: Progressing  1/11/2023 0207 by Jaylyn Alvares RN  Outcome: Progressing     Problem: Respiratory - Adult  Goal: Achieves optimal ventilation and oxygenation  1/11/2023 1018 by Deven Baca RN  Outcome: Progressing  1/11/2023 0207 by Jaylyn Alvares RN  Outcome: Progressing     Problem: Cardiovascular - Adult  Goal: Maintains optimal cardiac output and hemodynamic stability  1/11/2023 1018 by Lynn Og RN  Outcome: Progressing  1/11/2023 0207 by Avinash Carlos RN  Outcome: Progressing  Goal: Absence of cardiac dysrhythmias or at baseline  1/11/2023 1018 by Lynn Og RN  Outcome: Progressing  1/11/2023 0207 by Avinash Carlos RN  Outcome: Progressing     Problem: Skin/Tissue Integrity - Adult  Goal: Skin integrity remains intact  1/11/2023 1018 by Lynn Og RN  Outcome: Progressing  Flowsheets (Taken 1/11/2023 0846)  Skin Integrity Remains Intact: Monitor for areas of redness and/or skin breakdown  1/11/2023 0207 by Avinash Carlos RN  Outcome: Progressing  Goal: Incisions, wounds, or drain sites healing without S/S of infection  1/11/2023 1018 by Lynn Og RN  Outcome: Progressing  Flowsheets (Taken 1/11/2023 0846)  Incisions, Wounds, or Drain Sites Healing Without Sign and Symptoms of Infection: TWICE DAILY: Assess and document dressing/incision, wound bed, drain sites and surrounding tissue  1/11/2023 0207 by Avinash Carlos RN  Outcome: Progressing  Goal: Oral mucous membranes remain intact  1/11/2023 1018 by Lynn Og RN  Outcome: Progressing  1/11/2023 0207 by Avinash Carlos RN  Outcome: Progressing     Problem: Musculoskeletal - Adult  Goal: Return mobility to safest level of function  1/11/2023 1018 by Lynn Og RN  Outcome: Progressing  Flowsheets (Taken 1/11/2023 0846)  Return Mobility to Safest Level of Function:   Assess patient stability and activity tolerance for standing, transferring and ambulating with or without assistive devices   Assist with transfers and ambulation using safe patient handling equipment as needed   Ensure adequate protection for wounds/incisions during mobilization   Instruct patient/family in ordered activity level   Obtain physical therapy/occupational therapy consults as needed  1/11/2023 0207 by Avinash Carlos RN  Outcome: Progressing  Goal: Maintain proper alignment of affected body part  1/11/2023 1018 by Amilcar Santos RN  Outcome: Progressing  Flowsheets (Taken 1/11/2023 0846)  Maintain proper alignment of affected body part: Support and protect limb and body alignment per provider's orders  1/11/2023 0207 by Elizabeth Mace RN  Outcome: Progressing  Goal: Return ADL status to a safe level of function  1/11/2023 1018 by Amilcar Santos RN  Outcome: Progressing  Flowsheets (Taken 1/11/2023 0846)  Return ADL Status to a Safe Level of Function:   Administer medication as ordered   Assess activities of daily living deficits and provide assistive devices as needed   Assist and instruct patient to increase activity and self care as tolerated   Obtain physical therapy/occupational therapy consults as needed  1/11/2023 0207 by Elizabeth Mace RN  Outcome: Progressing     Problem: Gastrointestinal - Adult  Goal: Minimal or absence of nausea and vomiting  1/11/2023 1018 by Amilcar Santos RN  Outcome: Progressing  1/11/2023 0207 by Elizabeth Mace RN  Outcome: Progressing  Goal: Maintains or returns to baseline bowel function  1/11/2023 1018 by Amilcar Santos RN  Outcome: Progressing  1/11/2023 0207 by Elizabeth Mace RN  Outcome: Progressing  Goal: Maintains adequate nutritional intake  1/11/2023 1018 by Amilcar Santos RN  Outcome: Progressing  1/11/2023 0207 by Elizabeth Mace RN  Outcome: Progressing  Goal: Establish and maintain optimal ostomy function  1/11/2023 1018 by Amilcar Santos RN  Outcome: Progressing  1/11/2023 0207 by Elizabeth Mace RN  Outcome: Progressing     Problem: Infection - Adult  Goal: Absence of infection at discharge  1/11/2023 1018 by Amilcar Santos RN  Outcome: Progressing  Flowsheets (Taken 1/11/2023 0846)  Absence of infection at discharge: Assess and monitor for signs and symptoms of infection  1/11/2023 0207 by Elizabeth Mace RN  Outcome: Progressing  Goal: Absence of infection during hospitalization  1/11/2023 1018 by Amilcar Santos RN  Outcome: Progressing  Flowsheets (Taken 1/11/2023 0846)  Absence of infection during hospitalization: Assess and monitor for signs and symptoms of infection  1/11/2023 0207 by Jaylyn Alvares RN  Outcome: Progressing  Goal: Absence of fever/infection during anticipated neutropenic period  1/11/2023 1018 by Deven Baca RN  Outcome: Progressing  Flowsheets (Taken 1/11/2023 0846)  Absence of fever/infection during anticipated neutropenic period: Monitor white blood cell count  1/11/2023 0207 by Jaylyn Alvares RN  Outcome: Progressing     Problem: Chronic Conditions and Co-morbidities  Goal: Patient's chronic conditions and co-morbidity symptoms are monitored and maintained or improved  Outcome: Progressing  Flowsheets (Taken 1/11/2023 0846)  Care Plan - Patient's Chronic Conditions and Co-Morbidity Symptoms are Monitored and Maintained or Improved: Monitor and assess patient's chronic conditions and comorbid symptoms for stability, deterioration, or improvement

## 2023-01-11 NOTE — PROGRESS NOTES
Pt admitted to room 2104 per stretcher in condition from [facility] ED  Oriented to room and surroundings  Bed in lowest position, wheels locked, 2/4 side rails up  Call light in reach, room free of clutter, adequate lighting provided  Denies any further questions at this time  Instructed to call out with any questions/concerns/new onset of pain and/or n/v   White board updated  Continue to monitor with hourly rounding  STAY WITH ME protocol initiated   Bed alarm on/Fall Risk signs in place/Fall risk sticker to wrist band  Non-skid socks on/at bedside

## 2023-01-11 NOTE — PROGRESS NOTES
Patient refuses to take insulin and any new meds. After some education and with some difficulty, patient agreed to take [facility]. Was shouting at the writer and saying \"I am 80years old, you can't force me to take medicines, I don't want any medicines, leave me alone and let me die. \" With daughter's help, Leonel Vásquez was able to give patient her vitamin D. Writer notified Portia Givens NP about the above.

## 2023-01-11 NOTE — PLAN OF CARE
Problem: Discharge Planning  Goal: Discharge to home or other facility with appropriate resources  Outcome: Progressing     Problem: Pain  Goal: Verbalizes/displays adequate comfort level or baseline comfort level  Outcome: Progressing     Problem: Skin/Tissue Integrity  Goal: Absence of new skin breakdown  Description: 1. Monitor for areas of redness and/or skin breakdown  2. Assess vascular access sites hourly  3. Every 4-6 hours minimum:  Change oxygen saturation probe site  4. Every 4-6 hours:  If on nasal continuous positive airway pressure, respiratory therapy assess nares and determine need for appliance change or resting period.   Outcome: Progressing     Problem: Safety - Adult  Goal: Free from fall injury  Outcome: Progressing     Problem: ABCDS Injury Assessment  Goal: Absence of physical injury  Outcome: Progressing     Problem: Respiratory - Adult  Goal: Achieves optimal ventilation and oxygenation  Outcome: Progressing     Problem: Cardiovascular - Adult  Goal: Maintains optimal cardiac output and hemodynamic stability  Outcome: Progressing  Goal: Absence of cardiac dysrhythmias or at baseline  Outcome: Progressing     Problem: Skin/Tissue Integrity - Adult  Goal: Skin integrity remains intact  Outcome: Progressing  Goal: Incisions, wounds, or drain sites healing without S/S of infection  Outcome: Progressing  Goal: Oral mucous membranes remain intact  Outcome: Progressing     Problem: Musculoskeletal - Adult  Goal: Return mobility to safest level of function  Outcome: Progressing  Goal: Maintain proper alignment of affected body part  Outcome: Progressing  Goal: Return ADL status to a safe level of function  Outcome: Progressing     Problem: Gastrointestinal - Adult  Goal: Minimal or absence of nausea and vomiting  Outcome: Progressing  Goal: Maintains or returns to baseline bowel function  Outcome: Progressing  Goal: Maintains adequate nutritional intake  Outcome: Progressing  Goal: Establish and maintain optimal ostomy function  Outcome: Progressing     Problem: Infection - Adult  Goal: Absence of infection at discharge  Outcome: Progressing  Goal: Absence of infection during hospitalization  Outcome: Progressing  Goal: Absence of fever/infection during anticipated neutropenic period  Outcome: Progressing

## 2023-01-11 NOTE — PROGRESS NOTES
Transitions of Care Pharmacy Service   Medication Review    The patient's list of current home medications has been reviewed. Source(s) of information: [employer], Patient    Based on information provided by the above source(s), I have updated the patient's home med list as described below. Please review the ACTION REQUESTED section of this note below for any discrepancies on current hospital orders. I changed or updated the following medications on the patient's home medication list:  Removed Klor Con  Benicar     Added Allergra  Vit D  Farxiga     Adjusted   Bumex  Coreg  Vit B12  Glimepiride         PROVIDER ACTION REQUESTED  Medications that need to be addressed by a physician/nurse practitioner:    Medication Action Requested     Home Meds     Please review home medications and order as appropriate. Please feel free to call me with any questions about this encounter. Thank you.     Jamie Cota [facility]   Transitions of Care Pharmacy Service  Phone:  [phone]  Fax: [phone]      Electronically signed by Jamie Cota [facility] on 1/11/2023 at 6:35 PM       Medications Prior to Admission:   Cholecalciferol 50 MCG (2000 UT) CAPS, Take 2,000 Units by mouth daily  fexofenadine (ALLEGRA ALLERGY) 180 MG tablet, Take 180 mg by mouth daily  Cyanocobalamin 1000 MCG/15ML LIQD, Take 1,000 mcg by mouth daily  dapagliflozin (FARXIGA) 10 MG tablet, Take 10 mg by mouth every morning  bumetanide (BUMEX) 2 MG tablet, Take 2 mg by mouth daily  carvedilol (COREG) 12.5 MG tablet, Take 12.5 mg by mouth 2 times daily (with meals)  sacubitril-valsartan (ENTRESTO) 24-26 MG per tablet, Take 1 tablet by mouth 2 times daily  spironolactone (ALDACTONE) 25 MG tablet, Take 12.5 mg by mouth daily  loperamide (IMODIUM) 2 MG capsule, Take 2 mg by mouth daily  ibuprofen (ADVIL;MOTRIN) 200 MG tablet, Take 400 mg by mouth every 6 hours as needed for Pain  glimepiride (AMARYL) 4 MG tablet, Take 4 mg by mouth 2 times daily  Omega-3 Fatty Acids (FISH OIL PO), Take 1 capsule by mouth daily

## 2023-01-11 NOTE — PROGRESS NOTES
Occupational Therapy  Facility/Department: [facility] MED SURG  Occupational Therapy Initial Assessment    Name: Demarcus Lemon  : 10/23/1932  MRN: [id number]  Date of Service: 2023    HALEY Patterson reports patient is medically stable for therapy treatment this date. Chart reviewed prior to treatment and patient is agreeable for therapy. All lines intact and patient positioned comfortably at end of treatment. All patient needs addressed prior to ending therapy session. Discharge Recommendations:  Patient would benefit from continued therapy after discharge  Pt currently functioning below baseline. Recommend daily inpatient skilled therapy at time of discharge to maximize long term outcomes and prevent re-admission. Please refer to AM-PAC score for current level of function. OT Equipment Recommendations  Equipment Needed: Yes  Mobility Devices: ADL Assistive Devices  ADL Assistive Devices: Long-handled Shoe Horn;Long-handled Sponge;Reacher;Sock-Aid Hard;Emergency Alert System       Patient Diagnosis(es): The primary encounter diagnosis was Diarrhea, unspecified type. Diagnoses of Dehydration and Occult blood positive stool were also pertinent to this visit. Past Medical History:  has a past medical history of Adenocarcinoma ([address] Arthritis, Atrial fibrillation ([address] Cancer ([address] CHF (congestive heart failure) ([address] Chronic anticoagulation, Diabetes mellitus ([address] Diverticulitis, Femur fracture ([address] Hypertension, Melanoma ([address] Osteoporosis, Overweight, Sepsis ([address] and Serum creatinine raised. Past Surgical History:  has a past surgical history that includes Femur Surgery; Colon surgery; Cholecystectomy; and Appendectomy. PER H&P: 70-year-old female patient presents to the emergency department brought in via EMS for evaluation of diarrhea which has been going on off and on for last 2 months. Patient has had 3 episodes of diarrhea on the day of evaluation.   She denies any abdominal pain, nausea, vomiting, fever or chills. She denies any melena or hematochezia. She has history of colon cancer and has undergone hemicolectomy 1980s and normally has frequent bowel movements but it has been liquid brown stools at this time. She denies any urinary frequency, urgency, dysuria or hematuria. Patient was admitted at [facility] last week and was in fact discharged home 2 days ago. Patient states her medications were changed and she was put on new medicine and diarrhea has gotten worse since then. Patient lives at home alone. She has not been on any antibiotics recently. Her Lasix has been on hold since last 1 week. She does not take any blood thinners. Assessment   Performance deficits / Impairments: Decreased functional mobility ; Decreased ADL status; Decreased safe awareness;Decreased cognition;Decreased balance;Decreased posture;Decreased high-level IADLs;Decreased endurance;Decreased strength  Assessment: Pt would benefit from continued skilled OT services to increase I and safety during functional tasks to reduce caregiver burden as able. Prognosis: Good  Decision Making: Medium Complexity  Activity Tolerance  Activity Tolerance: Treatment limited secondary to decreased cognition;Treatment limited secondary to agitation  Activity Tolerance Comments: poor+, pt is limited by cognition and agitation this date. Unable to progress mobility due to pt becoming very agitated with therapy, requesting to lay back down.         Plan   Occupational Therapy Plan  Times Per Week: 4-5x/wk 1x/day as cole  Specific Instructions for Next Treatment: SLUMS  Current Treatment Recommendations: Strengthening, Balance training, Functional mobility training, Endurance training, Safety education & training, Equipment evaluation, education, & procurement, Patient/Caregiver education & training, Self-Care / ADL, Home management training     Restrictions  Restrictions/Precautions  Restrictions/Precautions: General Precautions, Fall Risk, Contact Precautions, Bedrest with Bathroom Privileges  Required Braces or Orthoses?: No  Position Activity Restriction  Other position/activity restrictions: ext cath, c-diff rule out, RUE IV    Subjective   General  Chart Reviewed: Yes  Patient assessed for rehabilitation services?: Yes  Family / Caregiver Present: Yes (Pts daughter Tom Nunes in room)  Subjective  Subjective: Pt resting in bed, at beginning of session pt pleasant and agreeable to OT eval stating, \"I have been wanting to get out of bed! They won't let me. \" Throughout eval pt became increasingly agitated with therapy staff requiring constant redirection. Social/Functional History  Social/Functional History  Additional Comments: Unable to obtain social/functional history this date d/t pt's agitiation during session. Daughter speaking w/ NP at end of session, unable to discuss hx w/ daughter this date. Objective   Observation/Palpation  Posture: Poor  Observation: fragile skin, R lower leg ace wrapped  Edema: mild BLE  Safety Devices  Type of Devices: Bed alarm in place;Call light within reach; Left in bed;Gait belt;Nurse notified; All ana prominences offloaded  Restraints  Restraints Initially in Place: No      Balance  Sitting:  (CGA seated on EOB)  Standing:  (Max x2 with B hand held assistance)  Functional Mobility   Overall Level of Assistance: Maximum assistance;Assist X2 (Pt completed 2 small lateral steps towards [facility] for proper postioning with B hand held assist/maxAx2. )  Interventions: Verbal cues; Tactile cues; Safety awareness training (Pt given MAX verbal cues for pacing self, upright posture, pursed lip breathing, line mgmt, intiaiton all to increase safety.)     AROM: Within functional limits  PROM: Within functional limits  Strength: Generally decreased, functional (B UE ~ 4-/5)  Coordination: Generally decreased, functional (slowed B opposition noted)  Tone: Normal  Sensation: Intact        ADL  Feeding: Setup  Grooming: Setup;Stand by assistance  UE Bathing: Setup;Minimal assistance  LE Bathing: Setup;Maximum assistance  UE Dressing: Setup; Moderate assistance (to doff soiled gown and don clean gown while supine in bed)  LE Dressing: Setup;Dependent/Total (for doffing soiled brief and donning clean brief while supine in bed)  Toileting: Dependent/Total (For hygiene while supine in bed after large loose BM in brief. Pt also has a purewick)  Additional Comments: Pt is limited by poor cognition, decreased activity tolerance and generalized weakness. Activity Tolerance  Activity Tolerance: Treatment limited secondary to agitation;Treatment limited secondary to decreased cognition      Bed mobility  Rolling to Left: Maximum assistance  Rolling to Right: Maximum assistance  Supine to Sit: Maximum assistance;2 Person assistance  Sit to Supine: Maximum assistance;2 Person assistance  Scooting: Maximal assistance;2 Person assistance  Bed Mobility Comments: Pt completed bed mobiltiy with significant difficulites this date. Pt required MAX verbal cues for initiaiton, progression, sequencing, line mgmt, pursed lip breathing, pacing self. Pt required increased time/effort to complete. Pt denied any dizziness once seated on EOB. Once returned to supine pt completed one roll L <>R for hygiene/brief change, requiring max assist maintain sidelying. Transfers  Sit to stand: Maximum assistance;2 Person assistance (with B hand held assistance)  Stand to sit: Maximum assistance;2 Person assistance  Transfer Comments: Pt given MAX verbal cues for pacing self, upright posture, controlled stand to sit, squaring self/AD up to surface and reaching back prior to sitting, line mgmt, all to increase safety.         Hearing  Hearing: Exceptions to [facility]  Hearing Exceptions: Hard of hearing/hearing concerns  Cognition  Overall Cognitive Status: Exceptions  Arousal/Alertness: Appropriate responses to stimuli  Following Commands: Inconsistently follows commands  Attention Span: Difficulty attending to directions  Memory: Decreased short term memory;Decreased recall of recent events  Safety Judgement: Decreased awareness of need for safety;Decreased awareness of need for assistance  Problem Solving: Decreased awareness of errors;Assistance required to identify errors made;Assistance required to correct errors made;Assistance required to implement solutions;Assistance required to generate solutions  Insights: Not aware of deficits  Initiation: Requires cues for all  Sequencing: Requires cues for all  Cognition Comment: Pt appearing confused throughout session w/ significant short term memory deficits noted. Pt initially very pleasant and cooperative and quickly becoming easily agitated and upset w/ staff throughout. Orientation  Orientation Level: Oriented to person;Oriented to situation                  Education Given To: Patient  Education Provided: Role of Therapy;Transfer Training;Plan of Care;Energy Conservation; Fall Prevention Strategies; ADL Adaptive Strategies  Education Provided Comments: safety in function, fall prevention/call light use, OT POC, role of OT in acute care setting, pursed lip breathing tech, recommendations for discharge, proper bed mobility tech, benefits of being OOB  Education Method: Verbal  Barriers to Learning: Cognition  Education Outcome: Continued education needed                                         AM-PAC Score        AM-[facility] Inpatient Daily Activity Raw Score: 13 (01/11/23 1545)  AM-PAC Inpatient ADL T-Scale Score : 32.03 (01/11/23 1545)  ADL Inpatient CMS 0-100% Score: 63.03 (01/11/23 1545)  ADL Inpatient CMS G-Code Modifier : CL (01/11/23 1545)         Goals  Short Term Goals  Time Frame for Short Term Goals: By discharge, pt to demo  Short Term Goal 1: ADL transfers and functional mobility to Min A with use of AD as needed.   Short Term Goal 2: bed mobility to Min A with use of bedrails as needed. Short Term Goal 3: increased B UE strength by 1/2 grade to assist with functional tasks/I with B UE HEP with use of handouts as needed. Short Term Goal 4: toileting to Min A with use of AD/grab bars as needed. Short Term Goal 5: UB ADLs to Set up and LB ADLs to Min A with use of AD/AE as needed. Long Term Goals  Long Term Goal 1: Pt to be I with fall prevention edu, EC/WS tech, recommendations for discharge/AE with use of handouts as needed. Patient Goals   Patient goals : To go home! Therapy Time   Individual Concurrent Group Co-treatment   Time In 1312         Time Out 6933         Minutes 41          Tx time: 30min     Co-treatment with PT warranted secondary to decreased safety and independence requiring 2 skilled therapy professionals to address individual discipline's goals. OT addressing preparation for ADL transfer, sitting balance for increased ADL performance, sitting/activity tolerance, fall prevention, ability to sequence and follow directions, and bed mobility tech.          Ellen Szymanski, OT

## 2023-01-11 NOTE — ACP (ADVANCE CARE PLANNING)
Advance Care Planning     Advance Care Planning Activator (Inpatient)  Conversation Note      Date of ACP Conversation: 1/11/2023     Conversation Conducted with: Patient with Decision Making Capacity    ACP Activator: Ángel Chung RN        Health Care Decision Maker: self     Current Designated Health Care Decision Maker: self    Click here to complete Healthcare Decision Makers including section of the Healthcare Decision Maker Relationship (ie \"Primary\")  Today we documented Decision Maker(s). The patient will provide ACP documents. Care Preferences    Ventilation: \"If you were in your present state of health and suddenly became very ill and were unable to breathe on your own, what would your preference be about the use of a ventilator (breathing machine) if it were available to you? \"      Would the patient desire the use of ventilator (breathing machine)?: yes    \"If your health worsens and it becomes clear that your chance of recovery is unlikely, what would your preference be about the use of a ventilator (breathing machine) if it were available to you? \"     Would the patient desire the use of ventilator (breathing machine)?: up to dtr Dannie Saunders       Resuscitation  \"CPR works best to restart the heart when there is a sudden event, like a heart attack, in someone who is otherwise healthy. Unfortunately, CPR does not typically restart the heart for people who have serious health conditions or who are very sick. \"    \"In the event your heart stopped as a result of an underlying serious health condition, would you want attempts to be made to restart your heart (answer \"yes\" for attempt to resuscitate) or would you prefer a natural death (answer \"no\" for do not attempt to resuscitate)? \"  Up to dtr Dannie Saunders       [] Yes   [] No   Educated Patient / Janell Dillard regarding differences between Advance Directives and portable DNR orders.     Length of ACP Conversation in minutes:  15    Conversation Outcomes:  [x] ACP discussion completed  [] Existing advance directive reviewed with patient; no changes to patient's previously recorded wishes  [] New Advance Directive completed  [] Portable Do Not Rescitate prepared for Provider review and signature  [] POLST/POST/MOLST/MOST prepared for Provider review and signature      Follow-up plan:    [] Schedule follow-up conversation to continue planning  [x] Referred individual to Provider for additional questions/concerns   [] Advised patient/agent/surrogate to review completed ACP document and update if needed with changes in condition, patient preferences or care setting    [] This note routed to one or more involved healthcare providers          Pt is a+o, dtr Do Reyes is POA. She is in agreement to full code at this time. Discussed palliative vs Hospice in the future-educated on what hospice entails. She relays her sister was on Hospice \"they didn't even give her water! \" Educated on reasons why-when someone is actively dying. Updated Do Reyes on conversation.

## 2023-01-11 NOTE — DISCHARGE INSTR - COC
Continuity of Care Form    Patient Name: Maria Elena Best   :  10/23/1932  MRN:  [id number]    Admit date:  1/10/2023  Discharge date:  2023    Code Status Order: Full Code   Advance Directives:     Admitting Physician:  Belem Ledezma DO  PCP: Debora Pimentel MD    Discharging Nurse: [facility] Unit/Room#: 2104/2104-01  Discharging Unit Phone Number: [phone] or [phone]    Emergency Contact:   Extended Emergency Contact Information  Primary Emergency Contact: DanielDelaney santosnda  Address: [address]  Home Phone: [phone]  Relation: Child    Past Surgical History:  Past Surgical History:   Procedure Laterality Date    APPENDECTOMY      CHOLECYSTECTOMY      COLON SURGERY      FEMUR SURGERY      s/p fracture       Immunization History: There is no immunization history on file for this patient.     Active Problems:  Patient Active Problem List   Diagnosis Code    Lower GI bleed K92.2    Acute on chronic diastolic congestive heart failure (HCC) I50.33    Allergic rhinitis J30.9    Arthritis of right knee M17.11    Bilateral lower extremity edema R60.0    Chronic allergic conjunctivitis H10.45    Congestive heart failure (HCC) I50.9    Diabetes mellitus (HCC) E11.9    Essential hypertension I10    Non-pressure chronic ulcer of left calf with fat layer exposed ([facility]) L97.222    Overweight E66.3    Paroxysmal atrial fibrillation (HCC) I48.0    Venous ulcer of right leg (HCC) I83.019, L97.919    Pseudogout of right knee M11.261    Vasomotor rhinitis J30.0    Sensorineural hearing loss (SNHL), bilateral H90.3    Serum creatinine raised R79.89    Tinnitus of both ears H93.13    Diarrhea R19.7    Chronic combined systolic (congestive) and diastolic (congestive) heart failure (HCC) I50.42       Isolation/Infection:   Isolation            C Diff Contact          Patient Infection Status       Infection Onset Added Last Indicated Last Indicated By Review Planned Expiration Resolved Resolved By    BRETT Rule Out 01/10/23 01/10/23 01/11/23 C. difficile toxin [facility], [address] [facility], [address] [place] [address],[address], TIFFIN - ONLY (Ordered) 01/17/23 01/20/23              Nurse Assessment:  Last Vital Signs: BP (!) 152/75   Pulse 61   Temp (!) 96.5 °F (35.8 °C) (Axillary)   Resp 18   Ht 5' 4\" (1.626 m)   Wt 149 lb (67.6 kg)   SpO2 95%   BMI 25.58 kg/m²     Last documented pain score (0-10 scale): Pain Level: 0  Last Weight:   Wt Readings from Last 1 Encounters:   01/10/23 149 lb (67.6 kg)     Mental Status:  oriented and alert    IV Access:  - None    Nursing Mobility/ADLs:  Walking   Assisted  Transfer  Assisted  Bathing  Assisted  Dressing  Assisted  Toileting  Assisted  Feeding  Independent  Med Admin  Independent  Med Delivery   whole    Wound Care Documentation and Therapy:  Wound 01/10/23 Pretibial Right (Active)   Wound Image   01/11/23 1710   Wound Etiology Venous 01/11/23 1710   Dressing Status New dressing applied; Old drainage noted 01/11/23 1710   Wound Cleansed Cleansed with saline 01/11/23 1710   Dressing/Treatment Alginate with Ag;ABD; Ace wrap 01/11/23 1710   Dressing Change Due 01/13/23 01/11/23 1710   Wound Length (cm) 3.6 cm 01/11/23 1710   Wound Width (cm) 3 cm 01/11/23 1710   Wound Depth (cm) 0.2 cm 01/11/23 1710   Wound Surface Area (cm^2) 10.8 cm^2 01/11/23 1710   Wound Volume (cm^3) 2.16 cm^3 01/11/23 1710   Wound Assessment Pink/red;Granulation tissue 01/11/23 1710   Drainage Amount Moderate 01/11/23 1710   Drainage Description Serosanguinous 01/11/23 1710   Odor None 01/11/23 1710   Jocelynn-wound Assessment Blanchable erythema;Edematous 01/11/23 1710   Margins Attached edges; Defined edges 01/11/23 1710   Wound Thickness Description not for Pressure Injury Full thickness 01/11/23 1710   Number of days: 1      -RLE wound care: Cleanse with soap and water, rinse, pat dry. Cover wound with Maxorb Ag, reinforce with ABD pad, secure with roll gauze.  Change dressing every other day and as needed if loose or soiled. -Ace wrap to both lower legs. Start with 4 inch wrap just above toes to ankle, then use 6 in wrap up to just below knees. Attempt to overlap layers 50/50. Rewrap daily. Ace wrap may come off at HS, back on in AM.     Elimination:  Continence: Bowel: Yes  Bladder: At times  Urinary Catheter: None   Colostomy/Ileostomy/Ileal Conduit: No       Date of Last BM: 01/13/2023    Intake/Output Summary (Last 24 hours) at 1/11/2023 1722  Last data filed at 1/11/2023 1442  Gross per 24 hour   Intake 752.58 ml   Output 360 ml   Net 392.58 ml     I/O last 3 completed shifts: In: 672.3 [I.V.:381.5; IV Piggyback:290.9]  Out: -     Safety Concerns: At Risk for Falls    Impairments/Disabilities:      Vision and Hearing    Nutrition Therapy:  Current Nutrition Therapy:   Oral Diet: Dysphagia, Soft and bite sized    Routes of Feeding: Oral  Liquids: No Restrictions  Daily Fluid Restriction: no  Last Modified Barium Swallow with Video (Video Swallowing Test): not done    Treatments at the Time of Hospital Discharge:   Respiratory Treatments: N/A  Oxygen Therapy:  is not on home oxygen therapy. Ventilator:    - No ventilator support    Rehab Therapies: none  Weight Bearing Status/Restrictions: no weight bearing restrictions  Other Medical Equipment (for information only, NOT a DME order):  walker  Other Treatments:   Skilled nurse assessment  Medication teaching and complaince  Call daughter Cindy Graham at [phone] to set up appt time. -RLE wound care: Cleanse with soap and water, rinse, pat dry. Cover wound with Maxorb Ag, reinforce with ABD pad, secure with roll gauze. Change dressing every other day and as needed if loose or soiled. -Ace wrap to both lower legs. Start with 4 inch wrap just above toes to ankle, then use 6 in wrap up to just below knees. Attempt to overlap layers 50/50. Rewrap daily.  Ace wrap may come off at HS, back on in AM.     Patient's personal belongings (please select all that are sent with patient):  Glasses    RN SIGNATURE:  Electronically signed by Rigoberto Tabor RN on 1/13/23 at 3:07 PM EST    CASE MANAGEMENT/SOCIAL WORK SECTION    Inpatient Status Date: ***    Readmission Risk Assessment Score:  Readmission Risk              Risk of Unplanned Readmission:  12           Discharging to Facility/ Agency   Name: [facility]  Address:  Phone: [address] (if applicable)   Name:  Address:  Dialysis Schedule:  Phone:  Fax:    / signature: Electronically signed by Yuki Sagastume on 1/13/23 at 1:36 PM EST    PHYSICIAN SECTION    Prognosis: Fair    Condition at Discharge: Stable    Rehab Potential (if transferring to Rehab): Fair    Recommended Labs or Other Treatments After Discharge: bmp next week    Physician Certification: I certify the above information and transfer of Eusebio Mooring  is necessary for the continuing treatment of the diagnosis listed and that she requires Home Care for greater 30 days.      Update Admission H&P: No change in H&P    PHYSICIAN SIGNATURE:  Electronically signed by Austin Ledezma DO on 1/13/23 at 1:16 PM EST

## 2023-01-11 NOTE — PROGRESS NOTES
[facility]  Consult Note       NAME:  [address] [place] [address] RECORD NUMBER:  [id number]  AGE: 80 y.o. GENDER: female  : 10/23/1932  TODAY'S DATE:  2023    Subjective: Azalea Kocher is a 80 y.o. female with inpatient referral to [facility] for:  RLE wound      Wound Identification:  Wound Type: venous  Contributing Factors: edema and venous stasis    Wound History: the patient states that she has had the wound for about the past week or so due to swelling. She has had venous stasis ulcerations in the past. She has been seen in the past at [address] Last visit was 10/4/22 and LE wounds had healed at that time. Current Wound Care Treatment:  telfa type of non-adherent dressing    Patient Goal of Care:  [x] Wound Healing  [] Odor Control  [] Palliative Care  [] Pain Control   [x] Other: infection prevention        PAST MEDICAL HISTORY        Diagnosis Date    Adenocarcinoma ([address]     colon    Arthritis     Atrial fibrillation ([address]     r/t sepsis     Cancer ([address]     colon, skin    CHF (congestive heart failure) (HCC)     Chronic anticoagulation     Eliquis for a fib stroke prophylaxis    Diabetes mellitus ([address]     Diverticulitis     Femur fracture ([address]     Hypertension     Melanoma ([address]     nose    Osteoporosis     Overweight     Sepsis ([address]     Serum creatinine raised        PAST SURGICAL HISTORY    Past Surgical History:   Procedure Laterality Date    APPENDECTOMY      CHOLECYSTECTOMY      COLON SURGERY      FEMUR SURGERY      s/p fracture       FAMILY HISTORY    History reviewed. No pertinent family history.     SOCIAL HISTORY    Social History     Tobacco Use    Smoking status: Never    Smokeless tobacco: Never   Vaping Use    Vaping Use: Never used   Substance Use Topics    Alcohol use: No    Drug use: No         ALLERGIES    Allergies   Allergen Reactions    Acetaminophen-Codeine Nausea Only    Codeine Nausea Only    Furosemide Other reaction(s): GI Disturbance    Linagliptin      Other reaction(s): SOB    Sitagliptin      Other reaction(s): felt poorly    Other Itching     Animal Dander       HOME MEDICATIONS  Prior to Admission medications    Medication Sig Start Date End Date Taking?  Authorizing Provider   Cholecalciferol 50 MCG (2000 UT) CAPS Take 2,000 Units by mouth daily 4/8/18  Yes Historical Provider, MD   bumetanide (BUMEX) 0.5 MG tablet Take 2 mg by mouth daily   Yes Historical Provider, MD   carvedilol (COREG) 6.25 MG tablet Take 12.5 mg by mouth 2 times daily (with meals)   Yes Historical Provider, MD   sacubitril-valsartan (ENTRESTO) 24-26 MG per tablet Take 1 tablet by mouth 2 times daily   Yes Historical Provider, MD   spironolactone (ALDACTONE) 25 MG tablet Take 12.5 mg by mouth daily   Yes Historical Provider, MD   loperamide (IMODIUM) 2 MG capsule Take 2 mg by mouth daily   Yes Historical Provider, MD   ibuprofen (ADVIL;MOTRIN) 200 MG tablet Take 400 mg by mouth every 6 hours as needed for Pain   Yes Historical Provider, MD   olmesartan (BENICAR) 20 MG tablet Take 20 mg by mouth daily    Historical Provider, MD   potassium chloride (KLOR-CON M) 20 MEQ extended release tablet Take 1 tablet by mouth daily  Patient taking differently: Take 10 mEq by mouth 2 times daily 3/31/20   Laron Allred DO   GLIMEPIRIDE PO Take 4 mg by mouth 2 times daily    Historical Provider, MD   Omega-3 Fatty Acids (FISH OIL PO) Take by mouth    Historical Provider, MD       CURRENT MEDICATIONS:  Current Facility-Administered Medications   Medication Dose Route Frequency Provider Last Rate Last Admin    carvedilol (COREG) tablet 12.5 mg  12.5 mg Oral BID  PATRICK Gaona NP   12.5 mg at 01/11/23 0826    traMADol (ULTRAM) tablet 50 mg  50 mg Oral Q6H PRN PATRICK Gaona NP        sacubitril-valsartan (ENTRESTO) 24-26 MG per tablet 1 tablet  1 tablet Oral BID PATRICK Dumont CNP   1 tablet at 01/11/23 1202    Vitamin D (CHOLECALCIFEROL) tablet 2,000 Units  2,000 Units Oral Daily Glenis Lesches, APRN - CNP   2,000 Units at 01/11/23 1235    cholestyramine light packet 4 g  4 g Oral BID PATRICK Solares - ZACH        pantoprazole (PROTONIX) 80 mg in sodium chloride 0.9 % 100 mL infusion  8 mg/hr IntraVENous Continuous Valerie Carlson MD 10 mL/hr at 01/11/23 1442 8 mg/hr at 01/11/23 1442    [Held by provider] glimepiride (AMARYL) tablet 4 mg  4 mg Oral Daily with breakfast Simón Sauer APRN - NP        [Held by provider] potassium chloride (KLOR-CON M) extended release tablet 20 mEq  20 mEq Oral Daily Simón Sauer, APRN - NP        sodium chloride flush 0.9 % injection 5-40 mL  5-40 mL IntraVENous 2 times per day Simón Sauer APRN - NP   10 mL at 01/11/23 0826    sodium chloride flush 0.9 % injection 5-40 mL  5-40 mL IntraVENous PRN Simón Shivers, APRN - NP        0.9 % sodium chloride infusion   IntraVENous PRN Simón Shivers, APRN - NP        ondansetron (ZOFRAN-ODT) disintegrating tablet 4 mg  4 mg Oral Q8H PRN Simón Shivers, APRN - NP        Or    ondansetron [facility]) injection 4 mg  4 mg IntraVENous Q6H PRN Simón Shivers, APRN - NP        acetaminophen (TYLENOL) tablet 650 mg  650 mg Oral Q6H PRN Simón Shivers, APRN - NP   650 mg at 01/10/23 1013    Or    acetaminophen (TYLENOL) suppository 650 mg  650 mg Rectal Q6H PRN Simón Shivers, APRN - NP        [Held by provider] pantoprazole (PROTONIX) tablet 40 mg  40 mg Oral BID AC Simón Jerniganvers, APRN - NP        glucose chewable tablet 16 g  4 tablet Oral PRN Simón Shivers, APRN - NP        dextrose bolus 10% 125 mL  125 mL IntraVENous PRN Simón Rerevers, APRN - NP        Or    dextrose bolus 10% 250 mL  250 mL IntraVENous PRN Simón Shivers, APRN - NP        glucagon (rDNA) injection 1 mg  1 mg SubCUTAneous PRN Simón Shivers, APRN - NP        dextrose 10 % infusion   IntraVENous Continuous PRN Simón Shivers, APRN - NP        polyethyl glycol-propyl glycol 0.4-0.3 % (SYSTANE) ophthalmic solution 1 drop  1 drop Both Eyes JOSIEN PATRICK Swenson - CNP   1 drop at 01/11/23 9491    insulin lispro (HUMALOG) injection vial 0-4 Units  0-4 Units SubCUTAneous TID WC PATRICK Ray - NP        insulin lispro (HUMALOG) injection vial 0-4 Units  0-4 Units SubCUTAneous Nightly PATRICK Ray - NP             Review of Systems:  Constitutional: negative for chills and fevers  Respiratory: negative for cough and shortness of breath  Cardiovascular: positive for lower extremity edema, negative for chest pain and palpitations  Gastrointestinal: negative for abdominal pain and nausea  Genitourinary:negative  Integument: RLE wound  Endocrine: negative  Musculoskeletal:negative  Behavioral/Psych: negative  Pain: negative      Objective:      BP (!) 152/75   Pulse 61   Temp (!) 96.5 °F (35.8 °C) (Axillary)   Resp 18   Ht 5' 4\" (1.626 m)   Wt 149 lb (67.6 kg)   SpO2 95%   BMI 25.58 kg/m²       LABS    CBC:   Lab Results   Component Value Date/Time    WBC 5.2 01/10/2023 05:31 AM    RBC 3.76 01/10/2023 05:31 AM    HGB 10.8 01/11/2023 09:35 AM     SED RATE: No results found for: SEDRATE    CMP:  Albumin:    Lab Results   Component Value Date/Time    LABALBU 3.0 01/10/2023 05:31 AM     PT/INR:    Lab Results   Component Value Date/Time    PROTIME 14.4 01/11/2023 03:30 AM    INR 1.1 01/11/2023 03:30 AM     HgBA1c:  No results found for: LABA1C  PTT: No components found for: LABPTT      PHYSICAL EXAM    General Appearance: alert and oriented to person, place and time, well-developed and well-nourished, in no acute distress  Head: normocephalic and atraumatic  Pulmonary/Chest: normal air movement, no respiratory distress  Skin: RLE wound appears to be full thickness, surrounding skin is wrinkled as if previous edema is markedly improved  Cardiovascular/Circulation: minimal edema, no cyanosis, palpable peripheral pulses  Extremities: no cyanosis and no clubbing  Musculoskeletal: no joint deformity or tenderness, no extremity amputations  Neurologic: gait not evaluated this visit, coordination normal and speech normal      Assessment:       Jacob Risk Score: Jacob Scale Score: 15    Patient Active Problem List   Diagnosis Code    Lower GI bleed K92.2    Acute on chronic diastolic congestive heart failure ([facility]) I50.33    Allergic rhinitis J30.9    Arthritis of right knee M17.11    Bilateral lower extremity edema R60.0    Chronic allergic conjunctivitis H10.45    Congestive heart failure ([facility]) I50.9    Diabetes mellitus ([facility]) E11.9    Essential hypertension I10    Non-pressure chronic ulcer of left calf with fat layer exposed ([facility]) L97.222    Overweight E66.3    Paroxysmal atrial fibrillation ([facility]) I48.0    Venous ulcer of right leg ([facility]) I83.019, L97.919    Pseudogout of right knee M11.261    Vasomotor rhinitis J30.0    Sensorineural hearing loss (SNHL), bilateral H90.3    Serum creatinine raised R79.89    Tinnitus of both ears H93.13    Diarrhea R19.7    Chronic combined systolic (congestive) and diastolic (congestive) heart failure ([facility]) I50.42         Measurements:  Wound 01/10/23 Pretibial Right (Active)   Wound Image   01/11/23 1710   Wound Etiology Venous 01/11/23 1710   Dressing Status New dressing applied; Old drainage noted 01/11/23 1710   Wound Cleansed Cleansed with saline 01/11/23 1710   Dressing/Treatment Alginate with Ag;ABD; Ace wrap 01/11/23 1710   Dressing Change Due 01/13/23 01/11/23 1710   Wound Length (cm) 3.6 cm 01/11/23 1710   Wound Width (cm) 3 cm 01/11/23 1710   Wound Depth (cm) 0.2 cm 01/11/23 1710   Wound Surface Area (cm^2) 10.8 cm^2 01/11/23 1710   Wound Volume (cm^3) 2.16 cm^3 01/11/23 1710   Wound Assessment Pink/red;Granulation tissue 01/11/23 1710   Drainage Amount Moderate 01/11/23 1710   Drainage Description Serosanguinous 01/11/23 1710   Odor None 01/11/23 1710   Jocelynn-wound Assessment Blanchable erythema;Edematous 01/11/23 1710   Margins Attached edges; Defined edges 01/11/23 1710   Wound Thickness Description not for Pressure Injury Full thickness 01/11/23 1710   Number of days: 1           Plan:     Plan of Care:     -RLE wound care: Cleanse with soap and water, rinse, pat dry. Cover wound with Maxorb Ag, reinforce with ABD pad, secure with roll gauze. Change dressing every other day and as needed if loose or soiled. -Ace wrap to both lower legs. Start with 4 inch wrap just above toes to ankle, then use 6 in wrap up to just below knees. Attempt to overlap layers 50/50. Rewrap daily. Ace wrap may come off at HS, back on in AM.     -Turn every 2 hours    -Float heels off of bed with pillows under calves. If needed- use offloading boots. Specialty Bed Required : Yes   [] Low Air Loss   [x] Pressure Redistribution  [] Fluid Immersion  [] Bariatric  [] Total Pressure Relief  [] Other:     Current Diet: ADULT DIET;  Dysphagia - Soft and Bite Sized    Discharge Plan:  Placement for patient upon discharge: home with support   Patient appropriate for Outpatient [address]: Yes    Patient/Caregiver Teaching:  Level of patientunderstanding able to:     [x] Indicates understanding       [] Needs reinforcement  [] Unsuccessful      [x] Verbal Understanding  [] Demonstrated understanding       [] No evidence of learning  [] Refused teaching         [] N/A       Electronically signed by PATRICK Chirinos CNP on  1/11/2023 at 5:11 PM

## 2023-01-11 NOTE — CONSULTS
GI Consult Note:    Name: Corinna Rivera  MRN: [id number]     [place]: [de-identified]  Room: 2104/2104-01    Admit Date: 1/10/2023  PCP: Real Calvert MD    Physician Requesting Consult: Paddy Ledezma DO     Reason for Consult:      Diarrhea  Colon cancer  Abdominal discomfort  Abdominal bloating and gas    Chief Complaint:     Chief Complaint   Patient presents with    Diarrhea     Patient states she was released from [facility] on Sunday for improved diarrhea. Patient states she has diarrhea just pouring out of her.         History Obtained From:     Patient and EMR  History of Present Illness:      Corinna Rivera is a  [address] y.o.  female who presents with Diarrhea (Patient states she was released from [facility] on Sunday for improved diarrhea./Patient states she has diarrhea just pouring out of her. )    This elderly female patient has history significant of multiple chronic medical issues  She has been admitted with history for what appears to be intermittent diarrhea for a long time  She alert forgetful she has history for colon cancer in the past had a surgery done 4 years ago  My nurse practitioner has discussed her with her daughter at bedside earlier  According to her she patient has history for intermittent diarrhea for a long time  Frequency initially was 2-3 bowel movements a day but since hospitalization she has intractable diarrhea  Also gives history for antibiotic use few months ago  No rectal bleeding or melanotic stools  Mild nausea no vomiting  Patient has abdominal bloating gas off-and-on cramping  No smoking alcohol abuse illicit drug usage  Has not had a colonoscopy done a long while    Symptoms:  Onset:  Location:  abdomen  Duration:  week(s)  Severity:  mild, moderate  Quality:  intermittent      Past Medical History:     Past Medical History:   Diagnosis Date    Aden[facility]     colon    Arthritis     Atrial fibrillation ([facility]     r/t sepsis     Cancer ([facility] colon, skin    CHF (congestive heart failure) (HCC)     Chronic anticoagulation     Eliquis for a fib stroke prophylaxis    Diabetes mellitus ([facility]     Diverticulitis     Femur fracture (HCC)     Hypertension     Melanoma ([facility]     nose    Osteoporosis     Overweight     Sepsis ([facility]     Serum creatinine raised         Past Surgical History:     Past Surgical History:   Procedure Laterality Date    APPENDECTOMY      CHOLECYSTECTOMY      COLON SURGERY      FEMUR SURGERY      s/p fracture        Medications Prior to Admission:       Prior to Admission medications    Medication Sig Start Date End Date Taking?  Authorizing Provider   Cholecalciferol 50 MCG (2000 UT) CAPS Take 2,000 Units by mouth daily 4/8/18  Yes Historical Provider, MD   bumetanide (BUMEX) 0.5 MG tablet Take 2 mg by mouth daily   Yes Historical Provider, MD   carvedilol (COREG) 6.25 MG tablet Take 12.5 mg by mouth 2 times daily (with meals)   Yes Historical Provider, MD   sacubitril-valsartan (ENTRESTO) 24-26 MG per tablet Take 1 tablet by mouth 2 times daily   Yes Historical Provider, MD   spironolactone (ALDACTONE) 25 MG tablet Take 12.5 mg by mouth daily   Yes Historical Provider, MD   loperamide (IMODIUM) 2 MG capsule Take 2 mg by mouth daily   Yes Historical Provider, MD   ibuprofen (ADVIL;MOTRIN) 200 MG tablet Take 400 mg by mouth every 6 hours as needed for Pain   Yes Historical Provider, MD   olmesartan (BENICAR) 20 MG tablet Take 20 mg by mouth daily    Historical Provider, MD   potassium chloride (KLOR-CON M) 20 MEQ extended release tablet Take 1 tablet by mouth daily  Patient taking differently: Take 10 mEq by mouth 2 times daily 3/31/20   Laron Allred DO   GLIMEPIRIDE PO Take 4 mg by mouth 2 times daily    Historical Provider, MD   Omega-3 Fatty Acids (FISH OIL PO) Take by mouth    Historical Provider, MD        Allergies:       Acetaminophen-codeine, Codeine, Furosemide, Linagliptin, Sitagliptin, and Other    Social History: Tobacco:    reports that she has never smoked. She has never used smokeless tobacco.  Alcohol:      reports no history of alcohol use. Drug Use:  reports no history of drug use. Family History:     History reviewed. No pertinent family history.     Review of Systems:     Positive and Negative as described in HPI    Constitutional:  negative for  fevers, chills, sweats, positive fatigue, and weight loss  HEENT:  negative for vision or hearing changes,   Respiratory:  negative for shortness of breath, cough, or congestion  Cardiovascular:  negative for  chest pain, palpitations  Gastrointestinal:  negative for nausea, vomiting, diarrhea, constipation, abdominal pain  Genitourinary: Positive requency, dysuria  Integument:  negative for rash, skin lesions  Musculoskeletal: Positive for muscle aches or joint pain  Neurological:  negative for headaches, dizziness, lightheadedness, numbness, pain and tingling extrimities  Behavior/Psych:  negative for depression and positive anxiety    Code Status:  Full Code    Physical Exam:     Vitals:  BP (!) 152/75   Pulse 61   Temp (!) 96.5 °F (35.8 °C) (Axillary)   Resp 18   Ht 5' 4\" (1.626 m)   Wt 149 lb (67.6 kg)   SpO2 95%   BMI 25.58 kg/m²   Temp (24hrs), Av.6 °F (36.4 °C), Min:96.5 °F (35.8 °C), Max:98.2 °F (36.8 °C)      General appearance - alert, very elderly appearing, and in no acute distress  Mental status -slow but oriented to person, place, and time with anxious affect  Head - normocephalic and atraumatic  Eyes - pupils equal and reactive, extraocular eye movements intact, conjunctiva clear  Ears - hearing appears to be intact  Nose - no drainage noted  Mouth - mucous membranes dry bilateral  Neck - supple, no carotid bruits, thyroid not palpable  Chest - Rales to auscultation, normal effort  Heart -not done at this time  Abdomen - soft, abdominal scar bloated mild upper abdominal not done at this time tenderness, nondistended, bowel sounds present all four quadrants, no masses, hepatomegaly or splenomegaly.  No hernias  Neurological - normal speech, no focal findings or movement disorder noted, cranial nerves II through XII grossly intact  Extremities -mild covered with dressing secondary to wound  Skin -positive gross lesions, rashes, or induration noted  Cranial Nerves : Not done at this time  Lymph nodes: not done at this time    Data:   CBC:   Lab Results   Component Value Date/Time    WBC 5.2 01/10/2023 05:31 AM    RBC 3.76 01/10/2023 05:31 AM    HGB 10.8 01/11/2023 09:35 AM    HCT 35.0 01/11/2023 09:35 AM    MCV 89.1 01/10/2023 05:31 AM    MCH 29.0 01/10/2023 05:31 AM    MCHC 32.5 01/10/2023 05:31 AM    RDW 14.9 01/10/2023 05:31 AM     01/10/2023 05:31 AM    MPV 8.7 01/10/2023 05:31 AM     CBC with Differential:    Lab Results   Component Value Date/Time    WBC 5.2 01/10/2023 05:31 AM    RBC 3.76 01/10/2023 05:31 AM    HGB 10.8 01/11/2023 09:35 AM    HCT 35.0 01/11/2023 09:35 AM     01/10/2023 05:31 AM    MCV 89.1 01/10/2023 05:31 AM    MCH 29.0 01/10/2023 05:31 AM    MCHC 32.5 01/10/2023 05:31 AM    RDW 14.9 01/10/2023 05:31 AM    LYMPHOPCT 10 01/10/2023 05:31 AM    MONOPCT 13 01/10/2023 05:31 AM    BASOPCT 1 01/10/2023 05:31 AM    MONOSABS 0.70 01/10/2023 05:31 AM    LYMPHSABS 0.50 01/10/2023 05:31 AM    EOSABS 0.10 01/10/2023 05:31 AM    BASOSABS 0.10 01/10/2023 05:31 AM    DIFFTYPE NOT REPORTED 02/09/2022 04:05 PM     Hemoglobin/Hematocrit:    Lab Results   Component Value Date/Time    HGB 10.8 01/11/2023 09:35 AM    HCT 35.0 01/11/2023 09:35 AM     CMP:    Lab Results   Component Value Date/Time     01/11/2023 03:30 AM    K 4.3 01/11/2023 03:30 AM     01/11/2023 03:30 AM    CO2 27 01/11/2023 03:30 AM    BUN 38 01/11/2023 03:30 AM    CREATININE 0.88 01/11/2023 03:30 AM    GFRAA >60 02/09/2022 04:05 PM    LABGLOM >60 01/11/2023 03:30 AM    GLUCOSE 192 01/11/2023 03:30 AM    PROT 5.5 01/10/2023 05:31 AM    LABALBU 3.0 01/10/2023 05:31 AM    CALCIUM 8.0 01/11/2023 03:30 AM    BILITOT 0.3 01/10/2023 05:31 AM    ALKPHOS 168 01/10/2023 05:31 AM    AST 26 01/10/2023 05:31 AM    ALT 21 01/10/2023 05:31 AM     BMP:    Lab Results   Component Value Date/Time     01/11/2023 03:30 AM    K 4.3 01/11/2023 03:30 AM     01/11/2023 03:30 AM    CO2 27 01/11/2023 03:30 AM    BUN 38 01/11/2023 03:30 AM    LABALBU 3.0 01/10/2023 05:31 AM    CREATININE 0.88 01/11/2023 03:30 AM    CALCIUM 8.0 01/11/2023 03:30 AM    GFRAA >60 02/09/2022 04:05 PM    LABGLOM >60 01/11/2023 03:30 AM    GLUCOSE 192 01/11/2023 03:30 AM     PT/INR:    Lab Results   Component Value Date/Time    PROTIME 14.4 01/11/2023 03:30 AM    INR 1.1 01/11/2023 03:30 AM     PTT:    Lab Results   Component Value Date/Time    APTT 31.8 01/11/2023 03:30 AM   [APTT}    Assesment:     Primary Problem  Lower GI bleed    Active Hospital Problems    Diagnosis Date Noted    Essential hypertension [I10] 01/11/2023     Priority: Medium    Diabetes mellitus ([facility] [E11.9] 01/11/2023     Priority: Medium    Congestive heart failure ([facility] [I50.9] 01/11/2023     Priority: Medium    Paroxysmal atrial fibrillation ([facility] [I48.0] 01/11/2023     Priority: Medium    Intractable diarrhea [R19.7] 01/11/2023     Priority: Medium    Chronic combined systolic (congestive) and diastolic (congestive) heart failure ([facility] [I50.42] 01/11/2023     Priority: Medium    Lower GI bleed [K92.2] 01/10/2023     Priority: Medium    Venous ulcer of right leg ([facility] [I83.019, L97.919] 01/03/2023     Priority: Medium     Diarrhea  Colon cancer  Abdominal discomfort  Abdominal bloating and gas  Plan:     Aggressive hydration  Soft diet as tolerated  Stool studies check for C.  Difficile  Trial of Questran  Probiotics  Reevaluate in the morning  She will require colonoscopy in versus outpatient if agreeable by patient and the family  Discussed with nursing staff on the floor        Thank you for allowing me to participate in the care of your patient. Please feel free to contact me with any questions or concerns.      Electronically signed by Kai Mccormack MD on 1/11/2023 at 4:59 PM     Copy sent to Dr. Анна Baker MD

## 2023-01-11 NOTE — ED NOTES
Patient incontinent of large loose stool. Patient cleaned with new gown, socks, and warm blanket provided. Daughter at bedside. Tolerated denies needs.        Kailey Britt RN  01/10/23 2616

## 2023-01-12 LAB
C DIFFICILE TOXINS, PCR: NORMAL
CAMPYLOBACTER PCR: NORMAL
E COLI ENTEROTOXIGENIC PCR: NORMAL
GLUCOSE BLD-MCNC: 194 MG/DL (ref 65–105)
GLUCOSE BLD-MCNC: 297 MG/DL (ref 65–105)
GLUCOSE BLD-MCNC: 390 MG/DL (ref 65–105)
HCT VFR BLD CALC: 37 % (ref 36.3–47.1)
HEMOGLOBIN: 11.5 G/DL (ref 11.9–15.1)
PLESIOMONAS SHIGELLOIDES PCR: NORMAL
SALMONELLA PCR: NORMAL
SHIGATOXIN GENE PCR: NORMAL
SHIGELLA SP PCR: NORMAL
SPECIMEN DESCRIPTION: NORMAL
SPECIMEN DESCRIPTION: NORMAL
VIBRIO PCR: NORMAL
YERSINIA ENTEROCOLITICA PCR: NORMAL

## 2023-01-12 PROCEDURE — 6370000000 HC RX 637 (ALT 250 FOR IP): Performed by: NURSE PRACTITIONER

## 2023-01-12 PROCEDURE — 6370000000 HC RX 637 (ALT 250 FOR IP): Performed by: INTERNAL MEDICINE

## 2023-01-12 PROCEDURE — 1200000000 HC SEMI PRIVATE

## 2023-01-12 PROCEDURE — 97116 GAIT TRAINING THERAPY: CPT

## 2023-01-12 PROCEDURE — 99232 SBSQ HOSP IP/OBS MODERATE 35: CPT | Performed by: INTERNAL MEDICINE

## 2023-01-12 PROCEDURE — 97530 THERAPEUTIC ACTIVITIES: CPT

## 2023-01-12 PROCEDURE — 36415 COLL VENOUS BLD VENIPUNCTURE: CPT

## 2023-01-12 PROCEDURE — 2580000003 HC RX 258: Performed by: NURSE PRACTITIONER

## 2023-01-12 PROCEDURE — 82947 ASSAY GLUCOSE BLOOD QUANT: CPT

## 2023-01-12 PROCEDURE — 85014 HEMATOCRIT: CPT

## 2023-01-12 PROCEDURE — 99233 SBSQ HOSP IP/OBS HIGH 50: CPT | Performed by: INTERNAL MEDICINE

## 2023-01-12 PROCEDURE — 97535 SELF CARE MNGMENT TRAINING: CPT

## 2023-01-12 PROCEDURE — 85018 HEMOGLOBIN: CPT

## 2023-01-12 PROCEDURE — APPSS30 APP SPLIT SHARED TIME 16-30 MINUTES: Performed by: NURSE PRACTITIONER

## 2023-01-12 RX ORDER — BUMETANIDE 1 MG/1
2 TABLET ORAL DAILY
Status: DISCONTINUED | OUTPATIENT
Start: 2023-01-13 | End: 2023-01-13 | Stop reason: HOSPADM

## 2023-01-12 RX ORDER — PANTOPRAZOLE SODIUM 40 MG/1
40 TABLET, DELAYED RELEASE ORAL
Status: DISCONTINUED | OUTPATIENT
Start: 2023-01-13 | End: 2023-01-12

## 2023-01-12 RX ORDER — PANTOPRAZOLE SODIUM 40 MG/1
40 TABLET, DELAYED RELEASE ORAL
Status: DISCONTINUED | OUTPATIENT
Start: 2023-01-12 | End: 2023-01-13 | Stop reason: HOSPADM

## 2023-01-12 RX ADMIN — SODIUM CHLORIDE 8 MG/HR: 9 INJECTION, SOLUTION INTRAVENOUS at 00:30

## 2023-01-12 RX ADMIN — CHOLESTYRAMINE 4 G: 4 POWDER, FOR SUSPENSION ORAL at 22:02

## 2023-01-12 RX ADMIN — Medication 2000 UNITS: at 07:47

## 2023-01-12 RX ADMIN — SODIUM CHLORIDE, PRESERVATIVE FREE 10 ML: 5 INJECTION INTRAVENOUS at 22:02

## 2023-01-12 RX ADMIN — CHOLESTYRAMINE 4 G: 4 POWDER, FOR SUSPENSION ORAL at 07:47

## 2023-01-12 RX ADMIN — CARVEDILOL 12.5 MG: 12.5 TABLET, FILM COATED ORAL at 07:47

## 2023-01-12 RX ADMIN — CARVEDILOL 12.5 MG: 12.5 TABLET, FILM COATED ORAL at 17:43

## 2023-01-12 RX ADMIN — PANTOPRAZOLE SODIUM 40 MG: 40 TABLET, DELAYED RELEASE ORAL at 13:13

## 2023-01-12 RX ADMIN — SACUBITRIL AND VALSARTAN 1 TABLET: 24; 26 TABLET, FILM COATED ORAL at 07:46

## 2023-01-12 RX ADMIN — SODIUM CHLORIDE 8 MG/HR: 9 INJECTION, SOLUTION INTRAVENOUS at 05:45

## 2023-01-12 RX ADMIN — SACUBITRIL AND VALSARTAN 1 TABLET: 24; 26 TABLET, FILM COATED ORAL at 22:02

## 2023-01-12 RX ADMIN — SODIUM CHLORIDE, PRESERVATIVE FREE 10 ML: 5 INJECTION INTRAVENOUS at 07:48

## 2023-01-12 RX ADMIN — PROBIOTIC PRODUCT - TAB 1 TABLET: TAB at 13:24

## 2023-01-12 RX ADMIN — POLYETHYLENE GLYCOL, PROPYLENE GLYCOL 1 DROP: .4; .3 LIQUID OPHTHALMIC at 07:48

## 2023-01-12 NOTE — PROGRESS NOTES
Occupational Therapy  Facility/Department: [facility]  Rehabilitation Occupational Therapy Daily Treatment Note    Date: 23  Patient Name: Emily Romano       Room: 6032/2490-96  MRN: [id number]  Account: [de-identified]   : 10/23/1932  (80 y.o.) Gender: female     Pt currently functioning below baseline. Recommend daily inpatient skilled therapy at time of discharge to maximize long term outcomes and prevent re-admission. Please refer to AM-PAC score for current level of function. Past Medical History:  has a past medical history of Adenocarcinoma ([address] Arthritis, Atrial fibrillation ([address] Cancer ([address] CHF (congestive heart failure) ([address] Chronic anticoagulation, Diabetes mellitus ([address] Diverticulitis, Femur fracture ([address] Hypertension, Melanoma ([facility] Osteoporosis, Overweight, Sepsis ([facility] [address] [facility] and Serum creatinine raised. Past Surgical History:   has a past surgical history that includes Femur Surgery; Colon surgery; Cholecystectomy; and Appendectomy. Restrictions  Restrictions/Precautions: General Precautions; Fall Risk;Up as Tolerated  Other position/activity restrictions: ext cath, RUE IV  Required Braces or Orthoses?: No    Subjective  Subjective: Pt in bed upon arrival with daughter present. Pt agreeable to therapy. Restrictions/Precautions: General Precautions; Fall Risk;Up as Tolerated             Objective     Cognition  Overall Cognitive Status: Exceptions  Arousal/Alertness: Appropriate responses to stimuli;Delayed responses to stimuli  Following Commands: Follows one step commands with increased time; Follows one step commands with repetition; Inconsistently follows commands  Attention Span: Attends with cues to redirect; Difficulty attending to directions  Memory: Decreased short term memory;Decreased recall of recent events  Safety Judgement: Decreased awareness of need for safety;Decreased awareness of need for assistance  Problem Solving: Decreased awareness of errors;Assistance required to identify errors made;Assistance required to correct errors made;Assistance required to implement solutions;Assistance required to generate solutions  Insights: Not aware of deficits  Initiation: Requires cues for all  Sequencing: Requires cues for all  Orientation  Overall Orientation Status: Within Functional Limits   Perception  Initiation: Cues to initiate tasks  Perseveration: Perseverates during conversation     ADL  Putting On/Taking Off Footwear  Assistance Level: Dependent  Skilled Clinical Factors: daughter put her personal shoes on pt due to pt refusing to walk without shoes and pt's shoes could not be located  Toileting  Assistance Level: Dependent  Skilled Clinical Factors: in supine for brief change/hygiene          Functional Mobility  Device: Rolling walker  Activity:  (mobility in room and hallway)  Assistance Level: Minimal assistance; Requires x 2 assistance  Skilled Clinical Factors: Pt req'd VCs for walker safety/mgmt, upright posture and overall safety. Pt stated she walks with 2 canes at home, educated pt on safety concerns with using 2 canes and that a RW would be much safer but pt resistive stating the walker is too big to use in her house. Bed Mobility  Overall Assistance Level: Moderate Assistance; Requires x 2 Assistance  Additional Factors: Verbal cues; Increased time to complete; Head of bed flat; With handrails  Roll Left  Assistance Level: Minimal assistance  Skilled Clinical Factors: with use of bed rail and max verbal/tactile cues for technique  Roll Right  Assistance Level: Minimal assistance  Skilled Clinical Factors: with use of bed rail and max verbal/tactile cues for technique  Supine to Sit  Assistance Level: Moderate assistance; Requires x 2 assistance  Skilled Clinical Factors: Max verbal/tactile cues for sequencing movements, use of bed rail and overall safety with poor follow thru. Transfers  Surface: From bed; To chair with arms  Additional Factors: Verbal cues; Hand placement cues; Increased time to complete  Device: Walker  Sit to Stand  Assistance Level: Minimal assistance; Requires x 2 assistance  Stand to Sit  Assistance Level: Minimal assistance; Requires x 2 assistance  Skilled Clinical Factors: Max verbal/tactile cues for hand placement, nose over toes, upright posture, walker safety/mgmt, backing up all the way and reaching back prior to sitting down and overall safety. Pt presents with posterior lean at times. Neuromuscular Education  Neuromuscular education: Yes  NDT Treatment: Gait ;Sitting;Standing     Assessment  Assessment  Assessment: Pt tolerated session well and is progressing towards goals. Pt should advance to 1 staff assist at next session. Pt remains limited by cogntiive deficits, global weakness, decreased activity tolerance and balance deficits. Pt has poor insight into deficits and will require 24 hr assist/supervision if returning home vs. SNF. Pt req' s A LOT of assist with bed mobility and self care tasks and is a very HIGH FALL RISK making her a high risk for readmission. Skilled OT indicated to increase safety and IND with allfunctional tasks to ensure a safe return to OF. Activity Tolerance: Patient tolerated treatment well;Patient limited by endurance;Treatment limited secondary to decreased cognition  Discharge Recommendations: Patient would benefit from continued therapy after discharge  Safety Devices  Safety Devices in place: Yes  Type of devices: All fall risk precautions in place; Left in chair;Nurse notified;Call light within reach; Chair alarm in place;Gait belt;Patient at risk for falls    Patient Education  Education  Education Given To: Patient  Education Provided: Mobility Training; Fall Prevention Strategies;Transfer Training;Energy Conservation;Cognition;Precautions; Safety;Equipment  Education Method: Verbal;Teach Back  Barriers to Learning: Cognition  Education Outcome: Verbalized understanding;Demonstrated understanding;Continued education needed    Plan  Occupational Therapy Plan  Times Per Week: 4-5x/wk 1x/day as cole  Current Treatment Recommendations: Strengthening;Balance training;Functional mobility training; Endurance training; Safety education & training;Equipment evaluation, education, & procurement;Patient/Caregiver education & training;Self-Care / ADL; Home management training    Goals  Patient Goals   Patient goals : To go home! Short Term Goals  Time Frame for Short Term Goals: By discharge, pt to demo  Short Term Goal 1: ADL transfers and functional mobility to Min A with use of AD as needed. Short Term Goal 2: bed mobility to Min A with use of bedrails as needed. Short Term Goal 3: increased B UE strength by 1/2 grade to assist with functional tasks/I with B UE HEP with use of handouts as needed. Short Term Goal 4: toileting to Min A with use of AD/grab bars as needed. Short Term Goal 5: UB ADLs to Set up and LB ADLs to Min A with use of AD/AE as needed. Long Term Goals  Long Term Goal 1: Pt to be I with fall prevention edu, EC/WS tech, recommendations for discharge/AE with use of handouts as needed. AM-PAC Score        AM-[facility] Inpatient Daily Activity Raw Score: 14 (01/12/23 1444)  AM-PAC Inpatient ADL T-Scale Score : 33.39 (01/12/23 1444)  ADL Inpatient CMS 0-100% Score: 59.67 (01/12/23 1444)  ADL Inpatient CMS G-Code Modifier : CK (01/12/23 1444)      Therapy Time   Individual Concurrent Group Co-treatment   Time In       1242   Time Out       1310   Minutes       29    Co-treatment with PT warranted secondary to decreased safety and independence requiring 2 skilled therapy professionals to address individual discipline's goals.  OT addressing preparation for ADL transfer, sitting balance for increased ADL performance, sitting/activity tolerance, functional reaching, environmental safety/scanning, fall prevention, functional mobility for ADL transfers, ability to sequence and follow directions, bed mobility tech, and functional UE strength.          Mark Wright, DANIEL

## 2023-01-12 NOTE — PROGRESS NOTES
[facility])    Progress Note    2023 3:12 PM      Subjective:  Ms. Emerita Walker  was recently at [facility] hospital came her with diarrhea    No cp, sob or palpitations           LABS:     CBC:   Recent Labs     01/10/23  0531 01/10/23  2146 23  0935 23  2129 23  0917   WBC 5.2  --   --   --   --    HGB 10.9*   < > 10.8* 10.5* 11.5*   HCT 33.5*   < > 35.0* 34.2* 37.0   MCV 89.1  --   --   --   --      --   --   --   --     < > = values in this interval not displayed. BMP:   Recent Labs     01/10/23  0531 23  0330    138   K 5.0 4.3    102   CO2 29 27   BUN 48* 38*   CREATININE 1.10* 0.88     PT/INR:   Recent Labs     23   PROTIME 14.4*   INR 1.1     APTT:   Recent Labs     23   APTT 31.8     MAG: No results for input(s): MG in the last 72 hours. D Dimer: No results for input(s): DDIMER in the last 72 hours. Troponin T No results for input(s): TROPONINT in the last 72 hours. Pro-BNP No results for input(s): BNP in the last 72 hours. Pulse Ox:  SpO2  Av.3 %  Min: 95 %  Max: 98 %  Supplemental O2:       Current Meds: [START ON 2023] bumetanide (BUMEX) tablet 2 mg, Daily  pantoprazole (PROTONIX) tablet 40 mg, QAM AC  lactobacillus (BACID) tablet 1 tablet, Daily  carvedilol (COREG) tablet 12.5 mg, BID WC  traMADol (ULTRAM) tablet 50 mg, Q6H PRN  sacubitril-valsartan (ENTRESTO) 24-26 MG per tablet 1 tablet, BID  Vitamin D (CHOLECALCIFEROL) tablet 2,000 Units, Daily  cholestyramine light packet 4 g, BID  glimepiride (AMARYL) tablet 4 mg, Daily with breakfast  [Held by provider] potassium chloride (KLOR-CON M) extended release tablet 20 mEq, Daily  sodium chloride flush 0.9 % injection 5-40 mL, 2 times per day  sodium chloride flush 0.9 % injection 5-40 mL, PRN  0.9 % sodium chloride infusion, PRN  ondansetron (ZOFRAN-ODT) disintegrating tablet 4 mg, Q8H PRN   Or  ondansetron (ZOFRAN) injection 4 mg, Q6H PRN  acetaminophen (TYLENOL) tablet 650 mg, Q6H PRN   Or  acetaminophen (TYLENOL) suppository 650 mg, Q6H PRN  glucose chewable tablet 16 g, PRN  dextrose bolus 10% 125 mL, PRN   Or  dextrose bolus 10% 250 mL, PRN  glucagon (rDNA) injection 1 mg, PRN  dextrose 10 % infusion, Continuous PRN  polyethyl glycol-propyl glycol 0.4-0.3 % (SYSTANE) ophthalmic solution 1 drop, PRN  insulin lispro (HUMALOG) injection vial 0-4 Units, TID WC  insulin lispro (HUMALOG) injection vial 0-4 Units, Nightly      Continuous Infusions:    sodium chloride      dextrose            VITAL SIGNS:    /63   Pulse 71   Temp 97.3 °F (36.3 °C) (Oral)   Resp 17   Ht 5' 4\" (1.626 m)   Wt 150 lb 14.4 oz (68.4 kg)   SpO2 98%   BMI 25.90 kg/m²         Admit Weight:  149 lb (67.6 kg)    Last 3 weights: Wt Readings from Last 3 Encounters:   01/12/23 150 lb 14.4 oz (68.4 kg)   10/20/22 150 lb (68 kg)   02/09/22 160 lb (72.6 kg)       BMI: Body mass index is 25.9 kg/m². INPUT/OUTPUT:        Intake/Output Summary (Last 24 hours) at 1/12/2023 1512  Last data filed at 1/12/2023 1321  Gross per 24 hour   Intake 208.96 ml   Output 1190 ml   Net -981.04 ml       EKG:     EXAM:     General appearance: In no acute distress. Lungs: Clear to Auscultation bilaterally  Heart: regular  Abdomen: Soft nontender  Extremities: No edema  Psych:  Appropriate mood and affect. Awake, alert and oriented x 3.    Other:    Principal Problem:    Diarrhea  Active Problems:    Lower GI bleed    Type 2 diabetes mellitus without complication, without long-term current use of insulin (HCC)    Essential hypertension    Paroxysmal atrial fibrillation (HCC)    Venous ulcer of right leg (HCC)    Chronic combined systolic (congestive) and diastolic (congestive) heart failure (HCC)  Resolved Problems:    * No resolved hospital problems.  *      ASSESSMENT / PLAN    Agree with meds as outlined by Dr. Ledezma  No new recommendations  Can see [facility] as an outpatient    Electronically signed by Timothy James MD on 1/12/2023 at 3:12 PM

## 2023-01-12 NOTE — PROGRESS NOTES
Physical Therapy  Facility/Department: [facility]  Rehabilitation Physical Therapy Treatment Note    NAME: Emily Romano  : 10/23/1932 (80 y.o.)  MRN: [id number]  CODE STATUS: Full Code    Date of Service: 23  Assessment: Pt with deficits in balance, bed mobility, safety awareness, and cognition and would benefit from cont skilled PT services in order to maximize independence and ensure safe return home. Activity Tolerance: Patient tolerated treatment well;Patient limited by endurance;Treatment limited secondary to decreased cognition  Discharge Recommendations: Patient would benefit from continued therapy after discharge    Restrictions:  Restrictions/Precautions: General Precautions; Fall Risk;Up as Tolerated  Position Activity Restriction  Other position/activity restrictions: ext cath, RUE IV     SUBJECTIVE  Subjective  Subjective: Pt agreeable with PT/OT, daughter present throughout session. OBJECTIVE  Cognition  Overall Cognitive Status: Exceptions  Arousal/Alertness: Appropriate responses to stimuli;Delayed responses to stimuli  Following Commands: Follows one step commands with increased time; Follows one step commands with repetition; Inconsistently follows commands  Attention Span: Attends with cues to redirect; Difficulty attending to directions  Memory: Decreased short term memory;Decreased recall of recent events  Safety Judgement: Decreased awareness of need for safety;Decreased awareness of need for assistance  Problem Solving: Decreased awareness of errors;Assistance required to identify errors made;Assistance required to correct errors made;Assistance required to implement solutions;Assistance required to generate solutions  Insights: Not aware of deficits  Initiation: Requires cues for all  Sequencing: Requires cues for all  Cognition Comment: Pt initially pleasant, however, becomes easily agitated and upset w/ staff throughout.   Orientation  Overall Orientation Status: Within Functional Limits    Functional Mobility  Bed Mobility  Overall Assistance Level: Moderate Assistance; Requires x 2 Assistance  Additional Factors: Verbal cues; Set-up; With handrails; Head of bed raised  Sit to Supine  Assistance Level: Requires x 2 assistance; Moderate assistance  Supine to Sit  Assistance Level: Requires x 2 assistance; Moderate assistance  Skilled Clinical Factors: Pt became agitated when staff encouraged pt to perform bed mobility independently - pt requires mod assist x2 to achieve EOB sitting and was not receptive to any instructions given by staff. Scooting  Assistance Level: Maximum assistance; Requires x 2 assistance  Transfers  Surface: From bed; To chair with arms  Additional Factors: Set-up; Verbal cues; Hand placement cues; Increased time to complete  Device: Walker  Sit to Stand  Assistance Level: Moderate assistance; Requires x 2 assistance  Skilled Clinical Factors: Pt initially required mod assist x2 to achieve standing from bed but able to perform a rocking motion in chair to assist in STS with CGA. Stand to Sit  Assistance Level: Contact guard assist  Skilled Clinical Factors: VC's to square body up to chair and reach back to allow for slow, controlled descent. Poor eccentric quad control noted. Environmental Mobility  Ambulation  Surface: Level surface  Device: Rolling walker  Distance: 12' 100'  Activity: Within Unit; Within Room  Activity Comments: Pt adamantly refusing to ambulate without shoes even though hospital socks are donned - daughter present and took off her own shoes to give pt to walk in. Additional Factors: Verbal cues; Hand placement cues; Increased time to complete  Assistance Level: Contact guard assist  Gait Deviations: Slow rosalee;Narrow base of support;Decreased step length bilateral;Decreased trunk rotation  Skilled Clinical Factors: Pt with very forward flexed posture and unable to maintain upright posture, pt attributes this to bad pain.  Poor return demo to any cues or education given - takes short, shuffled steps with RW and requires assist with IV line management. Goals  Patient Goals   Patient Goals : To get stronger, to stand, to go home, \"to fix my colon\"  Short Term Goals  Time Frame for Short Term Goals: 12 visits  Short Term Goal 1: Pt to demonstrate bed mobility ModA  Short Term Goal 2: Pt to perform STS transfers w/ most appropriate AD ModA  Short Term Goal 3: Pt to ambulate at least 25ft w/ most appropriate AD ModA  Short Term Goal 4: Pt to actively participate in at least 30 minutes of physical therapy for ther act, ther ex, balance, gait, and endurance training    [address]: 5-7 times per week  Current Treatment Recommendations: Strengthening;Balance training;Functional mobility training;Transfer training;Neuromuscular re-education;Gait training; Endurance training;Cognitive reorientation;Home exercise program;Equipment evaluation, education, & procurement;Patient/Caregiver education & training; Safety education & training; Therapeutic activities  Safety Devices  Type of Devices: Left in chair;Call light within reach; Chair alarm in place;Nurse notified    EDUCATION  Education  Education Given To: Patient  Education Provided: Mobility Training;Transfer Training; Safety;Cognition; Energy Conservation  Education Method: Verbal  Barriers to Learning: Cognition  Education Outcome: Continued education needed    [facility] 15    Therapy Time   Individual Concurrent Group Co-treatment   Time In 7947         Time Out 1310         Minutes 72 [place], [place], 01/12/23 at 4:09 PM

## 2023-01-12 NOTE — PLAN OF CARE
Problem: Discharge Planning  Goal: Discharge to home or other facility with appropriate resources  1/12/2023 0942 by Sara Rosales RN  Outcome: Progressing  Flowsheets (Taken 1/12/2023 9905)  Discharge to home or other facility with appropriate resources:   Identify barriers to discharge with patient and caregiver   Arrange for needed discharge resources and transportation as appropriate   Identify discharge learning needs (meds, wound care, etc)   Refer to discharge planning if patient needs post-hospital services based on physician order or complex needs related to functional status, cognitive ability or social support system  1/11/2023 2031 by Beverly Bone RN  Outcome: Progressing  Flowsheets (Taken 1/11/2023 2013)  Discharge to home or other facility with appropriate resources:   Identify barriers to discharge with patient and caregiver   Arrange for needed discharge resources and transportation as appropriate   Identify discharge learning needs (meds, wound care, etc)     Problem: Pain  Goal: Verbalizes/displays adequate comfort level or baseline comfort level  1/12/2023 0942 by Sara Rosales RN  Outcome: Progressing  1/11/2023 2031 by Beverly Bone RN  Outcome: Progressing  Flowsheets (Taken 1/11/2023 2031)  Verbalizes/displays adequate comfort level or baseline comfort level:   Encourage patient to monitor pain and request assistance   Assess pain using appropriate pain scale   Administer analgesics based on type and severity of pain and evaluate response   Implement non-pharmacological measures as appropriate and evaluate response     Problem: Skin/Tissue Integrity  Goal: Absence of new skin breakdown  Description: 1. Monitor for areas of redness and/or skin breakdown  2. Assess vascular access sites hourly  3. Every 4-6 hours minimum:  Change oxygen saturation probe site  4.   Every 4-6 hours:  If on nasal continuous positive airway pressure, respiratory therapy assess nares and determine need for appliance change or resting period.   1/12/2023 0942 by Lynn Og RN  Outcome: Progressing  1/11/2023 2031 by Yaritza Neumann RN  Outcome: Progressing  Note: Monitor for skin breakdown     Problem: Safety - Adult  Goal: Free from fall injury  1/12/2023 0942 by Lynn Og RN  Outcome: Progressing  1/11/2023 2031 by Yaritza Neumann RN  Outcome: Progressing  Flowsheets (Taken 1/11/2023 2031)  Free From Fall Injury: Instruct family/caregiver on patient safety     Problem: ABCDS Injury Assessment  Goal: Absence of physical injury  1/12/2023 0942 by Lynn Og RN  Outcome: Progressing  1/11/2023 2031 by Yaritza Neumann RN  Outcome: Progressing  Flowsheets (Taken 1/11/2023 2031)  Absence of Physical Injury: Implement safety measures based on patient assessment     Problem: Respiratory - Adult  Goal: Achieves optimal ventilation and oxygenation  1/12/2023 0942 by Lynn Og RN  Outcome: Progressing  1/11/2023 2031 by Yaritza Neumann RN  Outcome: Progressing  Flowsheets (Taken 1/11/2023 2013)  Achieves optimal ventilation and oxygenation:   Assess for changes in respiratory status   Assess for changes in mentation and behavior   Position to facilitate oxygenation and minimize respiratory effort   Oxygen supplementation based on oxygen saturation or arterial blood gases   Initiate smoking cessation protocol as indicated   Encourage broncho-pulmonary hygiene including cough, deep breathe, incentive spirometry   Assess and instruct to report shortness of breath or any respiratory difficulty   Respiratory therapy support as indicated     Problem: Cardiovascular - Adult  Goal: Maintains optimal cardiac output and hemodynamic stability  1/12/2023 0942 by Lynn Og RN  Outcome: Progressing  1/11/2023 2031 by Yaritza Neumann RN  Outcome: Progressing  Flowsheets (Taken 1/11/2023 2013)  Maintains optimal cardiac output and hemodynamic stability:   Monitor blood pressure and heart rate   Monitor urine output and notify Licensed Independent Practitioner for values outside of normal range   Assess for signs of decreased cardiac output  Goal: Absence of cardiac dysrhythmias or at baseline  1/12/2023 0942 by Ellen Guzman RN  Outcome: Progressing  1/11/2023 2031 by Bernie Martínez RN  Outcome: Progressing  [address] (Taken 1/11/2023 2013)  Absence of cardiac dysrhythmias or at baseline:   Monitor cardiac rate and rhythm   Assess for signs of decreased cardiac output     Problem: Skin/Tissue Integrity - Adult  Goal: Skin integrity remains intact  1/12/2023 0942 by Ellen Guzman RN  Outcome: Progressing  Flowsheets  Taken 1/12/2023 0924  Skin Integrity Remains Intact: Monitor for areas of redness and/or skin breakdown  Taken 1/12/2023 0858  Skin Integrity Remains Intact: Monitor for areas of redness and/or skin breakdown  1/11/2023 2031 by Bernie Martínez RN  Outcome: Progressing  Flowsheets  Taken 1/11/2023 2030 by Bernie Martínez RN  Skin Integrity Remains Intact:   Monitor for areas of redness and/or skin breakdown   Assess vascular access sites hourly  Taken 1/11/2023 2013 by Bernie Martínez RN  Skin Integrity Remains Intact:   Monitor for areas of redness and/or skin breakdown   Assess vascular access sites hourly  Taken 1/11/2023 1027 by Ellen Guzman RN  Skin Integrity Remains Intact: Monitor for areas of redness and/or skin breakdown  Goal: Incisions, wounds, or drain sites healing without S/S of infection  1/12/2023 0942 by Ellen Guzman RN  Outcome: Progressing  Flowsheets  Taken 1/12/2023 0924  Incisions, Wounds, or Drain Sites Healing Without Sign and Symptoms of Infection:   TWICE DAILY: Assess and document dressing/incision, wound bed, drain sites and surrounding tissue   TWICE DAILY: Assess and document skin integrity  Taken 1/12/2023 0858  Incisions, Wounds, or Drain Sites Healing Without Sign and Symptoms of Infection:   TWICE DAILY: Assess and document skin integrity   TWICE DAILY: Assess and document dressing/incision, wound bed, drain sites and surrounding tissue  1/11/2023 2031 by Annabelle Johns RN  Outcome: Progressing  Flowsheets  Taken 1/11/2023 2030 by Annabelle Johns RN  Incisions, Wounds, or Drain Sites Healing Without Sign and Symptoms of Infection: TWICE DAILY: Assess and document dressing/incision, wound bed, drain sites and surrounding tissue  Taken 1/11/2023 2013 by Annabelle Johns RN  Incisions, Wounds, or Drain Sites Healing Without Sign and Symptoms of Infection: TWICE DAILY: Assess and document dressing/incision, wound bed, drain sites and surrounding tissue  Taken 1/11/2023 1027 by Julia Georges RN  Incisions, Wounds, or Drain Sites Healing Without Sign and Symptoms of Infection:   TWICE DAILY: Assess and document dressing/incision, wound bed, drain sites and surrounding tissue   TWICE DAILY: Assess and document skin integrity   Implement wound care per orders   Initiate isolation precautions as appropriate   Initiate pressure ulcer prevention bundle as indicated  Goal: Oral mucous membranes remain intact  1/12/2023 0942 by Julia Georges RN  Outcome: Progressing  1/11/2023 2031 by Annabelle Johns RN  Outcome: Progressing  Flowsheets  Taken 1/11/2023 2030  Oral Mucous Membranes Remain Intact:   Assess oral mucosa and hygiene practices   Implement preventative oral hygiene regimen  Taken 1/11/2023 2013  Oral Mucous Membranes Remain Intact:   Assess oral mucosa and hygiene practices   Implement preventative oral hygiene regimen     Problem: Musculoskeletal - Adult  Goal: Return mobility to safest level of function  1/12/2023 0942 by Julia Georges RN  Outcome: Progressing  Flowsheets (Taken 1/12/2023 0858)  Return Mobility to Safest Level of Function:   Assess patient stability and activity tolerance for standing, transferring and ambulating with or without assistive devices   Assist with transfers and ambulation using safe patient handling equipment as needed   Ensure adequate protection for wounds/incisions during mobilization   Instruct patient/family in ordered activity level  1/11/2023 2031 by Heather Zhang RN  Outcome: Progressing  Flowsheets (Taken 1/11/2023 2013)  Return Mobility to Safest Level of Function:   Assess patient stability and activity tolerance for standing, transferring and ambulating with or without assistive devices   Assist with transfers and ambulation using safe patient handling equipment as needed   Ensure adequate protection for wounds/incisions during mobilization   Obtain physical therapy/occupational therapy consults as needed   Instruct patient/family in ordered activity level  Goal: Maintain proper alignment of affected body part  1/12/2023 0942 by Glenys Blackwood RN  Outcome: Progressing  Flowsheets (Taken 1/12/2023 0858)  Maintain proper alignment of affected body part: Support and protect limb and body alignment per provider's orders  1/11/2023 2031 by Heather Zhang RN  Outcome: Progressing  Flowsheets (Taken 1/11/2023 2013)  Maintain proper alignment of affected body part: Support and protect limb and body alignment per provider's orders  Goal: Return ADL status to a safe level of function  1/12/2023 0942 by Glenys Blackwood RN  Outcome: Progressing  Flowsheets (Taken 1/12/2023 0858)  Return ADL Status to a Safe Level of Function:   Administer medication as ordered   Assess activities of daily living deficits and provide assistive devices as needed   Assist and instruct patient to increase activity and self care as tolerated  1/11/2023 2031 by Heather Zhang RN  Outcome: Progressing  [address] (Taken 1/11/2023 2013)  Return ADL Status to a Safe Level of Function:   Administer medication as ordered   Assess activities of daily living deficits and provide assistive devices as needed   Obtain physical therapy/occupational therapy consults as needed   Assist and instruct patient to increase activity and self care as tolerated     Problem: Gastrointestinal - Adult  Goal: Minimal or absence of nausea and vomiting  1/12/2023 0942 by Radha Roper RN  Outcome: Progressing  1/11/2023 2031 by Sharifa Khan RN  Outcome: Progressing  Flowsheets (Taken 1/11/2023 2013)  Minimal or absence of nausea and vomiting:   Provide nonpharmacologic comfort measures as appropriate   Administer ordered antiemetic medications as needed  Goal: Maintains or returns to baseline bowel function  1/12/2023 0942 by Radha Roper RN  Outcome: Progressing  Flowsheets (Taken 1/12/2023 0858)  Maintains or returns to baseline bowel function: Assess bowel function  1/11/2023 2031 by Sharifa Khan RN  Outcome: Progressing  Flowsheets (Taken 1/11/2023 2013)  Maintains or returns to baseline bowel function:   Assess bowel function   Administer ordered medications as needed  Goal: Maintains adequate nutritional intake  1/12/2023 0942 by Radha Roper RN  Outcome: Progressing  Flowsheets (Taken 1/12/2023 0858)  Maintains adequate nutritional intake: Monitor percentage of each meal consumed  1/11/2023 2031 by Sharifa Khan RN  Outcome: Progressing  Flowsheets (Taken 1/11/2023 2013)  Maintains adequate nutritional intake:   Monitor percentage of each meal consumed   Monitor intake and output, weight and lab values  Goal: Establish and maintain optimal ostomy function  Outcome: Progressing     Problem: Infection - Adult  Goal: Absence of infection at discharge  1/12/2023 0942 by Radha Roper RN  Outcome: Progressing  Flowsheets (Taken 1/12/2023 0858)  Absence of infection at discharge: Assess and monitor for signs and symptoms of infection  1/11/2023 2031 by Sharifa Khan RN  Outcome: Progressing  Flowsheets (Taken 1/11/2023 2013)  Absence of infection at discharge:   Assess and monitor for signs and symptoms of infection   Monitor lab/diagnostic results   Monitor all insertion sites i.e., indwelling lines, tubes and drains   Administer medications as ordered   Instruct and encourage patient and family to use good hand hygiene technique   Identify and instruct in appropriate isolation precautions for identified infection/condition  Goal: Absence of infection during hospitalization  1/12/2023 0942 by Lluvia Shahid RN  Outcome: Progressing  Flowsheets (Taken 1/12/2023 0858)  Absence of infection during hospitalization: Assess and monitor for signs and symptoms of infection  1/11/2023 2031 by Jordi Willis RN  Outcome: Progressing  [address] (Taken 1/11/2023 2013)  Absence of infection during hospitalization:   Assess and monitor for signs and symptoms of infection   Monitor lab/diagnostic results   Monitor all insertion sites i.e., indwelling lines, tubes and drains   Administer medications as ordered   Instruct and encourage patient and family to use good hand hygiene technique   Identify and instruct in appropriate isolation precautions for identified infection/condition  Goal: Absence of fever/infection during anticipated neutropenic period  1/12/2023 0942 by Lluvia Shahid RN  Outcome: Progressing  Flowsheets (Taken 1/12/2023 0858)  Absence of fever/infection during anticipated neutropenic period: Monitor white blood cell count  1/11/2023 2031 by Jordi Willis RN  Outcome: Progressing  [address] (Taken 1/11/2023 2013)  Absence of fever/infection during anticipated neutropenic period: Monitor white blood cell count     Problem: Neurosensory - Adult  Goal: Achieves maximal functionality and self care  1/12/2023 0942 by Lluvia Shahid RN  Outcome: Progressing  1/11/2023 2031 by Jordi Willis RN  Outcome: Progressing  [address] (Taken 1/11/2023 2031)  Achieves maximal functionality and self care: Encourage and assist patient to increase activity and self care with guidance from physical therapy/occupational therapy     Problem: Genitourinary - Adult  Goal: Absence of urinary retention  1/12/2023 0942 by Lluvia Shahid RN  Outcome: Progressing  1/11/2023 2031 by Jordi Willis RN  Outcome: Progressing  Flowsheets (Taken 1/11/2023 2031)  Absence of urinary retention:   Assess patients ability to void and empty bladder   Monitor intake/output and perform bladder scan as needed     Problem: Metabolic/Fluid and Electrolytes - Adult  Goal: Electrolytes maintained within normal limits  1/12/2023 0942 by Jaycee Lesches, RN  Outcome: Progressing  Flowsheets (Taken 1/12/2023 0517)  Electrolytes maintained within normal limits: Monitor labs and assess patient for signs and symptoms of electrolyte imbalances  1/11/2023 2031 by Gabe Brunson RN  Outcome: Progressing  Flowsheets (Taken 1/11/2023 2031)  Electrolytes maintained within normal limits:   Monitor labs and assess patient for signs and symptoms of electrolyte imbalances   Administer electrolyte replacement as ordered   Monitor response to electrolyte replacements, including repeat lab results as appropriate  Goal: Hemodynamic stability and optimal renal function maintained  1/12/2023 0942 by Jaycee Lesches, RN  Outcome: Progressing  Flowsheets (Taken 1/12/2023 5595)  Hemodynamic stability and optimal renal function maintained: Monitor labs and assess for signs and symptoms of volume excess or deficit  1/11/2023 2031 by Gabe Brunson RN  Outcome: Progressing  Flowsheets (Taken 1/11/2023 2031)  Hemodynamic stability and optimal renal function maintained:   Monitor labs and assess for signs and symptoms of volume excess or deficit   Monitor intake, output and patient weight   Monitor urine specific gravity, serum osmolarity and serum sodium as indicated or ordered   Monitor response to interventions for patient's volume status, including labs, urine output, blood pressure (other measures as available)  Goal: Glucose maintained within prescribed range  1/12/2023 0942 by Jaycee Lesches, RN  Outcome: Progressing  Flowsheets (Taken 1/12/2023 0858)  Glucose maintained within prescribed range:   Monitor blood glucose as ordered   Assess for signs and symptoms of hyperglycemia and hypoglycemia   Administer ordered medications to maintain glucose within target range  1/11/2023 2031 by Damien Farfan RN  Outcome: Progressing  Flowsheets (Taken 1/11/2023 2031)  Glucose maintained within prescribed range:   Monitor blood glucose as ordered   Assess for signs and symptoms of hyperglycemia and hypoglycemia   Administer ordered medications to maintain glucose within target range     Problem: Chronic Conditions and Co-morbidities  Goal: Patient's chronic conditions and co-morbidity symptoms are monitored and maintained or improved  1/12/2023 0942 by Suresh Moreno RN  Outcome: Progressing  Flowsheets (Taken 1/12/2023 0858)  Care Plan - Patient's Chronic Conditions and Co-Morbidity Symptoms are Monitored and Maintained or Improved: Monitor and assess patient's chronic conditions and comorbid symptoms for stability, deterioration, or improvement  1/11/2023 2031 by Damien Farfan RN  Outcome: Progressing  [address] (Taken 1/11/2023 2013)  Care Plan - Patient's Chronic Conditions and Co-Morbidity Symptoms are Monitored and Maintained or Improved:   Monitor and assess patient's chronic conditions and comorbid symptoms for stability, deterioration, or improvement   Collaborate with multidisciplinary team to address chronic and comorbid conditions and prevent exacerbation or deterioration

## 2023-01-12 NOTE — PROGRESS NOTES
[address]  Occupational Therapy Not Seen    DATE: 2023    NAME: Randy Rich  MRN: [id number]   : 10/23/1932    Patient not seen this date for Occupational Therapy due to:      [] Cancel by RN or physician due to:    [] Hemodialysis    [] Critical Lab Value Level     [] Blood transfusion in progress    [] Acute or unstable cardiovascular status   _MAP < 55 or more than >115  _HR < 40 or > 130    [] Acute or unstable pulmonary status   -FiO2 > 60%   _RR < 5 or >40    _O2 sats < 85%    [] Strict Bedrest    [] Off Unit for surgery or procedure    [] Off Unit for testing       [] Pending imaging to R/O fracture    [] Refusal by Patient      [x] Other: Writer informed via RN that pt was requesting therapy, [place] Mariaelena entered room and pt was eating lunch and declined at this time. Will check back later. [] OT being discontinued at this time. Patient independent. No further needs. [] OT being discontinued at this time as the patient has been transferred to hospice care. No further needs.       DANIEL Watson

## 2023-01-12 NOTE — PROGRESS NOTES
[facility]  Office: [address], DO, [facility], DO, Robin Leventhal, DO, Hillary Ureña Blood, DO, Galilea Mcfarlane MD, Drew Calderón MD, Leonidas Garrett MD, Donta Blanton MD,  Kim Suarez MD, Olya Wang MD, Huma Griffith, DO, Ana Le MD,  Cata Jackson MD, Joceline Rosario MD, Yovana Robert DO, Arabella Lord MD, Erwin Gonzales MD, Kendy Lyn DO, Nancy Arevalo MD, Idalmis Monsalve MD, Kathrin Alvarado MD, Eugenio Lyn MD, Bety Mims DO, Mariza Darby MD, Dianne Duncan MD, Dariel Schwartz, CNP,  Susan Michael, CNP, Lucila Almendarez, CNP, Jonna Raygoza, CNP,  Naseem Suarez, [facility], Roosevelt Tao, CNP, Helena Harden, CNP, Diane Patel, CNP, Kathy Whitfield, CNP, Toby Umanzor, CNP, Bambi Andrea PA-C, Cristhian Camilo, CNS, Usama Cai, [facility], Moisés Candelaria, [facility]    Progress Note    1/12/2023    12:20 PM    Name:   Beverly Bermudez  MRN:     [id number]     Acct:      [de-identified]   Room:   [address] [place] [address] Day:  2  Admit Date:  1/10/2023  4:39 AM    PCP:   Jean-Claude Todd MD  Code Status:  Full Code    Subjective:     C/C:   Chief Complaint   Patient presents with    Diarrhea     Patient states she was released from [facility] on Sunday for improved diarrhea. Patient states she has diarrhea just pouring out of her. Interval History Status: improved. Diarrhea gone after starting Questran    Denies cp/sob/n/v/f/c    Daughter is very concerned that the patient is on multiple new meds that may have caused her diarrhea and would like cardiology to see her and develop specific plan for her cardiac meds    Brief History:     Per my GA:  \"Patient admitted with GI bleed and intractable diarrhea and dehydration. Patient has a past medical history of: CVA, atrial fibrillation, CHF, diabetes, hypertension and a venous stasis ulcer to her right lower leg.   Patient states she has chronic diarrhea due to her colon cancer but this excessive diarrhea has caused her to experience fatigue. \"    Review of Systems:     Constitutional:  negative for chills, fevers, sweats  Respiratory:  negative for cough, dyspnea on exertion, shortness of breath, wheezing  Cardiovascular:  negative for chest pain, chest pressure/discomfort, lower extremity edema, palpitations  Gastrointestinal:  negative for abdominal pain, constipation, diarrhea, nausea, vomiting  Neurological:  negative for dizziness, headache    Medications: Allergies:     Allergies   Allergen Reactions    Acetaminophen-Codeine Nausea Only    Codeine Nausea Only    Furosemide      Other reaction(s): GI Disturbance    Linagliptin      Other reaction(s): SOB    Sitagliptin      Other reaction(s): felt poorly    Other Itching     Animal Dander       Current Meds:   Scheduled Meds:    [START ON 1/13/2023] pantoprazole  40 mg Oral QAM AC    carvedilol  12.5 mg Oral BID WC    sacubitril-valsartan  1 tablet Oral BID    Vitamin D  2,000 Units Oral Daily    cholestyramine light  4 g Oral BID    [Held by provider] glimepiride  4 mg Oral Daily with breakfast    [Held by provider] potassium chloride  20 mEq Oral Daily    sodium chloride flush  5-40 mL IntraVENous 2 times per day    insulin lispro  0-4 Units SubCUTAneous TID WC    insulin lispro  0-4 Units SubCUTAneous Nightly     Continuous Infusions:    sodium chloride      dextrose       PRN Meds: traMADol, sodium chloride flush, sodium chloride, ondansetron **OR** ondansetron, acetaminophen **OR** acetaminophen, glucose, dextrose bolus **OR** dextrose bolus, glucagon (rDNA), dextrose, polyethyl glycol-propyl glycol 0.4-0.3 %    Data:     Past Medical History:   has a past medical history of Adenocarcinoma ([facility] Arthritis, Atrial fibrillation ([facility] Cancer ([facility] CHF (congestive heart failure) ([facility] Chronic anticoagulation, Diabetes mellitus ([facility] Diverticulitis, Femur fracture ([facility] Hypertension, Melanoma ([facility] Osteoporosis, Overweight, Sepsis ([facility] [address] and Serum creatinine raised. Social History:   reports that she has never smoked. She has never used smokeless tobacco. She reports that she does not drink alcohol and does not use drugs. Family History: History reviewed. No pertinent family history. Vitals:  /71   Pulse 54   Temp 97.5 °F (36.4 °C) (Oral)   Resp 18   Ht 5' 4\" (1.626 m)   Wt 150 lb 14.4 oz (68.4 kg)   SpO2 96%   BMI 25.90 kg/m²   Temp (24hrs), Av.3 °F (36.3 °C), Min:96.5 °F (35.8 °C), Max:97.5 °F (36.4 °C)    Recent Labs     23  1642 23  1932 23  0643 23  1144   POCGLU 253* 323* 194* 390*       I/O (24Hr): Intake/Output Summary (Last 24 hours) at 2023 1220  Last data filed at 2023 0924  Gross per 24 hour   Intake 263.34 ml   Output 1410 ml   Net -1146.66 ml       Labs:  Hematology:  Recent Labs     01/10/23  0531 01/10/23  2146 23  0330 23  0935 23  2129 23  0917   WBC 5.2  --   --   --   --   --    RBC 3.76*  --   --   --   --   --    HGB 10.9*   < > 10.6* 10.8* 10.5* 11.5*   HCT 33.5*   < > 34.3* 35.0* 34.2* 37.0   MCV 89.1  --   --   --   --   --    MCH 29.0  --   --   --   --   --    MCHC 32.5  --   --   --   --   --    RDW 14.9  --   --   --   --   --      --   --   --   --   --    MPV 8.7  --   --   --   --   --    INR  --   --  1.1  --   --   --     < > = values in this interval not displayed.      Chemistry:  Recent Labs     01/10/23  0531 23  0330    138   K 5.0 4.3    102   CO2 29 27   GLUCOSE 193* 192*   BUN 48* 38*   CREATININE 1.10* 0.88   ANIONGAP 10 9   LABGLOM 48* >60   CALCIUM 8.2* 8.0*     Recent Labs     01/10/23  0531 01/10/23  1010 23  0648 23  1133 23  1642 23  1932 23  0643 23  1144   PROT 5.5*  --   --   --   --   --   --   --    LABALBU 3.0*  --   --   --   --   --   --   --    AST 26  --   --   --   --   -- --   --    ALT 21  --   --   --   --   --   --   --    ALKPHOS 168*  --   --   --   --   --   --   --    BILITOT 0.3  --   --   --   --   --   --   --    POCGLU  --    < > 176* 251* 253* 323* 194* 390*    < > = values in this interval not displayed. ABG:No results found for: POCPH, PHART, PH, POCPCO2, ZRZ3RDV, PCO2, POCPO2, PO2ART, PO2, POCHCO3, HNE6YYJ, HCO3, NBEA, PBEA, BEART, BE, THGBART, THB, BIE8ICH, DNSQ2NJQ, J2QHVTDT, O2SAT, FIO2  Lab Results   Component Value Date/Time    SPECIAL NOT REPORTED 11/05/2017 01:55 PM     Lab Results   Component Value Date/Time    CULTURE ESCHERICHIA COLI >[id number] CFU/ML (A) 10/20/2022 05:48 PM       Radiology:  XR CHEST PORTABLE    Result Date: 1/10/2023  Stable cardiomegaly. Mild pulmonary vascular congestion.        Physical Examination:        General appearance:  alert, cooperative and no distress  Mental Status:  oriented to person, place and time and normal affect  Lungs:  clear to auscultation bilaterally, normal effort  Heart:  regular rate and rhythm, no murmur  Abdomen:  soft, nontender, nondistended, normal bowel sounds, no masses, hepatomegaly, splenomegaly  Extremities:  no edema, redness, tenderness in the calves  Skin:  no gross lesions, rashes, induration    Assessment:        Hospital Problems             Last Modified POA    * (Principal) Diarrhea 1/12/2023 Yes    Lower GI bleed 1/12/2023 Yes    Type 2 diabetes mellitus without complication, without long-term current use of insulin ([address] 1/12/2023 Yes    Essential hypertension 1/11/2023 Yes    Paroxysmal atrial fibrillation ([address] 1/11/2023 Yes    Venous ulcer of right leg ([address] 1/11/2023 Yes    Chronic combined systolic (congestive) and diastolic (congestive) heart failure ([address] 1/11/2023 Yes       Plan:        Resume amaryl  D/w daughter, she would like cardiology to see and wants a specific cardiac medication plan prior to discharge, as her meds were just changed at [facility] last week  After d/w her, I would recommend keeping off aldactone as of all the new chf meds, this is the most likely to cause the worsened diarrhea  She is already back on coreg and entresto-both recently new  Would resume bumex tomorrow, +/- kcl depending on elytes in am  Questran to continue-seems to be helping  Stop protonix drip  Plan discharge tomorrow as daughter does not feel comfortable having her released today, she feels her new meds may be culprit causing diarrhea and wants her back on meds a full day prior to discharge    Emilia [address] Blood, DO  1/12/2023  12:20 PM

## 2023-01-12 NOTE — PROGRESS NOTES
GI Progress notes    1/12/2023   11:15 AM    Name:  Fatou Arauz  MRN:    [id number]     Acct:     [de-identified]   Room:  210/210401   Day: 2     Admit Date: 1/10/2023  4:39 AM  PCP: Leatha Davis MD    Subjective:     C/C:   Chief Complaint   Patient presents with    Diarrhea     Patient states she was released from [facility] on Sunday for improved diarrhea. Patient states she has diarrhea just pouring out of her. Interval History: Status: improved. Patient seen and examined  No acute events overnight  No bowel movements since yesterday evening  Tolerating diet well  No nausea, vomiting  No abdominal pain  Negative for C.difficile  GI molecular panel is pending    ROS:  Constitutional: negative for chills, fevers and sweats  Gastrointestinal: negative for abdominal pain, constipation, diarrhea, nausea and vomiting  Neurological: negative for dizziness and headaches    Medications: Allergies:    Allergies   Allergen Reactions    Acetaminophen-Codeine Nausea Only    Codeine Nausea Only    Furosemide      Other reaction(s): GI Disturbance    Linagliptin      Other reaction(s): SOB    Sitagliptin      Other reaction(s): felt poorly    Other Itching     Animal Dander       Current Meds: carvedilol (COREG) tablet 12.5 mg, BID WC  traMADol (ULTRAM) tablet 50 mg, Q6H PRN  sacubitril-valsartan (ENTRESTO) 24-26 MG per tablet 1 tablet, BID  Vitamin D (CHOLECALCIFEROL) tablet 2,000 Units, Daily  cholestyramine light packet 4 g, BID  pantoprazole (PROTONIX) 80 mg in sodium chloride 0.9 % 100 mL infusion, Continuous  [Held by provider] glimepiride (AMARYL) tablet 4 mg, Daily with breakfast  [Held by provider] potassium chloride (KLOR-CON M) extended release tablet 20 mEq, Daily  sodium chloride flush 0.9 % injection 5-40 mL, 2 times per day  sodium chloride flush 0.9 % injection 5-40 mL, PRN  0.9 % sodium chloride infusion, PRN  ondansetron (ZOFRAN-ODT) disintegrating tablet 4 mg, Q8H PRN Or  ondansetron (ZOFRAN) injection 4 mg, Q6H PRN  acetaminophen (TYLENOL) tablet 650 mg, Q6H PRN   Or  acetaminophen (TYLENOL) suppository 650 mg, Q6H PRN  [Held by provider] pantoprazole (PROTONIX) tablet 40 mg, BID AC  glucose chewable tablet 16 g, PRN  dextrose bolus 10% 125 mL, PRN   Or  dextrose bolus 10% 250 mL, PRN  glucagon (rDNA) injection 1 mg, PRN  dextrose 10 % infusion, Continuous PRN  polyethyl glycol-propyl glycol 0.4-0.3 % (SYSTANE) ophthalmic solution 1 drop, PRN  insulin lispro (HUMALOG) injection vial 0-4 Units, TID WC  insulin lispro (HUMALOG) injection vial 0-4 Units, Nightly        Data:     Code Status:  Full Code    History reviewed. No pertinent family history. Social History     Socioeconomic History    Marital status:      Spouse name: Not on file    Number of children: Not on file    Years of education: Not on file    Highest education level: Not on file   Occupational History    Not on file   Tobacco Use    Smoking status: Never    Smokeless tobacco: Never   Vaping Use    Vaping Use: Never used   Substance and Sexual Activity    Alcohol use: No    Drug use: No    Sexual activity: Not on file   Other Topics Concern    Not on file   Social History Narrative    Not on file     Social Determinants of Health     Financial Resource Strain: Not on file   Food Insecurity: Not on file   Transportation Needs: Not on file   Physical Activity: Not on file   Stress: Not on file   Social Connections: Not on file   Intimate Partner Violence: Not on file   Housing Stability: Not on file       Vitals:  /64   Pulse 57   Temp 97.3 °F (36.3 °C) (Oral)   Resp 18   Ht 5' 4\" (1.626 m)   Wt 150 lb 14.4 oz (68.4 kg)   SpO2 98%   BMI 25.90 kg/m²   Temp (24hrs), Av.2 °F (36.2 °C), Min:96.5 °F (35.8 °C), Max:97.5 °F (36.4 °C)    Recent Labs     23  1133 23  1642 23  1932 23  0643   POCGLU 251* 253* 323* 194*       I/O (24Hr):     Intake/Output Summary (Last 24 hours) at 1/12/2023 1115  Last data filed at 1/12/2023 0924  Gross per 24 hour   Intake 263.34 ml   Output 1410 ml   Net -1146.66 ml       Labs:      CBC:   Lab Results   Component Value Date/Time    WBC 5.2 01/10/2023 05:31 AM    RBC 3.76 01/10/2023 05:31 AM    HGB 11.5 01/12/2023 09:17 AM    HCT 37.0 01/12/2023 09:17 AM    MCV 89.1 01/10/2023 05:31 AM    MCH 29.0 01/10/2023 05:31 AM    MCHC 32.5 01/10/2023 05:31 AM    RDW 14.9 01/10/2023 05:31 AM     01/10/2023 05:31 AM    MPV 8.7 01/10/2023 05:31 AM     CBC with Differential:    Lab Results   Component Value Date/Time    WBC 5.2 01/10/2023 05:31 AM    RBC 3.76 01/10/2023 05:31 AM    HGB 11.5 01/12/2023 09:17 AM    HCT 37.0 01/12/2023 09:17 AM     01/10/2023 05:31 AM    MCV 89.1 01/10/2023 05:31 AM    MCH 29.0 01/10/2023 05:31 AM    MCHC 32.5 01/10/2023 05:31 AM    RDW 14.9 01/10/2023 05:31 AM    LYMPHOPCT 10 01/10/2023 05:31 AM    MONOPCT 13 01/10/2023 05:31 AM    BASOPCT 1 01/10/2023 05:31 AM    MONOSABS 0.70 01/10/2023 05:31 AM    LYMPHSABS 0.50 01/10/2023 05:31 AM    EOSABS 0.10 01/10/2023 05:31 AM    BASOSABS 0.10 01/10/2023 05:31 AM    DIFFTYPE NOT REPORTED 02/09/2022 04:05 PM     Hemoglobin/Hematocrit:    Lab Results   Component Value Date/Time    HGB 11.5 01/12/2023 09:17 AM    HCT 37.0 01/12/2023 09:17 AM     CMP:    Lab Results   Component Value Date/Time     01/11/2023 03:30 AM    K 4.3 01/11/2023 03:30 AM     01/11/2023 03:30 AM    CO2 27 01/11/2023 03:30 AM    BUN 38 01/11/2023 03:30 AM    CREATININE 0.88 01/11/2023 03:30 AM    GFRAA >60 02/09/2022 04:05 PM    LABGLOM >60 01/11/2023 03:30 AM    GLUCOSE 192 01/11/2023 03:30 AM    PROT 5.5 01/10/2023 05:31 AM    LABALBU 3.0 01/10/2023 05:31 AM    CALCIUM 8.0 01/11/2023 03:30 AM    BILITOT 0.3 01/10/2023 05:31 AM    ALKPHOS 168 01/10/2023 05:31 AM    AST 26 01/10/2023 05:31 AM    ALT 21 01/10/2023 05:31 AM     BMP:    Lab Results   Component Value Date/Time     01/11/2023 03:30 AM    K 4.3 01/11/2023 03:30 AM     01/11/2023 03:30 AM    CO2 27 01/11/2023 03:30 AM    BUN 38 01/11/2023 03:30 AM    LABALBU 3.0 01/10/2023 05:31 AM    CREATININE 0.88 01/11/2023 03:30 AM    CALCIUM 8.0 01/11/2023 03:30 AM    GFRAA >60 02/09/2022 04:05 PM    LABGLOM >60 01/11/2023 03:30 AM    GLUCOSE 192 01/11/2023 03:30 AM     PT/INR:    Lab Results   Component Value Date/Time    PROTIME 14.4 01/11/2023 03:30 AM    INR 1.1 01/11/2023 03:30 AM     PTT:    Lab Results   Component Value Date/Time    APTT 31.8 01/11/2023 03:30 AM   [APTT}    Physical Examination:        General appearance: alert, cooperative and no distress  Mental Status: oriented to person, place and time and normal affect  Lungs: clear to auscultation bilaterally, normal effort  Heart: regular rate and rhythm, no murmur,  Abdomen: soft, nontender, nondistended, bowel sounds present all four quadrants, no masses, hepatomegaly or splenomegaly  Extremities: no edema, redness or tenderness in the calves  Skin: no gross lesions, rashes, or induration    Assessment:        Primary Problem  Lower GI bleed     Active Hospital Problems    Diagnosis Date Noted    Essential hypertension [I10] 01/11/2023     Priority: Medium    Diabetes mellitus ([facility] [E11.9] 01/11/2023     Priority: Medium    Congestive heart failure ([address] [I50.9] 01/11/2023     Priority: Medium    Paroxysmal atrial fibrillation ([address] [I48.0] 01/11/2023     Priority: Medium    Diarrhea [R19.7] 01/11/2023     Priority: Medium    Chronic combined systolic (congestive) and diastolic (congestive) heart failure ([address] [I50.42] 01/11/2023     Priority: Medium    Lower GI bleed [K92.2] 01/10/2023     Priority: Medium    Venous ulcer of right leg ([address] [I83.019, L97.919] 01/03/2023     Priority: Medium     Past Medical History:   Diagnosis Date    Adenocarcinoma ([facility]     colon    Arthritis     Atrial fibrillation ([facility]     r/t sepsis     Cancer ([facility]     colon, skin    CHF (congestive heart failure) (HCC)     Chronic anticoagulation     Eliquis for a fib stroke prophylaxis    Diabetes mellitus ([address]     Diverticulitis     Femur fracture (H[facility])     Hypertension     Melanoma ([facility]     nose    Osteoporosis     Overweight     Sepsis ([facility] [facility]     Serum creatinine raised         Plan:        Diarrhea, hx of colon cancer with R hemicolectomy 40 years ago  C.difficile negative  Started on Questran yesterday  No diarrhea overnight  Add probiotic  Tolerating diet well, no nausea, vomiting  Discussed possibility of colonoscopy with patient and daughter, they will discuss, may consider outpatient    Time spent reviewing the chart, seeing the patient, and discussing with the attending MD around 30 minutes.     Explained to the patient and d/W Nursing Staff  Will F/U with you  Please call or Page for any issues or change in status  Thanks    Electronically signed by PATRICK Srivastava NP on 1/12/2023 at 11:15 AM

## 2023-01-12 NOTE — PLAN OF CARE
Problem: Discharge Planning  Goal: Discharge to home or other facility with appropriate resources  1/11/2023 2031 by Beata Cueto RN  Outcome: Progressing  Flowsheets (Taken 1/11/2023 2013)  Discharge to home or other facility with appropriate resources:   Identify barriers to discharge with patient and caregiver   Arrange for needed discharge resources and transportation as appropriate   Identify discharge learning needs (meds, wound care, etc)     Problem: Pain  Goal: Verbalizes/displays adequate comfort level or baseline comfort level  1/11/2023 2031 by Beata Cueto RN  Outcome: Progressing  Flowsheets (Taken 1/11/2023 2031)  Verbalizes/displays adequate comfort level or baseline comfort level:   Encourage patient to monitor pain and request assistance   Assess pain using appropriate pain scale   Administer analgesics based on type and severity of pain and evaluate response   Implement non-pharmacological measures as appropriate and evaluate response     Problem: Skin/Tissue Integrity  Goal: Absence of new skin breakdown  Description: 1. Monitor for areas of redness and/or skin breakdown  2. Assess vascular access sites hourly  3. Every 4-6 hours minimum:  Change oxygen saturation probe site  4. Every 4-6 hours:  If on nasal continuous positive airway pressure, respiratory therapy assess nares and determine need for appliance change or resting period.   1/11/2023 2031 by Beata Cueto RN  Outcome: Progressing  Note: Monitor for skin breakdown     Problem: Safety - Adult  Goal: Free from fall injury  1/11/2023 2031 by Beata Cueto RN  Outcome: Ulus Barefoot (Taken 1/11/2023 2031)  Free From Fall Injury: Instruct family/caregiver on patient safety     Problem: ABCDS Injury Assessment  Goal: Absence of physical injury  1/11/2023 2031 by Beata Cueto RN  Outcome: Progressing  Flowsheets (Taken 1/11/2023 2031)  Absence of Physical Injury: Implement safety measures based on patient assessment     Problem: Respiratory - Adult  Goal: Achieves optimal ventilation and oxygenation  1/11/2023 2031 by Dane Hernandez RN  Outcome: Progressing  Flowsheets (Taken 1/11/2023 2013)  Achieves optimal ventilation and oxygenation:   Assess for changes in respiratory status   Assess for changes in mentation and behavior   Position to facilitate oxygenation and minimize respiratory effort   Oxygen supplementation based on oxygen saturation or arterial blood gases   Initiate smoking cessation protocol as indicated   Encourage broncho-pulmonary hygiene including cough, deep breathe, incentive spirometry   Assess and instruct to report shortness of breath or any respiratory difficulty   Respiratory therapy support as indicated     Problem: Cardiovascular - Adult  Goal: Maintains optimal cardiac output and hemodynamic stability  1/11/2023 2031 by Dane Hernandez RN  Outcome: Progressing  Flowsheets (Taken 1/11/2023 2013)  Maintains optimal cardiac output and hemodynamic stability:   Monitor blood pressure and heart rate   Monitor urine output and notify Licensed Independent Practitioner for values outside of normal range   Assess for signs of decreased cardiac output     Problem: Cardiovascular - Adult  Goal: Absence of cardiac dysrhythmias or at baseline  1/11/2023 2031 by Dane Hernandez RN  Outcome: Progressing  [address] (Taken 1/11/2023 2013)  Absence of cardiac dysrhythmias or at baseline:   Monitor cardiac rate and rhythm   Assess for signs of decreased cardiac output     Problem: Skin/Tissue Integrity - Adult  Goal: Skin integrity remains intact  1/11/2023 2031 by Dane Hernandez RN  Outcome: Progressing  Flowsheets  Taken 1/11/2023 2030 by Dane Hernandez RN  Skin Integrity Remains Intact:   Monitor for areas of redness and/or skin breakdown   Assess vascular access sites hourly  Taken 1/11/2023 2013 by Dane Hernandez RN  Skin Integrity Remains Intact:   Monitor for areas of redness and/or skin breakdown   Assess vascular access sites hourly  Taken 1/11/2023 1027 by Jakub Lynch RN  Skin Integrity Remains Intact: Monitor for areas of redness and/or skin breakdown     Problem: Skin/Tissue Integrity - Adult  Goal: Incisions, wounds, or drain sites healing without S/S of infection  1/11/2023 2031 by Cristina Cullen RN  Outcome: Progressing  Flowsheets  Taken 1/11/2023 2030 by Cristina Cullen RN  Incisions, Wounds, or Drain Sites Healing Without Sign and Symptoms of Infection: TWICE DAILY: Assess and document dressing/incision, wound bed, drain sites and surrounding tissue  Taken 1/11/2023 2013 by Cristina Cullen RN  Incisions, Wounds, or Drain Sites Healing Without Sign and Symptoms of Infection: TWICE DAILY: Assess and document dressing/incision, wound bed, drain sites and surrounding tissue  Taken 1/11/2023 1027 by Jakub Lynch RN  Incisions, Wounds, or Drain Sites Healing Without Sign and Symptoms of Infection:   TWICE DAILY: Assess and document dressing/incision, wound bed, drain sites and surrounding tissue   TWICE DAILY: Assess and document skin integrity   Implement wound care per orders   Initiate isolation precautions as appropriate   Initiate pressure ulcer prevention bundle as indicated     Problem: Skin/Tissue Integrity - Adult  Goal: Oral mucous membranes remain intact  1/11/2023 2031 by Cristina Cullen RN  Outcome: Progressing  Flowsheets  Taken 1/11/2023 2030  Oral Mucous Membranes Remain Intact:   Assess oral mucosa and hygiene practices   Implement preventative oral hygiene regimen  Taken 1/11/2023 2013  Oral Mucous Membranes Remain Intact:   Assess oral mucosa and hygiene practices   Implement preventative oral hygiene regimen     Problem: Musculoskeletal - Adult  Goal: Return mobility to safest level of function  1/11/2023 2031 by Cristina Cullen RN  Outcome: Progressing  Flowsheets (Taken 1/11/2023 2013)  Return Mobility to Safest Level of Function:   Assess patient stability and activity tolerance for standing, transferring and ambulating with or without assistive devices   Assist with transfers and ambulation using safe patient handling equipment as needed   Ensure adequate protection for wounds/incisions during mobilization   Obtain physical therapy/occupational therapy consults as needed   Instruct patient/family in ordered activity level     Problem: Musculoskeletal - Adult  Goal: Maintain proper alignment of affected body part  1/11/2023 2031 by Beverly Bone RN  Outcome: Progressing  Flowsheets (Taken 1/11/2023 2013)  Maintain proper alignment of affected body part: Support and protect limb and body alignment per provider's orders     Problem: Musculoskeletal - Adult  Goal: Return ADL status to a safe level of function  1/11/2023 2031 by Beverly Bone RN  Outcome: Progressing  Flowsheets (Taken 1/11/2023 2013)  Return ADL Status to a Safe Level of Function:   Administer medication as ordered   Assess activities of daily living deficits and provide assistive devices as needed   Obtain physical therapy/occupational therapy consults as needed   Assist and instruct patient to increase activity and self care as tolerated     Problem: Gastrointestinal - Adult  Goal: Minimal or absence of nausea and vomiting  1/11/2023 2031 by Beverly Bone RN  Outcome: Progressing  Flowsheets (Taken 1/11/2023 2013)  Minimal or absence of nausea and vomiting:   Provide nonpharmacologic comfort measures as appropriate   Administer ordered antiemetic medications as needed     Problem: Gastrointestinal - Adult  Goal: Maintains or returns to baseline bowel function  1/11/2023 2031 by Beverly Bone RN  Outcome: Progressing  [address] (Taken 1/11/2023 2013)  Maintains or returns to baseline bowel function:   Assess bowel function   Administer ordered medications as needed     Problem: Gastrointestinal - Adult  Goal: Maintains adequate nutritional intake  1/11/2023 2031 by Beverly Bone RN  Outcome: Progressing  Flowsheets (Taken 1/11/2023 2013)  Maintains adequate nutritional intake:   Monitor percentage of each meal consumed   Monitor intake and output, weight and lab values     Problem: Infection - Adult  Goal: Absence of infection at discharge  1/11/2023 2031 by Angela Middleton RN  Outcome: Progressing  [address] (Taken 1/11/2023 2013)  Absence of infection at discharge:   Assess and monitor for signs and symptoms of infection   Monitor lab/diagnostic results   Monitor all insertion sites i.e., indwelling lines, tubes and drains   Administer medications as ordered   Instruct and encourage patient and family to use good hand hygiene technique   Identify and instruct in appropriate isolation precautions for identified infection/condition     Problem: Infection - Adult  Goal: Absence of infection during hospitalization  1/11/2023 2031 by Angela Middleton RN  Outcome: Progressing  [address] (Taken 1/11/2023 2013)  Absence of infection during hospitalization:   Assess and monitor for signs and symptoms of infection   Monitor lab/diagnostic results   Monitor all insertion sites i.e., indwelling lines, tubes and drains   Administer medications as ordered   Instruct and encourage patient and family to use good hand hygiene technique   Identify and instruct in appropriate isolation precautions for identified infection/condition     Problem: Infection - Adult  Goal: Absence of fever/infection during anticipated neutropenic period  1/11/2023 2031 by Angela Middleton RN  Outcome: Progressing  [address] (Taken 1/11/2023 2013)  Absence of fever/infection during anticipated neutropenic period: Monitor white blood cell count     Problem: Chronic Conditions and Co-morbidities  Goal: Patient's chronic conditions and co-morbidity symptoms are monitored and maintained or improved  1/11/2023 2031 by Angela Middleton RN  Outcome: Progressing  Flowsheets (Taken 1/11/2023 2013)  Care Plan - Patient's Chronic Conditions and Co-Morbidity Symptoms are Monitored and Maintained or Improved:   Monitor and assess patient's chronic conditions and comorbid symptoms for stability, deterioration, or improvement   Collaborate with multidisciplinary team to address chronic and comorbid conditions and prevent exacerbation or deterioration     Problem: Neurosensory - Adult  Goal: Achieves maximal functionality and self care  Outcome: Progressing  Flowsheets (Taken 1/11/2023 2031)  Achieves maximal functionality and self care: Encourage and assist patient to increase activity and self care with guidance from physical therapy/occupational therapy     Problem: Genitourinary - Adult  Goal: Absence of urinary retention  Outcome: Progressing  Flowsheets (Taken 1/11/2023 2031)  Absence of urinary retention:   Assess patients ability to void and empty bladder   Monitor intake/output and perform bladder scan as needed     Problem: Metabolic/Fluid and Electrolytes - Adult  Goal: Electrolytes maintained within normal limits  Outcome: Progressing  Flowsheets (Taken 1/11/2023 2031)  Electrolytes maintained within normal limits:   Monitor labs and assess patient for signs and symptoms of electrolyte imbalances   Administer electrolyte replacement as ordered   Monitor response to electrolyte replacements, including repeat lab results as appropriate     Problem: Metabolic/Fluid and Electrolytes - Adult  Goal: Hemodynamic stability and optimal renal function maintained  Outcome: Progressing  Flowsheets (Taken 1/11/2023 2031)  Hemodynamic stability and optimal renal function maintained:   Monitor labs and assess for signs and symptoms of volume excess or deficit   Monitor intake, output and patient weight   Monitor urine specific gravity, serum osmolarity and serum sodium as indicated or ordered   Monitor response to interventions for patient's volume status, including labs, urine output, blood pressure (other measures as available)     Problem: Metabolic/Fluid and Electrolytes - Adult  Goal: Glucose maintained within prescribed range  Outcome: Progressing  Flowsheets (Taken 1/11/2023 2031)  Glucose maintained within prescribed range:   Monitor blood glucose as ordered   Assess for signs and symptoms of hyperglycemia and hypoglycemia   Administer ordered medications to maintain glucose within target range

## 2023-01-13 VITALS
RESPIRATION RATE: 16 BRPM | TEMPERATURE: 97.5 F | SYSTOLIC BLOOD PRESSURE: 127 MMHG | OXYGEN SATURATION: 97 % | BODY MASS INDEX: 25.62 KG/M2 | HEIGHT: 64 IN | WEIGHT: 150.1 LBS | HEART RATE: 56 BPM | DIASTOLIC BLOOD PRESSURE: 61 MMHG

## 2023-01-13 LAB
ANION GAP SERPL CALCULATED.3IONS-SCNC: 9 MMOL/L (ref 9–17)
BUN BLDV-MCNC: 23 MG/DL (ref 8–23)
BUN/CREAT BLD: 26 (ref 9–20)
CALCIUM SERPL-MCNC: 8.4 MG/DL (ref 8.6–10.4)
CHLORIDE BLD-SCNC: 104 MMOL/L (ref 98–107)
CO2: 24 MMOL/L (ref 20–31)
CREAT SERPL-MCNC: 0.87 MG/DL (ref 0.5–0.9)
GFR SERPL CREATININE-BSD FRML MDRD: >60 ML/MIN/1.73M2
GLUCOSE BLD-MCNC: 237 MG/DL (ref 70–99)
GLUCOSE BLD-MCNC: 244 MG/DL (ref 65–105)
HCT VFR BLD CALC: 35.9 % (ref 36.3–47.1)
HEMOGLOBIN: 11.1 G/DL (ref 11.9–15.1)
MCH RBC QN AUTO: 28.9 PG (ref 25.2–33.5)
MCHC RBC AUTO-ENTMCNC: 30.9 G/DL (ref 28.4–34.8)
MCV RBC AUTO: 93.5 FL (ref 82.6–102.9)
NRBC AUTOMATED: 0 PER 100 WBC
PDW BLD-RTO: 14.1 % (ref 11.8–14.4)
PLATELET # BLD: 206 K/UL (ref 138–453)
PMV BLD AUTO: 10.8 FL (ref 8.1–13.5)
POTASSIUM SERPL-SCNC: 4.8 MMOL/L (ref 3.7–5.3)
RBC # BLD: 3.84 M/UL (ref 3.95–5.11)
SODIUM BLD-SCNC: 137 MMOL/L (ref 135–144)
WBC # BLD: 4.9 K/UL (ref 3.5–11.3)

## 2023-01-13 PROCEDURE — 6370000000 HC RX 637 (ALT 250 FOR IP): Performed by: NURSE PRACTITIONER

## 2023-01-13 PROCEDURE — 85027 COMPLETE CBC AUTOMATED: CPT

## 2023-01-13 PROCEDURE — 82947 ASSAY GLUCOSE BLOOD QUANT: CPT

## 2023-01-13 PROCEDURE — 2580000003 HC RX 258: Performed by: NURSE PRACTITIONER

## 2023-01-13 PROCEDURE — 6370000000 HC RX 637 (ALT 250 FOR IP): Performed by: INTERNAL MEDICINE

## 2023-01-13 PROCEDURE — 99239 HOSP IP/OBS DSCHRG MGMT >30: CPT | Performed by: INTERNAL MEDICINE

## 2023-01-13 PROCEDURE — 80048 BASIC METABOLIC PNL TOTAL CA: CPT

## 2023-01-13 PROCEDURE — 36415 COLL VENOUS BLD VENIPUNCTURE: CPT

## 2023-01-13 RX ORDER — PANTOPRAZOLE SODIUM 40 MG/1
40 TABLET, DELAYED RELEASE ORAL
Qty: 30 TABLET | Refills: 3 | Status: ON HOLD | OUTPATIENT
Start: 2023-01-14

## 2023-01-13 RX ORDER — CHOLESTYRAMINE LIGHT 4 G/5.7G
4 POWDER, FOR SUSPENSION ORAL 2 TIMES DAILY
Qty: 60 PACKET | Refills: 3 | Status: ON HOLD | OUTPATIENT
Start: 2023-01-13

## 2023-01-13 RX ADMIN — BUMETANIDE 2 MG: 1 TABLET ORAL at 09:05

## 2023-01-13 RX ADMIN — CHOLESTYRAMINE 4 G: 4 POWDER, FOR SUSPENSION ORAL at 09:05

## 2023-01-13 RX ADMIN — SODIUM CHLORIDE, PRESERVATIVE FREE 10 ML: 5 INJECTION INTRAVENOUS at 09:08

## 2023-01-13 RX ADMIN — PANTOPRAZOLE SODIUM 40 MG: 40 TABLET, DELAYED RELEASE ORAL at 05:25

## 2023-01-13 RX ADMIN — GLIMEPIRIDE 4 MG: 2 TABLET ORAL at 09:14

## 2023-01-13 RX ADMIN — Medication 2000 UNITS: at 09:08

## 2023-01-13 RX ADMIN — SACUBITRIL AND VALSARTAN 1 TABLET: 24; 26 TABLET, FILM COATED ORAL at 09:05

## 2023-01-13 RX ADMIN — PROBIOTIC PRODUCT - TAB 1 TABLET: TAB at 09:05

## 2023-01-13 NOTE — CARE COORDINATION
Social work: spoke to [place] still planning around 4 pm discharge to home . Await [facility] to confirm. Pt refused home care now wants just the nurse for now and is not agreeing to PT/OT, perhaps the home nurse can help her accept them if needed. Will need aiden. Trying for ambulance to take pt home as per daughter requested. Shy ramires    Social work: spoke to [facility] they cannot do till 9 pm they will call around to find another ambulance.   Shy ramires

## 2023-01-13 NOTE — CARE COORDINATION
Discharge Planning    Met with pt and her dtr Esequiel Aguilar who is her main support. Educated on home care benefits under Medicare. Pt would not consider home care. States she has had it before and she will not let them in the house. She does not feel she needs therapy. Dtr states pt will not cooperate and change her mind so home care is not an option. Dtr states she will check in on her daily but works and can not be with her 24/7. Her other 2 sons are not available to help. Pt voices she does not like having people around and she just wants to be left alone. Medicare letter given and explained to pt and she states I am ready to go home.

## 2023-01-13 NOTE — PROGRESS NOTES
Physical Therapy  DATE: 2023    NAME: Ronan Olivera  MRN: [id number]   : 10/23/1932    Patient not seen this date for Physical Therapy due to:      [] Cancel by RN or physician due to:    [] Hemodialysis    [] Critical Lab Value Level     [] Blood transfusion in progress    [] Acute or unstable cardiovascular status   _MAP < 55 or more than >115  _HR < 40 or > 130    [] Acute or unstable pulmonary status   -FiO2 > 60%   _RR < 5 or >40    _O2 sats < 85%    [] Strict Bedrest    [] Off Unit for surgery or procedure    [] Off Unit for testing       [] Pending imaging to R/O fracture    [x] Refusal by Patient: Pt adamantly refused therapy stating \"It doesn't work! \" Pt rude and agitated and yelling at writer about the hospital system and how she \"hates it! \"               [] Other      [] PT being discontinued at this time. Patient independent. No further needs. [] PT being discontinued at this time as the patient has been transferred to hospice care. No further needs.       Tanner Al, PTA

## 2023-01-13 NOTE — DISCHARGE SUMMARY
[facility]  Office: [address], DO, Karen Solis, DO, Amparo Escalona, DO, Kartik Ledezma, DO, Alec Garcia MD, Antonio Albert MD, Janelle Beckham MD, Vee Mora MD,  Perfecto Martinez MD, Yaa Tobias MD, Tanja Smith, DO, Jayda Schwartz MD,  Robert Patterson MD, Jess Millan MD, Alexys Howell DO, Celia Sauer MD, Caryl Correa MD, Jin Mcdermott DO, Chriss Rodriguez MD, Remy Lopez MD, Teresa Gomez MD, Mohinder Pelayo MD, Johnie Gerber DO, Orlando Herrmann MD, Hal Reddy MD, Jose Prescott, [facility],  Ar Gamino, CNP, Alana Falcon, CNP, Perley Gilford, CNP,  Cornelia Pitt, [facility], Monserrat Landon, CNP, Sravanthi Miranda, CNP, Jazmin Bustamante, CNP, Karla Frances, CNP, Lawyer Martinez, CNP, Lilia Singh PA-C, Giorgi Holbrook, CNS, Cletis Hashimoto, CNP, Lizzeth Russell, [facility]    Discharge Summary     Patient ID: Leatha Gandhi  :  10/23/1932   MRN: [id number]     ACCOUNT:  [de-identified]   Patient's PCP: Laci Keenan MD  Admit Date: 1/10/2023   Discharge Date: 2023     Length of Stay: 3  Code Status:  Full Code  Admitting Physician: Flavio Ledezma DO  Discharge Physician: Flavio Ledezma DO     Active Discharge Diagnoses:     Hospital Problem Lists:  Principal Problem:    Diarrhea  Active Problems:    Lower GI bleed    Type 2 diabetes mellitus without complication, without long-term current use of insulin [facility])    Essential hypertension    Paroxysmal atrial fibrillation (HCC)    Venous ulcer of right leg (HCC)    Chronic combined systolic (congestive) and diastolic (congestive) heart failure ([address]  Resolved Problems:    * No resolved hospital problems. *      Admission Condition:  poor     Discharged Condition: stable    Hospital Stay:     Hospital Course:   Leatha Gandhi is a 80 y.o. female who was admitted for the management of  Diarrhea , presented to ER with Diarrhea (Patient states she was released from [facility] on Sunday for improved diarrhea./Patient states she has diarrhea just pouring out of her. )    Admitted with severe diarrhea and had been recently started on several new chf meds during a hospitalization at [address] facility. She was seen by GI here and placed on Questran, which significantly improved her diarrhea. Because of the recent med changes, daughter requested cardiology evaluation to determine which meds she should take and if any of them could have caused the worsened diarrhea. After consultation with them, she will stop the aldactone and see how she does    She had her bumex held and was given some gentle iv fluids in hospital as she had become mildly dehydrated from the diarrhea.   Bumex resumed at discharge      Significant therapeutic interventions: see above    Significant Diagnostic Studies:   Labs / Micro:  CBC:   Lab Results   Component Value Date/Time    WBC 4.9 01/13/2023 09:18 AM    RBC 3.84 01/13/2023 09:18 AM    HGB 11.1 01/13/2023 09:18 AM    HCT 35.9 01/13/2023 09:18 AM    MCV 93.5 01/13/2023 09:18 AM    MCH 28.9 01/13/2023 09:18 AM    MCHC 30.9 01/13/2023 09:18 AM    RDW 14.1 01/13/2023 09:18 AM     01/13/2023 09:18 AM     CMP:    Lab Results   Component Value Date/Time    GLUCOSE 237 01/13/2023 09:18 AM     01/13/2023 09:18 AM    K 4.8 01/13/2023 09:18 AM     01/13/2023 09:18 AM    CO2 24 01/13/2023 09:18 AM    BUN 23 01/13/2023 09:18 AM    CREATININE 0.87 01/13/2023 09:18 AM    ANIONGAP 9 01/13/2023 09:18 AM    ALKPHOS 168 01/10/2023 05:31 AM    ALT 21 01/10/2023 05:31 AM    AST 26 01/10/2023 05:31 AM    BILITOT 0.3 01/10/2023 05:31 AM    LABALBU 3.0 01/10/2023 05:31 AM    ALBUMIN 1.2 01/10/2023 05:31 AM    LABGLOM >60 01/13/2023 09:18 AM    GFRAA >60 02/09/2022 04:05 PM    GFR      02/09/2022 04:05 PM    GFR NOT REPORTED 02/09/2022 04:05 PM    PROT 5.5 01/10/2023 05:31 AM    CALCIUM 8.4 01/13/2023 09:18 AM Radiology:  XR CHEST PORTABLE    Result Date: 1/10/2023  Stable cardiomegaly. Mild pulmonary vascular congestion. Consultations:    Consults:     Final Specialist Recommendations/Findings:   IP CONSULT TO GI  IP CONSULT TO PHARMACY  IP CONSULT TO CARDIOLOGY      The patient was seen and examined on day of discharge and this discharge summary is in conjunction with any daily progress note from day of discharge. Discharge plan:     Disposition: Home with [facility]    Physician Follow Up:      Brennen Adame MD  [address] [place] [address] [place] [address]  [place]   [phone]    Follow up in 1 week(s)      [address], MD  [address]  [phone]    Follow up in 2 week(s)  any [facility] cardio is ok       Requiring Further Evaluation/Follow Up POST HOSPITALIZATION/Incidental Findings: check bmp early next week    Diet: cardiac diet and diabetic diet    Activity: As tolerated    Instructions to Patient: take medications as prescribed      Discharge Medications:      Medication List        START taking these medications      cholestyramine light 4 g packet  Take 1 packet by mouth 2 times daily     lactobacillus Tabs  Take 1 tablet by mouth daily  Start taking on: January 14, 2023     pantoprazole 40 MG tablet  Commonly known as: PROTONIX  Take 1 tablet by mouth every morning (before breakfast)  Start taking on: January 14, 2023            CONTINUE taking these medications      Allegra Allergy 180 MG tablet  Generic drug: fexofenadine     bumetanide 2 MG tablet  Commonly known as: BUMEX     carvedilol 12.5 MG tablet  Commonly known as: COREG     Cholecalciferol 50 MCG (2000 UT) Caps     Cyanocobalamin 1000 MCG/15ML Liqd     Entresto 24-26 MG per tablet  Generic drug: sacubitril-valsartan     FISH OIL PO     glimepiride 4 MG tablet  Commonly known as: AMARYL     ibuprofen 200 MG tablet  Commonly known as: ADVIL;MOTRIN     loperamide 2 MG capsule  Commonly known as: IMODIUM            STOP taking these medications      Farxiga 10 MG tablet  Generic drug: dapagliflozin     spironolactone 25 MG tablet  Commonly known as: ALDACTONE               Where to Get Your Medications        These medications were sent to [facility] [id number] [facility], [address] [place] [address], [address] [place] [address]      Phone: [phone]   cholestyramine light 4 g packet  lactobacillus Tabs  pantoprazole 40 MG tablet         No discharge procedures on file. Time Spent on discharge is  32 mins in patient examination, evaluation, counseling as well as medication reconciliation, prescriptions for required medications, discharge plan and follow up. Electronically signed by   Elsy Ledezma DO  1/13/2023  1:19 PM      Thank you Dr. Mira Hendrickson MD for the opportunity to be involved in this patient's care.

## 2023-01-13 NOTE — PLAN OF CARE
Problem: Discharge Planning  Goal: Discharge to home or other facility with appropriate resources  Outcome: Adequate for Discharge  Flowsheets (Taken 1/13/2023 0919)  Discharge to home or other facility with appropriate resources:   Identify barriers to discharge with patient and caregiver   Arrange for needed discharge resources and transportation as appropriate   Identify discharge learning needs (meds, wound care, etc)   Refer to discharge planning if patient needs post-hospital services based on physician order or complex needs related to functional status, cognitive ability or social support system     Problem: Pain  Goal: Verbalizes/displays adequate comfort level or baseline comfort level  Outcome: Adequate for Discharge  Flowsheets (Taken 1/13/2023 1426)  Verbalizes/displays adequate comfort level or baseline comfort level:   Encourage patient to monitor pain and request assistance   Assess pain using appropriate pain scale   Administer analgesics based on type and severity of pain and evaluate response   Implement non-pharmacological measures as appropriate and evaluate response   Consider cultural and social influences on pain and pain management   Notify Licensed Independent Practitioner if interventions unsuccessful or patient reports new pain     Problem: Skin/Tissue Integrity  Goal: Absence of new skin breakdown  Description: 1.   Monitor for areas of redness and/or skin breakdown  Outcome: Adequate for Discharge     Problem: Safety - Adult  Goal: Free from fall injury  Outcome: Adequate for Discharge  Flowsheets (Taken 1/13/2023 1426)  Free From Fall Injury:   Instruct family/caregiver on patient safety   Based on caregiver fall risk screen, instruct family/caregiver to ask for assistance with transferring infant if caregiver noted to have fall risk factors     Problem: ABCDS Injury Assessment  Goal: Absence of physical injury  Outcome: Adequate for Discharge  Flowsheets (Taken 1/13/2023 1426)  Absence of Physical Injury: Implement safety measures based on patient assessment  Note: Siderails up x 2  Hourly rounding  Call light in reach  Instructed to call for assist before attempting out of bed.   Remains free from falls and accidental injury at this time   Floor free from obstacles  Bed is locked and in lowest position  Adequate lighting provided  Bed alarm on, Red Falling star and Stay with Me signs posted     Problem: Respiratory - Adult  Goal: Achieves optimal ventilation and oxygenation  Outcome: Adequate for Discharge  Flowsheets (Taken 1/13/2023 1426)  Achieves optimal ventilation and oxygenation:   Assess for changes in respiratory status   Assess for changes in mentation and behavior   Position to facilitate oxygenation and minimize respiratory effort   Encourage broncho-pulmonary hygiene including cough, deep breathe, incentive spirometry   Assess the need for suctioning and aspirate as needed   Assess and instruct to report shortness of breath or any respiratory difficulty     Problem: Cardiovascular - Adult  Goal: Maintains optimal cardiac output and hemodynamic stability  Outcome: Adequate for Discharge  Flowsheets (Taken 1/13/2023 0919)  Maintains optimal cardiac output and hemodynamic stability:   Monitor blood pressure and heart rate   Monitor urine output and notify Licensed Independent Practitioner for values outside of normal range   Assess for signs of decreased cardiac output  Goal: Absence of cardiac dysrhythmias or at baseline  Outcome: Adequate for Discharge  Flowsheets (Taken 1/13/2023 0919)  Absence of cardiac dysrhythmias or at baseline:   Monitor cardiac rate and rhythm   Assess for signs of decreased cardiac output     Problem: Skin/Tissue Integrity - Adult  Goal: Skin integrity remains intact  Outcome: Adequate for Discharge  Flowsheets (Taken 1/13/2023 1111)  Skin Integrity Remains Intact: Monitor for areas of redness and/or skin breakdown  Goal: Incisions, wounds, or drain sites healing without S/S of infection  Outcome: Adequate for Discharge  Flowsheets (Taken 1/13/2023 1111)  Incisions, Wounds, or Drain Sites Healing Without Sign and Symptoms of Infection:   TWICE DAILY: Assess and document skin integrity   TWICE DAILY: Assess and document dressing/incision, wound bed, drain sites and surrounding tissue   Implement wound care per orders   Initiate pressure ulcer prevention bundle as indicated  Goal: Oral mucous membranes remain intact  Outcome: Adequate for Discharge  Flowsheets (Taken 1/13/2023 1111)  Oral Mucous Membranes Remain Intact:   Assess oral mucosa and hygiene practices   Implement preventative oral hygiene regimen     Problem: Musculoskeletal - Adult  Goal: Return mobility to safest level of function  Outcome: Adequate for Discharge  Flowsheets (Taken 1/13/2023 0919)  Return Mobility to Safest Level of Function:   Assess patient stability and activity tolerance for standing, transferring and ambulating with or without assistive devices   Assist with transfers and ambulation using safe patient handling equipment as needed   Ensure adequate protection for wounds/incisions during mobilization   Instruct patient/family in ordered activity level  Goal: Maintain proper alignment of affected body part  Outcome: Adequate for Discharge  Goal: Return ADL status to a safe level of function  Outcome: Adequate for Discharge  Flowsheets (Taken 1/13/2023 0919)  Return ADL Status to a Safe Level of Function:   Administer medication as ordered   Assess activities of daily living deficits and provide assistive devices as needed   Assist and instruct patient to increase activity and self care as tolerated     Problem: Gastrointestinal - Adult  Goal: Minimal or absence of nausea and vomiting  Outcome: Adequate for Discharge  Flowsheets (Taken 1/13/2023 0919)  Minimal or absence of nausea and vomiting:   Provide nonpharmacologic comfort measures as appropriate   Administer ordered antiemetic medications as needed  Goal: Maintains or returns to baseline bowel function  Outcome: Adequate for Discharge  Flowsheets (Taken 1/13/2023 0919)  Maintains or returns to baseline bowel function:   Assess bowel function   Encourage oral fluids to ensure adequate hydration   Administer ordered medications as needed   Encourage mobilization and activity  Goal: Maintains adequate nutritional intake  Outcome: Adequate for Discharge  Flowsheets (Taken 1/13/2023 0919)  Maintains adequate nutritional intake:   Monitor percentage of each meal consumed   Assist with meals as needed  Goal: Establish and maintain optimal ostomy function  Outcome: Adequate for Discharge     Problem: Infection - Adult  Goal: Absence of infection at discharge  Outcome: Adequate for Discharge  Flowsheets (Taken 1/13/2023 0919)  Absence of infection at discharge:   Assess and monitor for signs and symptoms of infection   Monitor lab/diagnostic results   Monitor all insertion sites i.e., indwelling lines, tubes and drains   Administer medications as ordered   Instruct and encourage patient and family to use good hand hygiene technique  Goal: Absence of infection during hospitalization  Outcome: Adequate for Discharge  Flowsheets (Taken 1/13/2023 0919)  Absence of infection during hospitalization:   Assess and monitor for signs and symptoms of infection   Monitor lab/diagnostic results   Monitor all insertion sites i.e., indwelling lines, tubes and drains   Administer medications as ordered  Goal: Absence of fever/infection during anticipated neutropenic period  Outcome: Adequate for Discharge  Flowsheets (Taken 1/13/2023 0919)  Absence of fever/infection during anticipated neutropenic period: Monitor white blood cell count     Problem: Chronic Conditions and Co-morbidities  Goal: Patient's chronic conditions and co-morbidity symptoms are monitored and maintained or improved  Outcome: Adequate for Discharge  Flowsheets (Taken 1/13/2023 4861)  Care Plan - Patient's Chronic Conditions and Co-Morbidity Symptoms are Monitored and Maintained or Improved:   Monitor and assess patient's chronic conditions and comorbid symptoms for stability, deterioration, or improvement   Collaborate with multidisciplinary team to address chronic and comorbid conditions and prevent exacerbation or deterioration   Update acute care plan with appropriate goals if chronic or comorbid symptoms are exacerbated and prevent overall improvement and discharge     Problem: Neurosensory - Adult  Goal: Achieves maximal functionality and self care  Outcome: Adequate for Discharge  Flowsheets (Taken 1/13/2023 0919)  Achieves maximal functionality and self care: Encourage and assist patient to increase activity and self care with guidance from physical therapy/occupational therapy     Problem: Genitourinary - Adult  Goal: Absence of urinary retention  Outcome: Adequate for Discharge  Flowsheets (Taken 1/13/2023 0919)  Absence of urinary retention:   Assess patients ability to void and empty bladder   Monitor intake/output and perform bladder scan as needed     Problem: Metabolic/Fluid and Electrolytes - Adult  Goal: Electrolytes maintained within normal limits  Outcome: Adequate for Discharge  Flowsheets (Taken 1/13/2023 0919)  Electrolytes maintained within normal limits: Monitor labs and assess patient for signs and symptoms of electrolyte imbalances  Goal: Hemodynamic stability and optimal renal function maintained  Outcome: Adequate for Discharge  Flowsheets (Taken 1/13/2023 0919)  Hemodynamic stability and optimal renal function maintained:   Monitor labs and assess for signs and symptoms of volume excess or deficit   Monitor intake, output and patient weight   Encourage oral intake as appropriate  Goal: Glucose maintained within prescribed range  Outcome: Adequate for Discharge  Flowsheets (Taken 1/13/2023 0919)  Glucose maintained within prescribed range:   Monitor blood glucose as ordered   Assess for signs and symptoms of hyperglycemia and hypoglycemia   Administer ordered medications to maintain glucose within target range   Instruct patient on self management of diabetes and initiate consult as needed

## 2023-01-13 NOTE — PROGRESS NOTES
[facility]  Office: [address], DO, Sahra Derrick, DO, Bonnyhannah Narvaez, DO, Stefano Pitts Blood, DO, Sang Alex MD, Lisette Raines MD, Jeovany Marley MD, Gutierrez Aguilar MD,  Jin Santiago MD, Pinky Foss MD, Italia Lemus, DO, Qamar Broussard MD,  Shaun Ma MD, Gian Randhawa MD, Mago Carnes DO, Lluvia Elder MD, Ese Del Valle MD, Trina Peterson DO, Rafael Cali MD, Carolyn Benitez MD, Beena Rodriguez MD, Mookie Toussaint MD, Mango Worthington DO, Pattie Borjas MD, Tex Casillas MD, Kati Sotomayor, CNP,  Shanell Ba, CNP, Marcy Celeste, CNP, Vita Cuba, CNP,  Jane Correa, [facility], Shivam Trevizo, CNP, Rajendra Salas, CNP, Lizeth Whitley, CNP, Pebbles Simmons, CNP, Janai Gomez, CNP, Kristel Lovett PANicoleC, Zak Bacon, [facility], Chong Miller, [facility], Humberto Bello, [facility]    Progress Note    1/13/2023    1:15 PM    Name:   Divya Nava  MRN:     [id number]     Acct:      [de-identified]   Room:   [address] [place] [address] Day:  3  Admit Date:  1/10/2023  4:39 AM    PCP:   Clotilde Hernandez MD  Code Status:  Full Code    Subjective:     C/C:   Chief Complaint   Patient presents with    Diarrhea     Patient states she was released from [facility] on Sunday for improved diarrhea. Patient states she has diarrhea just pouring out of her. Interval History Status: improved. Diarrhea gone after starting Questran    Denies cp/sob/n/v/f/c      Brief History:     Per my GA:  \"Patient admitted with GI bleed and intractable diarrhea and dehydration. Patient has a past medical history of: CVA, atrial fibrillation, CHF, diabetes, hypertension and a venous stasis ulcer to her right lower leg. Patient states she has chronic diarrhea due to her colon cancer but this excessive diarrhea has caused her to experience fatigue. \"    Review of Systems:     Constitutional:  negative for chills, fevers, sweats  Respiratory:  negative for cough, dyspnea on exertion, shortness of breath, wheezing  Cardiovascular:  negative for chest pain, chest pressure/discomfort, lower extremity edema, palpitations  Gastrointestinal:  negative for abdominal pain, constipation, diarrhea, nausea, vomiting  Neurological:  negative for dizziness, headache    Medications: Allergies: Allergies   Allergen Reactions    Acetaminophen-Codeine Nausea Only    Codeine Nausea Only    Furosemide      Other reaction(s): GI Disturbance    Linagliptin      Other reaction(s): SOB    Sitagliptin      Other reaction(s): felt poorly    Other Itching     Animal Dander       Current Meds:   Scheduled Meds:    bumetanide  2 mg Oral Daily    pantoprazole  40 mg Oral QAM AC    lactobacillus  1 tablet Oral Daily    carvedilol  12.5 mg Oral BID WC    sacubitril-valsartan  1 tablet Oral BID    Vitamin D  2,000 Units Oral Daily    cholestyramine light  4 g Oral BID    glimepiride  4 mg Oral Daily with breakfast    [Held by provider] potassium chloride  20 mEq Oral Daily    sodium chloride flush  5-40 mL IntraVENous 2 times per day    insulin lispro  0-4 Units SubCUTAneous TID WC    insulin lispro  0-4 Units SubCUTAneous Nightly     Continuous Infusions:    sodium chloride      dextrose       PRN Meds: traMADol, sodium chloride flush, sodium chloride, ondansetron **OR** ondansetron, acetaminophen **OR** acetaminophen, glucose, dextrose bolus **OR** dextrose bolus, glucagon (rDNA), dextrose, polyethyl glycol-propyl glycol 0.4-0.3 %    Data:     Past Medical History:   has a past medical history of Adenocarcinoma ([facility] [address] Arthritis, Atrial fibrillation ([facility] [address] Cancer ([facility] [address] CHF (congestive heart failure) ([facility] [address] Chronic anticoagulation, Diabetes mellitus ([facility] [address] Diverticulitis, Femur fracture ([facility] [address] Hypertension, Melanoma ([facility] [address] Osteoporosis, Overweight, Sepsis ([facility] [address] and Serum creatinine raised. Social History:   reports that she has never smoked.  She has never used smokeless tobacco. She reports that she does not drink alcohol and does not use drugs. Family History: History reviewed. No pertinent family history. Vitals:  /61   Pulse 56   Temp 97.5 °F (36.4 °C)   Resp 16   Ht 5' 4\" (1.626 m)   Wt 150 lb 1.6 oz (68.1 kg)   SpO2 97%   BMI 25.76 kg/m²   Temp (24hrs), Av.7 °F (36.5 °C), Min:97.3 °F (36.3 °C), Max:98.8 °F (37.1 °C)    Recent Labs     23  0643 23  1144 23  1610 23  1120   POCGLU 194* 390* 297* 244*       I/O (24Hr): Intake/Output Summary (Last 24 hours) at 2023 1315  Last data filed at 2023 1630  Gross per 24 hour   Intake 38.35 ml   Output 300 ml   Net -261.65 ml       Labs:  Hematology:  Recent Labs     23  03323  0935 239 23  0917 23  0918   WBC  --   --   --   --  4.9   RBC  --   --   --   --  3.84*   HGB 10.6*   < > 10.5* 11.5* 11.1*   HCT 34.3*   < > 34.2* 37.0 35.9*   MCV  --   --   --   --  93.5   MCH  --   --   --   --  28.9   MCHC  --   --   --   --  30.9   RDW  --   --   --   --  14.1   PLT  --   --   --   --  206   MPV  --   --   --   --  10.8   INR 1.1  --   --   --   --     < > = values in this interval not displayed. Chemistry:  Recent Labs     23  0918    137   K 4.3 4.8    104   CO2 27 24   GLUCOSE 192* 237*   BUN 38* 23   CREATININE 0.88 0.87   ANIONGAP 9 9   LABGLOM >60 >60   CALCIUM 8.0* 8.4*     Recent Labs     23  1642 23  1932 23  0643 23  1144 23  1610 23  1120   POCGLU 253* 323* 194* 390* 297* 244*     ABG:No results found for: POCPH, PHART, PH, POCPCO2, HSE1JAB, PCO2, POCPO2, PO2ART, PO2, POCHCO3, JTL9LND, HCO3, NBEA, PBEA, BEART, BE, THGBART, THB, PAF8ISS, SUPI4XXO, U8JRIUCZ, O2SAT, FIO2  Lab Results   Component Value Date/Time    SPECIAL NOT REPORTED 2017 01:55 PM       Radiology:  XR CHEST PORTABLE    Result Date: 1/10/2023  Stable cardiomegaly.   Mild pulmonary vascular congestion.        Physical Examination:        General appearance:  alert, cooperative and no distress  Mental Status:  oriented to person, place and time and normal affect  Lungs:  clear to auscultation bilaterally, normal effort  Heart:  regular rate and rhythm, no murmur  Abdomen:  soft, nontender, nondistended, normal bowel sounds, no masses, hepatomegaly, splenomegaly  Extremities:  no edema, redness, tenderness in the calves  Skin:  no gross lesions, rashes, induration  [place]    Assessment:        Hospital Problems             Last Modified POA    * (Principal) Diarrhea 1/12/2023 Yes    Lower GI bleed 1/12/2023 Yes    Type 2 diabetes mellitus without complication, without long-term current use of insulin ([facility] [address] 1/12/2023 Yes    Essential hypertension 1/11/2023 Yes    Paroxysmal atrial fibrillation ([address] 1/11/2023 Yes    Venous ulcer of right leg ([address] 1/11/2023 Yes    Chronic combined systolic (congestive) and diastolic (congestive) heart failure ([address] 1/11/2023 Yes       Plan:        Resumed amaryl  D/w daughter  After d/w her, I would recommend keeping off aldactone as of all the new chf meds, this is the most likely to cause the worsened diarrhea  She is already back on coreg and entresto-both recently new  Resumed bumex today, can keep off of potassium for now  Questran to continue-seems to be helping  Cardio agreed with all of the above  Dc home    Veronica Leedzma,   1/13/2023  1:15 PM

## 2023-01-13 NOTE — PROGRESS NOTES
Pt discharged to home via [address] [place] [address]. Daughter Todd Beaulieu is meeting patient at her home. Discharge instructions given to [address] [place] [address] to give to daughter. Discharge instructions reviewed over the telephone with daughter. Pt denies having any further questions at this time. Patient did not have any clothes or shoes with her as daughter took them home from [place] ED. Patient upset about having to go home in our scrub pants and gown. Notified daughter of this.

## 2023-01-13 NOTE — PROGRESS NOTES
[address]  Occupational Therapy Not Seen    DATE: 2023    NAME: Adonis Beach  MRN: [id number]   : 10/23/1932    Patient not seen this date for Occupational Therapy due to:      [] Cancel by RN or physician due to:    [] Hemodialysis    [] Critical Lab Value Level     [] Blood transfusion in progress    [] Acute or unstable cardiovascular status   _MAP < 55 or more than >115  _HR < 40 or > 130    [] Acute or unstable pulmonary status   -FiO2 > 60%   _RR < 5 or >40    _O2 sats < 85%    [] Strict Bedrest    [] Off Unit for surgery or procedure    [] Off Unit for testing       [] Pending imaging to R/O fracture    [x] Refusal by Patient: Pt adamantly refused therapy stating \"It doesn't work! \" Pt rude and agitated and yelling at writer about the hospital system and how she \"hates it! \"     [] Other      [] OT being discontinued at this time. Patient independent. No further needs. [] OT being discontinued at this time as the patient has been transferred to hospice care. No further needs.       Claudean Cook, OTA
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
## 2023-01-13 NOTE — PLAN OF CARE
Problem: Discharge Planning  Goal: Discharge to home or other facility with appropriate resources  1/12/2023 2308 by Sharifa Khan RN  Outcome: Progressing  Flowsheets (Taken 1/12/2023 1915)  Discharge to home or other facility with appropriate resources:   Identify barriers to discharge with patient and caregiver   Arrange for needed discharge resources and transportation as appropriate   Identify discharge learning needs (meds, wound care, etc)     Problem: Pain  Goal: Verbalizes/displays adequate comfort level or baseline comfort level  1/12/2023 2308 by Sharifa Khan RN  Outcome: Progressing  Flowsheets (Taken 1/11/2023 2031)  Verbalizes/displays adequate comfort level or baseline comfort level:   Encourage patient to monitor pain and request assistance   Assess pain using appropriate pain scale   Administer analgesics based on type and severity of pain and evaluate response   Implement non-pharmacological measures as appropriate and evaluate response     Problem: Skin/Tissue Integrity  Goal: Absence of new skin breakdown  Description: 1. Monitor for areas of redness and/or skin breakdown  2. Assess vascular access sites hourly  3. Every 4-6 hours minimum:  Change oxygen saturation probe site  4. Every 4-6 hours:  If on nasal continuous positive airway pressure, respiratory therapy assess nares and determine need for appliance change or resting period.   1/12/2023 2308 by Sharifa Khan RN  Outcome: Progressing  Note: Monitor skin breakdown     Problem: Safety - Adult  Goal: Free from fall injury  1/12/2023 2308 by Sharifa Khan RN  Outcome: Yakelin How (Taken 1/11/2023 2031)  Free From Fall Injury: Instruct family/caregiver on patient safety     Problem: ABCDS Injury Assessment  Goal: Absence of physical injury  1/12/2023 2308 by Sharifa Khan RN  Outcome: Progressing  Flowsheets (Taken 1/11/2023 2031)  Absence of Physical Injury: Implement safety measures based on patient assessment     Problem: Respiratory - Adult  Goal: Achieves optimal ventilation and oxygenation  1/12/2023 2308 by Angela Middleton RN  Outcome: Progressing  Flowsheets (Taken 1/12/2023 1915)  Achieves optimal ventilation and oxygenation:   Assess for changes in respiratory status   Assess for changes in mentation and behavior   Position to facilitate oxygenation and minimize respiratory effort   Oxygen supplementation based on oxygen saturation or arterial blood gases   Initiate smoking cessation protocol as indicated   Encourage broncho-pulmonary hygiene including cough, deep breathe, incentive spirometry   Assess and instruct to report shortness of breath or any respiratory difficulty   Respiratory therapy support as indicated     Problem: Cardiovascular - Adult  Goal: Maintains optimal cardiac output and hemodynamic stability  1/12/2023 2308 by Angela Middleton RN  Outcome: Progressing  Flowsheets (Taken 1/12/2023 1915)  Maintains optimal cardiac output and hemodynamic stability:   Monitor blood pressure and heart rate   Monitor urine output and notify Licensed Independent Practitioner for values outside of normal range   Assess for signs of decreased cardiac output     Problem: Cardiovascular - Adult  Goal: Absence of cardiac dysrhythmias or at baseline  1/12/2023 2308 by Angela Middleton RN  Outcome: Progressing  [address] (Taken 1/12/2023 1915)  Absence of cardiac dysrhythmias or at baseline:   Monitor cardiac rate and rhythm   Assess for signs of decreased cardiac output     Problem: Skin/Tissue Integrity - Adult  Goal: Skin integrity remains intact  1/12/2023 2308 by Angela Middleton RN  Outcome: Progressing  Flowsheets  Taken 1/12/2023 2308  Skin Integrity Remains Intact:   Monitor for areas of redness and/or skin breakdown   Assess vascular access sites hourly  Taken 1/12/2023 1915  Skin Integrity Remains Intact:   Monitor for areas of redness and/or skin breakdown   Assess vascular access sites hourly Problem: Skin/Tissue Integrity - Adult  Goal: Incisions, wounds, or drain sites healing without S/S of infection  1/12/2023 2308 by Gustav Goldmann, RN  Outcome: Progressing  Flowsheets  Taken 1/12/2023 2308  Incisions, Wounds, or Drain Sites Healing Without Sign and Symptoms of Infection: TWICE DAILY: Assess and document dressing/incision, wound bed, drain sites and surrounding tissue  Taken 1/12/2023 1915  Incisions, Wounds, or Drain Sites Healing Without Sign and Symptoms of Infection: TWICE DAILY: Assess and document dressing/incision, wound bed, drain sites and surrounding tissue     Problem: Skin/Tissue Integrity - Adult  Goal: Oral mucous membranes remain intact  1/12/2023 2308 by Gustav Goldmann, RN  Outcome: Progressing  Flowsheets  Taken 1/12/2023 2308  Oral Mucous Membranes Remain Intact:   Assess oral mucosa and hygiene practices   Implement preventative oral hygiene regimen  Taken 1/12/2023 1915  Oral Mucous Membranes Remain Intact: Assess oral mucosa and hygiene practices     Problem: Musculoskeletal - Adult  Goal: Return mobility to safest level of function  1/12/2023 2308 by Gustav Goldmann, RN  Outcome: Progressing  Flowsheets (Taken 1/12/2023 1915)  Return Mobility to Safest Level of Function:   Assess patient stability and activity tolerance for standing, transferring and ambulating with or without assistive devices   Assist with transfers and ambulation using safe patient handling equipment as needed   Ensure adequate protection for wounds/incisions during mobilization   Obtain physical therapy/occupational therapy consults as needed   Instruct patient/family in ordered activity level     Problem: Musculoskeletal - Adult  Goal: Maintain proper alignment of affected body part  1/12/2023 2308 by Gustav Goldmann, RN  Outcome: Progressing  Flowsheets (Taken 1/12/2023 1915)  Maintain proper alignment of affected body part: Support and protect limb and body alignment per provider's orders Problem: Musculoskeletal - Adult  Goal: Return ADL status to a safe level of function  1/12/2023 2308 by Bernie Martínez RN  Outcome: Progressing  Flowsheets (Taken 1/12/2023 1915)  Return ADL Status to a Safe Level of Function:   Administer medication as ordered   Assess activities of daily living deficits and provide assistive devices as needed   Obtain physical therapy/occupational therapy consults as needed   Assist and instruct patient to increase activity and self care as tolerated     Problem: Gastrointestinal - Adult  Goal: Minimal or absence of nausea and vomiting  1/12/2023 2308 by Bernie Martínez RN  Outcome: Progressing  Flowsheets (Taken 1/12/2023 1915)  Minimal or absence of nausea and vomiting:   Provide nonpharmacologic comfort measures as appropriate   Administer ordered antiemetic medications as needed     Problem: Gastrointestinal - Adult  Goal: Maintains or returns to baseline bowel function  1/12/2023 2308 by Bernie Martínez RN  Outcome: Progressing  Flowsheets (Taken 1/12/2023 1915)  Maintains or returns to baseline bowel function:   Assess bowel function   Administer ordered medications as needed   Encourage mobilization and activity     Problem: Gastrointestinal - Adult  Goal: Maintains adequate nutritional intake  1/12/2023 2308 by Bernie Martínez RN  Outcome: Progressing  Flowsheets (Taken 1/12/2023 1915)  Maintains adequate nutritional intake:   Monitor percentage of each meal consumed   Identify factors contributing to decreased intake, treat as appropriate   Assist with meals as needed     Problem: Infection - Adult  Goal: Absence of infection at discharge  1/12/2023 2308 by Bernie Martínez RN  Outcome: Progressing  [address] (Taken 1/12/2023 1915)  Absence of infection at discharge:   Assess and monitor for signs and symptoms of infection   Monitor lab/diagnostic results   Monitor all insertion sites i.e., indwelling lines, tubes and drains   Administer medications as ordered Instruct and encourage patient and family to use good hand hygiene technique   Identify and instruct in appropriate isolation precautions for identified infection/condition     Problem: Infection - Adult  Goal: Absence of infection during hospitalization  1/12/2023 2308 by Bhupendra Posadas RN  Outcome: Progressing  Flowsheets (Taken 1/12/2023 1915)  Absence of infection during hospitalization:   Assess and monitor for signs and symptoms of infection   Monitor lab/diagnostic results   Monitor all insertion sites i.e., indwelling lines, tubes and drains   Administer medications as ordered   Instruct and encourage patient and family to use good hand hygiene technique   Identify and instruct in appropriate isolation precautions for identified infection/condition     Problem: Infection - Adult  Goal: Absence of fever/infection during anticipated neutropenic period  1/12/2023 2308 by Bhupendra Posadas RN  Outcome: Progressing  Flowsheets (Taken 1/12/2023 1915)  Absence of fever/infection during anticipated neutropenic period: Monitor white blood cell count     Problem: Chronic Conditions and Co-morbidities  Goal: Patient's chronic conditions and co-morbidity symptoms are monitored and maintained or improved  1/12/2023 2308 by Bhupendra Posadas RN  Outcome: Progressing  Flowsheets (Taken 1/12/2023 1915)  Care Plan - Patient's Chronic Conditions and Co-Morbidity Symptoms are Monitored and Maintained or Improved:   Monitor and assess patient's chronic conditions and comorbid symptoms for stability, deterioration, or improvement   Collaborate with multidisciplinary team to address chronic and comorbid conditions and prevent exacerbation or deterioration     Problem: Neurosensory - Adult  Goal: Achieves maximal functionality and self care  1/12/2023 2308 by Bhupendra Posadas RN  Outcome: Progressing  Flowsheets (Taken 1/12/2023 1915)  Achieves maximal functionality and self care: Encourage and assist patient to increase activity and self care with guidance from physical therapy/occupational therapy     Problem: Genitourinary - Adult  Goal: Absence of urinary retention  1/12/2023 2308 by Bhupendra Posadas RN  Outcome: Progressing  Flowsheets (Taken 1/12/2023 1915)  Absence of urinary retention:   Assess patients ability to void and empty bladder   Monitor intake/output and perform bladder scan as needed     Problem: Metabolic/Fluid and Electrolytes - Adult  Goal: Electrolytes maintained within normal limits  1/12/2023 2308 by Bhupendra Posadas RN  Outcome: Progressing  Flowsheets (Taken 1/12/2023 1915)  Electrolytes maintained within normal limits:   Monitor labs and assess patient for signs and symptoms of electrolyte imbalances   Administer electrolyte replacement as ordered   Monitor response to electrolyte replacements, including repeat lab results as appropriate     Problem: Metabolic/Fluid and Electrolytes - Adult  Goal: Hemodynamic stability and optimal renal function maintained  1/12/2023 2308 by Bhupendra Posadas RN  Outcome: Progressing  Flowsheets (Taken 1/12/2023 1915)  Hemodynamic stability and optimal renal function maintained:   Monitor labs and assess for signs and symptoms of volume excess or deficit   Monitor intake, output and patient weight   Monitor urine specific gravity, serum osmolarity and serum sodium as indicated or ordered   Monitor response to interventions for patient's volume status, including labs, urine output, blood pressure (other measures as available)     Problem: Metabolic/Fluid and Electrolytes - Adult  Goal: Glucose maintained within prescribed range  1/12/2023 2308 by Bhupendra Posadas RN  Outcome: Progressing  Flowsheets (Taken 1/12/2023 1915)  Glucose maintained within prescribed range:   Monitor blood glucose as ordered   Assess for signs and symptoms of hyperglycemia and hypoglycemia   Administer ordered medications to maintain glucose within target range

## 2023-01-13 NOTE — CARE COORDINATION
Discharge Planning    Abbie Hui at [facility] contacted and can accept referral for SN only. No Saturday visits open but they will put on the schedule for Sunday . Informed her to contact dtr Jeremias Donahue to set up appts.

## 2023-01-22 PROBLEM — U07.1 COVID-19: Status: ACTIVE | Noted: 2023-01-01

## 2023-01-22 NOTE — CONSULTS
Pulmonary Medicine and [address] Brittany Saenz MD      Patient - Neris Alcaraz   MRN -  [id number]   Acct # - [de-identified]   - 10/23/1932      Date of Admission -  2023 11:39 PM  Date of evaluation -  2023  Room - 6252/7547-   Shona Cadena MD Primary Care Physician - Dov Ayala MD     Reason for Consult    Hypoxia     Assessment   Acute hypoxic respiratory failure  COVID infection  History of CHF, A-fib, HTN  Right basilar infiltrate,? Pneumonia  Right pleural effusion  Elevated BNP,? Decompensated CHF versus pulmonary hypertension    Recommendations   IV decadron  Albuterol every 6 hours prn  Labs: CBC and BMP in am  2 liters/min via nasal cannula  Lasix x1  Echo with right heart pressures  IV Levaquin  Noncontrast CT of the chest to further evaluate right basilar lung field  DVT prophylaxis with low molecular weight heparin  Consult ID  Will follow with you    Problem List      Patient Active Problem List   Diagnosis    Lower GI bleed    Acute on chronic diastolic congestive heart failure (HCC)    Allergic rhinitis    Arthritis of right knee    Bilateral lower extremity edema    Chronic allergic conjunctivitis    Congestive heart failure (HCC)    Type 2 diabetes mellitus without complication, without long-term current use of insulin (HCC)    Essential hypertension    Non-pressure chronic ulcer of left calf with fat layer exposed ([address]    Overweight    Paroxysmal atrial fibrillation (HCC)    Venous ulcer of right leg (HCC)    Pseudogout of right knee    Vasomotor rhinitis    Sensorineural hearing loss (SNHL), bilateral    Serum creatinine raised    Tinnitus of both ears    Diarrhea    Chronic combined systolic (congestive) and diastolic (congestive) heart failure ([address]    COVID-19       HPI     Neris Alcaraz is 80 y.o., female, admitted because of shortness of breath. Her daughter recently tested positive for COVID.  She was recently discharged from the hospital on 1/13/23 which she was treated for CHF at that time. She denies any chest pain, fever, chills. She does have a productive cough. She denies history of COPD or asthma. She is on 2 L nasal cannula SPO2  of 97%.  She is tolerating orals    PMHx   Past Medical History      Diagnosis Date    Adenocarcinoma ([facility]     colon    Arthritis     Atrial fibrillation ([facility]     r/t sepsis     Cancer ([facility]     colon, skin    CHF (congestive heart failure) (HCC)     Chronic anticoagulation     Eliquis for a fib stroke prophylaxis    Diabetes mellitus ([facility]     Diverticulitis     Femur fracture (HCC)     Hypertension     Melanoma ([facility]     nose    Osteoporosis     Overweight     Sepsis ([facility]     Serum creatinine raised       Past Surgical History        Procedure Laterality Date    APPENDECTOMY      CHOLECYSTECTOMY      COLON SURGERY      FEMUR SURGERY      s/p fracture       Meds    Current Medications    sodium chloride flush  5-40 mL IntraVENous 2 times per day    enoxaparin  40 mg SubCUTAneous Daily    insulin lispro  0-4 Units SubCUTAneous TID WC    insulin lispro  0-4 Units SubCUTAneous Nightly    dexamethasone  6 mg Oral 2 times per day     sodium chloride flush, sodium chloride, potassium chloride **OR** potassium alternative oral replacement **OR** potassium chloride, magnesium sulfate, ondansetron **OR** ondansetron, polyethylene glycol, acetaminophen **OR** acetaminophen, glucose, dextrose bolus **OR** dextrose bolus, glucagon (rDNA), dextrose, albuterol  IV Drips/Infusions   sodium chloride      dextrose       Home Medications  Medications Prior to Admission: lactobacillus (BACID) TABS, Take 1 tablet by mouth daily  cholestyramine light 4 g packet, Take 1 packet by mouth 2 times daily  pantoprazole (PROTONIX) 40 MG tablet, Take 1 tablet by mouth every morning (before breakfast)  Cholecalciferol 50 MCG (2000 UT) CAPS, Take 2,000 Units by mouth daily  fexofenadine (ALLEGRA ALLERGY) 180 MG tablet, Take 180 mg by mouth daily  Cyanocobalamin 1000 MCG/15ML LIQD, Take 1,000 mcg by mouth daily  bumetanide (BUMEX) 2 MG tablet, Take 2 mg by mouth daily  carvedilol (COREG) 12.5 MG tablet, Take 12.5 mg by mouth 2 times daily (with meals)  sacubitril-valsartan (ENTRESTO) 24-26 MG per tablet, Take 1 tablet by mouth 2 times daily  loperamide (IMODIUM) 2 MG capsule, Take 2 mg by mouth daily  ibuprofen (ADVIL;MOTRIN) 200 MG tablet, Take 400 mg by mouth every 6 hours as needed for Pain  glimepiride (AMARYL) 4 MG tablet, Take 4 mg by mouth 2 times daily  Omega-3 Fatty Acids (FISH OIL PO), Take 1 capsule by mouth daily    Allergies    Acetaminophen-codeine, Codeine, Furosemide, Linagliptin, Sitagliptin, and Other  Social History     Social History     Tobacco Use    Smoking status: Never    Smokeless tobacco: Never   Substance Use Topics    Alcohol use: No     Family History    No family history on file. ROS - 11 systems   General Denies any fever or chills  HEENT Denies any diplopia, tinnitus or vertigo  Resp positive for cough with sputum and dyspnea  Cardiac Denies any chest pain, palpitations, claudication, positive edema  GI Denies any melena, hematochezia, hematemesis or pyrosis   Denies any frequency, urgency, hesitancy or incontinence  Heme Denies bruising or bleeding easily  Endocrine Denies any history of thyroid disease, positive diabetes  Neuro Denies any focal motor or sensory deficits  Psychiatric Denies anxiety, depression, suicidal ideation  Skin Denies rashes, itching, open sores    Vitals     height is 5' 4\" (1.626 m) and weight is 152 lb 6.4 oz (69.1 kg). Her oral temperature is 97.4 °F (36.3 °C). Her blood pressure is 105/55 (abnormal) and her pulse is 60. Her respiration is 20 and oxygen saturation is 92%. Body mass index is 26.16 kg/m².   I/O      Intake/Output Summary (Last 24 hours) at 1/22/2023 0758  Last data filed at 1/22/2023 0356  Gross per 24 hour   Intake 50 ml   Output --   Net 50 ml     I/O last 3 completed shifts: In: 48 [IV Piggyback:50]  Out: -    Patient Vitals for the past 96 hrs (Last 3 readings):   Weight   01/22/23 0436 152 lb 6.4 oz (69.1 kg)   01/21/23 2353 150 lb (68 kg)     Exam   General Appearance  Awake, alert, oriented, in no acute distress  HEENT - Head is normocephalic, atraumatic. Pupil reactive to light  Neck - Supple,  trachea midline and straight  Lungs - Diminished air exchange, no crackles or wheezing  Cardiovascular - Heart sounds are normal.  Regular rhythm normal rate without murmur, gallop or rub. Abdomen - Soft, nontender, nondistended, no masses or organomegaly  Neurologic - CN II-XII are grossly intact.  There are no focal motor or sensory deficits  Skin - No bruising or bleeding  Extremities - No cyanosis, clubbing, positive edema    Labs  - Old records and notes have been reviewed in [facility]   CBC     Lab Results   Component Value Date/Time    WBC 9.4 01/22/2023 12:27 AM    RBC 4.07 01/22/2023 12:27 AM    HGB 11.6 01/22/2023 12:27 AM    HCT 35.8 01/22/2023 12:27 AM     01/22/2023 12:27 AM    MCV 88.0 01/22/2023 12:27 AM    MCH 28.4 01/22/2023 12:27 AM    MCHC 32.3 01/22/2023 12:27 AM    RDW 15.4 01/22/2023 12:27 AM    LYMPHOPCT 8 01/22/2023 12:27 AM    MONOPCT 4 01/22/2023 12:27 AM    BASOPCT 0 01/22/2023 12:27 AM    MONOSABS 0.38 01/22/2023 12:27 AM    LYMPHSABS 0.75 01/22/2023 12:27 AM    EOSABS 0.00 01/22/2023 12:27 AM    BASOSABS 0.00 01/22/2023 12:27 AM    DIFFTYPE NOT REPORTED 02/09/2022 04:05 PM     BMP   Lab Results   Component Value Date/Time     01/22/2023 12:27 AM    K 3.4 01/22/2023 12:27 AM     01/22/2023 12:27 AM    CO2 17 01/22/2023 12:27 AM    BUN 40 01/22/2023 12:27 AM    CREATININE 1.00 01/22/2023 12:27 AM    GLUCOSE 208 01/22/2023 12:27 AM    CALCIUM 6.7 01/22/2023 12:27 AM    MG 0.8 01/22/2023 12:27 AM     LFTS  Lab Results   Component Value Date/Time    ALKPHOS 107 01/22/2023 12:27 AM    ALT 14 01/22/2023 12:27 AM    AST 25 01/22/2023 12:27 AM    PROT 5.2 01/22/2023 12:27 AM    BILITOT 0.3 01/22/2023 12:27 AM    LABALBU 2.8 01/22/2023 12:27 AM       PTT  Lab Results   Component Value Date    APTT 31.8 01/11/2023     INR   Lab Results   Component Value Date    INR 1.1 01/11/2023    PROTIME 14.4 (H) 01/11/2023       Radiology    CXR   1/21/23        (See actual reports for details)    \"Thank you for asking us to see this patient\"    Case discussed with patient. Questions and concerns addressed.     Aneta Chavarria RN   Patient seen, evaluated, and data collected for Brett Lawrence MD, [facility]

## 2023-01-22 NOTE — CARE COORDINATION
CASE MANAGEMENT NOTE:    Admission Date:  1/21/2023 Barrera Miller is a 80 y.o.  female    Admitted for : Hypomagnesemia [E83.42]  Acute on chronic congestive heart failure, unspecified heart failure type ([facility] [I50.9]  COVID-19 [U07.1]    Met with:  Family daughter  Comfort Sami Dumass/Kolby    PCP:  Sujit Luke MD                                Insurance:  [facility]      Is patient alert and oriented at time of discussion:  Yes    Current Residence/ Living Arrangements:  at home dependent on family care             Current Services PTA:  No    Does patient go to outpatient dialysis: No  If yes, location and chair time: n/a  Who is their nephrologist? N/a    Is patient agreeable to VNS: Yes    Freedom of choice provided:  Yes    List of [address] provided: No    VNS chosen:  Yes    DME:  straight cane, walker, shower chair, and other grab bars and elevated toilet seat    Home Oxygen: No    Nebulizer: No    CPAP/BIPAP: No    Supplier: N/A    Potential Assistance Needed: Yes    SNF needed: No    Freedom of choice and list provided: Yes    Pharmacy:  Lily England at [facility] and [facility]       Is patient currently receiving oral anticoagulation therapy? NA    Is the Patient an [facility] in [place] with Readmission Risk Score greater than 14%? Yes  If yes, pt needs a follow up appointment made within 7 days. Family Members/Caregivers that are willing and able to care for patient at home:    Yes    If yes, list name here:  Daughter Chhaya Alberts a physician Peds at [facility] educated on resources available including DME and respite care: Yes    Transportation Provider:  Ambulance used last time [place] 2 steps in front door and wider doorway, back door renovated for easier walking in from car.              Discharge Plan:  The Plan for Transition of Care is related to the following treatment goals:Pt has only one goal to go home and remain independent yet that is not possible while she is so ill. Last stay [address] accepted pt for in home nursing only. Once at home pt related her back hurt after PT/OT AT home so she usually refuses it. However in the hospital she appears cooperative with PT/OT to some extent. [address] had cared for pt in the past but did not have a spot last time then after her return home they did call her. So [address] before they came out decided to contact [facility] to make sure only one agency will try to go out. Pt then got sick before any one could set up a home visit,. Will ask pt again through daughter if she would consider [facility]. She will have to cooperate with therapy. Will work with both home care agencies and see who has a spot for pt if she refuses in patient care. Daughter was provided a list of private [employer] last time and she states she still has that information if needed. The Patient and/or patient representative daughter was provided with a choice of provider and agrees   with the discharge plan. [x] Yes [] No    Freedom of choice list was provided with basic dialogue that supports the patient's individualized plan of care/goals, treatment preferences and shares the quality data associated with the providers.  [x] Yes [] No                  Electronically signed by: AV Fletcher, YOAN on 1/22/2023 at 10:52 AM

## 2023-01-22 NOTE — PROGRESS NOTES
Return call received from Dr Chelsie Saravia, updated on patient, hx, physical assessment/edema, labs, medications and telemetry reviewed, order received to discontinue p.o. bumex and begin I.V. bumex, 1 mg twice daily with first dose this evening, voiced concern with patient not being on anticoagulant, requests that attending be notified and address

## 2023-01-22 NOTE — DISCHARGE INSTR - COC
Continuity of Care Form    Patient Name: Angy Camarillo   :  10/23/1932  MRN:  [id number]    Admit date:  2023  Discharge date:  ***    Code Status Order: Full Code   Advance Directives:     Admitting Physician:  Luís Hernandez MD  PCP: Jerica Springer MD    Discharging Nurse: [facility] Unit/Room#: 2913/8223-36  Discharging Unit Phone Number: ***    Emergency Contact:   Extended Emergency Contact Information  Primary Emergency Contact: Mary Cristobal  Address: [address]  Home Phone: [phone]  Relation: Child    Past Surgical History:  Past Surgical History:   Procedure Laterality Date    APPENDECTOMY      CHOLECYSTECTOMY      COLON SURGERY      FEMUR SURGERY      s/p fracture       Immunization History: There is no immunization history on file for this patient.     Active Problems:  Patient Active Problem List   Diagnosis Code    Lower GI bleed K92.2    Acute on chronic diastolic congestive heart failure (HCC) I50.33    Allergic rhinitis J30.9    Arthritis of right knee M17.11    Bilateral lower extremity edema R60.0    Chronic allergic conjunctivitis H10.45    Congestive heart failure ([facility]) I50.9    Type 2 diabetes mellitus without complication, without long-term current use of insulin ([facility]) E11.9    Essential hypertension I10    Non-pressure chronic ulcer of left calf with fat layer exposed ([facility]) L97.222    Overweight E66.3    Paroxysmal atrial fibrillation ([facility]) I48.0    Venous ulcer of right leg ([facility]) I83.019, L97.919    Pseudogout of right knee M11.261    Vasomotor rhinitis J30.0    Sensorineural hearing loss (SNHL), bilateral H90.3    Serum creatinine raised R79.89    Tinnitus of both ears H93.13    Diarrhea R19.7    Chronic combined systolic (congestive) and diastolic (congestive) heart failure ([facility]) I50.42    COVID-19 U07.1       Isolation/Infection:   Isolation            Droplet Plus  Contact and Droplet          Patient Infection Status       Infection Onset Added Last Indicated Last Indicated By Review Planned Expiration Resolved Resolved By    COVID-19 01/22/23 01/22/23 01/22/23 COVID-19, Rapid 02/01/23 02/05/23      Resolved    COVID-19 (Rule Out) 01/22/23 01/22/23 01/22/23 COVID-19, Rapid (Ordered)   01/22/23 Rule-Out Test Resulted    C-diff Rule Out 01/10/23 01/10/23 01/11/23 C. difficile toxin Molecular [facility], [facility], [facility] - ONLY (Ordered)   01/12/23 Rule-Out Test Resulted            Nurse Assessment:  Last Vital Signs: /82   Pulse 73   Temp 97.5 °F (36.4 °C) (Oral)   Resp 16   Ht 5' 4\" (1.626 m)   Wt 152 lb 6.4 oz (69.1 kg)   SpO2 93%   BMI 26.16 kg/m²     Last documented pain score (0-10 scale):    Last Weight:   Wt Readings from Last 1 Encounters:   01/22/23 152 lb 6.4 oz (69.1 kg)     Mental Status:  {IP PT MENTAL STATUS:20030}    IV Access:  { BENEDICT IV ACCESS:688742451}    Nursing Mobility/ADLs:  Walking   {P DME YQZT:198941520}  Transfer  {P DME EHFF:324129094}  Bathing  {P DME KOFU:343552038}  Dressing  {P DME XYEL:286507487}  Toileting  {P DME MLTW:256063677}  Feeding  {[facility] DME KWVD:787142072}  Med Admin  {P DME KZKX:421351009}  Med Delivery   { BENEDICT MED Delivery:765711907}    Wound Care Documentation and Therapy:  Wound 01/10/23 Pretibial Right (Active)   Dressing Status Dry; Intact 01/22/23 0935   Wound Cleansed Cleansed with saline 01/22/23 0441   Dressing/Treatment Non adherent;Petroleum impregnated gauze; Ace wrap 01/22/23 0441   Wound Assessment Granulation tissue;Pink/red 01/22/23 0441   Drainage Amount Scant 01/22/23 0441   Drainage Description Serosanguinous 01/22/23 0441   Odor None 01/22/23 0441   Jocelynn-wound Assessment Intact; Blanchable erythema 01/22/23 0441   Number of days: 12        Elimination:  Continence:    Bowel: {YES / RB:89488}  Bladder: {YES / DN:10993}  Urinary Catheter: {Urinary Catheter:631174468}   Colostomy/Ileostomy/Ileal Conduit: {YES / EE:27245}       Date of Last BM: ***    Intake/Output Summary (Last 24 hours) at 2023 1337  Last data filed at 2023 0356  Gross per 24 hour   Intake 50 ml   Output --   Net 50 ml     I/O last 3 completed shifts: In: 48 [IV Piggyback:50]  Out: -     Safety Concerns:     [address] Concerns:201146360}    Impairments/Disabilities:      [address] Impairments/Disabilities:975805039}    Nutrition Therapy:  Current Nutrition Therapy:   [address] BENEDICT Diet List:367891163}    Routes of Feeding: {CHP DME Other Feedings:282904001}  Liquids: {Slp liquid thickness:02185}  Daily Fluid Restriction: {CHP DME Yes amt example:915008013}  Last Modified Barium Swallow with Video (Video Swallowing Test): {Done Not Done LMLI:848258016}    Treatments at the Time of Hospital Discharge:   Respiratory Treatments: ***  Oxygen Therapy:  {Therapy; copd oxygen:39989}  Ventilator:    { CC Vent YRFB:744455245}    Rehab Therapies: {THERAPEUTIC INTERVENTION:7400312436}  Weight Bearing Status/Restrictions: [address] CC Weight Bearin}  Other Medical Equipment (for information only, NOT a DME order):  {EQUIPMENT:802280704}  Other Treatments: ***    Patient's personal belongings (please select all that are sent with patient):  {CHP DME Belongings:800711097}    RN SIGNATURE:  {Esignature:202187718}    CASE MANAGEMENT/SOCIAL WORK SECTION    Inpatient Status Date: ***    Readmission Risk Assessment Score:  Readmission Risk              Risk of Unplanned Readmission:  18           Discharging to [facility]/ [address] [place] [address] Avenue: [address] [place] [address]  Address:  [address] [place] [address], [address]  Phone:   [phone]  Fax:   [phone]     / signature: Electronically signed by AV Kincaid, YOAN on 23 at 1:37 PM EST    PHYSICIAN SECTION    Prognosis: {Prognosis:2260048058}    Condition at Discharge: [address] Patient Condition:350666132}    Rehab Potential (if transferring to Rehab): {Prognosis:4922923869}    Recommended Labs or Other Treatments After Discharge: ***    -RLE wound care: Cleanse with soap and water, rinse, pat dry. Cover wound with oil emulsion dressing, reinforce with ABD pad, secure with roll gauze. Change dressing every day and as needed if loose or soiled. -Ace wrap to both lower legs. Start with 4 inch wrap just above toes to ankle, then use 6 in wrap up to just below knees. Attempt to overlap layers 50/50. Rewrap daily. Ace wrap may come off at HS, back on in AM.     Physician Certification: I certify the above information and transfer of Jan Churchill  is necessary for the continuing treatment of the diagnosis listed and that she requires {Admit to Appropriate Level of Care:54262} for {GREATER/LESS:416176684} 30 days.      Update Admission H&P: {CHP DME Changes in QPWNL:029966141}    PHYSICIAN SIGNATURE:  {Esignature:092571172}

## 2023-01-22 NOTE — PROGRESS NOTES
Pt arrived to room 1014 via transport from [facility] ED. Pt moved into the bed, telemetry applied, vital signs taken. Pt oriented to room. Admitting service notified of pt's arrival to unit. Admission assessment and database initiated. Pt denies further needs at this time.

## 2023-01-22 NOTE — CARE COORDINATION
Social work: with daughter's support sent referral to [facility] and [address] home care to follow along for possible needs. Advised daughter only Amara Mckeon and Lisa are currently taking covid patients if snf is needed. Will need aiden and Rx at discharge. [facility] is following along and has accepted the referral in case pt goes home as she usually does.  [facility]

## 2023-01-22 NOTE — PLAN OF CARE
Problem: Discharge Planning  Goal: Discharge to home or other facility with appropriate resources  Outcome: Progressing  Flowsheets (Taken 1/22/2023 0622)  Discharge to home or other facility with appropriate resources:   Identify barriers to discharge with patient and caregiver   Arrange for needed discharge resources and transportation as appropriate   Identify discharge learning needs (meds, wound care, etc)   Refer to discharge planning if patient needs post-hospital services based on physician order or complex needs related to functional status, cognitive ability or social support system     Problem: Skin/Tissue Integrity  Goal: Absence of new skin breakdown  Description: 1. Monitor for areas of redness and/or skin breakdown  2. Assess vascular access sites hourly  3. Every 4-6 hours minimum:  Change oxygen saturation probe site  4. Every 4-6 hours:  If on nasal continuous positive airway pressure, respiratory therapy assess nares and determine need for appliance change or resting period.   Outcome: Progressing     Problem: Safety - Adult  Goal: Free from fall injury  Outcome: Progressing     Problem: ABCDS Injury Assessment  Goal: Absence of physical injury  Outcome: Progressing  Flowsheets (Taken 1/22/2023 0622)  Absence of Physical Injury: Implement safety measures based on patient assessment

## 2023-01-22 NOTE — PROGRESS NOTES
Blood sugar 366, pt continues to refuse insulin, Dr Nayeli Parry updated and new orders received, dgjag hankins called and updated

## 2023-01-22 NOTE — H&P
History & Physical  [facility]    Adult Hospitalist      Name: Dimas Lim  MRN: [id number]     Acct: [de-identified]  Room: [address] [place], [place] [address]    Admit Date: 1/21/2023 11:39 PM  PCP: Akbar Ellis MD    Primary Problem  Principal Problem:    COVID-19  Resolved Problems:    * No resolved hospital problems. *        Assesment:     Acute on chronic congestive heart failure  COVID-19 pneumonia  Nonvaccinated against SARS-CoV-2  Respiratory failure with hypoxia requiring oxygen via nasal cannula  Hypomagnesemia  Hypokalemia  Question of bacterial pneumonia  Essential hypertension  Diabetes mellitus type 2  Paroxysmal atrial fibrillation  Congestive heart failure, type unknown  Osteoarthritis, multiple joints  History of colon cancer  History of skin cancer  Right eye vision loss        Plan:     Admit to progressive  Monitor vitals closely  Keep SPO2 above 90%  I's and O's  IV heplock  Pain control  Antiemetics as needed  Levaquin, Decadron  Bumex, Coreg, Entresto  Accu-Cheks before meals and at bedtime  Insulin sliding scale medium  Hypoglycemia protocol  And has elevated blood glucose and is refusing insulin  CBC , BMP, BNP  Pulmonary consulted  Discussed with daughter  Consult cardiology  Cardiology recommend heparin infusion until they can evaluate patient and determine why she is not on anticoagulation  DVT and GI prophylaxis. Chief Complaint:     Chief Complaint   Patient presents with    Shortness of Breath         History of Present Illness:      Dimas Lim is a 80 y.o.  female who presents with Shortness of Breath    Patient admitted to the emergency room at [facility] where she presented with dyspnea and neck pain. Patient had recently been discharged from the hospital where she was [facility] by [facility] for congestive heart failure. Patient says she was progressively getting dyspneic initially with exertion and recently even at rest.  As she had recently had cough as well.   Says it has mostly been dry. Her daughter was recently found to have COVID-19. Patient says she has not been immunized against COVID, influenza or pneumonia. She says flu and pneumonia shots made her sick. Never got them again. Regarding COVID-19 she says she lives in her own space and does not interact with people so she did not get it. Says she feels better since admission to the hospital.  Says her breathing and cough are better. She denies any chest pain, nausea, vomiting or abdominal pain. She is more concerned about her right IJ. 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 Denies headache, low back pain, dysuria, rash or hematuria    Patient was not found to be on any anticoagulation. Cardiology recommended keeping her on heparin infusion until they could figure that out. RN will inform daughter Sonia Moore that additional input from her    I have personally reviewed the past medical history, past surgical history, medications, social history, and family history, and summarized in the note. Review of Systems:     All 10 point system is reviewed and negative otherwise mentioned in HPI.       Past Medical History:     Past Medical History:   Diagnosis Date    Adenocarcinoma ([address]     colon    Arthritis     Atrial fibrillation ([address]     r/t sepsis     Cancer ([address]     colon, skin    CHF (congestive heart failure) (HCC)     Chronic anticoagulation     Eliquis for a fib stroke prophylaxis    Diabetes mellitus ([address]     Diverticulitis     Femur fracture (HCC)     Hypertension     Melanoma ([address]     nose    Osteoporosis     Overweight     Sepsis ([address]     Serum creatinine raised         Past Surgical History:     Past Surgical History:   Procedure Laterality Date    APPENDECTOMY      CHOLECYSTECTOMY      COLON SURGERY      FEMUR SURGERY      s/p fracture        Medications Prior to Admission:       Prior to Admission medications    Medication Sig Start Date End Date Taking? Authorizing Provider   lactobacillus (BACID) TABS Take 1 tablet by mouth daily 1/14/23   Fernandez Genera P Blood, DO   cholestyramine light 4 g packet Take 1 packet by mouth 2 times daily 1/13/23   Fernandez Genera P Blood, DO   pantoprazole (PROTONIX) 40 MG tablet Take 1 tablet by mouth every morning (before breakfast) 1/14/23   [employer] Maki Blood, DO   Cholecalciferol 50 MCG (2000 UT) CAPS Take 2,000 Units by mouth daily 4/8/18   Historical Provider, MD   fexofenadine (ALLEGRA) 180 MG tablet Take 180 mg by mouth daily    Historical Provider, MD   Cyanocobalamin 1000 MCG/15ML LIQD Take 1,000 mcg by mouth daily    Historical Provider, MD   bumetanide (BUMEX) 2 MG tablet Take 2 mg by mouth daily    Historical Provider, MD   carvedilol (COREG) 12.5 MG tablet Take 12.5 mg by mouth 2 times daily (with meals)    Historical Provider, MD   sacubitril-valsartan (ENTRESTO) 24-26 MG per tablet Take 1 tablet by mouth 2 times daily    Historical Provider, MD   loperamide (IMODIUM) 2 MG capsule Take 2 mg by mouth daily  Patient not taking: Reported on 1/22/2023    Historical Provider, MD   ibuprofen (ADVIL;MOTRIN) 200 MG tablet Take 400 mg by mouth every 6 hours as needed for Pain    Historical Provider, MD   glimepiride (AMARYL) 4 MG tablet Take 4 mg by mouth 2 times daily    Historical Provider, MD   Omega-3 Fatty Acids (FISH OIL PO) Take 1 capsule by mouth daily  Patient not taking: Reported on 1/22/2023    Historical Provider, MD        Allergies:       Acetaminophen-codeine, Codeine, Furosemide, Linagliptin, Sitagliptin, and Other    Social History:     Tobacco:    reports that she has never smoked. She has never used smokeless tobacco.  Alcohol:      reports no history of alcohol use. Drug Use:  reports no history of drug use. Family History:     No family history on file.       Physical Exam:     Vitals:  /82   Pulse 73   Temp 97.5 °F (36.4 °C) (Oral)   Resp 16   Ht 5' 4\" (1.626 m)   Wt 152 lb 6.4 oz (69.1 kg)   SpO2 93%   BMI 26.16 kg/m²   Temp (24hrs), Av.5 °F (36.4 °C), Min:97.4 °F (36.3 °C), Max:97.6 °F (36.4 °C)      General appearance - alert, well appearing, and in no acute distress  Mental status - oriented to person, place, and time with normal affect  Head - normocephalic and atraumatic  Eyes - extraocular eye movements intact, conjunctiva clear.   Right cornea haze pupillary dilatation  Ears - hearing appears to be intact  Nose - no drainage noted  Mouth - mucous membranes moist  Neck - supple, no carotid bruits, thyroid not palpable  Chest -coarse crackles to auscultation, normal effort  Heart - normal rate, irregular rhythm, no murmur  Abdomen - soft, nontender, nondistended, bowel sounds present all four quadrants, no masses, hepatomegaly or splenomegaly  Neurological - normal speech, no focal findings or movement disorder noted, cranial nerves II through XII grossly intact  Extremities - peripheral pulses palpable, no pedal edema or calf pain with palpation  Skin - no gross lesions, rashes, or induration noted        Data:     Labs:    Hematology:  Recent Labs     23   WBC 9.4   RBC 4.07   HGB 11.6*   HCT 35.8*   MCV 88.0   MCH 28.4   MCHC 32.3   RDW 15.4      MPV 8.8     Chemistry:  Recent Labs     23  1114     --    K 3.4*  --      --    CO2 17*  --    GLUCOSE 208*  --    BUN 40*  --    CREATININE 1.00*  --    MG 0.8* 1.4*   ANIONGAP 17  --    LABGLOM 54*  --    CALCIUM 6.7*  --    PROBNP 7,057*  --    TROPHS 72*  --      Recent Labs     23  0027 23  0546 23  0844 23  1216   PROT 5.2*  --   --   --    LABALBU 2.8*  --   --   --    AST 25  --   --   --    ALT 14  --   --   --    ALKPHOS 107*  --   --   --    BILITOT 0.3  --   --   --    POCGLU  --  201* 208* 273*       Lab Results   Component Value Date    INR 1.1 2023    PROTIME 14.4 (H) 2023       Lab Results   Component Value Date/Time SPECIAL NOT REPORTED 11/05/2017 01:55 PM     Lab Results   Component Value Date/Time    CULTURE ESCHERICHIA COLI >690571 CFU/ML (A) 10/20/2022 05:48 PM       No results found for: POCPH, PHART, PH, POCPCO2, ARS8AUS, PCO2, POCPO2, PO2ART, PO2, POCHCO3, SVW0XFK, HCO3, NBEA, PBEA, BEART, BE, THGBART, THB, ABF3PGQ, FXMQ0CTR, C0IRYHMM, O2SAT, FIO2    Radiology:    XR CHEST PORTABLE    Result Date: 1/21/2023  New small right effusion and airspace disease. Possible mild CHF. All radiological studies reviewed                Code Status:  Full Code    Electronically signed by Yolette Parson MD on 1/22/2023 at 2:35 PM     Copy sent to Dr. Luisa Saucedo MD    This note was created with the assistance of a speech-recognition program.  Although the intention is to generate a document that actually reflects the content of the visit, no guarantees can be provided that every mistake has been identified and corrected by editing. Note was updated later by me after  physical examination and  completion of the assessment.

## 2023-01-22 NOTE — PROGRESS NOTES
Dr Christen Lyman and Dr Jarad Ceron notified of Chest CT results:  Multifocal ground-glass opacities indeterminate for COVID-19. Right greater   than left pleural effusions. Ileus. Cardiomegaly and coronary artery   disease.            No change to plan of care at this time

## 2023-01-22 NOTE — ED NOTES
[facility] called for admission to [facility].       Shannan Thayer, RN  01/22/23 6895

## 2023-01-22 NOTE — ED PROVIDER NOTES
[address] [facility] E ED  EMERGENCY DEPARTMENT ENCOUNTER      Pt Name: Enio Gaines  MRN: [id number]  [place] 10/23/1932  Date of evaluation: 1/21/2023  Provider: Philip Santana MD    CHIEF COMPLAINT       Chief Complaint   Patient presents with    Shortness of Breath       CRITICAL CARE TIME   Total Critical Care time was 20 minutes, excluding separately reportable procedures. There was a high probability of clinically significant/life threatening deterioration in the patient's condition which required my urgent intervention. HISTORY OF PRESENT ILLNESS  (Location/Symptom, Timing/Onset, Context/Setting, Quality, Duration, Modifying Factors, Severity.)   Enio Gaines is a 80 y.o. female who presents to the emergency department complaining of shortness of breath as well as some neck pain. She recently was discharged from the hospital for CHF and also had some investigation into the neck pain but no one has been able to find anything apparently per EMS. Tonight she called because she was having some shortness of breath and the neck pain again. She has been in multiple times for the symptoms it appears by reading her medical history in the chart review. She relates she does not really feel like she is having any other pain. No chest pain. She feels like she does have some swelling but that is pretty typical.  No fever. Her daughter has recently tested positive for COVID. She herself does have a cough. Nursing Notes were reviewed. REVIEW OF SYSTEMS    (2-9 systems for level 4, 10 or more for level 5)     Review of Systems   Constitutional:  Negative for activity change, appetite change, chills, fatigue and fever. HENT:  Negative for congestion, ear pain and sore throat. Eyes:  Negative for pain, discharge and redness. Respiratory:  Positive for cough and shortness of breath. Negative for wheezing and stridor. Cardiovascular:  Negative for chest pain.    Gastrointestinal:  Negative for abdominal pain, constipation, diarrhea, nausea and vomiting. Genitourinary:  Negative for decreased urine volume and difficulty urinating. Musculoskeletal:  Positive for neck pain. Negative for arthralgias and myalgias. Skin:  Negative for color change and rash. Neurological:  Negative for dizziness, weakness and headaches. Psychiatric/Behavioral:  Negative for behavioral problems and confusion. Except as noted above the remainder of the review of systems was reviewed and negative.        PAST MEDICAL HISTORY     Past Medical History:   Diagnosis Date    Adenocarcinoma ([facility]     colon    Arthritis     [facility]     r/t sepsis     Cancer ([facility]     colon, skin    CHF (congestive heart failure) (HCC)     Chronic anticoagulation     Eliquis for a fib stroke prophylaxis    Diabetes mellitus ([facility]     Diverticulitis     Femur fracture (HCC)     Hypertension     Melanoma ([facility]     nose    Osteoporosis     Overweight     Sepsis ([facility]     Serum creatinine raised        SURGICAL HISTORY       Past Surgical History:   Procedure Laterality Date    APPENDECTOMY      CHOLECYSTECTOMY      COLON SURGERY      FEMUR SURGERY      s/p fracture       CURRENT MEDICATIONS       Previous Medications    BUMETANIDE (BUMEX) 2 MG TABLET    Take 2 mg by mouth daily    CARVEDILOL (COREG) 12.5 MG TABLET    Take 12.5 mg by mouth 2 times daily (with meals)    CHOLECALCIFEROL 50 MCG (2000 UT) CAPS    Take 2,000 Units by mouth daily    CHOLESTYRAMINE LIGHT 4 G PACKET    Take 1 packet by mouth 2 times daily    CYANOCOBALAMIN 1000 MCG/15ML LIQD    Take 1,000 mcg by mouth daily    FEXOFENADINE (ALLEGRA ALLERGY) 180 MG TABLET    Take 180 mg by mouth daily    GLIMEPIRIDE (AMARYL) 4 MG TABLET    Take 4 mg by mouth 2 times daily    IBUPROFEN (ADVIL;MOTRIN) 200 MG TABLET    Take 400 mg by mouth every 6 hours as needed for Pain    LACTOBACILLUS (BACID) TABS    Take 1 tablet by mouth daily    LOPERAMIDE (IMODIUM) 2 MG CAPSULE Take 2 mg by mouth daily    OMEGA-3 FATTY ACIDS (FISH OIL PO)    Take 1 capsule by mouth daily    PANTOPRAZOLE (PROTONIX) 40 MG TABLET    Take 1 tablet by mouth every morning (before breakfast)    SACUBITRIL-VALSARTAN (ENTRESTO) 24-26 MG PER TABLET    Take 1 tablet by mouth 2 times daily       ALLERGIES     Acetaminophen-codeine, Codeine, Furosemide, Linagliptin, Sitagliptin, and Other    FAMILY HISTORY     No family history on file. Family Status   Relation Name Status    Mother      Father          SOCIAL HISTORY      reports that she has never smoked. She has never used smokeless tobacco. She reports that she does not drink alcohol and does not use drugs. PHYSICAL EXAM    (up to 7 for level 4, 8 or more for level 5)     ED Triage Vitals [23 2353]   BP Temp Temp Source Heart Rate Resp SpO2 Height Weight   (!) 95/58 97.6 °F (36.4 °C) Oral 66 28 91 % 5' 4\" (1.626 m) 150 lb (68 kg)     Physical Exam  Vitals and nursing note reviewed. Constitutional:       General: She is not in acute distress. Appearance: She is well-developed. She is not ill-appearing, toxic-appearing or diaphoretic. HENT:      Head: Normocephalic and atraumatic. Right Ear: External ear normal.      Left Ear: External ear normal.      Nose: Nose normal.      Mouth/Throat:      Mouth: Mucous membranes are dry. Eyes:      General:         Right eye: No discharge. Left eye: No discharge. Conjunctiva/sclera: Conjunctivae normal.      Pupils: Pupils are equal, round, and reactive to light. Cardiovascular:      Rate and Rhythm: Normal rate and regular rhythm. Heart sounds: Normal heart sounds. No murmur heard. Pulmonary:      Effort: Pulmonary effort is normal. No respiratory distress. Breath sounds: No wheezing, rhonchi or rales. Chest:      Chest wall: No tenderness. Abdominal:      General: Bowel sounds are normal. There is no distension. Palpations: Abdomen is soft.  There is no mass. Tenderness: There is no abdominal tenderness. There is no guarding or rebound. Musculoskeletal:         General: Swelling present. Normal range of motion. Cervical back: Normal range of motion and neck supple. Right lower leg: Edema present. Left lower leg: Edema present. Skin:     General: Skin is warm. Coloration: Skin is not pale. Findings: No rash. Neurological:      General: No focal deficit present. Mental Status: She is alert and oriented to person, place, and time. Motor: No abnormal muscle tone. Psychiatric:         Mood and Affect: Mood normal.         Behavior: Behavior normal.        DIAGNOSTIC RESULTS     EKG: All EKG's are interpreted by the Emergency Department Physician who either signs or Co-signs this chart in the absence of a cardiologist.    EKG Interpretation    Interpreted by me    Rhythm: atrial fibrillation -regular rate   rate: Regular  Axis: Left  Ectopy: none  Conduction: irregularly irregular with nonspecific intraventricular block  ST Segments: nonspecific changes  T Waves: no acute change  Q Waves: nonspecific    Clinical Impression: atrial fibrillation with regular ventricular rate    RADIOLOGY:   Non-plain film images such as CT, Ultrasound and MRI are read by the radiologist.   Interpretation per the Radiologist below, if available at the time of this note:    XR CHEST PORTABLE   Final Result   New small right effusion and airspace disease. Possible mild CHF.                ED BEDSIDE ULTRASOUND:   Performed by ED Physician - none    LABS:  Labs Reviewed   COVID-19, RAPID - Abnormal; Notable for the following components:       Result Value    SARS-CoV-2, Rapid DETECTED (*)     All other components within normal limits   CBC WITH AUTO DIFFERENTIAL - Abnormal; Notable for the following components:    Hemoglobin 11.6 (*)     Hematocrit 35.8 (*)     Seg Neutrophils 88 (*)     Lymphocytes 8 (*)     Eosinophils % 0 (*)     Segs Absolute 8.27 (*)     Absolute Lymph # 0.75 (*)     All other components within normal limits   COMPREHENSIVE METABOLIC PANEL - Abnormal; Notable for the following components:    Glucose 208 (*)     BUN 40 (*)     Creatinine 1.00 (*)     Est, Glom Filt Rate 54 (*)     Calcium 6.7 (*)     Potassium 3.4 (*)     CO2 17 (*)     Alkaline Phosphatase 107 (*)     Total Protein 5.2 (*)     Albumin 2.8 (*)     All other components within normal limits   MAGNESIUM - Abnormal; Notable for the following components:    Magnesium 0.8 (*)     All other components within normal limits   TROPONIN - Abnormal; Notable for the following components:    Troponin, High Sensitivity 72 (*)     All other components within normal limits   BRAIN NATRIURETIC PEPTIDE - Abnormal; Notable for the following components:    Pro-BNP 7,057 (*)     All other components within normal limits       All other labs were within normal range or not returned as of this dictation. EMERGENCY DEPARTMENT COURSE and DIFFERENTIAL DIAGNOSIS/MDM:   Vitals:    Vitals:    01/21/23 2353   BP: (!) 95/58   Pulse: 66   Resp: 28   Temp: 97.6 °F (36.4 °C)   TempSrc: Oral   SpO2: 91%   Weight: 68 kg (150 lb)   Height: 5' 4\" (1.626 m)     Number and complexity of problems addressed at the encounter suggest high MDM level: We did discuss lab work and imaging. I am concerned that the patient has recently been in the hospital multiple times for the same complaint (CHF - 1 or more chronic illness with progression). Also there is some history of COVID exposure as well. So would like to check for this. I think the neck is already been evaluated but it is clear to me that her position is most likely the cause of her pain. She is sitting in the bed and when she is resting her head is down with her chin nearly resting on her chest.  So I am sure this is causing the pain that she is feeling. Her daughter does eventually come in and say that she feels the same.   That this is how she falls asleep most days. Amount and/or complexity of data to be reviewed and analyzed suggest high MDM level:    I did review prior charting in our chart review. I have also spoken with daughter at bedside for history as well as the patient. I have reviewed lab work. I did order imaging and lab work as well as EKG. I have reviewed EKG. I did discuss this management and test interpretation with family at bedside. I think that she does require oxygen and therefore will need admission due to acute on chronic CHF with COVID on top of this. Also we discussed that her magnesium is quite low as well and will need replaced. They are agreeable. She was last admitted at [facility]. [facility] complications and/or morbidity or mortality of patient management RISK:   Patient does require admission and we did have a decision regarding this with patient and family at bedside. She will likely need therapy to help reduce fluid with the CHF exacerbation but oxygen associated with COVID and CHF exacerbation. She will also require magnesium which will require monitoring for any EKG changes. She will require cardiac monitoring and oxygen saturation monitoring as well. I did speak with hospitalist for [address] group Larene Lunch) and she accepts the patient for admission. No further orders for us in the emergency setting. She relates she is working with physician, Dr. Magaly Rouse. CONSULTS:  None    PROCEDURES:  None    FINAL IMPRESSION      1. Acute on chronic congestive heart failure, unspecified heart failure type ([facility]    2. COVID-19    3. Hypomagnesemia          DISPOSITION/PLAN   DISPOSITION Decision To Admit 01/22/2023 01:36:02 AM      PATIENT REFERRED TO:  No follow-up provider specified.     DISCHARGE MEDICATIONS:  New Prescriptions    No medications on file       (Please note that portions of this note were completed with a voice recognition program.  Efforts were made to edit the dictations but occasionally words are mis-transcribed.)    Marcello Walters MD  Attending Emergency Physician       Marcello Walters MD  01/22/23 Ihsan Gonzales MD  01/22/23 1993

## 2023-01-22 NOTE — PROGRESS NOTES
Pt refused insulin, states that she does take insulin and doesn't need it, patient educated on need, states that she has lived to be [de-identified] with blood sugars greater than 300, encouragement provided with no change in decision

## 2023-01-22 NOTE — PROGRESS NOTES
Consult called to Dr Radha Fleming for consultation through answering service, informed that Dr Harris Castaneda is covering, information and contact number provided

## 2023-01-22 NOTE — ED TRIAGE NOTES
Patient states she is terribly dehydrated. Last time she was seen in the ED patient had worse diarrhea than normal. Patient states she it has resolved.    Patient states she has had a cough

## 2023-01-23 NOTE — PROGRESS NOTES
Call received from Dr Back Screen, states that based on hx, patient was on eliquis previously, instructed to reach out to dgtr to confirm if this is a current medication, dgtr in visiting, states that patient is not currently on Eliquis and that this medication was discontinued secondary to G. I. bleed, on-coming nurse Isa DE LEON updated, states she will follow through

## 2023-01-23 NOTE — CONSULTS
[facility]  Initial Consult Note  Date: 1/23/2023    Hospital day :1     Impression:   COVID-19 virus infection tested + 1/22/2023  Unvaccinated adult patient  Acute respiratory insufficiency requiring supplemental oxygen at 4 L  Congestive heart failure with mild congestive changes on chest x-ray  Multifocal opacities in the bases  Right-sided greater than left-sided pleural effusion    Recommendations   COVID therapy: The patient does have some hypoxia which is likely multifactorial from pneumonia/CHF  At this time is difficult to determine when the infection onset was and therefore I will hold off antiviral therapy  I agree with IV Decadron therapy  Check CRP but I do not feel that immunosuppressive therapy is indicated at this time  Continue levofloxacin for possible antibacterial coverage  I will follow her clinical progress and adjust therapy accordingly    Chief complaint/reason for consultation:   COVID-19 virus infection    History of Present Illness:   Marcia Cummins is a 80y.o.-year-old female who was initially admitted on 1/21/2023. 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 She does not report any subjective fevers, chills no significant cough, no abdominal pain, no nausea vomiting, has chronic diarrhea. The patient was reporting some neck pain and again the neck pain has been previously evaluated with no source found.   She reports that her daughter recently tested positive for COVID and work-up in the emergency department included a chest x-ray that showed a new small right effusion and airspace disease and possible mild CHF. COVID testing was done that was positive and the patient was admitted with COVID-19 virus infection as well as concern for congestive heart failure. The patient has since been seen by the pulmonary critical care service and was started on supplemental oxygen, IV Decadron, intravenous levofloxacin as there was concern for right basilar infiltrate/pneumonia and I was asked to evaluate and help with antibiotic choice. I have personally reviewed the past medical history, past surgical history, medications, social history, and family history, and I have updated the database accordingly.   Past Medical History:     Past Medical History:   Diagnosis Date    Adenocarcinoma ([facility]     colon    Arthritis     Atrial fibrillation (HCC)     r/t sepsis     [facility]     colon, skin    CHF (congestive heart failure) (HCC)     Chronic anticoagulation     Eliquis for a fib stroke prophylaxis    Diabetes mellitus ([facility]     Diverticulitis     Femur fracture (HCC)     Hypertension     Melanoma ([facility]     nose    Osteoporosis     Overweight     Sepsis ([facility]     Serum creatinine raised      Past Surgical  History:     Past Surgical History:   Procedure Laterality Date    APPENDECTOMY      CHOLECYSTECTOMY      COLON SURGERY      FEMUR SURGERY      s/p fracture     Medications:      sodium chloride flush  5-40 mL IntraVENous 2 times per day    dexamethasone  6 mg Oral 2 times per day    levofloxacin  750 mg IntraVENous Q48H    bumetanide  2 mg Oral Daily    carvedilol  12.5 mg Oral BID WC    sacubitril-valsartan  1 tablet Oral BID    Vitamin D  2,000 Units Oral Daily    vitamin B-12  1,000 mcg Oral Daily    lactobacillus  1 tablet Oral Daily    cholestyramine light  4 g Oral BID    pantoprazole  40 mg Oral QAM AC    insulin lispro  0-8 Units SubCUTAneous TID WC    insulin lispro  0-4 Units SubCUTAneous Nightly    glimepiride  4 mg Oral BID WC    potassium chloride  10 mEq Oral BID    bumetanide  1 mg IntraVENous BID     Social History:     Social History     Socioeconomic History    Marital status:      Spouse name: Not on file    Number of children: Not on file    Years of education: Not on file    Highest education level: Not on file   Occupational History    Not on file   Tobacco Use    Smoking status: Never    Smokeless tobacco: Never   Vaping Use    Vaping Use: Never used   Substance and Sexual Activity    Alcohol use: No    Drug use: No    Sexual activity: Not on file   Other Topics Concern    Not on file   Social History Narrative    Not on file     Social Determinants of Health     Financial Resource Strain: Not on file   Food Insecurity: Not on file   Transportation Needs: Not on file   Physical Activity: Not on file   Stress: Not on file   Social Connections: Not on file   Intimate Partner Violence: Not on file   Housing Stability: Not on file     Family History:   No family history on file. Allergies:   Acetaminophen-codeine, Codeine, Furosemide, Linagliptin, Sitagliptin, and Other     Review of Systems:   General: No fevers or chills. Eyes: No double vision or blurry vision. ENT: No sore throat or runny nose. Cardiovascular: No chest pain or palpitations. Lung:  She has had some shortness of breath but no significant cough  Abdomen: No nausea, vomiting, diarrhea, or abdominal pain. Genitourinary: No increased urinary frequency, or dysuria. Musculoskeletal: No muscle aches or pains. Hematologic: No bleeding or bruising. Neurologic: No headache, weakness, numbness, or tingling.     Physical Examination :   /77   Pulse 80   Temp 97.3 °F (36.3 °C) (Oral)   Resp 18   Ht 5' 4\" (1.626 m)   Wt 152 lb 6.4 oz (69.1 kg)   SpO2 91%   BMI 26.16 kg/m²     Temperature Range: Temp: 97.3 °F (36.3 °C) Temp  Av.6 °F (36.4 °C)  Min: 97.3 °F (36.3 °C)  Max: 98.6 °F (37 °C)  General Appearance: Awake, alert, and in no apparent distress  Head: Normocephalic, without obvious abnormality, atraumatic  Eyes: Pupils equal, round, reactive, to light and accommodation; extraocular movements intact; sclera anicteric; conjunctivae pink  ENT: Oropharynx clear, without erythema, exudate, or thrush. Neck: Supple, without lymphadenopathy. Pulmonary/Chest: Few scattered bibasilar rales appreciated  Cardiovascular: Regular rate and rhythm without murmurs, rubs, or gallops. Abdomen: soft, non-tender, non-distended, normal bowel sounds, no masses or organomegaly  Extremities: varicose veins noted-  bilateral feet. Skeletal: There is evidence of kyphoscoliosis  Neurologic: No gross sensory or motor deficits. Skin: warm and dry and right medial posterior calf superficial ulceration stage II clean    Medical Decision Making:   I have independently reviewed/ordered the following labs:  CBC with Differential:   Recent Labs     01/22/23 0027 01/22/23 1959 01/23/23 0513   WBC 9.4 10.9 11.5*   HGB 11.6* 11.3* 11.7*   HCT 35.8* 35.8* 37.0    184 202   LYMPHOPCT 8*  --  3*   MONOPCT 4  --  4     BMP:   Recent Labs     01/22/23  0027 01/22/23  1114 01/23/23  0513     --  133*   K 3.4*  --  3.6*     --  103   CO2 17*  --  18*   BUN 40*  --  47*   CREATININE 1.00*  --  1.20*   MG 0.8* 1.4*  --      Hepatic Function Panel:   Recent Labs     01/22/23 0027   PROT 5.2*   LABALBU 2.8*   BILITOT 0.3   ALKPHOS 107*   ALT 14   AST 25       Lab Results   Component Value Date/Time    PROCAL 0.55 01/22/2023 01:40 PM       Lab Results   Component Value Date/Time    CRP 0.5 03/26/2018 03:47 PM     No results found for: FERRITIN  No results found for: FIBRINOGEN  Lab Results   Component Value Date/Time    DDIMER 0.86 03/26/2018 03:47 PM     No results found for: LDH    No results found for: SEDRATE    Lab Results   Component Value Date/Time    COVID19 DETECTED 01/22/2023 12:30 AM    COVID19 Not Detected 02/09/2022 05:46 PM     No results found for requested labs within last 30 days.        Imaging Studies: CT OF THE CHEST WITHOUT CONTRAST 1/22/2023 2:03 pm        FINDINGS:   Multifocal ground-glass opacities indeterminate for COVID-19. Right greater than left pleural effusions. Ileus. Cardiomegaly and coronary artery disease. ONE XRAY VIEW OF THE CHEST 1/21/2023 11:42 pm   FINDINGS:   New small right effusion and airspace disease. Possible mild CHF. Cultures:     COVID-19, Rapid [[id number]] (Abnormal) Collected: 01/22/23 0030   Order Status: Completed Specimen: Nasopharyngeal Swab Updated: 01/22/23 0058    Specimen Description . NASOPHARYNGEAL SWAB    SARS-CoV-2, Rapid DETECTED Abnormal     Comment:        Rapid NAAT: The specimen is POSITIVE for SARS-Cov-2, the novel coronavirus associated with   COVID-19. This test has been authorized by the FDA under an Emergency Use Authorization (EUA) for use   by authorized laboratories. The ID NOW COVID-19 assay is designed to detect the virus that causes COVID-19 in patients   with signs and symptoms of infection who are suspected of COVID-19. An individual without symptoms of COVID-19 and who is not shedding SARS-CoV-2 virus would   expect to have a negative (not detected) result in this assay. Fact sheet for Healthcare Providers: http://www.tara.ángel/   Fact sheet for Patients: http://www.nacho-bar.bijames/         Methodology: Isothermal Nucleic Acid Amplification         Results reported to the appropriate Health Department           Electronically signed by Kayla Maldonado MD on 1/23/2023 at 8:34 AM      [facility]  Kayla Maldonado MD  [employer] messaging  OFFICE: [phone]    Thank you for allowing us to participate in the care of this patient. Please call with questions.      This note is created with the assistance of a speech recognition program.  While intending to generate a document that actually reflects the content of the visit, the document can still have some errors including those of syntax and sound a like substitutions which may escape proof reading. In such instances, actual meaning can be extrapolated by contextual diversion.

## 2023-01-23 NOTE — CONSULTS
[address], [address] [facility]  [phone]               Cardiology Consult           Date of Admission:  1/21/2023  Date of Consultation:  1/23/2023      PCP:  Shari Jimenez MD      Chief Complaint: Shortness of breath    History of Present Illness:  Leonarda Alejandra is a 80 y.o. female who presents with  past medical history significant for chronic atrial fibrillation, chronic congestive heart failure secondary to preserved ejection fraction, a-fib, hypertension and diabetes. .   She presents with shortness of breath and is found to be COVID-19 positive. She was recently here in the hospital for congestive heart failure exacerbation. We have been asked to see her regarding possible evidence of volume overload and slightly elevated high sensitivity troponins. PMH:   has a past medical history of Adenocarcinoma ([address] Arthritis, Atrial fibrillation ([address] Cancer ([address] CHF (congestive heart failure) ([address] Chronic anticoagulation, Diabetes mellitus ([address] Diverticulitis, Femur fracture ([address] Hypertension, Melanoma ([address] Osteoporosis, Overweight, Sepsis ([address] and Serum creatinine raised. PSH:   has a past surgical history that includes Femur Surgery; Colon surgery; Cholecystectomy; and Appendectomy. Allergies: Allergies   Allergen Reactions    Acetaminophen-Codeine Nausea Only    Codeine Nausea Only    Furosemide      Other reaction(s): GI Disturbance    Linagliptin      Other reaction(s): SOB    Sitagliptin      Other reaction(s): felt poorly    Other Itching     Animal Dander        Home Meds:    Prior to Admission medications    Medication Sig Start Date End Date Taking?  Authorizing Provider   lactobacillus (BACID) TABS Take 1 tablet by mouth daily 1/14/23   Emy Ort P Blood, DO   cholestyramine light 4 g packet Take 1 packet by mouth 2 times daily 1/13/23   Emy Ort P Blood, DO   pantoprazole (PROTONIX) 40 MG tablet Take 1 tablet by mouth every morning (before breakfast) 1/14/23   Adelfo He Blood, DO   Cholecalciferol 50 MCG (2000 UT) CAPS Take 2,000 Units by mouth daily 4/8/18   Historical Provider, MD   fexofenadine (ALLEGRA) 180 MG tablet Take 180 mg by mouth daily    Historical Provider, MD   Cyanocobalamin 1000 MCG/15ML LIQD Take 1,000 mcg by mouth daily    Historical Provider, MD   bumetanide (BUMEX) 2 MG tablet Take 2 mg by mouth daily    Historical Provider, MD   carvedilol (COREG) 12.5 MG tablet Take 12.5 mg by mouth 2 times daily (with meals)    Historical Provider, MD   sacubitril-valsartan (ENTRESTO) 24-26 MG per tablet Take 1 tablet by mouth 2 times daily    Historical Provider, MD   ibuprofen (ADVIL;MOTRIN) 200 MG tablet Take 400 mg by mouth every 6 hours as needed for Pain    Historical Provider, MD   glimepiride (AMARYL) 4 MG tablet Take 4 mg by mouth 2 times daily    Historical Provider, MD        Hospital Meds:    Current Facility-Administered Medications   Medication Dose Route Frequency Provider Last Rate Last Admin    [START ON 1/24/2023] dexamethasone (DECADRON) tablet 6 mg  6 mg Oral Daily Sylvia Bolaños MD        sodium chloride flush 0.9 % injection 5-40 mL  5-40 mL IntraVENous 2 times per day Dorann Paddy Lump, APRN - CNP   10 mL at 01/23/23 0932    sodium chloride flush 0.9 % injection 10 mL  10 mL IntraVENous PRN Gardenia A Lump, APRN - CNP   10 mL at 01/22/23 1616    0.9 % sodium chloride infusion   IntraVENous PRN Gardenia A Lump, APRN - CNP        magnesium sulfate 1000 mg in dextrose 5% 100 mL IVPB  1,000 mg IntraVENous PRN Dorann Paddy Lump, APRN - CNP   Stopped at 01/22/23 1715    ondansetron (ZOFRAN-ODT) disintegrating tablet 4 mg  4 mg Oral Q8H PRN Gardenia A Lump, APRN - CNP        Or    ondansetron (ZOFRAN) injection 4 mg  4 mg IntraVENous Q6H PRN Gardenia A Lump, APRN - CNP        polyethylene glycol (GLYCOLAX) packet 17 g  17 g Oral Daily PRN Gardenia A Lump, APRN - CNP        acetaminophen (TYLENOL) tablet 650 mg  650 mg Oral Q6H PRN Sakina FRAGOSO Lump, APRN - CNP        Or    acetaminophen (TYLENOL) suppository 650 mg  650 mg Rectal Q6H PRN Gardenia A Lump, APRN - CNP        glucose chewable tablet 16 g  4 tablet Oral PRN Gardenia A Lump, APRN - CNP        dextrose bolus 10% 125 mL  125 mL IntraVENous PRN Gardenia A Lump, APRN - CNP        Or    dextrose bolus 10% 250 mL  250 mL IntraVENous PRN Gardenia A Lump, APRN - CNP        glucagon (rDNA) injection 1 mg  1 mg SubCUTAneous PRN Gardenia A Lump, APRN - CNP        dextrose 10 % infusion   IntraVENous Continuous PRN Gardenia A Lump, APRN - CNP        albuterol (PROVENTIL) nebulizer solution 2.5 mg  2.5 mg Nebulization Q6H PRN Gardenia A Lump, APRN - CNP        levoFLOXacin (LEVAQUIN) 750 MG/150ML infusion 750 mg  750 mg IntraVENous Q48H Blaze Martinez MD   Stopped at 01/22/23 1936    bumetanide (BUMEX) tablet 2 mg  2 mg Oral Daily Haleigh Wilson MD   2 mg at 01/22/23 1725    carvedilol (COREG) tablet 12.5 mg  12.5 mg Oral BID WC Haleigh Wilson MD   12.5 mg at 01/23/23 0845    sacubitril-valsartan (ENTRESTO) 24-26 MG per tablet 1 tablet  1 tablet Oral BID Haleigh Wilson MD   1 tablet at 01/23/23 0845    Vitamin D (CHOLECALCIFEROL) tablet 2,000 Units  2,000 Units Oral Daily Haleigh Wilson MD   2,000 Units at 01/23/23 0845    vitamin B-12 (CYANOCOBALAMIN) tablet 1,000 mcg  1,000 mcg Oral Daily Haleigh Wilson MD   1,000 mcg at 01/23/23 0845    lactobacillus (BACID) tablet 1 tablet  1 tablet Oral Daily Haleigh Wilson MD   1 tablet at 01/23/23 0845    cholestyramine light packet 4 g  4 g Oral BID Haleigh Wilson MD   4 g at 01/23/23 0900    pantoprazole (PROTONIX) tablet 40 mg  40 mg Oral QAM AC Haleigh Wilson MD   40 mg at 01/23/23 0534    insulin lispro (HUMALOG) injection vial 0-8 Units  0-8 Units SubCUTAneous TID WC Haleigh Wilson MD   2 Units at 01/23/23 1338    insulin lispro (HUMALOG) injection vial 0-4 Units  0-4 Units SubCUTAneous Nightly Haleigh Wilson MD   4 Units at 01/22/23 2445    glimepiride (AMARYL) tablet 4 mg  4 mg Oral BID WC Sharmila Cruz MD   4 mg at 01/23/23 0845    potassium chloride (KLOR-CON M) extended release tablet 10 mEq  10 mEq Oral BID Haleigh Wilson MD   10 mEq at 01/23/23 0845    bumetanide (BUMEX) injection 1 mg  1 mg IntraVENous BID Aislinn Peter MD   1 mg at 01/23/23 0930       Social History:       TOBACCO:   reports that she has never smoked. She has never used smokeless tobacco.  ETOH:   reports no history of alcohol use. DRUGS:  reports no history of drug use. OCCUPATION:          Family Histroy:     No family history on file. Review of Systems:   Constitutional: there has been no unanticipated weight loss. There's been no change in energy level, sleep pattern, or activity level. Eyes: No visual changes or diplopia. No scleral icterus. ENT: No Headaches, hearing loss or vertigo. No mouth sores or sore throat. Cardiovascular: No chest pain, dyspnea on exertion, palpitations or loss of consciousness. No cough, hemoptysis, pleuritic pain, or phlebitis. Respiratory: No cough or wheezing, no sputum production. No hematemesis. Gastrointestinal: No abdominal pain, appetite loss, blood in stools. No change in bowel or bladder habits. Genitourinary: No dysuria, trouble voiding, or hematuria. Musculoskeletal:  No gait disturbance, weakness or joint complaints. Integumentary: No rash or pruritis. Neurological: No headache, diplopia, change in muscle strength, numbness or tingling. No change in gait, balance, coordination, mood, affect, memory, mentation, behavior. Psychiatric: No anxiety, or depression. Endocrine: No temperature intolerance. No excessive thirst, fluid intake, or urination. No tremor. Hematologic/Lymphatic: No abnormal bruising or bleeding, blood clots or swollen lymph nodes. Allergic/Immunologic: No nasal congestion or hives.        Physical Exam    Vital Signs: BP (!) 104/57   Pulse 75   Temp 97.2 °F (36.2 °C)   Resp 20   Ht 5' 4\" (1.626 m)   Wt 152 lb 6.4 oz (69.1 kg) SpO2 96%   BMI 26.16 kg/m²  O2 Flow Rate (L/min): (S) 2 L/min     Admission Weight: 150 lb (68 kg)     General appearance: Awake, Alert Cooperative    Head: Normocephalic, without obvious abnormality, atraumatic    Eyes: Conjunctivae/corneas clear. PERRL, EOM's intact. Fundi benign    Neck: no adenopathy, no carotid bruit, no JVD, supple, symmetrical, trachea midline and thyroid: not enlarged, symmetric, no tenderness/mass/nodules    Lungs: clear to auscultation bilaterally    Heart: regular rate and rhythm, S1, S2 normal, no murmur, click, rub or gallop    Abdomen: Soft, non-tender. Bowel sounds normal. No masses,  no organomegaly    Extremities: extremities normal, atraumatic, no cyanosis or edema    Skin: Skin color, texture, turgor normal. No rashes or lesions    Neurologic: Grossly normal            Labs:      CBC:   Recent Labs     01/22/23 0027 01/22/23 1959 01/23/23  0513   WBC 9.4 10.9 11.5*   HGB 11.6* 11.3* 11.7*   HCT 35.8* 35.8* 37.0   MCV 88.0 88.8 89.6    184 202     BMP:   Recent Labs     01/22/23 0027 01/23/23 0513    133*   K 3.4* 3.6*    103   CO2 17* 18*   BUN 40* 47*   CREATININE 1.00* 1.20*     PT/INR:   Recent Labs     01/22/23 1959   PROTIME 14.9*   INR 1.2     APTT:   Recent Labs     01/22/23 1959   APTT 34.1*     MAG:   Recent Labs     01/22/23 0027 01/22/23  1114   MG 0.8* 1.4*     D Dimer: No results for input(s): DDIMER in the last 72 hours. Troponin T   Recent Labs     01/22/23 0027   TROPHS 72*     ProBNP Invalid input(s): PRO-BNP    CT CHEST WO CONTRAST    Result Date: 1/22/2023  Multifocal ground-glass opacities indeterminate for COVID-19. Right greater than left pleural effusions. Ileus. Cardiomegaly and coronary artery disease. XR CHEST PORTABLE    Result Date: 1/21/2023  New small right effusion and airspace disease. Possible mild CHF. Diagnosis:  Principal Problem:    COVID-19  Resolved Problems:    * No resolved hospital problems. *          Plan:      Elevated BNP and small right pleural effusion. Appears to be mild will back off of IV diuresis to q.day. To avoid worsening renal function specially in light of her slight increase in creatinine and BUN ratio. Regarding her COVID-19. She is currently being seen by infectious disease. Regarding her slightly elevated high sensitivity troponin. I see no evidence of acute coronary syndrome. This is likely type 2 myocardial infarction. Will continue to monitor. Given advanced age and comorbidities no need for any further ischemic evaluation    A-fib with chads vasc of 6 not on anticoag secondary to history of gi bleeding and advanced age and frailty.      Electronically signed by Bre Maya MD on 1/23/2023 at 3:10 PM

## 2023-01-23 NOTE — PROGRESS NOTES
Occupational Therapy  Facility/Department: [facility] PROGRESSIVE CARE  Occupational Therapy Initial Assessment    Name: Jan Churchill  : 10/23/1932  MRN: [id number]  Date of Service: 2023    HALEY Newby reports patient is medically stable for therapy treatment this date. Chart reviewed prior to treatment and patient is agreeable for therapy. All lines intact and patient positioned comfortably at end of treatment. All patient needs addressed prior to ending therapy session. Pt currently functioning below baseline. Recommend daily inpatient skilled therapy at time of discharge to maximize long term outcomes and prevent re-admission. Please refer to AM-PAC score for current level of function. Discharge Recommendations:  Patient would benefit from continued therapy after discharge  OT Equipment Recommendations  Equipment Needed:  (CTA)       Patient Diagnosis(es): The primary encounter diagnosis was Acute on chronic congestive heart failure, unspecified heart failure type ([address] Diagnoses of COVID-19 and Hypomagnesemia were also pertinent to this visit. Past Medical History:  has a past medical history of Adenocarcinoma ([address] Arthritis, Atrial fibrillation ([address] Cancer ([address] CHF (congestive heart failure) ([address] Chronic anticoagulation, Diabetes mellitus ([address] Diverticulitis, Femur fracture ([address] Hypertension, Melanoma ([address] Osteoporosis, Overweight, Sepsis ([address] and Serum creatinine raised. Past Surgical History:  has a past surgical history that includes Femur Surgery; Colon surgery; Cholecystectomy; and Appendectomy. PER H&P:    Jan Churchill is a 80 y.o.  female who presents with Shortness of Breath     Patient admitted to the emergency room at [place] where she presented with dyspnea and neck pain. Patient had recently been discharged from the hospital where she was admitted by [facility] for congestive heart failure.   Patient says she was progressively getting dyspneic initially with exertion and recently even at rest.  As she had recently had cough as well. Says it has mostly been dry. Her daughter was recently found to have COVID-19. Patient says she has not been immunized against COVID, influenza or pneumonia. She says flu and pneumonia shots made her sick. Never got them again. Regarding COVID-19 she says she lives in her own space and does not interact with people so she did not get it. Assessment   Performance deficits / Impairments: Decreased functional mobility ; Decreased safe awareness;Decreased balance;Decreased coordination;Decreased ADL status; Decreased cognition;Decreased vision/visual deficit; Decreased posture;Decreased endurance;Decreased high-level IADLs;Decreased strength;Decreased sensation;Decreased fine motor control  Assessment: Pt is a high fall risk, has poor safety awareness in function, overall weakness/balance and act cole deficits and requires increased assist of 2 staff when up. Skilled OT is indicated for maximizing functional I and safety as able.   Prognosis: Fair  Decision Making: High Complexity  REQUIRES OT FOLLOW-UP: Yes  Activity Tolerance  Activity Tolerance: Patient limited by fatigue;Treatment limited secondary to decreased cognition;Treatment limited secondary to agitation;Patient limited by pain  Activity Tolerance Comments: poor/poor plus and pt fatigies easily; stand cole 1-2 mins with RW        Plan   Occupational Therapy Plan  Times Per Week: 4-5x/week 1x/day as cole  Current Treatment Recommendations: Strengthening, Balance training, Safety education & training, Patient/Caregiver education & training, Neuromuscular re-education, Functional mobility training, Endurance training, Equipment evaluation, education, & procurement, Cognitive/Perceptual training, Manual Therapy:  MLD, Self-Care / ADL, Home management training, Positioning, Pain management     Restrictions  Restrictions/Precautions  Restrictions/Precautions: General Precautions, Fall Risk  Position Activity Restriction  Other position/activity restrictions:  Up with assist, telemetry, Heels off bed at all times, ext urinary catheter, RUE IV, droplet+ isolation 2* positive Covid,  VERY Huslia, 4LO2 via NC, ALARMS, pt agitates easily    Subjective   General  Chart Reviewed: Yes  Patient assessed for rehabilitation services?: Yes  Family / Caregiver Present: No  Subjective  Subjective: Pt denies pain however note BLE's hypersensitive to light touch and palpation     Social/Functional History  Social/Functional History  Lives With: Alone  Type of Home: House  Home Layout: One level  Home Access: Stairs to enter with rails  Entrance Stairs - Number of Steps: 3  Entrance Stairs - Rails: Both  Bathroom Shower/Tub: Tub/Shower unit (pt states she only does sponge bath due to fearful of falling)  Bathroom Toilet: Handicap height  Bathroom Equipment: Grab bars in shower, Shower chair  Home Equipment: Yolanda Able, rolling  Receives Help From:  (pt states she has limited support; her daughter is local but works so limited in availability to assist her)  ADL Assistance: [address] [place] [address] Avenue: Independent (but chart stated pt dependent on family for care)  Homemaking Responsibilities: Yes  Ambulation Assistance: Independent (pt says she uses 2 canes to ambulate)  Transfer Assistance: Independent  Active : No  Patient's  Info: daughter  Occupation: Retired  Type of Occupation: [employer]  IADL Comments: Question reliability of info pt reported inconsistent information, will need verified for accuracy  Additional Comments: Pt stated she fell on throw rug & also fell/broke her femur but then stated this was in 2013; denies falls past year       Objective           Observation/Palpation  Posture: Fair (with RW)  Observation: ext urinary catheter, RUE IV,  4LO2 via NC, ALARMS;Pt has very fragile skin with multiple scattered bruisng of BUE's, BLE's very tender/sensitive to touch & has wound R shin covered by dressing; Resting SPO2 93%; pt easily agitates and verbally abusive to staff during session. Increased time needed to do any tasks as pt is extremely argumentative/set in own ways however is unsafe and resistive to any safety edu. Edema: BLE's  Scar: pt has previuos old abd scar s/p colon sx    Safety Devices  Type of Devices: Bed alarm in place;Gait belt; Heels elevated for pressure relief;Patient at risk for falls; Left in bed;Nurse notified (assisted pt up to chair and then due to safety concerns/increased agitation and fear pt would attempt self transfers (high fall risk/very unsafe) she was assisted back to bed    Bed Mobility Training  Bed Mobility Training: Yes  Overall Level of Assistance: Maximum assistance;Assist X2;Moderate assistance  Interventions: Verbal cues; Safety awareness training; Tactile cues (MAX cues/tactile assist for initiating movements/proper log rolling tech, hand placement on bed rail, pursed lip breathing, use of BUE's to assist with upright posiiton/full scoot to EOB and place BLE's on floor, assist with BLE's back up into bed awareness/assist with lines to increase safety)  Rolling: Maximum assistance;Assist X2;Moderate assistance  Supine to Sit: Maximum assistance;Assist X2;Moderate assistance  Sit to Supine:  (waffle cushion placed in chair and pt agreed after max encouragement/edu on benefits of being up OOB as able; pt up for < 10 mins and returned back to bed due to safety conncerns/pt agitated about being up and concern for pt to attempt self nevarez)  Scooting: Maximum assistance;Assist X2 to boost up in bed     Balance  Sitting:  (CG to SBA)  Standing:  (MAX-MOD x2 with RW and pt with posterior lean)  Transfer Training  Transfer Training: Yes  Overall Level of Assistance: Maximum assistance;Assist X2;Moderate assistance (with RW bed to chair and with increased time)  Interventions: Verbal cues; Safety awareness training;Weight shifting training; Tactile cues (MAX cues/tactile assist and demo for B hand placement pushing from surface seated on/reaching back, pacing, nose over toes tech, pursed lip breathing, scanning, upright posture/weight shifting and widening KATARINA, RW safety/mgt, squaring self/AD up to surface prior to sitting/controlled descend and awareness/assist with lines to increase safety/reduce falls)  Sit to Stand: Maximum assistance;Assist X2;Moderate assistance (EOB mod x2; from chair max x2 and all movements with increased time)  Stand to Sit: Maximum assistance;Assist X2;Moderate assistance  Bed to Chair: Maximum assistance;Assist X2;Moderate assistance    Functional mob   Overall Level of Assistance: Assist X2;Moderate assistance (bed to chair only with increased time)  Interventions: Verbal cues; Tactile cues; Safety awareness training (MAX cues/tactile assist for upright posture/weight shifting, widening KATARINA, RW safety/mgt and staying inside AD/keeping close to body vs too far out in front, AD/BLE sequence, pacing, pursed lip breathing, looking up and scanning and awareness/assist with lines to increase safety/reduce fall risk)     AROM: Within functional limits  Strength: Generally decreased, functional (BUE strength grossly 4/5)  Coordination: Generally decreased, functional (very slow B hand opposition)  Tone: Normal  Sensation: Impaired (pt states her R hand is numb constantly)    ADL  Feeding: Setup  Grooming: Setup; Moderate assistance (in seated position)  UE Bathing: Setup;Minimal assistance  LE Bathing: Setup;Dependent/Total  UE Dressing: Setup;Maximum assistance (doffing/donning clean hosp gown as other one was soiled/increased time needed)  LE Dressing: Dependent/Total;Maximum assistance (x2 staff to stand with RW for any brief/clothing mgt up)  LE Dressing Skilled Clinical Factors: MAX assist with B socks/slippers then pt got aggitated and kicked slippers off and wanted tennis shoes on however not found in room; pt edu mult times on proper foot wear needed when up; donned and doffed B socks/slippers x3 this session  Toileting: Dependent/Total (ext cath)  Additional Comments: *pt is DEP when up in function with RW and 2 staff assist needed for safety/balance support           Vision  Vision: Impaired  Vision Exceptions: Wears glasses at all times (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  Hearing Exceptions: Hard of hearing/hearing concerns;Bilateral hearing aid (aids are not here, says they need \"fixed\")  Cognition  Overall Cognitive Status: Exceptions  Following Commands: Inconsistently follows commands; Follows one step commands with increased time; Follows one step commands with repetition  Attention Span: Difficulty attending to directions; Difficulty dividing attention; Attends with cues to redirect  Memory: Decreased short term memory;Decreased recall of recent events  Safety Judgement: Decreased awareness of need for assistance;Decreased awareness of need for safety  Problem Solving: Assistance required to implement solutions;Assistance required to generate solutions;Assistance required to identify errors made;Assistance required to correct errors made;Decreased awareness of errors  Insights: Not aware of deficits  Initiation: Requires cues for all  Sequencing: Requires cues for all  Orientation  Overall Orientation Status: Within Functional Limits  Orientation Level: Oriented X4  Perception  Overall Perceptual Status: Impaired               Education Given To: Patient  Education Provided: Role of Therapy;Plan of Care;Transfer Training; Fall Prevention Strategies; Energy Conservation  Education Provided Comments: safety in function, call light use, pursed lip breathing, posturla control and weight shifting, benefits of being up OOB as able/recommendations for continued therapy, proper bed mob tech  Education Method: Demonstration;Verbal  Barriers to Learning: Cognition (agitation and verbally abusive to staff)  Education Outcome: Continued education needed                    Co-treatment with PT warranted secondary to decreased safety and independence requiring 2 skilled therapy professionals to address individual discipline's goals. OT addressing preparation for ADL transfer, sitting and standing balance for increased ADL performance, sitting/activity tolerance, functional reaching, environmental safety/scanning, fall prevention, functional mobility for ADL transfers, ability to sequence and follow directions, bed mobility tech, and functional UE strength. AM-PAC Score=11                  Goals  Short Term Goals  Time Frame for Short Term Goals: by discharge, pt to demo  Short Term Goal 1: bed mob tasks with use of rail as needed to mod assist of 1. Short Term Goal 2: increase BUE strength by a 1/2 grade to assist with ADL tasks. Short Term Goal 3: UB ADL to set up and LB ADL to mod assist of 1 and use of AE/AD. Short Term Goal 4: ADL transfers and functional mob with AD to mod assist of 1. Short Term Goal 5: toileting tasks with use of AD/BSC and grab bar to mod assist of 1. Long Term Goals  Long Term Goal 1: Pt to stand with min assist of 1 and AD cole > 4 mins as able to reduce falls with ADL's. Long Term Goal 2: Caregivers to be I with pressure relief, COVID edu, BUE HEP, EC/WS and fall prevention tech as well as DME/AD and AE recommendations with use of handouts. Patient Goals   Patient goals : Pt states she wants to be back in bed and off her behind!        Therapy Time   Individual Concurrent Group Co-treatment   Time In 1000 (plus 10 min chart review/nursing and SW communication)         Time Out 1690         Minutes 88=98 mins total          Timed Code Treatment Minutes: 80 Minutes       Chaparro Klein OT

## 2023-01-23 NOTE — PROGRESS NOTES
Progress note  [facility].,    Adult Hospitalist      Name: Marcia Cummins  MRN: [id number]     Acct: [de-identified]  Room: [address] [place] [address]    Admit Date: 1/21/2023 11:39 PM  PCP: Himanshu Elder MD    Primary Problem  Principal Problem:    COVID-19  Resolved Problems:    * No resolved hospital problems. *        Assesment:     Acute on chronic congestive heart failure  COVID-19 pneumonia  Nonvaccinated against SARS-CoV-2  Respiratory failure with hypoxia requiring oxygen via nasal cannula  Hypomagnesemia  Hypokalemia  Question of bacterial pneumonia  Essential hypertension  Diabetes mellitus type 2  Paroxysmal atrial fibrillation  Congestive heart failure, type unknown  Osteoarthritis, multiple joints  History of colon cancer  History of skin cancer  Right eye vision loss        Plan:     Admit to progressive  Monitor vitals closely  Keep SPO2 above 90%  I's and O's  IV heplock  Pain control  Antiemetics as needed  Levaquin, Decadron  Bumex, Coreg, Entresto  Accu-Cheks before meals and at bedtime  Insulin sliding scale medium  Hypoglycemia protocol  And has elevated blood glucose and is refusing insulin  CBC , BMP, BNP  Pulmonary consulted  Discussed with daughter  Consult cardiology  Cardiology recommend heparin infusion until they can evaluate patient and determine why she is not on anticoagulation  DVT and GI prophylaxis. Chief Complaint:     Chief Complaint   Patient presents with    Shortness of Breath         History of Present Illness:      Marcia Cummins is a 80 y.o.  female who presents with Shortness of Breath  Patient seen and examined at bedside and reviewed  last 24 hrs events with nursing staff  No acute events overnight. Patient denies any acute complaints. Afebrile  Patient denies any chest pain, palpitation, headache, dizziness, cough, nausea, vomiting, abdominal pain, pain, changes in urination or bowel habit or rash.   Shortness of breath is improved            HPI  Patient admitted to the emergency room at [facility] where she presented with dyspnea and neck pain. Patient had recently been discharged from the hospital where she was admitted by Stephanie for congestive heart failure. Patient says she was progressively getting dyspneic initially with exertion and recently even at rest.  As she had recently had cough as well. Says it has mostly been dry. Her daughter was recently found to have COVID-19. Patient says she has not been immunized against COVID, influenza or pneumonia. She says flu and pneumonia shots made her sick. Never got them again. Regarding COVID-19 she says she lives in her own space and does not interact with people so she did not get it. Says she feels better since admission to the hospital.  Says her breathing and cough are better. She denies any chest pain, nausea, vomiting or abdominal pain. She is more concerned about her right IJ. 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 Denies headache, low back pain, dysuria, rash or hematuria    Patient was not found to be on any anticoagulation. Cardiology recommended keeping her on heparin infusion until they could figure that out. RN will inform [facility] that additional input from her    I have personally reviewed the past medical history, past surgical history, medications, social history, and family history, and summarized in the note. Review of Systems:     All 10 point system is reviewed and negative otherwise mentioned in HPI.       Past Medical History:     Past Medical History:   Diagnosis Date    Adenocarcinoma ([address]     colon    Arthritis     Atrial fibrillation (HCC)     r/t sepsis     Cancer ([address]     colon, skin    CHF (congestive heart failure) (HCC)     Chronic anticoagulation     Eliquis for a fib stroke prophylaxis    Diabetes mellitus ([address]     Diverticulitis     Femur fracture ([address]     Hypertension Melanoma ([facility]     nose    Osteoporosis     Overweight     Sepsis ([facility]     Serum creatinine raised         Past Surgical History:     Past Surgical History:   Procedure Laterality Date    APPENDECTOMY      CHOLECYSTECTOMY      COLON SURGERY      FEMUR SURGERY      s/p fracture        Medications Prior to Admission:       Prior to Admission medications    Medication Sig Start Date End Date Taking? Authorizing Provider   lactobacillus (BACID) TABS Take 1 tablet by mouth daily 1/14/23   Austin Amend P Blood, DO   cholestyramine light 4 g packet Take 1 packet by mouth 2 times daily 1/13/23   Austin Amend P Blood, DO   pantoprazole (PROTONIX) 40 MG tablet Take 1 tablet by mouth every morning (before breakfast) 1/14/23   Beacon Tristin Blood, DO   Cholecalciferol 50 MCG (2000 UT) CAPS Take 2,000 Units by mouth daily 4/8/18   Historical Provider, MD   fexofenadine (ALLEGRA) 180 MG tablet Take 180 mg by mouth daily    Historical Provider, MD   Cyanocobalamin 1000 MCG/15ML LIQD Take 1,000 mcg by mouth daily    Historical Provider, MD   bumetanide (BUMEX) 2 MG tablet Take 2 mg by mouth daily    Historical Provider, MD   carvedilol (COREG) 12.5 MG tablet Take 12.5 mg by mouth 2 times daily (with meals)    Historical Provider, MD   sacubitril-valsartan (ENTRESTO) 24-26 MG per tablet Take 1 tablet by mouth 2 times daily    Historical Provider, MD   ibuprofen (ADVIL;MOTRIN) 200 MG tablet Take 400 mg by mouth every 6 hours as needed for Pain    Historical Provider, MD   glimepiride (AMARYL) 4 MG tablet Take 4 mg by mouth 2 times daily    Historical Provider, MD        Allergies:       Acetaminophen-codeine, Codeine, Furosemide, Linagliptin, Sitagliptin, and Other    Social History:     Tobacco:    reports that she has never smoked. She has never used smokeless tobacco.  Alcohol:      reports no history of alcohol use. Drug Use:  reports no history of drug use. Family History:     No family history on file.       Physical Exam:     Vitals: /63   Pulse 66   Temp 97.5 °F (36.4 °C) (Axillary)   Resp 16   Ht 5' 4\" (1.626 m)   Wt 152 lb 6.4 oz (69.1 kg)   SpO2 93%   BMI 26.16 kg/m²   Temp (24hrs), Av.5 °F (36.4 °C), Min:97.2 °F (36.2 °C), Max:98.6 °F (37 °C)      General appearance - alert, well appearing, and in no acute distress  Mental status - oriented to person, place, and time with normal affect  Head - normocephalic and atraumatic  Eyes - extraocular eye movements intact, conjunctiva clear.   Right cornea haze pupillary dilatation  Ears - hearing appears to be intact  Nose - no drainage noted  Mouth - mucous membranes moist  Neck - supple, no carotid bruits, thyroid not palpable  Chest -coarse crackles to auscultation, normal effort  Heart - normal rate, irregular rhythm, no murmur  Abdomen - soft, nontender, nondistended, bowel sounds present all four quadrants, no masses, hepatomegaly or splenomegaly  Neurological - normal speech, no focal findings or movement disorder noted, cranial nerves II through XII grossly intact  Extremities - peripheral pulses palpable, no pedal edema or calf pain with palpation  Skin - no gross lesions, rashes, or induration noted        Data:     Labs:    Hematology:  Recent Labs     23   WBC 9.4 10.9 11.5*   RBC 4.07 4.03 4.13   HGB 11.6* 11.3* 11.7*   HCT 35.8* 35.8* 37.0   MCV 88.0 88.8 89.6   MCH 28.4 28.0 28.3   MCHC 32.3 31.6 31.6   RDW 15.4 14.5* 14.7*    184 202   MPV 8.8 11.7 10.3   CRP  --   --  37.7*   INR  --  1.2  --        Chemistry:  Recent Labs     23  1114 23  05     --  133*   K 3.4*  --  3.6*     --  103   CO2 17*  --  18*   GLUCOSE 208*  --  264*   BUN 40*  --  47*   CREATININE 1.00*  --  1.20*   MG 0.8* 1.4*  --    ANIONGAP 17  --  12   LABGLOM 54*  --  43*   CALCIUM 6.7*  --  6.7*   PROBNP 7,057*  --  4,026*   TROPHS 72*  --   --        Recent Labs     23  0027 23  0546 01/22/23  0844 01/22/23  1216 01/22/23 2023 01/23/23  0513 01/23/23  0809 01/23/23  1228   PROT 5.2*  --   --   --   --   --   --   --    LABALBU 2.8*  --   --   --   --   --   --   --    LABA1C  --   --   --   --   --  8.7*  --   --    AST 25  --   --   --   --   --   --   --    ALT 14  --   --   --   --   --   --   --    ALKPHOS 107*  --   --   --   --   --   --   --    BILITOT 0.3  --   --   --   --   --   --   --    POCGLU  --  201* 208* 273* 358*  --  255* 240*         Lab Results   Component Value Date    INR 1.2 01/22/2023    INR 1.1 01/11/2023    PROTIME 14.9 (H) 01/22/2023    PROTIME 14.4 (H) 01/11/2023       Lab Results   Component Value Date/Time    SPECIAL NOT REPORTED 11/05/2017 01:55 PM     Lab Results   Component Value Date/Time    CULTURE ESCHERICHIA COLI >950089 CFU/ML (A) 10/20/2022 05:48 PM       No results found for: POCPH, PHART, PH, POCPCO2, ZFE4PUK, PCO2, POCPO2, PO2ART, PO2, POCHCO3, GSB4DAM, HCO3, NBEA, PBEA, BEART, BE, THGBART, THB, EGG3PUB, UEKV3DFP, I7PFKRIB, O2SAT, FIO2    Radiology:    XR CHEST PORTABLE    Result Date: 1/21/2023  New small right effusion and airspace disease. Possible mild CHF. All radiological studies reviewed                Code Status:  Full Code    Electronically signed by Anayeli Celis MD on 1/23/2023 at 3:44 PM     Copy sent to Dr. Kaylee Perez MD    This note was created with the assistance of a speech-recognition program.  Although the intention is to generate a document that actually reflects the content of the visit, no guarantees can be provided that every mistake has been identified and corrected by editing. Note was updated later by me after  physical examination and  completion of the assessment.

## 2023-01-23 NOTE — PLAN OF CARE
Hailee Musa is resting on and off this shift with report of pain with touch. She is argumentative with care and exhibits confusion and forgetfulness with staff interactions. She was holding telemetry monitor up to her face at one point thinking it was a phone and wondering why we weren't answering. She does not remember conversations over short periods of time. She was not going to take HS insulin; daughter was in the room and we explained that because of the steroid, her sugar was high and needed insulin to control it. She said it was too complicated and didn't want to take it. Explained it was just while she was in the hospital and we would do all the calculating. She was still resistant, but daughter able to talk her into it. Notified NP of elevated glucose level and ordered 4U recommended. When I came back with the insulin, she'd forgotten about our conversation and stated she was not going to take a blood thinner. Refreshed her memory and she took the insulin. There was a question about anticoagulation r/t a-fib. Heparin was suggested by cardiology, but Dr. Magaly Rouse noted Eliquis mentioned in the notes. Family states she was taken off the Eliquis r/t GI bleed. Reached out to cardiology MD, Dr. Mir Melendez and updated him about hx of GI bleed and anticoagulation on hold now. She also had hypotension at bedtime; NP requested reaching out to cardiology for hold orders. Dr. Alexander Bentley advised holding Bumex this evening. She was up to bathroom with sera steady and 2 assist and had large, soft/formed BM. She has difficulty reaching a standing position in lift, but once she is on her feet, she is steady. She has supplemental O2 at 3L/min. via nc. Call light in reach and isolation precautions in place; safety maintained.     Problem: Discharge Planning  Goal: Discharge to home or other facility with appropriate resources  Outcome: Progressing  Flowsheets (Taken 1/22/2023 2120)  Discharge to home or other facility with appropriate resources: Identify barriers to discharge with patient and caregiver     Problem: Safety - Adult  Goal: Free from fall injury  Outcome: Progressing     Problem: Chronic Conditions and Co-morbidities  Goal: Patient's chronic conditions and co-morbidity symptoms are monitored and maintained or improved  Outcome: Progressing  Flowsheets (Taken 1/22/2023 2120)  Care Plan - Patient's Chronic Conditions and Co-Morbidity Symptoms are Monitored and Maintained or Improved:   Monitor and assess patient's chronic conditions and comorbid symptoms for stability, deterioration, or improvement   Update acute care plan with appropriate goals if chronic or comorbid symptoms are exacerbated and prevent overall improvement and discharge     Problem: Respiratory - Adult  Goal: Achieves optimal ventilation and oxygenation  Outcome: Progressing  Flowsheets (Taken 1/22/2023 2120)  Achieves optimal ventilation and oxygenation:   Assess for changes in respiratory status   Assess for changes in mentation and behavior   Position to facilitate oxygenation and minimize respiratory effort   Oxygen supplementation based on oxygen saturation or arterial blood gases     Problem: Gastrointestinal - Adult  Goal: Maintains or returns to baseline bowel function  Outcome: Progressing  Flowsheets (Taken 1/22/2023 2120)  Maintains or returns to baseline bowel function:   Assess bowel function   Encourage oral fluids to ensure adequate hydration   Administer ordered medications as needed   Encourage mobilization and activity     Problem: Musculoskeletal - Adult  Goal: Return mobility to safest level of function  Outcome: Progressing  Flowsheets (Taken 1/22/2023 2120)  Return Mobility to Safest Level of Function:   Assess patient stability and activity tolerance for standing, transferring and ambulating with or without assistive devices   Assist with transfers and ambulation using safe patient handling equipment as needed   Instruct patient/family in ordered activity level     Problem: Musculoskeletal - Adult  Goal: Return ADL status to a safe level of function  Outcome: Progressing  Flowsheets (Taken 1/22/2023 2120)  Return ADL Status to a Safe Level of Function:   Assess activities of daily living deficits and provide assistive devices as needed   Assist and instruct patient to increase activity and self care as tolerated     Problem: Cardiovascular - Adult  Goal: Maintains optimal cardiac output and hemodynamic stability  Outcome: Progressing  Flowsheets (Taken 1/22/2023 2120)  Maintains optimal cardiac output and hemodynamic stability:   Monitor blood pressure and heart rate   Monitor urine output and notify Licensed Independent Practitioner for values outside of normal range   Assess for signs of decreased cardiac output

## 2023-01-23 NOTE — PROGRESS NOTES
Pulmonary Critical Care Progress Note  Aleta Patino CNP/Gosia Calle MD     Patient seen for the follow up of  COVID-19 acute hypoxic respiratory insufficiency, pulmonary edema, pleural effusions    Subjective:  No significant overnight events noted. She is currently sitting up in the bed therapy is at bedside. She is very hard of hearing. Her shortness of breath is slightly better. She has a loose harsh cough, nonproductive. She states she will not cough anything up because it makes her want to throw up which she hates to do. She denies any chest pain. Examination:  Vitals: /77   Pulse 80   Temp 97.3 °F (36.3 °C) (Oral)   Resp 18   Ht 5' 4\" (1.626 m)   Wt 152 lb 6.4 oz (69.1 kg)   SpO2 91%   BMI 26.16 kg/m²   General appearance: alert and cooperative with exam  Neck: No JVD  Lungs: Moderate to decreased air exchange, no significant wheezing, occasional crackles  Heart: regular rate and rhythm, S1, S2 normal, no gallop  Abdomen: Soft, non tender, + BS  Extremities: no cyanosis or clubbing. Trace edema.     LABs:  CBC:   Recent Labs     01/22/23 0027 01/22/23 1959 01/23/23  0513   WBC 9.4 10.9 11.5*   HGB 11.6* 11.3* 11.7*   HCT 35.8* 35.8* 37.0    184 202     BMP:   Recent Labs     01/22/23 0027 01/23/23  0513    133*   K 3.4* 3.6*   CO2 17* 18*   BUN 40* 47*   CREATININE 1.00* 1.20*   LABGLOM 54* 43*   GLUCOSE 208* 264*     PT/INR:   Recent Labs     01/22/23 1959   PROTIME 14.9*   INR 1.2     APTT:  Recent Labs     01/22/23 1959   APTT 34.1*     LIVER PROFILE:  Recent Labs     01/22/23 0027   AST 25   ALT 14   LABALBU 2.8*      Latest Reference Range & Units Most Recent   Procalcitonin <0.09 ng/mL 0.55 (H)  1/22/23 13:40   (H): Data is abnormally high    Radiology:  CT chest 1/22/2023      X-ray chest 1/21/2023      Impression:  Acute hypoxic respiratory failure  COVID infection  Multifocal groundglass opacities,  pneumonia versus pulmonary edema  Small bilateral pleural effusions right greater than left  Elevated troponin and BNP,? Decompensated CHF versus pulmonary hypertension  History of A-fib, allergic rhinitis, DM, HTN  Ileus    Recommendations:  Oxygen via nasal cannula, keep SPO2 90% or greater   Incentive spirometry every hour while awake   Albuterol every 6 hours as needed  Continue IV Levaquin  Oral Decadron  Infectious disease on consult  Diuresis with IV Bumex 1 mg twice daily  DVT prophylaxis, Lovenox  GI prophylaxis, Protonix  PT/OT  Above assessment and plan will be reviewed with Dr. Desmond Pena. Patient plan will be finalized following review by Dr. Desmond Pena.   Will follow with you    Aziza Bonilla APRN-CNP   Pulmonary Critical Care and Sleep Medicine,  Patient seen under the supervision of Mony Montenegro MD, [facility]

## 2023-01-23 NOTE — CARE COORDINATION
Discharge planning    Patient chart reviewed. Appreciate prior SS initial assessment. Per ss patietn lives at home and dependent on family care. . DME: [employer]. Sc and grab bars. Patient lives alone with assistance from daughter nhi. History of a fib. Unable to do anticoagulation ( was on eliquis ) due to gi bleed. Patient notes state that luzma mari used for restroom. Patient admitted with COVID 19. Currently on 4 liters. She has had home care in past with [facility] and [facility]. Did not participate in therapy. Documentation shows patient as slightly confused. SS did send referral to [facility] and [facility] . [facility] has accepted and will follow.

## 2023-01-23 NOTE — PROGRESS NOTES
[facility]  Consult Note       [facility]:  [id number]  AGE: 80 y.o. GENDER: female  : 10/23/1932  TODAY'S DATE:  2023    Subjective: Leonarda Alejandra is a 80 y.o. female with inpatient referral to [facility] for:  RLE wound      Wound Identification:  Wound Type: venous  Contributing Factors: edema and venous stasis    Wound History: the patient is confused during this visit, history taken from previous hospitalization when I had seen the patient and she told me that she had the ulceration from a CHF exacerbation in which her legs swelled and caused the wound. Current Wound Care Treatment:  oil emulsion dressing. Patient Goal of Care:  [] Wound Healing  [] Odor Control  [] Palliative Care  [] Pain Control   [] Other:         PAST MEDICAL HISTORY        Diagnosis Date    Adenocarcinoma ([address]     colon    Arthritis     Atrial fibrillation ([address]     r/t sepsis     Cancer ([address]     colon, skin    CHF (congestive heart failure) (HCC)     Chronic anticoagulation     Eliquis for a fib stroke prophylaxis    Diabetes mellitus ([address]     Diverticulitis     Femur fracture (HCC)     Hypertension     Melanoma ([address]     nose    Osteoporosis     Overweight     Sepsis ([address]     Serum creatinine raised        PAST SURGICAL HISTORY    Past Surgical History:   Procedure Laterality Date    APPENDECTOMY      CHOLECYSTECTOMY      COLON SURGERY      FEMUR SURGERY      s/p fracture       FAMILY HISTORY    No family history on file.     SOCIAL HISTORY    Social History     Tobacco Use    Smoking status: Never    Smokeless tobacco: Never   Vaping Use    Vaping Use: Never used   Substance Use Topics    Alcohol use: No    Drug use: No         ALLERGIES    Allergies   Allergen Reactions    Acetaminophen-Codeine Nausea Only    Codeine Nausea Only    Furosemide      Other reaction(s): GI Disturbance    Linagliptin      Other reaction(s): SOB Sitagliptin      Other reaction(s): felt poorly    Other Itching     Animal Dander       HOME MEDICATIONS  Prior to Admission medications    Medication Sig Start Date End Date Taking?  Authorizing Provider   lactobacillus (BACID) TABS Take 1 tablet by mouth daily 1/14/23   Sadi Ear P Blood, DO   cholestyramine light 4 g packet Take 1 packet by mouth 2 times daily 1/13/23   Sadi Ear P Blood, DO   pantoprazole (PROTONIX) 40 MG tablet Take 1 tablet by mouth every morning (before breakfast) 1/14/23   Adelfo Mort Blood, DO   Cholecalciferol 50 MCG (2000 UT) CAPS Take 2,000 Units by mouth daily 4/8/18   Historical Provider, MD   fexofenadine (ALLEGRA) 180 MG tablet Take 180 mg by mouth daily    Historical Provider, MD   Cyanocobalamin 1000 MCG/15ML LIQD Take 1,000 mcg by mouth daily    Historical Provider, MD   bumetanide (BUMEX) 2 MG tablet Take 2 mg by mouth daily    Historical Provider, MD   carvedilol (COREG) 12.5 MG tablet Take 12.5 mg by mouth 2 times daily (with meals)    Historical Provider, MD   sacubitril-valsartan (ENTRESTO) 24-26 MG per tablet Take 1 tablet by mouth 2 times daily    Historical Provider, MD   ibuprofen (ADVIL;MOTRIN) 200 MG tablet Take 400 mg by mouth every 6 hours as needed for Pain    Historical Provider, MD   glimepiride (AMARYL) 4 MG tablet Take 4 mg by mouth 2 times daily    Historical Provider, MD       CURRENT MEDICATIONS:  Current Facility-Administered Medications   Medication Dose Route Frequency Provider Last Rate Last Admin    [START ON 1/24/2023] dexamethasone (DECADRON) tablet 6 mg  6 mg Oral Daily Sylvia Bolaños MD        [START ON 1/24/2023] bumetanide (BUMEX) injection 1 mg  1 mg IntraVENous Daily Nupur Pearson MD        sodium chloride flush 0.9 % injection 5-40 mL  5-40 mL IntraVENous 2 times per day Peterson Cottrell Lump, APRN - CNP   10 mL at 01/23/23 0932    sodium chloride flush 0.9 % injection 10 mL  10 mL IntraVENous PRN Gardenia FRAGOSO Lump, APRN - CNP   10 mL at 01/22/23 1616    0.9 % sodium chloride infusion   IntraVENous PRN Gardenia A Lump, APRN - CNP        magnesium sulfate 1000 mg in dextrose 5% 100 mL IVPB  1,000 mg IntraVENous PRN Dartha Bombard Lump, APRN - CNP   Stopped at 01/22/23 1715    ondansetron (ZOFRAN-ODT) disintegrating tablet 4 mg  4 mg Oral Q8H PRN Gardenia A Lump, APRN - CNP        Or    ondansetron (ZOFRAN) injection 4 mg  4 mg IntraVENous Q6H PRN Gardenia A Lump, APRN - CNP        polyethylene glycol (GLYCOLAX) packet 17 g  17 g Oral Daily PRN Gardenia A Lump, APRN - CNP        acetaminophen (TYLENOL) tablet 650 mg  650 mg Oral Q6H PRN Gardenia A Lump, APRN - CNP        Or    acetaminophen (TYLENOL) suppository 650 mg  650 mg Rectal Q6H PRN Gardenia A Lump, APRN - CNP        glucose chewable tablet 16 g  4 tablet Oral PRN Gardenia A Lump, APRN - CNP        dextrose bolus 10% 125 mL  125 mL IntraVENous PRN Gardenia A Lump, APRN - CNP        Or    dextrose bolus 10% 250 mL  250 mL IntraVENous PRN Gardenia A Lump, APRN - CNP        glucagon (rDNA) injection 1 mg  1 mg SubCUTAneous PRN Gardenia A Lump, APRN - CNP        dextrose 10 % infusion   IntraVENous Continuous PRN Gardenia A Lump, APRN - CNP        albuterol (PROVENTIL) nebulizer solution 2.5 mg  2.5 mg Nebulization Q6H PRN Gardenia A Lump, APRN - CNP        levoFLOXacin (LEVAQUIN) 750 MG/150ML infusion 750 mg  750 mg IntraVENous Q48H Anahi Trujillo MD   Stopped at 01/22/23 1936    carvedilol (COREG) tablet 12.5 mg  12.5 mg Oral BID  Haleigh Wilson MD   12.5 mg at 01/23/23 1735    sacubitril-valsartan (ENTRESTO) 24-26 MG per tablet 1 tablet  1 tablet Oral BID Haleigh Wilson MD   1 tablet at 01/23/23 0845    Vitamin D (CHOLECALCIFEROL) tablet 2,000 Units  2,000 Units Oral Daily Haleigh Wilson MD   2,000 Units at 01/23/23 0845    vitamin B-12 (CYANOCOBALAMIN) tablet 1,000 mcg  1,000 mcg Oral Daily Haleigh Wilson MD   1,000 mcg at 01/23/23 0845    lactobacillus (BACID) tablet 1 tablet  1 tablet Oral Daily Haleigh Wilson MD   1 tablet at 01/23/23 0845    cholestyramine light packet 4 g  4 g Oral BID Haleigh Wilson MD   4 g at 01/23/23 0900    pantoprazole (PROTONIX) tablet 40 mg  40 mg Oral QAM AC Haleigh Wilson MD   40 mg at 01/23/23 0534    insulin lispro (HUMALOG) injection vial 0-8 Units  0-8 Units SubCUTAneous TID  Haleigh Wilson MD   2 Units at 01/23/23 1735    insulin lispro (HUMALOG) injection vial 0-4 Units  0-4 Units SubCUTAneous Nightly Haleigh Wilson MD   4 Units at 01/22/23 2149    glimepiride (AMARYL) tablet 4 mg  4 mg Oral BID  Haleigh Wilson MD   4 mg at 01/23/23 1735    potassium chloride (KLOR-CON M) extended release tablet 10 mEq  10 mEq Oral BID Ana Cristina Verma MD   10 mEq at 01/23/23 0845         Review of Systems:  Unable to obtain as the patient is confused and no family present during visit      Objective:      /63   Pulse 66   Temp 97.5 °F (36.4 °C) (Axillary)   Resp 16   Ht 5' 4\" (1.626 m)   Wt 152 lb 6.4 oz (69.1 kg)   SpO2 93%   BMI 26.16 kg/m²       LABS    CBC:   Lab Results   Component Value Date/Time    WBC 11.5 01/23/2023 05:13 AM    RBC 4.13 01/23/2023 05:13 AM    HGB 11.7 01/23/2023 05:13 AM     SED RATE: No results found for: SEDRATE    CMP:  Albumin:    Lab Results   Component Value Date/Time    LABALBU 2.8 01/22/2023 12:27 AM     PT/INR:    Lab Results   Component Value Date/Time    PROTIME 14.9 01/22/2023 07:59 PM    INR 1.2 01/22/2023 07:59 PM     HgBA1c:    Lab Results   Component Value Date/Time    LABA1C 8.7 01/23/2023 05:13 AM     PTT: No components found for: LABPTT      PHYSICAL EXAM    General Appearance: alert and oriented to person, place and time, well-developed and well-nourished, in no acute distress  Head: normocephalic and atraumatic  Pulmonary/Chest: normal air movement, no respiratory distress  Skin: RLE medial- wound bed healthy with red granulation tissue.    Cardiovascular/Circulation: 1+ BLE edema, no cyanosis, palpable peripheral pulses  Extremities: no cyanosis and no clubbing  Musculoskeletal: no joint deformity or tenderness, no extremity amputations  Neurologic: gait not evaluated this visit, coordination normal and speech normal      Assessment:       Jacob Risk Score: Jacob Scale Score: 18    Patient Active Problem List   Diagnosis Code    Lower GI bleed K92.2    Acute on chronic diastolic congestive heart failure ([facility]) I50.33    Allergic rhinitis J30.9    Arthritis of right knee M17.11    Bilateral lower extremity edema R60.0    Chronic allergic conjunctivitis H10.45    Congestive heart failure ([facility]) I50.9    Type 2 diabetes mellitus without complication, without long-term current use of insulin ([facility]) E11.9    Essential hypertension I10    Non-pressure chronic ulcer of left calf with fat layer exposed ([facility]) L97.222    Overweight E66.3    Paroxysmal atrial fibrillation ([facility]) I48.0    Venous ulcer of right leg ([facility]) I83.019, L97.919    Pseudogout of right knee M11.261    Vasomotor rhinitis J30.0    Sensorineural hearing loss ([facility]), bilateral H90.3    Serum creatinine raised R79.89    Tinnitus of both ears H93.13    Diarrhea R19.7    Chronic combined systolic (congestive) and diastolic (congestive) heart failure ([facility]) I50.42    COVID-19 U07.1         Measurements:  Wound 01/10/23 Pretibial Right;Medial (Active)   Wound Image   01/23/23 1742   Wound Etiology Venous 01/11/23 1710   Dressing Status Dry; Intact 01/23/23 0938   Wound Cleansed Cleansed with saline 01/22/23 0441   Dressing/Treatment Non adherent;Petroleum impregnated gauze; Ace wrap 01/22/23 0441   Dressing Change Due 01/15/23 01/13/23 1400   Wound Length (cm) 3.6 cm 01/11/23 1710   Wound Width (cm) 3 cm 01/11/23 1710   Wound Depth (cm) 0.2 cm 01/11/23 1710   Wound Surface Area (cm^2) 10.8 cm^2 01/11/23 1710   Wound Volume (cm^3) 2.16 cm^3 01/11/23 1710   Wound Assessment Granulation tissue;Pink/red 01/22/23 0441   Drainage Amount Scant 01/22/23 0441   Drainage Description Serosanguinous 01/22/23 0441   Odor None 01/22/23 0441   Jocelynn-wound Assessment Intact; Blanchable erythema 01/22/23 0441   Margins Attached edges; Defined edges 01/13/23 1400   Wound Thickness Description not for Pressure Injury Full thickness 01/13/23 1400   Number of days: 13         Plan:     Plan of Care:     -RLE wound care: Cleanse with soap and water, rinse, pat dry. Cover wound with oil emulsion dressing, reinforce with ABD pad, secure with roll gauze. Change dressing every day and as needed if loose or soiled. -Ace wrap to both lower legs. Start with 4 inch wrap just above toes to ankle, then use 6 in wrap up to just below knees. Attempt to overlap layers 50/50. Rewrap daily. Ace wrap may come off at HS, back on in AM.     -Follow up outpatient wound care center.     -Turn every 2 hours    -Float heels off of bed with pillows under calves. If needed- use offloading boots.    -Sacral foam dressing to sacrococcygeal area. Apply skin barrier wipe to skin first to help adherence. Peel back dressing to inspect skin beneath qshift, re-secure. Change every 72 hours and prn wrinkles, soilage. Discontinue if repeatedly soiled by incontinence.     -Routine incontinence care with foaming cleanser and zinc oxide cream. Apply zinc oxide cream twice daily and prn incontinence. Use moisture wicking under pad (one layer only). -Waffle mattress overlay. Check inflation each shift by sliding hand under the air overlay. Feel for the patient's heaviest/ most dependant body part. Ideally 1/2 to 1\" of air will be between your hand and the patient's body. Add air prn. Specialty Bed Required : Yes   [] Low Air Loss   [x] Pressure Redistribution  [] Fluid Immersion  [] Bariatric  [] Total Pressure Relief  [] Other:     Current Diet: ADULT DIET; Easy to Chew; 4 carb choices (60 gm/meal);  Low Fat/Low Chol/High Fiber/SHAHZAD    Discharge Plan:  Placement for patient upon discharge: skilled nursing   Patient appropriate for Outpatient [address]: Yes    Patient/Caregiver Teaching:  Level of patientunderstanding able to:     [] Indicates understanding       [x] Needs reinforcement  [x] Unsuccessful      [] Verbal Understanding  [] Demonstrated understanding       [] No evidence of learning  [] Refused teaching         [] N/A       Electronically signed by PATRICK Casey CNP on  1/23/2023 at 5:44 PM

## 2023-01-23 NOTE — CARE COORDINATION
Social work: Met with patient at bedside and had long conversation regarding benefits of short-term rehab. Patient is not willing to consider SNF, stating she \"built her home, she will die in her home. \" SW discussed rehab would only be for week or so, as patient admits she is weak. She continues to refuse. SW called Mary/dtr to inform, she is not surprised patient refused. Dtr provides most of the care for patient and will continue to do so. Pt/family agreeable to home care, either [facility] or [facility].

## 2023-01-23 NOTE — PROGRESS NOTES
Dr Young Part notified of concerns voiced about patient not being placed on anti-coags and questioning if patient should be placed on heparin gtt until seen by cardiology, via perfect serve, return call received, states that he will address

## 2023-01-23 NOTE — PLAN OF CARE
Patient resting comfortably in bed eating her dinner. O2 is 93% on 2L nasal cannula. Patient is alert only to self and is very forgetful during conversation. [place] changed and brief is in place. Patient attempted to use the bedpan but was unable to have a bowel movement, she would like to try again later. VSS, call light within reach, bed alarm on, safety maintained      Problem: Discharge Planning  Goal: Discharge to home or other facility with appropriate resources  1/23/2023 1804 by Jordyn Mccullough RN  Outcome: Progressing     Problem: Skin/Tissue Integrity  Goal: Absence of new skin breakdown  Description: 1. Monitor for areas of redness and/or skin breakdown  2. Assess vascular access sites hourly  3. Every 4-6 hours minimum:  Change oxygen saturation probe site  4. Every 4-6 hours:  If on nasal continuous positive airway pressure, respiratory therapy assess nares and determine need for appliance change or resting period.   1/23/2023 1804 by Jordyn Mccullough RN  Outcome: Progressing     Problem: Safety - Adult  Goal: Free from fall injury  1/23/2023 1804 by Jordyn Mccullough RN  Outcome: Progressing     Problem: ABCDS Injury Assessment  Goal: Absence of physical injury  1/23/2023 1804 by Jordyn Mccullough RN  Outcome: Progressing     Problem: Chronic Conditions and Co-morbidities  Goal: Patient's chronic conditions and co-morbidity symptoms are monitored and maintained or improved  1/23/2023 1804 by Jordyn Mccullough RN  Outcome: Progressing     Problem: Cardiovascular - Adult  Goal: Maintains optimal cardiac output and hemodynamic stability  1/23/2023 1804 by Jordyn Mccullough RN  Outcome: Progressing     Problem: Respiratory - Adult  Goal: Achieves optimal ventilation and oxygenation  1/23/2023 1804 by Jordyn Mccullough RN  Outcome: Progressing

## 2023-01-23 NOTE — PROGRESS NOTES
Physical Therapy  Facility/Department: [facility] PROGRESSIVE CARE  Physical Therapy Initial Assessment    Name: Chelly Uribe  : 10/23/1932  MRN: [id number]  Date of Service: 2023    Discharge Recommendations:  Patient would benefit from continued therapy after discharge   Pt presented to ED on 23 complaining of shortness of breath as well as some neck pain. She recently was discharged from the hospital for CHF and also had some investigation into the neck pain but no one has been able to find anything apparently per EMS. Tonight she called because she was having some shortness of breath and the neck pain again. She has been in multiple times for the symptoms it appears by reading her medical history in the chart review. She relates she does not really feel like she is having any other pain. No chest pain. She feels like she does have some swelling but that is pretty typical.  No fever. Her daughter has recently tested positive for COVID    Pt underwent Covid testing & specimen  POSITIVE for SARS-Cov-2, the novel coronavirus associated with COVID-19. Pt admitted for further medical management of Covid 23 infection  RN reports patient is medically stable for therapy treatment this date. Chart reviewed prior to treatment and patient is agreeable for therapy. Patient Diagnosis(es): The primary encounter diagnosis was Acute on chronic congestive heart failure, unspecified heart failure type ([address] Diagnoses of COVID-19 and Hypomagnesemia were also pertinent to this visit. Past Medical History:  has a past medical history of Adenocarcinoma ([address] Arthritis, Atrial fibrillation ([address] Cancer ([address] CHF (congestive heart failure) ([address] Chronic anticoagulation, Diabetes mellitus ([address] Diverticulitis, Femur fracture ([address] Hypertension, Melanoma ([address] Osteoporosis, Overweight, Sepsis ([address] and Serum creatinine raised.   Past Surgical History:  has a past surgical history that includes Femur Surgery; Colon surgery; Cholecystectomy; and Appendectomy. Assessment   Body Structures, Functions, Activity Limitations Requiring Skilled Therapeutic Intervention: Decreased functional mobility ; Decreased ADL status; Decreased strength;Decreased safe awareness;Decreased endurance;Decreased balance;Decreased cognition;Decreased posture  Assessment: Pt with deficits of strength, bed mobility, transfers, ambulation, balance, cognition, posture, safety awareness,  is very self limiting & quick to agitate  with impaired endurance this session. Pt is globally deconditioned & required 2 assist for safe functional mobility, transfers & gait. With current deficits, Pt HIGH risk for falls & requires continued PT to maximize independence with functional mobility, balance, safety awareness & activity tolerance to improve overall tolerance of ADL's. Pt currently functioning below baseline with -PAC mobility score of 10/24. Recommend daily inpatient skilled therapy at time of discharge to maximize long term outcomes and prevent re-admission.   Therapy Prognosis: Good  Decision Making: High Complexity  Exam: ROM, MMT, functional mobility, activity tolerance, Balance, & [address] [place] [address] [address] AM-[facility] 6 Clicks Basic Mobility  Clinical Presentation: evolving  Requires PT Follow-Up: Yes  Activity Tolerance  Activity Tolerance: Patient limited by fatigue;Patient limited by endurance;Treatment limited secondary to agitation     Plan   Physcial Therapy Plan  General Plan: 5-7 times per week  Current Treatment Recommendations: Strengthening, Balance training, Functional mobility training, Transfer training, Endurance training, Gait training, Stair training, Home exercise program, Safety education & training, Patient/Caregiver education & training, Neuromuscular re-education  Safety Devices  Type of Devices: Bed alarm in place, Gait belt, Heels elevated for pressure relief, Patient at risk for falls, Left in bed, Nurse notified (assisted pt up to chair and then due to safety concerns/increased agitation and fear pt would attempt self transfers/high fall risk she was assisted back to bed)     Restrictions  Restrictions/Precautions  Restrictions/Precautions: General Precautions, Fall Risk  Position Activity Restriction  Other position/activity restrictions:  Up with assist, telemetry, Heels off bed at all times, ext urinary catheter, RUE IV, droplet+ isolation 2* positive Covid,  VERY [place], 4LO2 via NC, ALARMS, pt agitates easily     Subjective   General  Chart Reviewed: Yes  Patient assessed for rehabilitation services?: Yes  Additional Pertinent Hx: Colon cancer, atrial fib, CHF, DM, HTN, osteoporosis  Response To Previous Treatment: Not applicable  Other (Comment): RN okays PT  General Comment  Comments: Pt agreeable to PT, denies pain         Social/Functional History  Social/Functional History  Lives With: Alone  Type of Home: House  Home Layout: One level  Home Access: Stairs to enter with rails  Entrance Stairs - Number of Steps: 3  Entrance Stairs - Rails: Both  Bathroom Shower/Tub: Tub/Shower unit (pt states she only does sponge bath due to fearful of falling)  Bathroom Toilet: Handicap height  Bathroom Equipment: Grab bars in shower, Shower chair  Home Equipment: markus Andre  Receives Help From:  (pt states she has limited support; her daughter is local but works so limited in availability to assist her)  ADL Assistance: Independent  Homemaking Assistance: Independent (but chart stated pt dependent on family for care)  Homemaking Responsibilities: Yes  Ambulation Assistance: Independent (pt says she uses 2 canes to ambulate)  Transfer Assistance: Independent  Active : No  Patient's  Info: daughter  Occupation: Retired  Type of Occupation: [employer]  IADL Comments: Question reliability of info pt reported inconsistent information, will need verified for accuracy  Additional Comments: Pt stated she fell on throw rug & also fell/broke her femur but then stated this was in 2013; denies falls past year  Vision/Hearing  Vision  Vision: Impaired  Vision Exceptions: Wears glasses at all times (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, denies changes for R eye & denies any eye sx)  Hearing  Hearing: Exceptions to [facility]  Hearing Exceptions: Hard of hearing/hearing concerns;Bilateral hearing aid (aids are not here, says they need \"fixed\")    Cognition   Orientation  Overall Orientation Status: Within Functional Limits  Orientation Level: Oriented X4  Cognition  Overall Cognitive Status: Exceptions  Following Commands: Inconsistently follows commands; Follows one step commands with increased time; Follows one step commands with repetition  Attention Span: Difficulty attending to directions; Difficulty dividing attention; Attends with cues to redirect  Memory: Decreased short term memory;Decreased recall of recent events  Safety Judgement: Decreased awareness of need for assistance;Decreased awareness of need for safety  Problem Solving: Assistance required to implement solutions;Assistance required to generate solutions;Assistance required to identify errors made;Assistance required to correct errors made;Decreased awareness of errors  Insights: Not aware of deficits  Initiation: Requires cues for all  Sequencing: Requires cues for all     Objective   Heart Rate: 66  Heart Rate Source: Monitor  BP: 114/63  BP Location: Left upper arm  Patient Position: Supine  MAP (Calculated): 80  Resp: 16  SpO2: 93 %  O2 Device: Nasal cannula     Observation/Palpation  Posture: Fair (with RW)  Observation: ext urinary catheter, RUE IV,  4LO2 via NC, ALARMS;Pt has very fragile skin with multiple scattered bruisng of BUE's, BLE's very tender/sensitive to touch & has wound R shin covered by dressing;  Resting SPO2 93%; pt easily agitates and verbally abusive to staff/taunting during session  Edema: BLE's  Scar: pt has previuos old abd scar s/p colon sx  Gross Assessment  Sensation: Impaired (R hand numb, denies L hand or BLE paresthesias)     AROM RLE (degrees)  RLE AROM: WFL  AROM LLE (degrees)  LLE AROM : WFL  AROM RUE (degrees)  RUE General AROM: See OT assessment  AROM LUE (degrees)  LUE General AROM: See OT assessment  Strength RLE  Comment: 3+/5  Strength LLE  Comment: 3+/5  Strength RUE  Comment: See OT assessment  Strength LUE  Comment: See OT assessment          Bed mobility  Rolling to Left: Moderate assistance;Maximum assistance;2 Person assistance  Supine to Sit: Moderate assistance;Maximum assistance;2 Person assistance  Sit to Supine: Moderate assistance;Maximum assistance;2 Person assistance  Scooting: Maximal assistance;2 Person assistance  Bed Mobility Comments: Max verbal instruction/tactile assist for initiating movements, UE hand placement on rail and proper log rolling tech + proper use of UB to scoot completely out to EOB with B foot placement to establish safe sitting balance, pursed lip breathing, pacing, encouragement, awareness/assist with line mgt,  with increased time needed all to increase safety & reduce fall risk  Transfers  Sit to Stand: Moderate Assistance;Maximum Assistance;2 Person Assistance  Stand to Sit: Moderate Assistance;Maximum Assistance;2 Person Assistance  Bed to Chair: Moderate assistance;Maximum assistance;2 Person Assistance  Stand Pivot Transfers: Moderate Assistance;Maximum Assistance;2 Person Assistance  Lateral Transfers: Moderate Assistance;Maximum Assistance;2 Person Assistance  Comment: MAX VC + tactile assist on correct use of upper body for safe sit/stand, nose over toes tech, upright posture, pacing + to back all way back to surface with walker CLOSE until she feels touch behind legs, & to ensure she reaches with UB support to arms of chair AND to slow down & take time for transitions to make sure he has no feeling of spinning or dizzy(poor carryover/compliance). Waffle cushion placed in chair and pt agreed after max encouragement/edu on benefits of being up OOB as able; pt up for < 10 mins and returned back to bed due to safety conncerns/pt agitated about being up and concern for pt to attempt self transfer & HIGH fall risk  Ambulation  Surface: Level tile  Device: Rolling Walker  Assistance: Maximum assistance; Moderate assistance;2 Person assistance  Quality of Gait: Short step pattern, & MAX cues to keep walker close at all times/amb inside base of walker & upright posture for safety/scanning with POOR carryover/compliance  Gait Deviations: Decreased step length;Decreased step height;Decreased head and trunk rotation  Distance: 5ftx 2     Balance  Posture: Fair  Sitting - Static: Good  Sitting - Dynamic: Fair;+  Standing - Static: Fair  Standing - Dynamic: Fair;-  Single Leg Stance R Le  Single Leg Stance L Le  Comments: Pt demonstrated posterior LOB upon standing, needed MAX cues & ModA to bring wt forward to establish safe COG  Exercise Treatment:   Pt completed lateral WS seated,  and upon standing static standing weight shifts & picking feet up off floor with UB support at R/walker to improve core strength & stability  Circulation/Endurance Exercises: ankle pumps x 20  Static Sitting Balance Exercises: seated EOB with Stephane foot placement x 10 minutes with CGA  Dynamic Sitting Balance Exercises: Core strengthening sitting EOB with bilat UE support, then UE support x 1, then no UE support & alternately lifting feet up off floor with Fabio  Postural Correction Exercises: upright posture in seated & standing   All lines intact, call light within reach, and patient positioned comfortably at end of treatment. All patient needs addressed prior to ending therapy session.       [id number]:  Breathing Techniques: pursed lip breathing techniques  & deep breathing with incentive spirometer including technique, frequency and purpose, completed 10 reps        OutComes Score AM-PAC Score  AM-PAC Inpatient Mobility Raw Score : 10 (01/23/23 1058)  AM-PAC Inpatient T-Scale Score : 32.29 (01/23/23 1058)  Mobility Inpatient CMS 0-100% Score: 76.75 (01/23/23 1058)  Mobility Inpatient CMS G-Code Modifier : CL (01/23/23 1058)          Tinneti Score       Goals  Short Term Goals  Time Frame for Short Term Goals: 12 visits  Short Term Goal 1: Inc bed-mobility & transfers to independent to enable pt to safely get in/OOB & chair to return to PLOF & decrease risk for falls; Short Term Goal 2: Inc gait to amb 75ft or > indep w/ RW to enable pt to return to previous level of independence & able to demonstrate indep/ safe use of RW in functional activities including approaching surfaces and turning to sit. Short Term Goal 3: Pt able to go up/down 3 steps with Stephane rail indep  Short Term Goal 4: Inc strength to [address] standing balance to good with device to facilitate pt independence for performance of ADL's & functional mobility, & reduce fall risk  Short Term Goal 5: Pt able to tolerate 30 min of activity to include 15-20 reps of ex, NMR & functional mobility with device to facilitate activity tolerance to [facility]  Additional Goals?: Yes  Short Term Goal 6: Ed pt on home ex's, safety, balance & endurance training, pressure relief with Pt able to demonstrate effective pressure relief techniques, breathing techniques, energy conservation(planning, pacing, prioritizing & positioning), fall prevention, & issue written Pt Ed       Education  Patient Education  Education Given To: Patient  Education Provided: Role of Therapy;Transfer Training;Energy Conservation; Fall Prevention Strategies;Precautions  Education Provided Comments: Pt educated on purpose of acute PT eval, importance of continued mobility throughout admission, general safety awareness, fall risk prevention, safe transfers & ambulation w/ RW, circulation ex's, pressure relief/optimal breathing techniques, prevention of sedentary complications, and PT POC. Pt demonstrated POOR carryover  Pt requires continued reinforcement of education. Education Method: Demonstration  Barriers to Learning: Cognition  Education Outcome: Continued education needed      Therapy Time   Individual Concurrent Group Co-treatment   Time In 1000 (0625)         Time Out 36 (8052)         Minutes [phone]             Treatment time: 98 minutes  Additional 10 minutes for chart review    Co-treatment with OT warranted secondary to decreased safety and independence requiring 2 skilled therapy professionals to address individual discipline's goals. PT addressing core control in transitions with bed mobility & transfers, seated/standing posture, pressure relief, seated & standing postural alignment and breathing techniques, safety/scanning, & fall prevention in ambulation techniques.         [address], PT

## 2023-01-24 NOTE — PROGRESS NOTES
[facility]  Progress Note    Jania Darby  MRN: [id number]  Date: 1/24/2023  LOS: 2     Reason for F/U :   COVID-19 virus infection    Impression :   COVID-19 virus infection tested + 1/22/2023  Unvaccinated adult patient  Acute respiratory insufficiency requiring supplemental oxygen at 4 L  Congestive heart failure with mild congestive changes on chest x-ray  Multifocal opacities in the bases  Right-sided greater than left-sided pleural effusion    Recommendations:   COVID therapy: The patient is not a candidate for antiviral therapy  Given the hypoxia the patient will continue Decadron   The patient is not a candidate for immunosuppressive therapy at this time and the CRP is not overly elevated    Antibiotic therapy:  Continue levofloxacin  The patient remains on 2 L of oxygen by nasal cannula  Continue supportive care    Infection Control Recommendations:   Droplet plus precautions    Discharge Planning:   Estimated Length of IV antimicrobials: 5 days  Patient will need Midline Catheter Insertion/ PICC line Insertion: No  Patient will need: Home IV , [place],  SNF,  LTAC: Undetermined  Patient willneed outpatient wound care: No    Medical Decision making / Summary of Stay:   Jania Darby is a 80y.o.-year-old female who was initially admitted on 1/21/2023. 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 She does not report any subjective fevers, chills no significant cough, no abdominal pain, no nausea vomiting, has chronic diarrhea.   The patient was reporting some neck pain and again the neck pain has been previously evaluated with no source found. She reports that her daughter recently tested positive for COVID and work-up in the emergency department included a chest x-ray that showed a new small right effusion and airspace disease and possible mild CHF. COVID testing was done that was positive and the patient was admitted with COVID-19 virus infection as well as concern for congestive heart failure. The patient has since been seen by the pulmonary critical care service and was started on supplemental oxygen, IV Decadron, intravenous levofloxacin as there was concern for right basilar infiltrate/pneumonia and I was asked to evaluate and help with antibiotic choice. Current evaluation:2023    /68   Pulse 73   Temp 97.3 °F (36.3 °C) (Oral)   Resp 17   Ht 5' 4\" (1.626 m)   Wt 152 lb 6.4 oz (69.1 kg)   SpO2 97%   BMI 26.16 kg/m²     Temperature Range: Temp: 97.3 °F (36.3 °C) Temp  Av.3 °F (36.3 °C)  Min: 97.2 °F (36.2 °C)  Max: 97.5 °F (36.4 °C)  The patient is seen and evaluated at bedside she is awake and alert and continues to have generalized fatigue/weakness. She does have a cough mostly nonproductive. She still does have some shortness of breath. She continues on supplemental oxygen though requirement has decreased down to 2 L. Review of Systems   Constitutional: Negative. Respiratory:  Positive for cough and shortness of breath. Cardiovascular: Negative. Gastrointestinal: Negative. Genitourinary: Negative. Musculoskeletal: Negative. Skin: Negative. Neurological: Negative. Psychiatric/Behavioral: Negative. Physical Examination :     Physical Exam  Constitutional:       Appearance: She is well-developed. She is cachectic. HENT:      Head: Normocephalic and atraumatic. Cardiovascular:      Rate and Rhythm: Regular rhythm. Heart sounds: Normal heart sounds.    Pulmonary:      Effort: Pulmonary effort is normal.      Breath sounds: Rales present. Abdominal:      General: Bowel sounds are normal.      Palpations: Abdomen is soft. Musculoskeletal:      Cervical back: Normal range of motion and neck supple. Right lower leg: Edema present. Left lower leg: Edema present. Skin:     General: Skin is warm and dry. Neurological:      Mental Status: She is alert and oriented to person, place, and time. Laboratory data:   I have independently reviewed the followinglabs:  CBC with Differential:   Recent Labs     01/23/23  0513 01/24/23  0510   WBC 11.5* 11.7*   HGB 11.7* 11.4*   HCT 37.0 36.0*    227   LYMPHOPCT 3* 4*   MONOPCT 4 5     BMP:   Recent Labs     01/22/23  0027 01/22/23  1114 01/23/23  0513 01/24/23  0510     --  133* 137   K 3.4*  --  3.6* 3.8     --  103 107   CO2 17*  --  18* 17*   BUN 40*  --  47* 49*   CREATININE 1.00*  --  1.20* 1.25*   MG 0.8* 1.4*  --   --      Hepatic Function Panel:   Recent Labs     01/22/23  0027   PROT 5.2*   LABALBU 2.8*   BILITOT 0.3   ALKPHOS 107*   ALT 14   AST 25         Lab Results   Component Value Date/Time    PROCAL 0.55 01/22/2023 01:40 PM     Lab Results   Component Value Date/Time    CRP 37.7 01/23/2023 05:13 AM    CRP 0.5 03/26/2018 03:47 PM     No results found for: SEDRATE      Lab Results   Component Value Date/Time    DDIMER 0.86 03/26/2018 03:47 PM     No results found for: FERRITIN  No results found for: LDH  No results found for: FIBRINOGEN    Results in Past 30 Days  Result Component Current Result Ref Range Previous Result Ref Range   SARS-CoV-2, Rapid DETECTED (A) (1/22/2023) Not Detected Not in Time Range      Lab Results   Component Value Date/Time    COVID19 DETECTED 01/22/2023 12:30 AM    COVID19 Not Detected 02/09/2022 05:46 PM       No results for input(s): [facility] in the last 72 hours.     Imaging Studies:   ONE XRAY VIEW OF THE CHEST 1/24/2023 5:36 am  Impression   More prominent bilateral interstitial and ground-glass opacities, which may represent developing edema versus multifocal infection. Grossly similar   appearance of small effusions. Cultures:     COVID-19, Rapid [[id number]] (Abnormal) Collected: 01/22/23 0030   Order Status: Completed Specimen: Nasopharyngeal Swab Updated: 01/22/23 0058    Specimen Description . NASOPHARYNGEAL SWAB    SARS-CoV-2, Rapid DETECTED Abnormal     Comment:        Rapid NAAT: The specimen is POSITIVE for SARS-Cov-2, the novel coronavirus associated with   COVID-19. This test has been authorized by the FDA under an Emergency Use Authorization (EUA) for use   by authorized laboratories. The ID NOW COVID-19 assay is designed to detect the virus that causes COVID-19 in patients   with signs and symptoms of infection who are suspected of COVID-19. An individual without symptoms of COVID-19 and who is not shedding SARS-CoV-2 virus would   expect to have a negative (not detected) result in this assay.    Fact sheet for Healthcare Providers: http://www.tara.ángel/   Fact sheet for Patients: http://www.tara.ángel/         Methodology: Isothermal Nucleic Acid Amplification         Results reported to the appropriate Health Department          Medications:      dexamethasone  6 mg Oral Daily    bumetanide  1 mg IntraVENous Daily    sodium chloride flush  5-40 mL IntraVENous 2 times per day    levofloxacin  750 mg IntraVENous Q48H    carvedilol  12.5 mg Oral BID WC    sacubitril-valsartan  1 tablet Oral BID    Vitamin D  2,000 Units Oral Daily    vitamin B-12  1,000 mcg Oral Daily    lactobacillus  1 tablet Oral Daily    cholestyramine light  4 g Oral BID    pantoprazole  40 mg Oral QAM AC    insulin lispro  0-8 Units SubCUTAneous TID WC    insulin lispro  0-4 Units SubCUTAneous Nightly    glimepiride  4 mg Oral BID WC    potassium chloride  10 mEq Oral BID       Electronically signed by Caitlin Teresa MD on 1/24/2023 at 8:57 AM      Infectious Disease [address] [place] [address] [employer] zonia  [employer] messaging  OFFICE: [phone]    Thank you for allowing us to participate in the care of this patient. Please call with questions. This note is created with the assistance of a speech recognition program.  While intending to generate a document that actually reflects the content of the visit, the document can still have some errors including those of syntax and sound a like substitutions which may escape proof reading. In such instances, actual meaning can be extrapolated by contextual diversion.

## 2023-01-24 NOTE — CARE COORDINATION
Home Oxygen Evaluation    Home Oxygen Evaluation completed. Patient is on 2 liters per minute via NC. Resting SpO2 = 93%  Resting SpO2 on room air = 87%    SpO2 on room air with exercise = na %  SpO2 on oxygen as above with exercise = na%    Nocturnal Oximetry with patient on room air is recommended is SpO2 is between 89% and 95% (requires additional order).     Pavel Darnell RN  1:31 PM

## 2023-01-24 NOTE — PROGRESS NOTES
Pt remained free from falls and injury. Blood sugars were: 176 no coverage provided  Testing completed: Chest xray was completed this morning, results pending. Wound care included: Patients wound was wrapped in ace bandage, assessed and rewrapped. Output: 175mL in purwick canister and 2 bowel movements during the shift.   Hand off report given to Karely by Olga Gottron

## 2023-01-24 NOTE — PROGRESS NOTES
Pulmonary Critical Care Progress Note  Alli Sotomayor CNP/Gosia Calle MD     Patient seen for the follow up of  COVID-19 acute hypoxic respiratory insufficiency, pulmonary edema, pleural effusions    Subjective:  No significant overnight events noted. She is currently resting in bed however her oxygen is in her nose and she is mouth breathing and her SPO2 is 83%. She is able to come up to about 90% with breathing in through her nose. Patient is sleepy so I placed her cannula in her mouth and saturations remained around 90%. She has occasional cough, mostly nonproductive. Examination:  Vitals: /68   Pulse 73   Temp 97.3 °F (36.3 °C) (Oral)   Resp 17   Ht 5' 4\" (1.626 m)   Wt 152 lb 6.4 oz (69.1 kg)   SpO2 97%   BMI 26.16 kg/m²   General appearance: alert and cooperative with exam  Neck: No JVD  Lungs: Moderate to decreased air exchange, no significant wheezing, occasional crackles  Heart: regular rate and rhythm, S1, S2 normal, no gallop  Abdomen: Soft, non tender, + BS  Extremities: no cyanosis or clubbing. Trace edema.     LABs:  CBC:   Recent Labs     01/22/23 0027 01/22/23 1959 01/23/23  0513 01/24/23  0510   WBC 9.4 10.9 11.5* 11.7*   HGB 11.6* 11.3* 11.7* 11.4*   HCT 35.8* 35.8* 37.0 36.0*    184 202 227     BMP:   Recent Labs     01/22/23 0027 01/23/23  0513 01/24/23  0510    133* 137   K 3.4* 3.6* 3.8   CO2 17* 18* 17*   BUN 40* 47* 49*   CREATININE 1.00* 1.20* 1.25*   LABGLOM 54* 43* 41*   GLUCOSE 208* 264* 122*     PT/INR:   Recent Labs     01/22/23 1959   PROTIME 14.9*   INR 1.2     APTT:  Recent Labs     01/22/23 1959   APTT 34.1*     LIVER PROFILE:  Recent Labs     01/22/23 0027   AST 25   ALT 14   LABALBU 2.8*      Latest Reference Range & Units Most Recent   Procalcitonin <0.09 ng/mL 0.55 (H)  1/22/23 13:40   (H): Data is abnormally high    Radiology:  CT chest 1/22/2023      X-ray chest 1/21/2023      Impression:  Acute hypoxic respiratory failure  COVID infection  Multifocal groundglass opacities,  pneumonia versus pulmonary edema  Small bilateral pleural effusions right greater than left  Elevated troponin and BNP,? Decompensated CHF versus pulmonary hypertension  History of A-fib, allergic rhinitis, DM, HTN  Ileus    Recommendations:  Oxygen via nasal cannula, keep SPO2 90% or greater   Incentive spirometry every hour while awake   Albuterol every 6 hours as needed  Continue IV Levaquin  Oral Decadron  Infectious disease on consult, no indication for antiviral or immunosuppressive therapy at this time  Diuresis with IV Bumex 1 mg daily per cardiology  DVT prophylaxis, Lovenox  GI prophylaxis, Protonix  PT/OT  Discussed with RN  Above assessment and plan will be reviewed with Dr. Kwame Ochoa. Patient plan will be finalized following review by Dr. Kwame Ochoa.   Will follow with you    PATRICK Cohn-CNP   Pulmonary Critical Care and Sleep Medicine,  Patient seen under the supervision of Dusty Joy MD, [facility]

## 2023-01-24 NOTE — PROGRESS NOTES
[address]  Occupational Therapy Not Seen    DATE: 2023    NAME: Tristin Torres  MRN: [id number]   : 10/23/1932    Patient not seen this date for Occupational Therapy due to:      [] Cancel by RN or physician due to:    [] Hemodialysis    [] Critical Lab Value Level     [] Blood transfusion in progress    [] Acute or unstable cardiovascular status   _MAP < 55 or more than >115  _HR < 40 or > 130    [] Acute or unstable pulmonary status   -FiO2 > 60%   _RR < 5 or >40    _O2 sats < 85%    [] Strict Bedrest    [] Off Unit for surgery or procedure    [] Off Unit for testing       [] Pending imaging to R/O fracture    [x] Refusal by Patient (Case management in room with pt upon arrival attempting to edu on benefits of participation of therapy to further assess needs of pt but pt was very agitated repeating \"you need to get off my back! I will be dead in a year anyway! \" Will continue to follow and assess needs. [] Other      [] OT being discontinued at this time. Patient independent. No further needs. [] OT being discontinued at this time as the patient has been transferred to hospice care. No further needs.       DANIEL Arteaga

## 2023-01-24 NOTE — PLAN OF CARE
Problem: Discharge Planning  Goal: Discharge to home or other facility with appropriate resources  Outcome: Progressing  Discharge to home or other facility with appropriate resources:   Identify barriers to discharge with patient and caregiver   Identify discharge learning needs (meds, wound care, etc)   Arrange for needed discharge resources and transportation as appropriate  Note: Pt has plans to be discharged home with family planning to help with care. Pt refused SNF at this time, will consider home health services. Problem: Skin/Tissue Integrity  Goal: Absence of new skin breakdown  Description: 1. Monitor for areas of redness and/or skin breakdown  2. Assess vascular access sites hourly  3. Every 4-6 hours minimum:  Change oxygen saturation probe site  4. Every 4-6 hours:  If on nasal continuous positive airway pressure, respiratory therapy assess nares and determine need for appliance change or resting period. Outcome: Progressing  Note: Pts brief checked and changed as needed. Barrier cream applied to buttock due to redness.        Problem: Chronic Conditions and Co-morbidities  Goal: Patient's chronic conditions and co-morbidity symptoms are monitored and maintained or improved

## 2023-01-24 NOTE — PROGRESS NOTES
Comprehensive Nutrition Assessment    Type and Reason for Visit:  Positive Nutrition Screen (Unplanned weight loss, decreased appetite)    Nutrition Recommendations/Plan:   Continue ADULT DIET; Easy to Chew; 4 carb choices (60 gm/meal); Low Fat/Low Chol/High Fiber/SHAHZAD  Start Ensure [employer] 2x/day  Monitor p.o intakes and labs     Malnutrition Assessment:  Malnutrition Status: At risk for malnutrition (Comment) (01/24/23 9860)        Nutrition Assessment:    Patient admission is related to hypomagnesmia, acute on chronic CHF and COVID-19 virus. Patient has variable p.o intakes. Patien ate some at dinner last night but did not eat breakfast this morning. According to electronic weight records patient usually weighs about 150 lbs. Will start Glucerna 2x/day. Monitor p.o intakes and labs. Nutrition Related Findings:    Edema: +1 pitting BLE. Active bowel sounds. Wound Type: None       Current Nutrition Intake & Therapies:    Average Meal Intake: 1-25%, 26-50%, 0%     ADULT DIET; Easy to Chew; 4 carb choices (60 gm/meal); Low Fat/Low Chol/High Fiber/SHAHZAD    Anthropometric Measures:  Height: 5' 4\" (162.6 cm)  Ideal Body Weight (IBW): 120 lbs (55 kg)       Current Body Weight: 153 lb (69.4 kg), 127.5 % IBW. Weight Source: Bed Scale  Current BMI (kg/m2): 26.2                          BMI Categories: Overweight (BMI 25.0-29. 9)    Estimated Daily Nutrient Needs:  Energy Requirements Based On: Kcal/kg  Weight Used for Energy Requirements: Current  Energy (kcal/day): 0943-6260 kcal (20-23 kcal/kg)  Weight Used for Protein Requirements: Current  Protein (g/day): 83-90 gm of protein (1.2-1.3 gm/kg)         Nutrition Diagnosis:   Inadequate protein-energy intake related to inadequate protein-energy intake as evidenced by intake 26-50%, intake 0-25%    Nutrition Interventions:   Food and/or Nutrient Delivery: Continue Current Diet, Start Oral Nutrition Supplement  Nutrition Education/Counseling: Education not indicated  Coordination of Nutrition Care: Continue to monitor while inpatient       Goals:     Goals: PO intake 50% or greater       Nutrition Monitoring and Evaluation:      Food/Nutrient Intake Outcomes: Food and Nutrient Intake, Supplement Intake  Physical Signs/Symptoms Outcomes: Biochemical Data, Fluid Status or Edema, Skin, Weight    Discharge Planning:    Continue current diet, Continue Oral Nutrition Supplement             Chung CRUZN, RDN, LDN  Lead Clinical Dietitian  RD Office Phone [phone]

## 2023-01-24 NOTE — PROGRESS NOTES
Physical Therapy  DATE: 2023    NAME: Enio Gaines  MRN: [id number]   : 10/23/1932    Patient not seen this date for Physical Therapy due to:      [] Cancel by RN or physician due to:    [] Hemodialysis    [] Critical Lab Value Level     [] Blood transfusion in progress    [] Acute or unstable cardiovascular status   _MAP < 55 or more than >115  _HR < 40 or > 130    [] Acute or unstable pulmonary status   -FiO2 > 60%   _RR < 5 or >40    _O2 sats < 85%    [] Strict Bedrest    [] Off Unit for surgery or procedure    [] Off Unit for testing       [] Pending imaging to R/O fracture    [x] Refusal by Patient (Case management in room with pt upon arrival attempting to edu on benefits of participation of therapy to further assess needs of pt but pt was very agitated repeating \"you need to get off my back! I will be dead in a year anyway! \" Will continue to follow and assess needs.)       [] Other      [] PT being discontinued at this time. Patient independent. No further needs. [] PT being discontinued at this time as the patient has been transferred to hospice care. No further needs.       Shala Hammonds, PTA

## 2023-01-24 NOTE — PROGRESS NOTES
[address] Hospitalist        1/24/2023   4:47 PM    Name:  Jan Churchill  MRN:    [id number]     Acct:     [de-identified]   Room:  [address] [place] [address] Day: 2     Admit Date: 1/21/2023 11:39 PM  PCP: Marcello Quezada MD    C/C:   Chief Complaint   Patient presents with    Shortness of Breath       Assessment:      Acute on chronic congestive heart failure  COVID-19 pneumonia  Nonvaccinated against SARS-CoV-2  Respiratory failure with hypoxia requiring oxygen via nasal cannula  Hypomagnesemia  Hypokalemia  Question of bacterial pneumonia  Essential hypertension  Diabetes mellitus type 2  Paroxysmal atrial fibrillation  Congestive heart failure, type unknown  Osteoarthritis, multiple joints  History of colon cancer  History of skin cancer  Right eye vision loss      Plan:      Admit to progressive  Monitor vitals closely  Keep SPO2 above 90%  I's and O's  IV heplock  Pain control  Antiemetics as needed  Levaquin, Decadron  Bumex, Coreg, Entresto  Accu-Cheks before meals and at bedtime  Insulin sliding scale medium  Hypoglycemia protocol  And has elevated blood glucose and is refusing insulin  CBC , BMP, BNP  Pulmonary consulted  Consult cardiology  Cardiology recommend heparin infusion until they can evaluate patient and determine why she is not on anticoagulation  DVT and GI prophylaxis.   Continue medication as below  Patient declining to go to skilled nursing facility, patient likely will be discharged home with home health care  Discharge likely next 24 hours if okay with pulmonology and infectious disease  Patient qualified for home oxygen      Scheduled Meds:   dexamethasone  6 mg Oral Daily    sodium chloride flush  5-40 mL IntraVENous 2 times per day    levofloxacin  750 mg IntraVENous Q48H    carvedilol  12.5 mg Oral BID WC    sacubitril-valsartan  1 tablet Oral BID    Vitamin D  2,000 Units Oral Daily    vitamin B-12  1,000 mcg Oral Daily    lactobacillus  1 tablet Oral Daily cholestyramine light  4 g Oral BID    pantoprazole  40 mg Oral QAM AC    insulin lispro  0-8 Units SubCUTAneous TID WC    insulin lispro  0-4 Units SubCUTAneous Nightly    glimepiride  4 mg Oral BID WC     Continuous Infusions:   sodium chloride 25 mL (23 1455)    dextrose       PRN Meds:  sodium chloride flush, 10 mL, PRN  sodium chloride, , PRN  magnesium sulfate, 1,000 mg, PRN  ondansetron, 4 mg, Q8H PRN   Or  ondansetron, 4 mg, Q6H PRN  polyethylene glycol, 17 g, Daily PRN  acetaminophen, 650 mg, Q6H PRN   Or  acetaminophen, 650 mg, Q6H PRN  glucose, 4 tablet, PRN  dextrose bolus, 125 mL, PRN   Or  dextrose bolus, 250 mL, PRN  glucagon (rDNA), 1 mg, PRN  dextrose, , Continuous PRN  albuterol, 2.5 mg, Q6H PRN            Subjective:     Patient seen and examined at bedside and reviewed  last 24 hrs events with nursing staff  No acute events overnight. Patient denies any acute complaints. Afebrile  Patient denies any chest pain, palpitation, headache, dizziness, cough, nausea, vomiting, abdominal pain, pain, changes in urination or bowel habit or rash. Patient shortness of breath is much improved    ROS:  A 10 point system reviewed and negative otherwise mentioned above.         Physical Examination:      Vitals:  BP (!) 103/56   Pulse 67   Temp 98.1 °F (36.7 °C) (Oral)   Resp 16   Ht 5' 4\" (1.626 m)   Wt 152 lb 6.4 oz (69.1 kg)   SpO2 94%   BMI 26.16 kg/m²   Temp (24hrs), Av.4 °F (36.3 °C), Min:97 °F (36.1 °C), Max:98.1 °F (36.7 °C)    Weight:   Wt Readings from Last 3 Encounters:   23 152 lb 6.4 oz (69.1 kg)   23 150 lb 1.6 oz (68.1 kg)   10/20/22 150 lb (68 kg)     I/O last 3 completed shifts:  I/O last 3 completed shifts:  In: -   Out: 367 [Urine:875; Stool:1]     Recent Labs     23  1228 23  1712 23  0825   POCGLU 240* 233* 176* 104         General appearance - alert, well appearing, and in no acute distress  Mental status - oriented to person, place, and time with normal affect  Head - normocephalic and atraumatic  Eyes - pupils equal and reactive, extraocular eye movements intact, conjunctiva clear  Ears - hearing appears to be intact  Nose - no drainage noted  Mouth - mucous membranes moist  Neck - supple, no carotid bruits, thyroid not palpable  Chest - clear to auscultation, normal effort  Heart - normal rate, regular rhythm, no murmur  Abdomen - soft, nontender, nondistended, bowel sounds present all four quadrants, no masses, hepatomegaly or splenomegaly  Neurological - normal speech, no focal findings or movement disorder noted, cranial nerves II through XII grossly intact  Extremities - peripheral pulses palpable, no pedal edema or calf pain with palpation  Skin - no gross lesions, rashes, or induration noted        Medications: Allergies:    Allergies   Allergen Reactions    Acetaminophen-Codeine Nausea Only    Codeine Nausea Only    Furosemide      Other reaction(s): GI Disturbance    Linagliptin      Other reaction(s): SOB    Sitagliptin      Other reaction(s): felt poorly    Other Itching     Animal Dander       Current Meds:   Current Facility-Administered Medications:     dexamethasone (DECADRON) tablet 6 mg, 6 mg, Oral, Daily, Sylvia Bolaños MD, 6 mg at 01/24/23 0848    sodium chloride flush 0.9 % injection 5-40 mL, 5-40 mL, IntraVENous, 2 times per day, Gardenia A Lump, APRN - CNP, 10 mL at 01/24/23 1826    sodium chloride flush 0.9 % injection 10 mL, 10 mL, IntraVENous, PRN, Gardenia A Lump, APRN - CNP, 10 mL at 01/22/23 1616    0.9 % sodium chloride infusion, , IntraVENous, PRN, Gardenia A Lump, APRN - CNP, Last Rate: 50 mL/hr at 01/24/23 1455, 25 mL at 01/24/23 1455    magnesium sulfate 1000 mg in dextrose 5% 100 mL IVPB, 1,000 mg, IntraVENous, PRN, Gardenia A Lump, APRN - CNP, Stopped at 01/22/23 1715    ondansetron (ZOFRAN-ODT) disintegrating tablet 4 mg, 4 mg, Oral, Q8H PRN, 4 mg at 01/23/23 2013 **OR** ondansetron (ZOFRAN) injection 4 mg, 4 mg, IntraVENous, Q6H PRN, Gardenia A Lump, APRN - CNP    polyethylene glycol (GLYCOLAX) packet 17 g, 17 g, Oral, Daily PRN, Gardenia A Lump, APRN - CNP    acetaminophen (TYLENOL) tablet 650 mg, 650 mg, Oral, Q6H PRN, 650 mg at 01/24/23 0907 **OR** acetaminophen (TYLENOL) suppository 650 mg, 650 mg, Rectal, Q6H PRN, Gardenia A Lump, APRN - CNP    glucose chewable tablet 16 g, 4 tablet, Oral, PRN, Gardenia A Lump, APRN - CNP    dextrose bolus 10% 125 mL, 125 mL, IntraVENous, PRN **OR** dextrose bolus 10% 250 mL, 250 mL, IntraVENous, PRN, Gardenia A Lump, APRN - CNP    glucagon (rDNA) injection 1 mg, 1 mg, SubCUTAneous, PRN, Gardenia A Lump, APRN - CNP    dextrose 10 % infusion, , IntraVENous, Continuous PRN, Gardenia A Lump, APRN - CNP    albuterol (PROVENTIL) nebulizer solution 2.5 mg, 2.5 mg, Nebulization, Q6H PRN, Gardenia A Lump, APRN - CNP    levoFLOXacin (LEVAQUIN) 750 MG/150ML infusion 750 mg, 750 mg, IntraVENous, Q48H, [facility] MD Luc, Last Rate: 100 mL/hr at 01/24/23 1504, 750 mg at 01/24/23 1504    carvedilol (COREG) tablet 12.5 mg, 12.5 mg, Oral, BID WC, Haleigh Wilson MD, 12.5 mg at 01/24/23 0855    sacubitril-valsartan (ENTRESTO) 24-26 MG per tablet 1 tablet, 1 tablet, Oral, BID, Haleigh Wilson MD, 1 tablet at 01/24/23 0848    Vitamin D (CHOLECALCIFEROL) tablet 2,000 Units, 2,000 Units, Oral, Daily, Haleigh Wilson MD, 2,000 Units at 01/24/23 0855    vitamin B-12 (CYANOCOBALAMIN) tablet 1,000 mcg, 1,000 mcg, Oral, Daily, Haleigh Wilson MD, 1,000 mcg at 01/24/23 0848    lactobacillus (BACID) tablet 1 tablet, 1 tablet, Oral, Daily, Haleigh Wilson MD, 1 tablet at 01/24/23 0855    cholestyramine light packet 4 g, 4 g, Oral, BID, Haleigh Wilson MD, 4 g at 01/24/23 0848    pantoprazole (PROTONIX) tablet 40 mg, 40 mg, Oral, QAM AC, Haleigh Wilson MD, 40 mg at 01/24/23 0800    insulin lispro (HUMALOG) injection vial 0-8 Units, 0-8 Units, SubCUTAneous, TID WC, Haleigh Wilson MD, 2 Units at 01/23/23 1735    insulin lispro (HUMALOG) injection vial 0-4 Units, 0-4 Units, SubCUTAneous, Nightly, Haleigh Wilson MD, 4 Units at 01/22/23 2149    glimepiride (AMARYL) tablet 4 mg, 4 mg, Oral, BID WC, Haleigh Wilson MD, 4 mg at 01/24/23 0855      I/O (24Hr): Intake/Output Summary (Last 24 hours) at 1/24/2023 1647  Last data filed at 1/24/2023 1051  Gross per 24 hour   Intake --   Output 776 ml   Net -776 ml       Data:           Labs:    Hematology:  Recent Labs     01/22/23 1959 01/23/23  0513 01/24/23  0510   WBC 10.9 11.5* 11.7*   RBC 4.03 4.13 4.00   HGB 11.3* 11.7* 11.4*   HCT 35.8* 37.0 36.0*   MCV 88.8 89.6 90.0   MCH 28.0 28.3 28.5   MCHC 31.6 31.6 31.7   RDW 14.5* 14.7* 14.8*    202 227   MPV 11.7 10.3 10.7   CRP  --  37.7*  --    INR 1.2  --   --      Chemistry:  Recent Labs     01/22/23 0027 01/22/23  1114 01/23/23  0513 01/24/23  0510     --  133* 137   K 3.4*  --  3.6* 3.8     --  103 107   CO2 17*  --  18* 17*   GLUCOSE 208*  --  264* 122*   BUN 40*  --  47* 49*   CREATININE 1.00*  --  1.20* 1.25*   MG 0.8* 1.4*  --   --    ANIONGAP 17  --  12 13   LABGLOM 54*  --  43* 41*   CALCIUM 6.7*  --  6.7* 7.0*   PROBNP 7,057*  --  4,026*  --    TROPHS 72*  --   --   --      Recent Labs     01/22/23  0027 01/22/23  0546 01/22/23 2023 01/23/23  0513 01/23/23  0809 01/23/23  1228 01/23/23  1712 01/23/23 2010 01/24/23  0825   PROT 5.2*  --   --   --   --   --   --   --   --    LABALBU 2.8*  --   --   --   --   --   --   --   --    LABA1C  --   --   --  8.7*  --   --   --   --   --    AST 25  --   --   --   --   --   --   --   --    ALT 14  --   --   --   --   --   --   --   --    ALKPHOS 107*  --   --   --   --   --   --   --   --    BILITOT 0.3  --   --   --   --   --   --   --   --    POCGLU  --    < > 358*  --  255* 240* 233* 176* 104    < > = values in this interval not displayed.        Lab Results   Component Value Date/Time    SPECIAL NOT REPORTED 11/05/2017 01:55 PM     Lab Results   Component Value Date/Time    CULTURE ESCHERICHIA COLI >468362 CFU/ML (A) 10/20/2022 05:48 PM       No results found for: POCPH, PHART, PH, POCPCO2, HOS7NCU, PCO2, POCPO2, PO2ART, PO2, POCHCO3, TBA7XUQ, HCO3, NBEA, PBEA, BEART, BE, THGBART, THB, VEU8SZJ, VJIQ8WNU, D7JDDKIY, O2SAT, FIO2    Radiology:    CT CHEST WO CONTRAST    Result Date: 1/22/2023  Multifocal ground-glass opacities indeterminate for COVID-19. Right greater than left pleural effusions. Ileus. Cardiomegaly and coronary artery disease. XR CHEST PORTABLE    Result Date: 1/24/2023  More prominent bilateral interstitial and ground-glass opacities, which may represent developing edema versus multifocal infection. Grossly similar appearance of small effusions. XR CHEST PORTABLE    Result Date: 1/21/2023  New small right effusion and airspace disease. Possible mild CHF. All radiological studies reviewed  Code Status:  Full Code        Electronically signed by Darling Cortez MD on 1/24/2023 at 4:47 PM    This note was created with the assistance of a speech-recognition program.  Although the intention is to generate a document that actually reflects the content of the visit, no guarantees can be provided that every mistake has been identified and corrected by editing. Note was updated later by me after  physical examination and  completion of the assessment.

## 2023-01-24 NOTE — CARE COORDINATION
01/24/23 1615   Readmission Assessment   Number of Days since last admission? 8-30 days   Previous Disposition Home with Family   Who is being Interviewed Patient;Caregiver   What was the patient's/caregiver's perception as to why they think they needed to return back to the hospital? Did not realize care needs would be so extensive; Could not/did not make appointment with PCP;Did not agree to original recommended D/C plan   Did you visit your Primary Care Physician after you left the hospital, before you returned this time? No   Why weren't you able to visit your PCP? Did not have an appointment   Did you see a specialist, such as Cardiac, Pulmonary, Orthopedic Physician, etc. after you left the hospital? No   Who advised the patient to return to the hospital? Caregiver   Does the patient report anything that got in the way of taking their medications? No   In our efforts to provide the best possible care to you and others like you, can you think of anything that we could have done to help you after you left the hospital the first time, so that you might not have needed to return so soon?  Additional Community resources available for illness support;Arrange for more help when leaving the hospital

## 2023-01-24 NOTE — PROGRESS NOTES
Patient was evaluated today for the diagnosis of CHF. I entered a DME order for home oxygen because the diagnosis and testing requires the patient to have supplemental oxygen. Condition will improve or be benefited by oxygen use. The patient is  able to perform good mobility in a home setting and therefore does require the use of a portable oxygen system. The need for this equipment was discussed with the patient and she understands and is in agreement.      Electronically signed by   NITIN Andrews  Patient seen under the supervision of Jaye Chávez MD, [place]

## 2023-01-24 NOTE — PLAN OF CARE
Problem: Discharge Planning  Goal: Discharge to home or other facility with appropriate resources  1/24/2023 1120 by Samantha Valencia RN  Outcome: Progressing     Problem: Skin/Tissue Integrity  Goal: Absence of new skin breakdown  Description: 1. Monitor for areas of redness and/or skin breakdown  2. Assess vascular access sites hourly  3. Every 4-6 hours minimum:  Change oxygen saturation probe site  4. Every 4-6 hours:  If on nasal continuous positive airway pressure, respiratory therapy assess nares and determine need for appliance change or resting period.   1/24/2023 1120 by Samantha Valencia RN  Outcome: Progressing     Problem: Safety - Adult  Goal: Free from fall injury  Outcome: Progressing     Problem: ABCDS Injury Assessment  Goal: Absence of physical injury  Outcome: Progressing     Problem: Chronic Conditions and Co-morbidities  Goal: Patient's chronic conditions and co-morbidity symptoms are monitored and maintained or improved  Outcome: Progressing     Problem: Cardiovascular - Adult  Goal: Maintains optimal cardiac output and hemodynamic stability  Outcome: Progressing     Problem: Respiratory - Adult  Goal: Achieves optimal ventilation and oxygenation  1/24/2023 1120 by Samantha Valencia RN  Outcome: Progressing     Problem: Musculoskeletal - Adult  Goal: Return mobility to safest level of function  Outcome: Progressing  Goal: Return ADL status to a safe level of function  Outcome: Progressing     Problem: Pain  Goal: Verbalizes/displays adequate comfort level or baseline comfort level  Outcome: Progressing

## 2023-01-24 NOTE — PROGRESS NOTES
[address], [facility]  [phone]          Progress Note    Patient Name:  Jan Churchill    :  10/23/1932  2023 4:29 PM      SUBJECTIVE       Ms. Cinthya Cruz  Resting comfortably on oxygen by nasal cannula    OBJECTIVE     Vital signs:    BP (!) 103/56   Pulse 67   Temp 98.1 °F (36.7 °C) (Oral)   Resp 16   Ht 5' 4\" (1.626 m)   Wt 152 lb 6.4 oz (69.1 kg)   SpO2 94%   BMI 26.16 kg/m²  2 L/min  .tro    Admit Weight:  150 lb (68 kg)    Last 3 weights: Wt Readings from Last 3 Encounters:   23 152 lb 6.4 oz (69.1 kg)   23 150 lb 1.6 oz (68.1 kg)   10/20/22 150 lb (68 kg)       BMI: Body mass index is 26.16 kg/m².     Input/Output:       Intake/Output Summary (Last 24 hours) at 2023 1629  Last data filed at 2023 1051  Gross per 24 hour   Intake --   Output 776 ml   Net -776 ml       Date 23 0000 - 23 2359   Shift 8716-9327 0949-4389 0165-0109 24 Hour Total   INTAKE   Shift Total(mL/kg)       OUTPUT   Urine(mL/kg/hr) 175(0.3) 300(0.5)  475   Shift Total(mL/kg) 175(2.5) 300(4.3)  475(6.9)   Weight (kg) 69.1 69.1 69.1 69.1     Exam:     General appearance: awake and alert moves all ext   Lungs: no rhonchi, no wheezes, no rales  Heart: S1 and S2 no murmur  Abdomen: positive bowel sounds, no bruits, no masses  Extremities: warm and dry, no cyanosis, no clubbing        Laboratory Studies:     CBC:   Recent Labs     23  0513 23  0510   WBC 10.9 11.5* 11.7*   HGB 11.3* 11.7* 11.4*   HCT 35.8* 37.0 36.0*   MCV 88.8 89.6 90.0    202 227     BMP:   Recent Labs     23  0027 23  0513 23  0510    133* 137   K 3.4* 3.6* 3.8    103 107   CO2 17* 18* 17*   BUN 40* 47* 49*   CREATININE 1.00* 1.20* 1.25*     PT/INR:   Recent Labs     23   PROTIME 14.9*   INR 1.2     APTT:   Recent Labs     23   APTT 34.1*     MAG:   Recent Labs     23 23  1114   MG 0.8* 1.4*     D Dimer: No results for input(s): DDIMER in the last 72 hours. Troponin    Recent Labs     23  0027   TROPHS 72*                BNP No results for input(s): BNP in the last 72 hours. Recent Labs     23  0027 23  0513   PROBNP 7,057* 4,026*         Pulse Ox: SpO2  Av.6 %  Min: 91 %  Max: 97 %  Supplemental O2: O2 Flow Rate (L/min): 2 L/min     Current Meds:    dexamethasone  6 mg Oral Daily    sodium chloride flush  5-40 mL IntraVENous 2 times per day    levofloxacin  750 mg IntraVENous Q48H    carvedilol  12.5 mg Oral BID WC    sacubitril-valsartan  1 tablet Oral BID    Vitamin D  2,000 Units Oral Daily    vitamin B-12  1,000 mcg Oral Daily    lactobacillus  1 tablet Oral Daily    cholestyramine light  4 g Oral BID    pantoprazole  40 mg Oral QAM AC    insulin lispro  0-8 Units SubCUTAneous TID WC    insulin lispro  0-4 Units SubCUTAneous Nightly    glimepiride  4 mg Oral BID WC    potassium chloride  10 mEq Oral BID     Continuous Infusions:    sodium chloride 25 mL (23 1455)    dextrose           CT CHEST WO CONTRAST    Result Date: 2023  Multifocal ground-glass opacities indeterminate for COVID-19. Right greater than left pleural effusions. Ileus. Cardiomegaly and coronary artery disease. XR CHEST PORTABLE    Result Date: 2023  More prominent bilateral interstitial and ground-glass opacities, which may represent developing edema versus multifocal infection. Grossly similar appearance of small effusions. XR CHEST PORTABLE    Result Date: 2023  New small right effusion and airspace disease. Possible mild CHF. Echo:      ASSESSMENT     Principal Problem:    COVID-19  Active Problems:    Non-pressure chronic ulcer of left calf with fat layer exposed ([address]  Resolved Problems:    * No resolved hospital problems.  *   Acute on chronic heart failure with reduced ejection fraction EF of 30-35% with moderate to severe mitral regurgitation and at least moderate tricuspid regurgitation   Severe pulmonary hypertension   Chronic atrial fibrillation    PLAN      Increasing creatinine as well as BUN with creatinine ratio above 20 suggestive of  pre renal azotemia. Will hold Bumex at this time. May need nephrology consultation. Will continue to monitor renal function. May also need to hold her Entresto. Given age, prior GI bleeding and frailty not a candidate for anticoagulation. Addendum: I had nurses do beside standing scale weight and patient's weight is down significantly from admission 139#. Which would correspond with significant  intravascular depletion. Also nursing staff informs me that she is not eating or drinking much.      Electronically signed by Yola Hudson MD on 1/24/2023 at 4:29 PM

## 2023-01-24 NOTE — CARE COORDINATION
Discharge planning    Spent over 30 minutes with patient discussing plan of care. Half way through her daughter came into the room ( nhi). Patient qualified for home oxygen at 2 L all the time. Therapy notes from 1/23 reviewed with patient. Per OT 1/23  : Pt is a high fall risk, has poor safety awareness in function, overall weakness/balance and act cole deficits and requires increased assist of 2 staff when up   pt easily agitates and verbally abusive to staff during session. Increased time needed to do any tasks as pt is extremely argumentative/set in own ways however is unsafe and resistive to any safety edu. When discussed with patient she states that she has been home by herself and doing what needs to be done. When added that oxygen is now added that becomes a fall risk especially if weak. .  asked patient to work with therapy to evaluate for her safety at home and she is refusing. Maylj Estiven or daughter tried to explain that patient needs more help that can be provided she deflected to Mandaeism or yelling that we were upsetting her. She absolutely refused to work with therapy today, refusing any type of rehab. Discussed with daughter suggestions of hiring private care HHA, nanny gutierrez and family to frequently check on. Patient may be potential APS case. Patient did qualify for home 02 and did send rx, face to face, demo sheet to [facility]  Updated daughter that they will call to arrange delivery. Patient will NOT need portable tank as she needs to go home in ambulance.      [facility]   Cristina rep: [phone]  Phone [phone]  Fax [phone]

## 2023-01-24 NOTE — DISCHARGE INSTRUCTIONS
HOME OXYGEN     You have qualified for home oxygen at 2 liters per NC. Pari Horne      Your oxygen requirements are as follows: oxygen should be worn at all times 24/7    You have been set up with [facility]  medical service company    Call them at [phone] with any questions

## 2023-01-25 NOTE — PLAN OF CARE
Patient confused and alert to self. Patient ate only 5% of meal.  Patient states she had ice cream, but the ice cream was untouched. Patient asks for water but when approached with water, patient states \"I don't want that\". Patient having issues sleeping and asked for help. [address] notified. Zyprexa 10mg was ordered. Patient repeats self often and questions \"what she should do now\". Patient calling out multiple times with no clear reason why or what she needs. Patient continues to be Afib on monitor. Patient on 2.5L NC and sating in mid to high 90's. Patient continues to be confused this morning. Patient needs explanation for any med she needs to take. She is unsure of what she should or should not take. Patient stated this morning \"can I wait to take that? \"  Patient Protonix is in lockbox in room. Patient encouraged to drink fluids but very confused and doesn't understand why. Patient remains free from falls. Safety measures in place. Call light and bedside table within reach. Problem: Discharge Planning  Goal: Discharge to home or other facility with appropriate resources  Outcome: Progressing     Problem: Skin/Tissue Integrity  Goal: Absence of new skin breakdown  Description: 1. Monitor for areas of redness and/or skin breakdown  2. Assess vascular access sites hourly  3. Every 4-6 hours minimum:  Change oxygen saturation probe site  4. Every 4-6 hours:  If on nasal continuous positive airway pressure, respiratory therapy assess nares and determine need for appliance change or resting period.   Outcome: Progressing     Problem: Safety - Adult  Goal: Free from fall injury  Outcome: Progressing     Problem: ABCDS Injury Assessment  Goal: Absence of physical injury  Outcome: Progressing     Problem: Chronic Conditions and Co-morbidities  Goal: Patient's chronic conditions and co-morbidity symptoms are monitored and maintained or improved  Outcome: Progressing     Problem: Cardiovascular - Adult  Goal: Maintains optimal cardiac output and hemodynamic stability  Outcome: Progressing

## 2023-01-25 NOTE — PROGRESS NOTES
Pt is very confused and uncooperative this morning. Found with spilt coffee on gown, and linens were changed. Breakfast was untouched at bedside. During any care, patient was extremely argumentative, complaining of her hair, of being touched in any way. She could not follow my conversation. Unable to do a thorough assessment d/t her refusal.  Attempted to give patient her morning medications. She refused crushed with applesauce, but also refused to take them with no reason given, but continued confusing distractions in conversation. Dtr Marleny Patton arrived and was also unsuccessful. After probably a half an hour of continued trying, she was persuaded to take a drink, to take one pill at a time, requiring continued persuasion throughout. All medications could not be given. Marleny Patton now aware that patient cannot be home alone. Referred to Discharge Planner. Sunday Nino RN to followup.

## 2023-01-25 NOTE — PROGRESS NOTES
Pulmonary Critical Care Progress Note  Sharri Khoury CNP/Gosia Calle MD     Patient seen for the follow up of  COVID-19 acute hypoxic respiratory insufficiency, pulmonary edema, pleural effusions    Subjective:  No significant overnight events noted. She is resting in the bed no distress. She is on oxygen 2 L nasal cannula. She denies significant shortness of breath or chest pain. She has an occasional congested cough. She seems a little more confused today than she was yesterday. Examination:  Vitals: BP (!) 153/70   Pulse 87   Temp 97.7 °F (36.5 °C) (Oral)   Resp 18   Ht 5' 4\" (1.626 m)   Wt 158 lb (71.7 kg)   SpO2 97%   BMI 27.12 kg/m²   General appearance: alert and cooperative with exam  Neck: No JVD  Lungs: Moderate to decreased air exchange, no significant wheezing, occasional crackles  Heart: regular rate and rhythm, S1, S2 normal, no gallop  Abdomen: Soft, non tender, + BS  Extremities: no cyanosis or clubbing. Trace edema. LABs:  CBC:   Recent Labs     01/22/23 1959 01/23/23  0513 01/24/23  0510 01/25/23  0510   WBC 10.9 11.5* 11.7* 10.2   HGB 11.3* 11.7* 11.4* 11.8*   HCT 35.8* 37.0 36.0* 38.1    202 227 237     BMP:   Recent Labs     01/23/23  0513 01/24/23  0510 01/25/23  0510   * 137 138   K 3.6* 3.8 4.6   CO2 18* 17* 20   BUN 47* 49* 46*   CREATININE 1.20* 1.25* 1.26*   LABGLOM 43* 41* 41*   GLUCOSE 264* 122* 182*     PT/INR:   Recent Labs     01/22/23 1959   PROTIME 14.9*   INR 1.2     APTT:  Recent Labs     01/22/23 1959   APTT 34.1*        Latest Reference Range & Units Most Recent   Procalcitonin <0.09 ng/mL 0.55 (H)  1/22/23 13:40   (H): Data is abnormally high    Radiology:  CT chest 1/22/2023      X-ray chest 1/21/2023      Impression:  Acute hypoxic respiratory failure  COVID infection  Multifocal groundglass opacities,  pneumonia versus pulmonary edema  Small bilateral pleural effusions right greater than left  Elevated troponin and BNP,?   Decompensated CHF versus pulmonary hypertension  History of A-fib, allergic rhinitis, DM, HTN  Ileus    Recommendations:  Oxygen via nasal cannula, keep SPO2 90% or greater   Incentive spirometry every hour while awake   Albuterol every 6 hours as needed  Transition Levaquin to oral at discharge to complete 7-day course  Oral Decadron  Infectious disease on consult, no indication for antiviral or immunosuppressive therapy at this time  Off Diuresis per cardiology  DVT prophylaxis, Lovenox  GI prophylaxis, Protonix  PT/OT  Discharge planning per primary team  Above assessment and plan will be reviewed with Dr. Lilia Middleton. Patient plan will be finalized following review by Dr. Lilia Middleton.   Will follow with you    PATRICK Carter-CNP   Pulmonary Critical Care and Sleep Medicine,  Patient seen under the supervision of Jenny Mccarty MD, [facility]

## 2023-01-25 NOTE — CARE COORDINATION
Social work: Carl Diaz met with patient for onsite visit. Patient was very lethargic, could not participate in conversation. Ramez Christiansen will re-visit patient tomorrow.

## 2023-01-25 NOTE — PROGRESS NOTES
[address]  Occupational Therapy Not Seen    DATE: 2023    NAME: Savi Dougherty  MRN: [id number]   : 10/23/1932    Patient not seen this date for Occupational Therapy due to:      [] Cancel by RN or physician due to:    [] Hemodialysis    [] Critical Lab Value Level     [] Blood transfusion in progress    [] Acute or unstable cardiovascular status   _MAP < 55 or more than >115  _HR < 40 or > 130    [] Acute or unstable pulmonary status   -FiO2 > 60%   _RR < 5 or >40    _O2 sats < 85%    [] Strict Bedrest    [] Off Unit for surgery or procedure    [] Off Unit for testing       [] Pending imaging to R/O fracture    [x] Refusal by Patient: Pt adamantly refused therapy. Unable to reason with pt on the importance of therapy due to confusion and agitation. Pt not making sense at all.     [] Other      [] OT being discontinued at this time. Patient independent. No further needs. [] OT being discontinued at this time as the patient has been transferred to hospice care. No further needs.       DANIEL Amezcua

## 2023-01-25 NOTE — PLAN OF CARE
Problem: Respiratory - Adult  Goal: Achieves optimal ventilation and oxygenation  Outcome: Progressing    Pt's Sp02 stable in mid 90s on 2L. Q 4 hour checks as she has refused to wear her continuous pulse ox. Did take her Decadron today with effort by staff. Levaquin is now oral and will start tomorrow. Family attempting to persuade patient, although confused, to agree to snf for rehab.

## 2023-01-25 NOTE — PROGRESS NOTES
Pharmacy requested patient family bring in Glimepiride from home d/t lack of stock in pharmacy. Writer spoke to [facility], patients daughter, and she agreed to bring in patients Rx of Glimepiride. Patient has Glimepiride in room lockbox as well.

## 2023-01-25 NOTE — PROGRESS NOTES
[facility]  Progress Note    Ajit Tineo  MRN: [id number]  Date: 1/25/2023  LOS: 3     Reason for F/U :   COVID-19 virus infection    Impression :   COVID-19 virus infection tested + 1/22/2023  Unvaccinated adult patient  Acute respiratory insufficiency requiring supplemental oxygen   Congestive heart failure with mild congestive changes on chest x-ray  Multifocal opacities in the bases/ pneumonia  Confusion  Right-sided greater than left-sided pleural effusion    Recommendations:   COVID therapy: The patient is not a candidate for antiviral therapy  Given the hypoxia the patient will continue Decadron   The patient is not a candidate for immunosuppressive therapy at this time and the CRP is not overly elevated    Antibiotic therapy:  Continue levofloxacin  The patient remains on 2 L of oxygen by nasal cannula  Continue supportive care though the patient does have some confusion  The patient is not in any clinical condition to be discharged home alone and currently is refusing to go to a skilled nursing facility/acute rehab    Infection Control Recommendations:   Droplet plus precautions    Discharge Planning:   Estimated Length of IV antimicrobials: 5 days  Patient will need Midline Catheter Insertion/ PICC line Insertion: No  Patient will need: Home IV , [place],  SNF,  LTAC: Undetermined  Patient willneed outpatient wound care: No    Medical Decision making / Summary of Stay:   Ajit Tineo is a 80y.o.-year-old female who was initially admitted on 1/21/2023. 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 She does not report any subjective fevers, chills no significant cough, no abdominal pain, no nausea vomiting, has chronic diarrhea. The patient was reporting some neck pain and again the neck pain has been previously evaluated with no source found. She reports that her daughter recently tested positive for COVID and work-up in the emergency department included a chest x-ray that showed a new small right effusion and airspace disease and possible mild CHF. COVID testing was done that was positive and the patient was admitted with COVID-19 virus infection as well as concern for congestive heart failure. The patient has since been seen by the pulmonary critical care service and was started on supplemental oxygen, IV Decadron, intravenous levofloxacin as there was concern for right basilar infiltrate/pneumonia and I was asked to evaluate and help with antibiotic choice. Current evaluation:2023    /66   Pulse 77   Temp 97.5 °F (36.4 °C) (Oral)   Resp 17   Ht 5' 4\" (1.626 m)   Wt 158 lb (71.7 kg)   SpO2 99%   BMI 27.12 kg/m²     Temperature Range: Temp: 97.5 °F (36.4 °C) Temp  Av.9 °F (36.6 °C)  Min: 97.5 °F (36.4 °C)  Max: 98.1 °F (36.7 °C)  The patient is seen and evaluated at bedside she does have some confusion remains on supplemental oxygen at 2 L by nasal cannula and does still also reports some cough. She does have some generalized malaise and it is my understanding that her daughter was in earlier today. It has been difficult to convince the patient that she needs some rehabilitation. Review of Systems   Constitutional:  Positive for fatigue. Respiratory:  Positive for cough and shortness of breath. Cardiovascular: Negative. Gastrointestinal: Negative. Genitourinary: Negative. Musculoskeletal: Negative. Skin: Negative. Neurological: Negative. Psychiatric/Behavioral:  Positive for confusion.       Physical Examination :     Physical Exam  Constitutional: Appearance: She is well-developed. She is cachectic. HENT:      Head: Normocephalic and atraumatic. Cardiovascular:      Rate and Rhythm: Regular rhythm. Heart sounds: Normal heart sounds. Pulmonary:      Effort: Pulmonary effort is normal.      Breath sounds: Rales present. Abdominal:      General: Bowel sounds are normal.      Palpations: Abdomen is soft. Musculoskeletal:      Cervical back: Normal range of motion and neck supple. Right lower leg: Edema present. Left lower leg: Edema present. Skin:     General: Skin is warm and dry. Neurological:      Mental Status: She is alert and oriented to person, place, and time. Laboratory data:   I have independently reviewed the followinglabs:  CBC with Differential:   Recent Labs     01/24/23  0510 01/25/23  0510   WBC 11.7* 10.2   HGB 11.4* 11.8*   HCT 36.0* 38.1    237   LYMPHOPCT 4* 4*   MONOPCT 5 5       BMP:   Recent Labs     01/24/23  0510 01/25/23  0510    138   K 3.8 4.6    107   CO2 17* 20   BUN 49* 46*   CREATININE 1.25* 1.26*       Hepatic Function Panel:   No results for input(s): PROT, LABALBU, BILIDIR, IBILI, BILITOT, ALKPHOS, ALT, AST in the last 72 hours.         Lab Results   Component Value Date/Time    PROCAL 0.55 01/22/2023 01:40 PM     Lab Results   Component Value Date/Time    CRP 37.7 01/23/2023 05:13 AM    CRP 0.5 03/26/2018 03:47 PM     No results found for: [place]      Lab Results   Component Value Date/Time    DDIMER 0.86 03/26/2018 03:47 PM     No results found for: FERRITIN  No results found for: LDH  No results found for: FIBRINOGEN    Results in Past 30 Days  Result Component Current Result Ref Range Previous Result Ref Range   SARS-CoV-2, Rapid DETECTED (A) (1/22/2023) Not Detected Not in Time Range      Lab Results   Component Value Date/Time    COVID19 DETECTED 01/22/2023 12:30 AM    COVID19 Not Detected 02/09/2022 05:46 PM       No results for input(s): Gareth Juarez in the last 72 hours. Imaging Studies:   ONE XRAY VIEW OF THE CHEST 1/24/2023 5:36 am  Impression   More prominent bilateral interstitial and ground-glass opacities, which may   represent developing edema versus multifocal infection. Grossly similar   appearance of small effusions. Cultures:     COVID-19, Rapid [[id number]] (Abnormal) Collected: 01/22/23 0030   Order Status: Completed Specimen: Nasopharyngeal Swab Updated: 01/22/23 0058    Specimen Description . NASOPHARYNGEAL SWAB    SARS-CoV-2, Rapid DETECTED Abnormal     Comment:        Rapid NAAT: The specimen is POSITIVE for SARS-Cov-2, the novel coronavirus associated with   COVID-19. This test has been authorized by the FDA under an Emergency Use Authorization (EUA) for use   by authorized laboratories. The ID NOW COVID-19 assay is designed to detect the virus that causes COVID-19 in patients   with signs and symptoms of infection who are suspected of COVID-19. An individual without symptoms of COVID-19 and who is not shedding SARS-CoV-2 virus would   expect to have a negative (not detected) result in this assay.    Fact sheet for Healthcare Providers: http://www.nacho-bar.ángel/   Fact sheet for Patients: http://www.nacho-bar.bijames/         Methodology: Isothermal Nucleic Acid Amplification         Results reported to the appropriate Health Department          Medications:      dexamethasone  6 mg Oral Daily    sodium chloride flush  5-40 mL IntraVENous 2 times per day    levofloxacin  750 mg IntraVENous Q48H    carvedilol  12.5 mg Oral BID WC    sacubitril-valsartan  1 tablet Oral BID    Vitamin D  2,000 Units Oral Daily    vitamin B-12  1,000 mcg Oral Daily    lactobacillus  1 tablet Oral Daily    cholestyramine light  4 g Oral BID    pantoprazole  40 mg Oral QAM AC    insulin lispro  0-8 Units SubCUTAneous TID WC    insulin lispro  0-4 Units SubCUTAneous Nightly    glimepiride  4 mg Oral BID WC       Electronically signed by Shell Hendrickson MD on 1/25/2023 at 1:25 PM      [facility]  Shell Hendrickson MD  [employer]  OFFICE: [phone]    Thank you for allowing us to participate in the care of this patient. Please call with questions. This note is created with the assistance of a speech recognition program.  While intending to generate a document that actually reflects the content of the visit, the document can still have some errors including those of syntax and sound a like substitutions which may escape proof reading. In such instances, actual meaning can be extrapolated by contextual diversion.

## 2023-01-25 NOTE — CARE COORDINATION
DC Planning    Writer and LSW did speak with pt and her dtr Ranjana Mcadams at bedside. Pt is refusing snf. Ranjana Mcadams did reach out to non skilled agencies to try to arrange for more care for mom at home however none can commit at this time since pt is covid+. They may be able to arrange in the next few weeks. Pt is on 2l. She is adamant about going home. She is more confused today. Dtr relays has noticed some cognitive decline the past few months but pt has not been dx with dementia officially. Pt has had 3 hospitalizations this month. This writer had spoken with pt previously in which she was completely a+o. Appears much more weak. Again, writer and the LSW  along with her dtr attempted to convince pt to go to rehab to no avail. Discussed possibility of hospice/palliative given pt co morbidities/hospitalizations and wishes to stay home. Discussed possibility of psych consult for capacity and APS. Discussed long term care and cost. They may be able to afford temporarily. Pt cannot move in with dtr and dtr cannot move in with her mom full time. Informed cannot force pt to go to snf. LSW to get snf choices from dtr-however informed choices are limited dt covid+. Plan for snf temporarily until strong enough to get home and then hire non skilled caregivers for home along with [address] did ask to appeal dc-dc order is not yet in-but given IMM and explained process. She asked if writer would speak with her brother Saman Wei. Spoke with Saman Wei [phone]. Informed cannot force pt to go to snf. Explained staff and Ranjana Mcadams are trying to convince mom to go to snf. He relays mom is stubborn and does not listen to him either but he  will come talk to pt in the morning to try to convince pt to go to rehab. Informed snf options are limited to covid + dx. Also suggested maybe pt granddaughter can assist with convincing pt to go to snf at least temporarily.  Pt has spoken of her in the past.

## 2023-01-25 NOTE — PROGRESS NOTES
Levofloxacin 750 mg Q48H changed from IV to PO per [facility] approved policy. Basic Criteria (please refer to hospital policy for details):  1. functioning GI tract  2. tolerating PO/NG routine medications  2. Antibiotics: Received at least 24 hours of IV, clinical stabilization, afebrile, normalizing WBC)    Thank you.   Adrienne Chowdary RPh 1/25/2023 3:30 PM

## 2023-01-25 NOTE — PROGRESS NOTES
[address] [place]  [phone]          Progress Note    Patient Name:  Maria Fernanda King    :  10/23/1932  2023 5:27 PM      SUBJECTIVE       Ms. Analia Ramírez  more confused today. OBJECTIVE     Vital signs:    /64   Pulse 86   Temp 97.2 °F (36.2 °C) (Oral)   Resp 18   Ht 5' 4\" (1.626 m)   Wt 158 lb (71.7 kg)   SpO2 97%   BMI 27.12 kg/m²  2 L/min  .tro    Admit Weight:  150 lb (68 kg)    Last 3 weights: Wt Readings from Last 3 Encounters:   23 158 lb (71.7 kg)   23 150 lb 1.6 oz (68.1 kg)   10/20/22 150 lb (68 kg)       BMI: Body mass index is 27.12 kg/m². Input/Output:       Intake/Output Summary (Last 24 hours) at 2023 1727  Last data filed at 2023 2338  Gross per 24 hour   Intake --   Output 400 ml   Net -400 ml         Exam:     General appearance:confused  Lungs: no rhonchi, no wheezes, no rales  Heart: S1 and S2 no murmur  Abdomen: positive bowel sounds, no bruits, no masses  Extremities: warm and dry, no cyanosis, no clubbing        Laboratory Studies:     CBC:   Recent Labs     23  0513 23  0510 23  0510   WBC 11.5* 11.7* 10.2   HGB 11.7* 11.4* 11.8*   HCT 37.0 36.0* 38.1   MCV 89.6 90.0 90.7    227 237     BMP:   Recent Labs     23  0513 23  0510 23  0510   * 137 138   K 3.6* 3.8 4.6    107 107   CO2 18* 17* 20   BUN 47* 49* 46*   CREATININE 1.20* 1.25* 1.26*     PT/INR:   Recent Labs     23   PROTIME 14.9*   INR 1.2     APTT:   Recent Labs     23   APTT 34.1*     MAG: No results for input(s): MG in the last 72 hours. D Dimer: No results for input(s): DDIMER in the last 72 hours. Troponin  No results for input(s): TROPHS in the last 72 hours. BNP No results for input(s): BNP in the last 72 hours. Recent Labs     23  0513 23  0510   PROBNP 4,026* 4,334*         Pulse Ox:  SpO2  Av.2 % Min: 91 %  Max: 99 %  Supplemental O2: O2 Flow Rate (L/min): 2 L/min     Current Meds:    [START ON 1/26/2023] levoFLOXacin  750 mg Oral Q48H    dexamethasone  6 mg Oral Daily    sodium chloride flush  5-40 mL IntraVENous 2 times per day    carvedilol  12.5 mg Oral BID WC    sacubitril-valsartan  1 tablet Oral BID    Vitamin D  2,000 Units Oral Daily    vitamin B-12  1,000 mcg Oral Daily    lactobacillus  1 tablet Oral Daily    cholestyramine light  4 g Oral BID    pantoprazole  40 mg Oral QAM AC    insulin lispro  0-8 Units SubCUTAneous TID WC    insulin lispro  0-4 Units SubCUTAneous Nightly    glimepiride  4 mg Oral BID WC     Continuous Infusions:    sodium chloride 25 mL (01/24/23 5345)    dextrose           CT CHEST WO CONTRAST    Result Date: 1/22/2023  Multifocal ground-glass opacities indeterminate for COVID-19. Right greater than left pleural effusions. Ileus. Cardiomegaly and coronary artery disease. XR CHEST PORTABLE    Result Date: 1/24/2023  More prominent bilateral interstitial and ground-glass opacities, which may represent developing edema versus multifocal infection. Grossly similar appearance of small effusions. XR CHEST PORTABLE    Result Date: 1/21/2023  New small right effusion and airspace disease. Possible mild CHF. Echo:      ASSESSMENT     Principal Problem:    COVID-19  Active Problems:    Non-pressure chronic ulcer of left calf with fat layer exposed ([address]  Resolved Problems:    * No resolved hospital problems. *  Troponin elevation    PLAN     Troponin elevation type 2 MI, not acs.  Likely related to acute on chronic systolic heart failure and demand ischemia    Clinically appears volume contracted, will continue to hold diuresis    Mental status change per primary, likely toxic metabolic      Electronically signed by Beatriz Ha MD on 1/25/2023 at 5:27 PM

## 2023-01-25 NOTE — CARE COORDINATION
Social work: JonnarJaci/RN SONA and dtr all met with patient at bedside to discuss dc needs. Dtr shared she cannot take care of mom in her current state, as she is max 2 assist for transfers. Dtr is hopeful if patient gets a little stronger she could bring her home. SW and team tried to reason with patient on benefits of rehab, she has flight of ideas. Dtr is POA and would like referrals to [facility](both take covid positive patients). Referrals sent to both. JUMA started.

## 2023-01-25 NOTE — PROGRESS NOTES
[address] Hospitalist        1/25/2023   5:34 PM    Name:  Ajit Tineo  MRN:    [id number]     Acct:     [de-identified]   Room:  [address] [place] [address] Day: 3     Admit Date: 1/21/2023 11:39 PM  PCP: Yobany Chaney MD    C/C:   Chief Complaint   Patient presents with    Shortness of Breath       Assessment:      Acute on chronic congestive heart failure  COVID-19 pneumonia  Nonvaccinated against SARS-CoV-2  Respiratory failure with hypoxia requiring oxygen via nasal cannula  Hypomagnesemia  Hypokalemia  Question of bacterial pneumonia  Essential hypertension  Diabetes mellitus type 2  Paroxysmal atrial fibrillation  Congestive heart failure, type unknown  Osteoarthritis, multiple joints  History of colon cancer  History of skin cancer  Right eye vision loss      Plan:      Admit to progressive  Monitor vitals closely  Keep SPO2 above 90%  I's and O's  IV heplock  Pain control  Antiemetics as needed  Levaquin, Decadron  Bumex, Coreg, Entresto  Accu-Cheks before meals and at bedtime  Insulin sliding scale medium  Hypoglycemia protocol  And has elevated blood glucose and is refusing insulin  CBC , BMP, BNP  Pulmonary consulted  Consult cardiology  Cardiology recommend heparin infusion until they can evaluate patient and determine why she is not on anticoagulation  DVT and GI prophylaxis.   Continue medication as below  Patient declining to go to skilled nursing facility, patient likely will be discharged home with home health care  Discussed in detail with daughter at bedside, I do feel that patient will benefit from skilled nursing care facility she continues to refuse, daughter herself is pediatrician and has been is difficult for her to take care of her at home she will talk to her mom to see if she can convince her      Scheduled Meds:   [START ON 1/26/2023] levoFLOXacin  750 mg Oral Q48H    dexamethasone  6 mg Oral Daily    sodium chloride flush  5-40 mL IntraVENous 2 times per day carvedilol  12.5 mg Oral BID     sacubitril-valsartan  1 tablet Oral BID    Vitamin D  2,000 Units Oral Daily    vitamin B-12  1,000 mcg Oral Daily    lactobacillus  1 tablet Oral Daily    cholestyramine light  4 g Oral BID    pantoprazole  40 mg Oral QAM AC    insulin lispro  0-8 Units SubCUTAneous TID WC    insulin lispro  0-4 Units SubCUTAneous Nightly    glimepiride  4 mg Oral BID WC     Continuous Infusions:   sodium chloride 25 mL (23 1455)    dextrose       PRN Meds:  sodium chloride flush, 10 mL, PRN  sodium chloride, , PRN  magnesium sulfate, 1,000 mg, PRN  ondansetron, 4 mg, Q8H PRN   Or  ondansetron, 4 mg, Q6H PRN  polyethylene glycol, 17 g, Daily PRN  acetaminophen, 650 mg, Q6H PRN   Or  acetaminophen, 650 mg, Q6H PRN  glucose, 4 tablet, PRN  dextrose bolus, 125 mL, PRN   Or  dextrose bolus, 250 mL, PRN  glucagon (rDNA), 1 mg, PRN  dextrose, , Continuous PRN  albuterol, 2.5 mg, Q6H PRN          Subjective:     I have seen and examined patient today, patient last 24 hours events were reviewed also discussed with nursing staff  No new complaints  Overnight patient did not had any acute issues  No nausea vomiting diarrhea  Afebrile  Pt. Denies any CP, SOB, palpitation, HA, dizziness, chills, cough, cold, changes in urination, BM or skin changes . ROS:  A 10 point system reviewed and negative otherwise mentioned above.         Physical Examination:      Vitals:  /64   Pulse 86   Temp 97.2 °F (36.2 °C) (Oral)   Resp 18   Ht 5' 4\" (1.626 m)   Wt 158 lb (71.7 kg)   SpO2 97%   BMI 27.12 kg/m²   Temp (24hrs), Av.6 °F (36.4 °C), Min:97.2 °F (36.2 °C), Max:98.1 °F (36.7 °C)    Weight:   Wt Readings from Last 3 Encounters:   23 158 lb (71.7 kg)   23 150 lb 1.6 oz (68.1 kg)   10/20/22 150 lb (68 kg)     I/O last 3 completed shifts:  I/O last 3 completed shifts:  In: -   Out: 350 [Urine:875]     Recent Labs     23  2102 23  0813 23  1220 23  1630   POCGLU 235* 178* 220* 185*           General appearance - alert, well appearing, and in no acute distress  Mental status - oriented to person, place, and time with normal affect  Head - normocephalic and atraumatic  Eyes - pupils equal and reactive, extraocular eye movements intact, conjunctiva clear  Ears - hearing appears to be intact  Nose - no drainage noted  Mouth - mucous membranes moist  Neck - supple, no carotid bruits, thyroid not palpable  Chest - clear to auscultation, normal effort  Heart - normal rate, regular rhythm, no murmur  Abdomen - soft, nontender, nondistended, bowel sounds present all four quadrants, no masses, hepatomegaly or splenomegaly  Neurological - normal speech, no focal findings or movement disorder noted, cranial nerves II through XII grossly intact  Extremities - peripheral pulses palpable, no pedal edema or calf pain with palpation  Skin - no gross lesions, rashes, or induration noted        Medications: Allergies:    Allergies   Allergen Reactions    Acetaminophen-Codeine Nausea Only    Codeine Nausea Only    Furosemide      Other reaction(s): GI Disturbance    Linagliptin      Other reaction(s): SOB    Sitagliptin      Other reaction(s): felt poorly    Other Itching     Animal Dander       Current Meds:   Current Facility-Administered Medications:     [START ON 1/26/2023] levoFLOXacin (LEVAQUIN) tablet 750 mg, 750 mg, Oral, Q48H, Michelet Nunez MD    dexamethasone (DECADRON) tablet 6 mg, 6 mg, Oral, Daily, Sylvia Bolaños MD, 6 mg at 01/25/23 1044    sodium chloride flush 0.9 % injection 5-40 mL, 5-40 mL, IntraVENous, 2 times per day, Gardenia A Lump, APRN - CNP, 10 mL at 01/25/23 1247    sodium chloride flush 0.9 % injection 10 mL, 10 mL, IntraVENous, PRN, Gardenia A Lump, APRN - CNP, 10 mL at 01/22/23 1616    0.9 % sodium chloride infusion, , IntraVENous, PRN, Gardenia A Lump, APRN - CNP, Last Rate: 50 mL/hr at 01/24/23 1455, 25 mL at 01/24/23 1455    magnesium sulfate 1000 mg in dextrose 5% 100 mL IVPB, 1,000 mg, IntraVENous, PRN, Estephania Ohara Lump, APRN - CNP, Stopped at 01/22/23 1715    ondansetron (ZOFRAN-ODT) disintegrating tablet 4 mg, 4 mg, Oral, Q8H PRN, 4 mg at 01/23/23 2013 **OR** ondansetron (ZOFRAN) injection 4 mg, 4 mg, IntraVENous, Q6H PRN, Gardenia A Lump, APRN - CNP    polyethylene glycol (GLYCOLAX) packet 17 g, 17 g, Oral, Daily PRN, Gardenia A Lump, APRN - CNP    acetaminophen (TYLENOL) tablet 650 mg, 650 mg, Oral, Q6H PRN, 650 mg at 01/24/23 0907 **OR** acetaminophen (TYLENOL) suppository 650 mg, 650 mg, Rectal, Q6H PRN, Gardenia A Lump, APRN - CNP    glucose chewable tablet 16 g, 4 tablet, Oral, PRN, Gardenia A Lump, APRN - CNP    dextrose bolus 10% 125 mL, 125 mL, IntraVENous, PRN **OR** dextrose bolus 10% 250 mL, 250 mL, IntraVENous, PRN, Gardenia A Lump, APRN - CNP    glucagon (rDNA) injection 1 mg, 1 mg, SubCUTAneous, PRN, Gardenia A Lump, APRN - CNP    dextrose 10 % infusion, , IntraVENous, Continuous PRN, Gardenia A Lump, APRN - CNP    albuterol (PROVENTIL) nebulizer solution 2.5 mg, 2.5 mg, Nebulization, Q6H PRN, Gardenia A Lump, APRN - CNP    carvedilol (COREG) tablet 12.5 mg, 12.5 mg, Oral, BID WC, Haleigh Wilson MD, 12.5 mg at 01/25/23 1721    sacubitril-valsartan (ENTRESTO) 24-26 MG per tablet 1 tablet, 1 tablet, Oral, BID, Haleigh Wilson MD, 1 tablet at 01/25/23 1043    Vitamin D (CHOLECALCIFEROL) tablet 2,000 Units, 2,000 Units, Oral, Daily, Haleigh Wilson MD, 2,000 Units at 01/24/23 0855    vitamin B-12 (CYANOCOBALAMIN) tablet 1,000 mcg, 1,000 mcg, Oral, Daily, Haleigh Wilson MD, 1,000 mcg at 01/24/23 0848    lactobacillus (BACID) tablet 1 tablet, 1 tablet, Oral, Daily, Haleigh Wilson MD, 1 tablet at 01/24/23 0855    cholestyramine light packet 4 g, 4 g, Oral, BID, Haleigh Wilson MD, 4 g at 01/24/23 2035    pantoprazole (PROTONIX) tablet 40 mg, 40 mg, Oral, QAM AC, Haleigh Wilson MD, 40 mg at 01/24/23 0800    insulin lispro (HUMALOG) injection vial 0-8 Units, 0-8 Units, SubCUTAneous, TID WC, Sangeetha Lee MD, 2 Units at 01/25/23 1338    insulin lispro (HUMALOG) injection vial 0-4 Units, 0-4 Units, SubCUTAneous, Nightly, Haleigh Wilson MD, 4 Units at 01/22/23 2149    glimepiride (AMARYL) tablet 4 mg (Patient Supplied), 4 mg, Oral, BID WC, Haleigh Wilson MD, 4 mg at 01/25/23 1042      I/O (24Hr): Intake/Output Summary (Last 24 hours) at 1/25/2023 1734  Last data filed at 1/24/2023 2338  Gross per 24 hour   Intake --   Output 400 ml   Net -400 ml         Data:           Labs:    Hematology:  Recent Labs     01/22/23 1959 01/23/23  0513 01/24/23  0510 01/25/23  0510   WBC 10.9 11.5* 11.7* 10.2   RBC 4.03 4.13 4.00 4.20   HGB 11.3* 11.7* 11.4* 11.8*   HCT 35.8* 37.0 36.0* 38.1   MCV 88.8 89.6 90.0 90.7   MCH 28.0 28.3 28.5 28.1   MCHC 31.6 31.6 31.7 31.0   RDW 14.5* 14.7* 14.8* 14.8*    202 227 237   MPV 11.7 10.3 10.7 10.6   CRP  --  37.7*  --   --    INR 1.2  --   --   --        Chemistry:  Recent Labs     01/23/23  0513 01/24/23  0510 01/25/23  0510   * 137 138   K 3.6* 3.8 4.6    107 107   CO2 18* 17* 20   GLUCOSE 264* 122* 182*   BUN 47* 49* 46*   CREATININE 1.20* 1.25* 1.26*   ANIONGAP 12 13 11   LABGLOM 43* 41* 41*   CALCIUM 6.7* 7.0* 7.8*   PROBNP 4,026*  --  4,334*       Recent Labs     01/23/23  0513 01/23/23  0809 01/24/23  1200 01/24/23  1647 01/24/23  2102 01/25/23  0813 01/25/23  1220 01/25/23  1630   LABA1C 8.7*  --   --   --   --   --   --   --    POCGLU  --    < > 124* 241* 235* 178* 220* 185*    < > = values in this interval not displayed.          Lab Results   Component Value Date/Time    SPECIAL NOT REPORTED 11/05/2017 01:55 PM     Lab Results   Component Value Date/Time    CULTURE ESCHERICHIA COLI >[id number] CFU/ML (A) 10/20/2022 05:48 PM       No results found for: POCPH, PHART, PH, POCPCO2, HUK9BYA, PCO2, POCPO2, PO2ART, PO2, POCHCO3, KVA8UXJ, HCO3, NBEA, PBEA, BEART, BE, THGBART, THB, BYR5TFW, TXDE2EXF, Z1IWFVPJ, O2SAT, FIO2    Radiology:    CT CHEST WO CONTRAST    Result Date: 1/22/2023  Multifocal ground-glass opacities indeterminate for COVID-19. Right greater than left pleural effusions. Ileus. Cardiomegaly and coronary artery disease. XR CHEST PORTABLE    Result Date: 1/24/2023  More prominent bilateral interstitial and ground-glass opacities, which may represent developing edema versus multifocal infection. Grossly similar appearance of small effusions. XR CHEST PORTABLE    Result Date: 1/21/2023  New small right effusion and airspace disease. Possible mild CHF. All radiological studies reviewed  Code Status:  Full Code        Electronically signed by Francois Mejia MD on 1/25/2023 at 5:34 PM    This note was created with the assistance of a speech-recognition program.  Although the intention is to generate a document that actually reflects the content of the visit, no guarantees can be provided that every mistake has been identified and corrected by editing. Note was updated later by me after  physical examination and  completion of the assessment.

## 2023-01-25 NOTE — PROGRESS NOTES
Physical Therapy  DATE: 2023    NAME: Aide Butler  MRN: [id number]   : 10/23/1932    Patient not seen this date for Physical Therapy due to:      [] Cancel by RN or physician due to:    [] Hemodialysis    [] Critical Lab Value Level     [] Blood transfusion in progress    [] Acute or unstable cardiovascular status   _MAP < 55 or more than >115  _HR < 40 or > 130    [] Acute or unstable pulmonary status   -FiO2 > 60%   _RR < 5 or >40    _O2 sats < 85%    [] Strict Bedrest    [] Off Unit for surgery or procedure    [] Off Unit for testing       [] Pending imaging to R/O fracture    [x] Refusal by Patient (Patient adamantly refused therapy, no making sense when writer tying to educated about the importance of PT therapy and became easily agitated.)     [] Other      [] PT being discontinued at this time. Patient independent. No further needs. [] PT being discontinued at this time as the patient has been transferred to hospice care. No further needs.       Shante Allison, PTA

## 2023-01-26 NOTE — PLAN OF CARE
Writer spoke with daughter at shift change. Daughter wants patient  to go to snf, but she cannot get patient to agree. Daughter states she cannot bring her home. Patient confusion is progressing. Patient is talking in nonsensical fashion. Patient took half of her Cholestyramine mixture and then refused the remaining amount. Patient refusing to drink, but states she  wants water. Patient gets angry when writer attempts to give her a drink. Patient refused attempt to use warm washcloth to wipe discharge from eyes. Patient began pulling wires on telemetry and pulled continuous pulse ox off. Patient took her gown off as well. Patient also attempted to bite into call light wire. Patient now has telesitter in room due to pulling at IV and telemetry wires. Patient is leaving NC alone. Patient has not slept at all. Patient highly agitated this morning. /97 HR 79. 1515 Edwin Santiago notified. Labs ordered. STAT ABG  and CXR ordered this a.m. Also a breathing tx ed be ordered if needed. Patient remains free from falls. Safety measures in place. Call light and bedside table within reach, however patient has stopped calling out appropriately. Problem: Discharge Planning  Goal: Discharge to home or other facility with appropriate resources  1/26/2023 0047 by Brooke Lee RN  Outcome: Progressing     Problem: Skin/Tissue Integrity  Goal: Absence of new skin breakdown  Description: 1. Monitor for areas of redness and/or skin breakdown  2. Assess vascular access sites hourly  3. Every 4-6 hours minimum:  Change oxygen saturation probe site  4. Every 4-6 hours:  If on nasal continuous positive airway pressure, respiratory therapy assess nares and determine need for appliance change or resting period.   1/26/2023 0047 by Brooke Lee RN  Outcome: Progressing     Problem: Safety - Adult  Goal: Free from fall injury  1/26/2023 0047 by Brooke Lee RN  Outcome: Progressing     Problem: ABCDS Injury Assessment  Goal: Absence of physical injury  1/26/2023 0047 by Eden Roy RN  Outcome: Progressing     Problem: Chronic Conditions and Co-morbidities  Goal: Patient's chronic conditions and co-morbidity symptoms are monitored and maintained or improved  1/26/2023 0047 by Eden Roy RN  Outcome: Progressing     Problem: Cardiovascular - Adult  Goal: Maintains optimal cardiac output and hemodynamic stability  1/26/2023 0047 by Eden Roy RN  Outcome: Progressing     Problem: Respiratory - Adult  Goal: Achieves optimal ventilation and oxygenation  1/26/2023 0047 by Eden Roy RN  Outcome: Progressing  Flowsheets (Taken 1/25/2023 2000)  Achieves optimal ventilation and oxygenation:   Assess for changes in respiratory status   Assess for changes in mentation and behavior   Position to facilitate oxygenation and minimize respiratory effort     Problem: Gastrointestinal - Adult  Goal: Maintains or returns to baseline bowel function  1/26/2023 0047 by Eden Roy RN  Outcome: Progressing     Problem: Pain  Goal: Verbalizes/displays adequate comfort level or baseline comfort level  1/26/2023 0047 by Eden Roy RN  Outcome: Progressing     Problem: Nutrition Deficit:  Goal: Optimize nutritional status  1/26/2023 0047 by Eden Roy RN  Outcome: Progressing

## 2023-01-26 NOTE — PROGRESS NOTES
Pulmonary Critical Care Progress Note  Cade Ramirez CNP/Gosia Calle MD     Patient seen for the follow up of  COVID-19 acute hypoxic respiratory insufficiency, pulmonary edema, pleural effusions    Subjective:  Currently fairly sleepy/lethargic. She was awake all night, confused. She is a one-on-one at this time. She is on oxygen at 2 L nasal cannula. She does not appear to be in any distress. Examination:  Vitals: BP (!) 132/97   Pulse 83   Temp 97.3 °F (36.3 °C) (Axillary)   Resp 18   Ht 5' 4\" (1.626 m)   Wt 139 lb 12.8 oz (63.4 kg)   SpO2 95%   BMI 24.00 kg/m²   General appearance: Lethargic  Neck: No JVD  Lungs: Moderate to decreased air exchange, no significant wheezing, occasional crackles  Heart: regular rate and rhythm, S1, S2 normal, no gallop  Abdomen: Soft, non tender, + BS  Extremities: no cyanosis or clubbing. Trace edema. LABs:  CBC:   Recent Labs     01/24/23  0510 01/25/23  0510 01/26/23  0514   WBC 11.7* 10.2 11.0   HGB 11.4* 11.8* 12.4   HCT 36.0* 38.1 40.5    237 233     BMP:   Recent Labs     01/24/23  0510 01/25/23  0510 01/26/23  0514    138 141   K 3.8 4.6 4.8   CO2 17* 20 23   BUN 49* 46* 43*   CREATININE 1.25* 1.26* 1.00*   LABGLOM 41* 41* 54*   GLUCOSE 122* 182* 221*        Latest Reference Range & Units Most Recent   Procalcitonin <0.09 ng/mL 0.55 (H)  1/22/23 13:40   (H): Data is abnormally high    Radiology:  CT chest 1/22/2023      X-ray chest 1/21/2023      Impression:  Acute hypoxic respiratory failure  COVID infection  Multifocal groundglass opacities,  pneumonia versus pulmonary edema  Small bilateral pleural effusions right greater than left  Elevated troponin and BNP,?   Decompensated CHF versus pulmonary hypertension  History of A-fib, allergic rhinitis, DM, HTN  Ileus    Recommendations:  Oxygen via nasal cannula, keep SPO2 90% or greater   Incentive spirometry every hour while awake   Albuterol every 6 hours as needed  Oral Levaquin to complete 7-day course  Oral Decadron milligrams daily x10 days  Infectious disease on consult, no indication for antiviral or immunosuppressive therapy at this time  Off Diuresis per cardiology  CT head per primary team for confusion  DVT prophylaxis, Lovenox  GI prophylaxis, Protonix  PT/OT  Rehab discharge planning per primary team, no objection from pulmonary standpoint  Above assessment and plan will be reviewed with Dr. Kristel Rios. Patient plan will be finalized following review by Dr. Kristel Rios.   Will follow with you    PATRICK Higgins-CNP   Pulmonary Critical Care and Sleep Medicine,  Patient seen under the supervision of Gautam Galan MD, [facility]

## 2023-01-26 NOTE — PLAN OF CARE
Pt confused and agitated at shift change. At breakfast, pt resting with eyes closed. Dtr at bedside at that time and requests pt not be woke up for meds at this time. She voices concern that steroid is causing confusion/hallucinations. Physician notified. Order obtained for neuro consult and CT. Also obtained order to premedicate with seroquel prior to CT. Brief changed and pt became agitated, pushing staff's hands away. Able to get pt to take some meds with much encouragement.        Problem: Neurosensory - Adult  Goal: Achieves maximal functionality and self care  Outcome: Not Progressing      Problem: Skin/Tissue Integrity  Goal: Absence of new skin breakdown  1/26/2023 1321 by Maria Antonia Ocampo RN  Outcome: Progressing     Problem: Safety - Adult  Goal: Free from fall injury  1/26/2023 1321 by Maria Antonia Ocampo RN  Outcome: Progressing     Problem: ABCDS Injury Assessment  Goal: Absence of physical injury  1/26/2023 1321 by Maria Antonia Ocampo RN  Outcome: Progressing     Problem: Cardiovascular - Adult  Goal: Maintains optimal cardiac output and hemodynamic stability  1/26/2023 1321 by Maria Antonia Ocampo RN  Outcome: Progressing     Problem: Respiratory - Adult  Goal: Achieves optimal ventilation and oxygenation  1/26/2023 1321 by Maria Antonia Ocampo RN  Outcome: Progressing

## 2023-01-26 NOTE — PROGRESS NOTES
Physical Therapy  DATE: 2023    NAME: Savi Dougherty  MRN: [id number]   : 10/23/1932    Patient not seen this date for Physical Therapy due to:      [x] Cancel by RN or physician due to: RN Ximoy Gonzalez states not to disturb patient nor wake patient due to lack of sleep. [] Hemodialysis    [] Critical Lab Value Level     [] Blood transfusion in progress    [] Acute or unstable cardiovascular status   _MAP < 55 or more than >115  _HR < 40 or > 130    [] Acute or unstable pulmonary status   -FiO2 > 60%   _RR < 5 or >40    _O2 sats < 85%    [] Strict Bedrest    [] Off Unit for surgery or procedure    [] Off Unit for testing       [] Pending imaging to R/O fracture    [] Refusal by Patient      [] Other      [] PT being discontinued at this time. Patient independent. No further needs. [] PT being discontinued at this time as the patient has been transferred to hospice care. No further needs.       Ena Griffith, PTA

## 2023-01-26 NOTE — PROGRESS NOTES
Pt resting in bed with eyes closed. Daughter at bedside. Pt also has 1:1 sitter r/t confusion, agitation, pulling at lines and attempting to bite call light cord. Dtr expresses concern if steroid is causing pt's behaviors. She would like to try to stop this med if OK with physician. She also requests pt not be awakened for meds at this time and if pt awakens soon OK to give coreg and enteresto. Dr. Avtar Merino notified of concerns. Orders obtained from Dr. Avtar Merino. Dtr notified of orders and she is in agreement.

## 2023-01-26 NOTE — PROGRESS NOTES
[address] Hospitalist        1/26/2023   2:41 PM    Name:  Rios Boston  MRN:    [id number]     Acct:     [de-identified]   Room:  [address] [place] [address] Day: 4     Admit Date: 1/21/2023 11:39 PM  PCP: Leonela Jean MD    C/C:   Chief Complaint   Patient presents with    Shortness of Breath       Assessment:      Acute on chronic congestive heart failure  COVID-19 pneumonia  Hypotension  Acute metabolic encephalopathy  Nonvaccinated against SARS-CoV-2  Respiratory failure with hypoxia requiring oxygen via nasal cannula  Hypomagnesemia  Hypokalemia  Question of bacterial pneumonia  Essential hypertension  Diabetes mellitus type 2  Paroxysmal atrial fibrillation  Congestive heart failure, type unknown  Osteoarthritis, multiple joints  History of colon cancer  History of skin cancer  Right eye vision loss      Plan:      Patient has become hypotensive, bradycardic, further she is more altered, will transfer patient to medical ICU  Primary critical care already following  CT brain without contrast, can medicate with Seroquel if agitated, consult neurology    Monitor vitals closely  Keep SPO2 above 90%  I's and O's  IV heplock  Pain control  Antiemetics as needed  Levaquin, Decadron  Bumex, Coreg, Entresto  Accu-Cheks before meals and at bedtime  Insulin sliding scale medium  Hypoglycemia protocol  And has elevated blood glucose and is refusing insulin  CBC , BMP, BNP  Pulmonary consulted  Consult cardiology  Cardiology recommend heparin infusion until they can evaluate patient and determine why she is not on anticoagulation  DVT and GI prophylaxis.   Continue medication as below  Discussed with daughter at bedside in detail      Scheduled Meds:   QUEtiapine  25 mg Oral Once    sodium chloride  500 mL IntraVENous Once    levoFLOXacin  750 mg Oral Q48H    dexamethasone  6 mg Oral Daily    sodium chloride flush  5-40 mL IntraVENous 2 times per day    [Held by provider] carvedilol  12.5 mg Oral BID  sacubitril-valsartan  1 tablet Oral BID    Vitamin D  2,000 Units Oral Daily    vitamin B-12  1,000 mcg Oral Daily    lactobacillus  1 tablet Oral Daily    cholestyramine light  4 g Oral BID    pantoprazole  40 mg Oral QAM AC    insulin lispro  0-8 Units SubCUTAneous TID WC    insulin lispro  0-4 Units SubCUTAneous Nightly    glimepiride  4 mg Oral BID WC     Continuous Infusions:   sodium chloride 25 mL (23 1455)    dextrose       PRN Meds:  sodium chloride flush, 10 mL, PRN  sodium chloride, , PRN  magnesium sulfate, 1,000 mg, PRN  ondansetron, 4 mg, Q8H PRN   Or  ondansetron, 4 mg, Q6H PRN  polyethylene glycol, 17 g, Daily PRN  acetaminophen, 650 mg, Q6H PRN   Or  acetaminophen, 650 mg, Q6H PRN  glucose, 4 tablet, PRN  dextrose bolus, 125 mL, PRN   Or  dextrose bolus, 250 mL, PRN  glucagon (rDNA), 1 mg, PRN  dextrose, , Continuous PRN  albuterol, 2.5 mg, Q6H PRN          Subjective:     Patient seen examined at bedside  Patient remains in progressive unit  Patient noticed to be hypotensive and bradycardic this morning, further also noticed to have altered mental status, further consulted with pulmonology they recommended to have patient transferred to medical ICU  Will discuss with daughter    ROS:  Unable to assess due to patient mental status        Physical Examination:      Vitals:  BP (!) 130/53   Pulse 67   Temp 97.6 °F (36.4 °C) (Axillary)   Resp 18   Ht 5' 4\" (1.626 m)   Wt 139 lb 12.8 oz (63.4 kg)   SpO2 95%   BMI 24.00 kg/m²   Temp (24hrs), Av.4 °F (36.3 °C), Min:97.2 °F (36.2 °C), Max:97.6 °F (36.4 °C)    Weight:   Wt Readings from Last 3 Encounters:   23 139 lb 12.8 oz (63.4 kg)   23 150 lb 1.6 oz (68.1 kg)   10/20/22 150 lb (68 kg)     I/O last 3 completed shifts:  I/O last 3 completed shifts:   In: 480 [P.O.:480]  Out: 600 [Urine:600]     Recent Labs     23  1630 23  2124 23  0838 23  1215   POCLUKEU 185* 249* 190* 209*           General appearance -patient is sleepy  Mental status -not oriented to person, place, and time with normal affect  Head - normocephalic and atraumatic  Eyes - pupils equal and reactive, extraocular eye movements intact, conjunctiva clear  Ears - hearing appears to be intact  Nose - no drainage noted  Mouth - mucous membranes moist  Neck - supple, no carotid bruits, thyroid not palpable  Chest - clear to auscultation, normal effort  Heart - normal rate, regular rhythm, no murmur  Abdomen - soft, nontender, nondistended, bowel sounds present all four quadrants, no masses, hepatomegaly or splenomegaly  Neurological -unable to assess  Extremities - peripheral pulses palpable, no pedal edema or calf pain with palpation  Skin - no gross lesions, rashes, or induration noted        Medications: Allergies:    Allergies   Allergen Reactions    Acetaminophen-Codeine Nausea Only    Codeine Nausea Only    Furosemide      Other reaction(s): GI Disturbance    Linagliptin      Other reaction(s): SOB    Sitagliptin      Other reaction(s): felt poorly    Other Itching     Animal Dander       Current Meds:   Current Facility-Administered Medications:     QUEtiapine (SEROQUEL) tablet 25 mg, 25 mg, Oral, Once, Sylvia Bolaños MD    0.9 % sodium chloride bolus, 500 mL, IntraVENous, Once, Hina Hu MD    levoFLOXacin (LEVAQUIN) tablet 750 mg, 750 mg, Oral, Q48H, Keon Biggs MD    dexamethasone (DECADRON) tablet 6 mg, 6 mg, Oral, Daily, Sylvia Bolaños MD, 6 mg at 01/26/23 1229    sodium chloride flush 0.9 % injection 5-40 mL, 5-40 mL, IntraVENous, 2 times per day, Gardenia A Lump, APRN - CNP, 10 mL at 01/26/23 1233    sodium chloride flush 0.9 % injection 10 mL, 10 mL, IntraVENous, PRN, Gardenia A Lump, APRN - CNP, 10 mL at 01/22/23 1616    0.9 % sodium chloride infusion, , IntraVENous, PRN, Gardenia A Lump, APRN - CNP, Last Rate: 50 mL/hr at 01/24/23 1455, 25 mL at 01/24/23 1455    magnesium sulfate 1000 mg in dextrose 5% 100 mL IVPB, 1,000 mg, IntraVENous, PRN, Irwin Duffel Lump, APRN - CNP, Stopped at 01/22/23 1715    ondansetron (ZOFRAN-ODT) disintegrating tablet 4 mg, 4 mg, Oral, Q8H PRN, 4 mg at 01/23/23 2013 **OR** ondansetron (ZOFRAN) injection 4 mg, 4 mg, IntraVENous, Q6H PRN, Gardenia A Lump, APRN - CNP    polyethylene glycol (GLYCOLAX) packet 17 g, 17 g, Oral, Daily PRN, Gardenia A Lump, APRN - CNP    acetaminophen (TYLENOL) tablet 650 mg, 650 mg, Oral, Q6H PRN, 650 mg at 01/24/23 0907 **OR** acetaminophen (TYLENOL) suppository 650 mg, 650 mg, Rectal, Q6H PRN, Gardenia A Lump, APRN - CNP    glucose chewable tablet 16 g, 4 tablet, Oral, PRN, Gardenia A Lump, APRN - CNP    dextrose bolus 10% 125 mL, 125 mL, IntraVENous, PRN **OR** dextrose bolus 10% 250 mL, 250 mL, IntraVENous, PRN, Gardenia A Lump, APRN - CNP    glucagon (rDNA) injection 1 mg, 1 mg, SubCUTAneous, PRN, Gardenia A Lump, APRN - CNP    dextrose 10 % infusion, , IntraVENous, Continuous PRN, Gardenia A Lump, APRN - CNP    albuterol (PROVENTIL) nebulizer solution 2.5 mg, 2.5 mg, Nebulization, Q6H PRN, Gardenia A Lump, APRN - CNP    [Held by provider] carvedilol (COREG) tablet 12.5 mg, 12.5 mg, Oral, BID WC, Haleigh Wilson MD, 12.5 mg at 01/26/23 1229    sacubitril-valsartan (ENTRESTO) 24-26 MG per tablet 1 tablet, 1 tablet, Oral, BID, Haleigh Wilson MD, 1 tablet at 01/26/23 1229    Vitamin D (CHOLECALCIFEROL) tablet 2,000 Units, 2,000 Units, Oral, Daily, Haleigh Wilson MD, 2,000 Units at 01/24/23 0855    vitamin B-12 (CYANOCOBALAMIN) tablet 1,000 mcg, 1,000 mcg, Oral, Daily, Haleigh Wilson MD, 1,000 mcg at 01/24/23 0848    lactobacillus (BACID) tablet 1 tablet, 1 tablet, Oral, Daily, Haleigh Wilson MD, 1 tablet at 01/24/23 0855    cholestyramine light packet 4 g, 4 g, Oral, BID, Haleigh Wilson MD, 4 g at 01/26/23 0047    pantoprazole (PROTONIX) tablet 40 mg, 40 mg, Oral, QAM AC, Haleigh Wilson MD, 40 mg at 01/24/23 0800    insulin lispro (HUMALOG) injection vial 0-8 Units, 0-8 Units, SubCUTAneous, TID WC, Lencho Jay MD, 2 Units at 01/26/23 1224    insulin lispro (HUMALOG) injection vial 0-4 Units, 0-4 Units, SubCUTAneous, Nightly, Haleigh Wilson MD, 4 Units at 01/22/23 2149    glimepiride (AMARYL) tablet 4 mg (Patient Supplied), 4 mg, Oral, BID WC, Haleigh Wilson MD, 4 mg at 01/25/23 1903      I/O (24Hr): Intake/Output Summary (Last 24 hours) at 1/26/2023 1441  Last data filed at 1/26/2023 0445  Gross per 24 hour   Intake 480 ml   Output 200 ml   Net 280 ml         Data:           Labs:    Hematology:  Recent Labs     01/24/23  0510 01/25/23  0510 01/26/23  0514 01/26/23  0658   WBC 11.7* 10.2 11.0  --    RBC 4.00 4.20 4.52  --    HGB 11.4* 11.8* 12.4  --    HCT 36.0* 38.1 40.5  --    MCV 90.0 90.7 89.6  --    MCH 28.5 28.1 27.4  --    MCHC 31.7 31.0 30.6  --    RDW 14.8* 14.8* 14.7*  --     237 233  --    MPV 10.7 10.6 10.4  --    DDIMER  --   --   --  1.81*       Chemistry:  Recent Labs     01/24/23  0510 01/25/23  0510 01/26/23  0514    138 141   K 3.8 4.6 4.8    107 108*   CO2 17* 20 23   GLUCOSE 122* 182* 221*   BUN 49* 46* 43*   CREATININE 1.25* 1.26* 1.00*   ANIONGAP 13 11 10   LABGLOM 41* 41* 54*   CALCIUM 7.0* 7.8* 8.7   PROBNP  --  4,334*  --        Recent Labs     01/25/23  0813 01/25/23  1220 01/25/23  1630 01/25/23  2124 01/26/23  0838 01/26/23  1215   POCGLU 178* 220* 185* 249* 190* 209*         Lab Results   Component Value Date/Time    SPECIAL NOT REPORTED 11/05/2017 01:55 PM     Lab Results   Component Value Date/Time    CULTURE ESCHERICHIA COLI >[id number] CFU/ML (A) 10/20/2022 05:48 PM       No results found for: POCPH, PHART, PH, POCPCO2, WNA4XGI, PCO2, POCPO2, PO2ART, PO2, POCHCO3, MDU1DDQ, HCO3, NBEA, PBEA, BEART, BE, THGBART, THB, PVW8UTN, BPGO7LEH, V0PPVMDB, O2SAT, FIO2    Radiology:    CT CHEST WO CONTRAST    Result Date: 1/22/2023  Multifocal ground-glass opacities indeterminate for COVID-19. Right greater than left pleural effusions. Ileus.   Cardiomegaly and coronary artery disease. XR CHEST PORTABLE    Result Date: 1/24/2023  More prominent bilateral interstitial and ground-glass opacities, which may represent developing edema versus multifocal infection. Grossly similar appearance of small effusions. XR CHEST PORTABLE    Result Date: 1/21/2023  New small right effusion and airspace disease. Possible mild CHF. All radiological studies reviewed  Code Status:  Full Code        Electronically signed by Malu Calvert MD on 1/26/2023 at 2:41 PM    This note was created with the assistance of a speech-recognition program.  Although the intention is to generate a document that actually reflects the content of the visit, no guarantees can be provided that every mistake has been identified and corrected by editing. Note was updated later by me after  physical examination and  completion of the assessment.

## 2023-01-26 NOTE — PROGRESS NOTES
Pt becoming bradycardic with HR as low as 39. Blood pressure 90/38. Verified manually at 78/40. Dr. Juan Cormier notified. Orders obtained. EKG completed, Dr. Juan Cormier and Dr. Aubrie Mancini notified. Dr. Aubrie Mancini orders PICC line and transfer to ICU. NS bolus given per order and midodrine given per one time order. Order also obtained to hold coreg. HR irregular from 40s-70s. Unable to get good blood pressure using machine r/t pt becoming agitated and moving too much. Manual blood pressure recheck at 96/50. Dr. Juan Cormier in to see pt and spoke with dtr. Pt was taken for CT and is now back in room. Report called to [facility] in ICU and pt will be transferred to room 1114. Dtr at bedside  and aware.

## 2023-01-26 NOTE — CONSULTS
NEUROLOGY CONSULT NOTE      Requesting Physician:  Abhilash Lopez MD    Reason for Consult:  Evaluate for new confusion, agitation, and hallucinations. History of Present Illness:  Angy Camarillo is a 80 y.o. female  with h/o atrial fibrillation on Eliquis, CHF, HTN, DM, colon cancer, and diverticulitis who was admitted to [address] [place] [address] on 1/21/2023 with presentation of shortness of breath and neck pain. Patient with history of multiple prior admissions for same symptoms without a clear etiology found. With current event, there is no specific precipitating or aggravating etiology found. Given discharge from the hospital after this of heart failure. Continues to have shortness of breath with a dry cough report. She also has a recent diagnosis of COVID-19 infection with history of no influenza or COVID-19 vaccines. She was COVID-positive admission with improvement suggested admission but has since been a decline in function. Placed on Decadron to help with her respiratory insufficiency and COVID pneumonia. However, she is refusing oral medications at this time. Recently she is also been refusing food. She has become more irritable and combative according to staff and is refusing a lot of her care. Family disoriented and poorly responsive with respect to answering questions and following commands.       Past Medical History:        Diagnosis Date    Adenocarcinoma ([address]     colon    Arthritis     Atrial fibrillation (HCC)     r/t sepsis     Cancer ([address]     colon, skin    CHF (congestive heart failure) (HCC)     Chronic anticoagulation     Eliquis for a fib stroke prophylaxis    Diabetes mellitus ([address]     Diverticulitis     Femur fracture (HCC)     Hypertension     Melanoma ([address]     nose    Osteoporosis     Overweight     Sepsis ([address]     Serum creatinine raised            Procedure Laterality Date    APPENDECTOMY      CHOLECYSTECTOMY      COLON SURGERY      FEMUR SURGERY      s/p fracture Social History:  Social History     Tobacco Use   Smoking Status Never   Smokeless Tobacco Never     Social History     Substance and Sexual Activity   Alcohol Use No     Social History     Substance and Sexual Activity   Drug Use No         Family History:   No family history on file. Review of Systems:  Constitutional:  Negative for activity change, appetite change, chills, fatigue and fever. HENT:  Negative for congestion, ear pain and sore throat. Eyes:  Negative for pain, discharge and redness. Respiratory:  Positive for cough and shortness of breath. Negative for wheezing and stridor. Cardiovascular:  Negative for chest pain. Gastrointestinal:  Negative for abdominal pain, constipation, diarrhea, nausea and vomiting. Genitourinary:  Negative for decreased urine volume and difficulty urinating. Musculoskeletal:  Positive for neck pain. Negative for arthralgias and myalgias. Skin:  Negative for color change and rash. Neurological:  Negative for dizziness, weakness and headaches. Psychiatric/Behavioral:  Negative for behavioral problems and confusion. Except as noted above the remainder of the review of systems was reviewed and negative. Allergies:     Allergies   Allergen Reactions    Acetaminophen-Codeine Nausea Only    Codeine Nausea Only    Furosemide      Other reaction(s): GI Disturbance    Linagliptin      Other reaction(s): SOB    Sitagliptin      Other reaction(s): felt poorly    Other Itching     Animal Dander        Current Medications:   0.9 % sodium chloride bolus, Once  levoFLOXacin (LEVAQUIN) tablet 750 mg, Q48H  dexamethasone (DECADRON) tablet 6 mg, Daily  sodium chloride flush 0.9 % injection 5-40 mL, 2 times per day  sodium chloride flush 0.9 % injection 10 mL, PRN  0.9 % sodium chloride infusion, PRN  magnesium sulfate 1000 mg in dextrose 5% 100 mL IVPB, PRN  ondansetron (ZOFRAN-ODT) disintegrating tablet 4 mg, Q8H PRN   Or  ondansetron (ZOFRAN) injection 4 mg, Q6H PRN  polyethylene glycol (GLYCOLAX) packet 17 g, Daily PRN  acetaminophen (TYLENOL) tablet 650 mg, Q6H PRN   Or  acetaminophen (TYLENOL) suppository 650 mg, Q6H PRN  glucose chewable tablet 16 g, PRN  dextrose bolus 10% 125 mL, PRN   Or  dextrose bolus 10% 250 mL, PRN  glucagon (rDNA) injection 1 mg, PRN  dextrose 10 % infusion, Continuous PRN  albuterol (PROVENTIL) nebulizer solution 2.5 mg, Q6H PRN  [Held by provider] carvedilol (COREG) tablet 12.5 mg, BID WC  sacubitril-valsartan (ENTRESTO) 24-26 MG per tablet 1 tablet, BID  Vitamin D (CHOLECALCIFEROL) tablet 2,000 Units, Daily  vitamin B-12 (CYANOCOBALAMIN) tablet 1,000 mcg, Daily  lactobacillus (BACID) tablet 1 tablet, Daily  cholestyramine light packet 4 g, BID  pantoprazole (PROTONIX) tablet 40 mg, QAM AC  insulin lispro (HUMALOG) injection vial 0-8 Units, TID WC  insulin lispro (HUMALOG) injection vial 0-4 Units, Nightly  glimepiride (AMARYL) tablet 4 mg (Patient Supplied), BID WC         Physical Exam:  BP (!) 130/53   Pulse 67   Temp 97.6 °F (36.4 °C) (Axillary)   Resp 18   Ht 5' 4\" (1.626 m)   Wt 139 lb 12.8 oz (63.4 kg)   SpO2 95%   BMI 24.00 kg/m²  I Body mass index is 24 kg/m². I   Wt Readings from Last 1 Encounters:   01/26/23 139 lb 12.8 oz (63.4 kg)          HEENT: Normocephalic, atraumatic, no lesions or abnormalities noted. Neck:  supple with full ROM; no masses, nodes or bruits; no cervical tenderness on palpation. Lungs:  clear to auscultation  bilaterally     CV: RRR without gallops or murmurs     Extremities: no c/c; venous stasis changes in bilateral lower legs with bandaged right calf and ankle. Neurologic Exam   Mental Status:  Patient was alert and responsive, was oriented only to name. She did not know where she was and was not answering any questions appropriately. There was a great deal of difficulty following commands. Frequency yelling out to leave her alone.   Cranial Nerves: Pupils were small and sluggishly reactive to light;    Visual fields were full on confrontation;   Pain with eyes at times with no evident nystagmus on lateral gaze; She will expressions with good bilateral eye closure; Hearing was grossly intact; Difficulty with evaluation of remaining cranial nerves due to poor patient cooperation  Motor Exam: Limited exam secondary to poor cooperation. Patient able to lift arms off bed with some resistance was generated; she was able to lift both legs off the bed but did not cooperate with resistance testing; limited movement noted in ankles bilaterally. Normal tone appreciated. Sensory Exam: Responsive to noxious stimuli bilaterally. Cerebella Exam: Mild fine motor tremor noted in bilateral hands with slowed movements. No abnormal movements appreciated; no posturing noted. Gait:  Gait was not tested. Reflexes: Difficult to assess due to poor patient cooperation. Labs:    CBC:   Recent Labs     01/24/23  0510 01/25/23  0510 01/26/23  0514   WBC 11.7* 10.2 11.0   HGB 11.4* 11.8* 12.4    237 233   MCV 90.0 90.7 89.6   MCH 28.5 28.1 27.4   MCHC 31.7 31.0 30.6   RDW 14.8* 14.8* 14.7*     CMP:  Recent Labs     01/24/23  0510 01/25/23  0510 01/26/23  0514    138 141   K 3.8 4.6 4.8    107 108*   CO2 17* 20 23   BUN 49* 46* 43*   CREATININE 1.25* 1.26* 1.00*   LABGLOM 41* 41* 54*   GLUCOSE 122* 182* 221*   CALCIUM 7.0* 7.8* 8.7     Liver: No results for input(s): AST, ALT, ALKPHOS, PROT, LABALBU, BILITOT in the last 72 hours. Invalid input(s): BILDIR  INR: No results for input(s): PROTIME, INR in the last 72 hours. ToxicologyNo results for input(s): PHENYTOIN, CARBTOT, PHENOBARB, VALPROATE, LAMOTRIG in the last 72 hours. Invalid input(s):  KEPPRA  No results for input(s): AMPMETHURSCR, BARBTQTU, BDZQTU, CANNABQUANT, COCMETQTU, OPIAU, PCPQUANT in the last 72 hours.     Radiology:  CT CHEST WO CONTRAST    Result Date: 1/22/2023  EXAMINATION: CT OF THE CHEST WITHOUT CONTRAST 1/22/2023 2:03 pm TECHNIQUE: CT of the chest was performed without the administration of intravenous contrast. Multiplanar reformatted images are provided for review. Automated exposure control, iterative reconstruction, and/or weight based adjustment of the mA/kV was utilized to reduce the radiation dose to as low as reasonably achievable. COMPARISON: Chest x-ray January 21, 2023 HISTORY: ORDERING SYSTEM PROVIDED HISTORY: Pulmonary nodule TECHNOLOGIST PROVIDED HISTORY: Pulmonary nodule Reason for Exam:  sob, nodule FINDINGS: Mediastinum: Cardiomegaly and calcific coronary artery disease. No pathologic mediastinal or hilar lymphadenopathy. Heart and great vessels otherwise unremarkable. Lungs/pleura: Multifocal ground-glass opacities right greater than left. Small bilateral pleural effusions. Bibasilar consolidations. Upper Abdomen: Multiple air-fluid levels in the small bowel. Soft Tissues/Bones: Kyphosis and spondylosis. Multifocal ground-glass opacities indeterminate for COVID-19. Right greater than left pleural effusions. Ileus. Cardiomegaly and coronary artery disease. XR CHEST PORTABLE    Result Date: 1/24/2023  EXAMINATION: ONE XRAY VIEW OF THE CHEST 1/24/2023 5:36 am COMPARISON: 01/22/2023, 01/21/2023 HISTORY: ORDERING SYSTEM PROVIDED HISTORY: Effusion/infiltrate TECHNOLOGIST PROVIDED HISTORY: Effusion/infiltrate Reason for Exam: Effusion/infiltrate Additional signs and symptoms: Effusion/infiltrate FINDINGS: Cardiac silhouette enlargement again noted. Bilateral interstitial and ground-glass opacities are noted and more prominent since the chest radiograph. Small pleural effusions again noted. No pneumothorax. More prominent bilateral interstitial and ground-glass opacities, which may represent developing edema versus multifocal infection. Grossly similar appearance of small effusions.      XR CHEST PORTABLE    Result Date: 1/21/2023  EXAMINATION: ONE XRAY VIEW OF THE CHEST 1/21/2023 11:42 pm COMPARISON: January 10, 2023 HISTORY: ORDERING SYSTEM PROVIDED HISTORY: Shortness of Breath TECHNOLOGIST PROVIDED HISTORY: Shortness of Breath Reason for Exam: SOB. Best image possibe due to patient movement FINDINGS: Moderate cardiomegaly. Mild edema. Small right effusion. Mediastinum normal.  Bony thorax intact. New small right effusion and airspace disease. Possible mild CHF. XR CHEST PORTABLE    Result Date: 1/10/2023  EXAMINATION: ONE XRAY VIEW OF THE CHEST 1/10/2023 6:39 am COMPARISON: 02/09/2022, 1622 hours HISTORY: ORDERING SYSTEM PROVIDED HISTORY: Weakness, diarrhea, Hist of CHF TECHNOLOGIST PROVIDED HISTORY: Weakness, diarrhea, Hist of CHF Reason for Exam: Weakness, severe diarrhea. Hx CHF 5year-old female with weakness, diarrhea; history of CHF FINDINGS: Portable upright view of the chest. Trachea midline. No pneumothorax. Stable cardiomegaly. Mild pulmonary vascular congestion. No sizable pleural effusions. No acute osseous abnormality. Stable cardiomegaly. Mild pulmonary vascular congestion. XR CHEST 1 VIEW    Result Date: 1/26/2023  EXAMINATION: ONE XRAY VIEW OF THE CHEST 1/26/2023 6:44 am COMPARISON: 01/24/2023 HISTORY: ORDERING SYSTEM PROVIDED HISTORY: shortness of breath TECHNOLOGIST PROVIDED HISTORY: shortness of breath Reason for Exam: shortness of breath Additional signs and symptoms: shortness of breath FINDINGS: Stable right perihilar patchy opacities, concerning for aspiration/pneumonia. Unchanged enlarged cardiomediastinal silhouette. No pleural effusion. No pneumothorax. Bony structures unremarkable. Stable right perihilar patchy opacities, concerning for aspiration/pneumonia. The patient's records from referring provider and available information in the EHR was reviewed.       Impression:  Agitated delirium  COVID-19 infection  Respiratory insufficiency  Congestive heart failure  Right leg pressure ulcer    Patient status post recent admission for shortness of breath. She was in congestive heart failure and likely COVID-pneumonia when she presented for this admission. She is now refusing food and any type of p.o. intake with progressive decline in function. She now is very confused and this does not appear to be clearing. She was functional at home prior to recent hospitalizations and apparently has not been able to get back to her baseline. I do not suspect any significant structural CNS abnormality is the cause of her current presentation. Clinical picture more consistent with a metabolic encephalopathy in an elderly female with agitated delirium and decreased p.o. intake. This likely will continue to make symptoms worse. We will add Seroquel to help with agitation and possibility of some sundowning as well. Will defer EEG since low suspicion of seizure activity, but will continue to monitor for any type of activity that would warrant patient of seizure activity or to assess degree of encephalopathy. Principal Problem:    COVID-19  Active Problems:    Non-pressure chronic ulcer of left calf with fat layer exposed ([address]  Resolved Problems:    * No resolved hospital problems. *      Recommendations:                                            Medical management  Seroquel 25 mg nightly for agitation given age and possibility of sundowning. For work-up recommended at this time  Agree with palliative care consult and will continue to follow peripherally. It was my pleasure to evaluate Davon Shearing today. Please call with questions.       Electronically signed by Gabe Starks MD on 1/26/2023 at 3:44 PM

## 2023-01-26 NOTE — PROGRESS NOTES
[address] [facility]  Occupational Therapy Not Seen    DATE: 2023    NAME: Annie Dempsey  MRN: [id number]   : 10/23/1932    Patient not seen this date for Occupational Therapy due to:      [x] Cancel by RN or physician due to: RN Nila Cota requested to hold therapy at this time d/t pt is sleeping and has been agitated. Will continue to follow. [] Hemodialysis    [] Critical Lab Value Level     [] Blood transfusion in progress    [] Acute or unstable cardiovascular status   _MAP < 55 or more than >115  _HR < 40 or > 130    [] Acute or unstable pulmonary status   -FiO2 > 60%   _RR < 5 or >40    _O2 sats < 85%    [] Strict Bedrest    [] Off Unit for surgery or procedure    [] Off Unit for testing       [] Pending imaging to R/O fracture    [] Refusal by Patient      [] Other      [] OT being discontinued at this time. Patient independent. No further needs. [] OT being discontinued at this time as the patient has been transferred to hospice care. No further needs.       DANIEL Patel

## 2023-01-26 NOTE — CARE COORDINATION
Social work: [facility](accepted) following. Patient has sitter at bedside and increasing confusion.

## 2023-01-26 NOTE — PROGRESS NOTES
[address]  [address] [place] [address], [address] [facility] [address]  [phone]          Progress Note    Patient Name:  Barrera Miller    :  10/23/1932  2023 4:40 PM      SUBJECTIVE   Patient is off the floor for IR placement of access. Noted to be bradycardic and hypotensive overnight, with HR in 30's and sbp 70\"      OBJECTIVE     Vital signs:    BP (!) 130/53   Pulse 67   Temp 97.6 °F (36.4 °C) (Axillary)   Resp 18   Ht 5' 4\" (1.626 m)   Wt 139 lb 12.8 oz (63.4 kg)   SpO2 95%   BMI 24.00 kg/m²  2.5 L/min  .tro    Admit Weight:  150 lb (68 kg)    Last 3 weights: Wt Readings from Last 3 Encounters:   23 139 lb 12.8 oz (63.4 kg)   23 150 lb 1.6 oz (68.1 kg)   10/20/22 150 lb (68 kg)       BMI: Body mass index is 24 kg/m². Input/Output:       Intake/Output Summary (Last 24 hours) at 2023 1640  Last data filed at 2023 0445  Gross per 24 hour   Intake 480 ml   Output 200 ml   Net 280 ml       Date 23 0000 - 23 2359   Shift 2795-0740 8415-3842 2748-6938 24 Hour Total   INTAKE   P.O.(mL/kg/hr) 240(0.5)   240   Shift Total(mL/kg) 240(3.8)   240(3.8)   OUTPUT   Urine(mL/kg/hr) 200(0.4)   200   Shift Total(mL/kg) 200(3.2)   200(3.2)   Weight (kg) 63.4 63.4 63.4 63.4     Exam:         Laboratory Studies:     CBC:   Recent Labs     23  0510 23  0510 23  0514   WBC 11.7* 10.2 11.0   HGB 11.4* 11.8* 12.4   HCT 36.0* 38.1 40.5   MCV 90.0 90.7 89.6    237 233     BMP:   Recent Labs     23  0510 23  0510 23  0514    138 141   K 3.8 4.6 4.8    107 108*   CO2 17* 20 23   BUN 49* 46* 43*   CREATININE 1.25* 1.26* 1.00*     PT/INR: No results for input(s): PROTIME, INR in the last 72 hours. APTT: No results for input(s): APTT in the last 72 hours. MAG: No results for input(s): MG in the last 72 hours.   D Dimer:   Recent Labs     23  0658   DDIMER 1.81*     Troponin  No results for input(s): TROPHS in the last 72 hours. BNP No results for input(s): BNP in the last 72 hours. Recent Labs     23  0510   PROBNP 4,334*         Pulse Ox: SpO2  Av.3 %  Min: 93 %  Max: 99 %  Supplemental O2: O2 Flow Rate (L/min): 2.5 L/min     Current Meds:    levoFLOXacin  750 mg Oral Q48H    dexamethasone  6 mg Oral Daily    sodium chloride flush  5-40 mL IntraVENous 2 times per day    [Held by provider] carvedilol  12.5 mg Oral BID WC    sacubitril-valsartan  1 tablet Oral BID    Vitamin D  2,000 Units Oral Daily    vitamin B-12  1,000 mcg Oral Daily    lactobacillus  1 tablet Oral Daily    cholestyramine light  4 g Oral BID    pantoprazole  40 mg Oral QAM AC    insulin lispro  0-8 Units SubCUTAneous TID WC    insulin lispro  0-4 Units SubCUTAneous Nightly    glimepiride  4 mg Oral BID WC     Continuous Infusions:    sodium chloride 25 mL (23 1455)    dextrose           CT HEAD WO CONTRAST    Result Date: 2023  No acute intracranial abnormality. CT CHEST WO CONTRAST    Result Date: 2023  Multifocal ground-glass opacities indeterminate for COVID-19. Right greater than left pleural effusions. Ileus. Cardiomegaly and coronary artery disease. XR CHEST PORTABLE    Result Date: 2023  More prominent bilateral interstitial and ground-glass opacities, which may represent developing edema versus multifocal infection. Grossly similar appearance of small effusions. XR CHEST PORTABLE    Result Date: 2023  New small right effusion and airspace disease. Possible mild CHF. XR CHEST 1 VIEW    Result Date: 2023  Stable right perihilar patchy opacities, concerning for aspiration/pneumonia. Echo:      ASSESSMENT     Principal Problem:    COVID-19  Active Problems:    Non-pressure chronic ulcer of left calf with fat layer exposed ([address]  Resolved Problems:    * No resolved hospital problems.  *   Troponin elevation   Atrial fibrillation    PLAN     Once back to floor and if remains imelda and hypo can consider initiation of dopamine    Keep mag >2.0 and K > 4.0     Troponin elevation type 2 myocardial infarction not acute coronary syndrome.       Patient admitted to ICU for close monitoring     Atrial fibrillation currently controlled and in slow vent response not on anticoagulation secondary to age,  frailty and prior history of GI bleeding        Electronically signed by Adán Guidry MD on 1/26/2023 at 4:40 PM

## 2023-01-27 NOTE — PROGRESS NOTES
[facility]  Progress Note    Marcia Cummins  MRN: [id number]  Date: 1/27/2023  LOS: 5     Reason for F/U :   COVID-19 virus infection    Impression :   COVID-19 virus infection tested + 1/22/2023  Unvaccinated adult patient  Acute respiratory insufficiency requiring supplemental oxygen   Congestive heart failure with mild congestive changes on chest x-ray  Multifocal opacities in the bases/ pneumonia  Confusion  Right-sided greater than left-sided pleural effusion    Recommendations:   COVID therapy: The patient is not a candidate for antiviral therapy  Given the hypoxia the patient will continue Decadron-the patient has not been taking this as she has been refusing oral intake  The patient is not a candidate for immunosuppressive therapy at this time and the CRP is not overly elevated  The CRP is decreased and the procalcitonin level remains low    Antibiotic therapy:  Continue levofloxacin through 1/28/2023  The chest x-ray findings remain stable  The patient remains on 2 -3 L of oxygen by nasal cannula  Clinically the patient remains the same, confused, refusing to eat but no major deterioration on imaging    Infection Control Recommendations:   Droplet plus precautions    Discharge Planning:   Estimated Length of IV antimicrobials: 5 days  Patient will need Midline Catheter Insertion/ PICC line Insertion: No  Patient will need: Home IV , [place],  SNF,  LTAC: Undetermined  Patient willneed outpatient wound care: No    Medical Decision making / Summary of Stay:   Marcia Cummins is a 80y.o.-year-old female who was initially admitted on 1/21/2023.    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 She does not report any subjective fevers, chills no significant cough, no abdominal pain, no nausea vomiting, has chronic diarrhea. The patient was reporting some neck pain and again the neck pain has been previously evaluated with no source found. She reports that her daughter recently tested positive for COVID and work-up in the emergency department included a chest x-ray that showed a new small right effusion and airspace disease and possible mild CHF. COVID testing was done that was positive and the patient was admitted with COVID-19 virus infection as well as concern for congestive heart failure. The patient has since been seen by the pulmonary critical care service and was started on supplemental oxygen, IV Decadron, intravenous levofloxacin as there was concern for right basilar infiltrate/pneumonia and I was asked to evaluate and help with antibiotic choice. Current evaluation:2023    /64   Pulse 77   Temp 97 °F (36.1 °C) (Oral)   Resp 19   Ht 5' 4\" (1.626 m)   Wt 139 lb 12.8 oz (63.4 kg)   SpO2 99%   BMI 24.00 kg/m²     Temperature Range: Temp: 97 °F (36.1 °C) Temp  Av.5 °F (36.4 °C)  Min: 97 °F (36.1 °C)  Max: 97.8 °F (36.6 °C)  She was transferred to the intensive care unit where she remains on 2 L of oxygen by nasal cannula. The patient remained somewhat confused and is currently refusing to answer questions as she essentially saying \"I do not know\" to all the questions. Review of Systems   Unable to perform ROS: Other     Physical Examination :     Physical Exam  Constitutional:       Appearance: She is well-developed. She is cachectic. HENT:      Head: Normocephalic and atraumatic. Mouth/Throat:      Mouth: Mucous membranes are dry. Cardiovascular:      Rate and Rhythm: Regular rhythm. Heart sounds: Normal heart sounds.    Pulmonary:      Effort: Pulmonary effort is normal.      Breath sounds: Rales present. Abdominal:      General: Bowel sounds are normal.      Palpations: Abdomen is soft. Musculoskeletal:      Cervical back: Normal range of motion and neck supple. Right lower leg: Edema present. Left lower leg: Edema present. Skin:     General: Skin is warm and dry. Neurological:      Mental Status: She is alert and oriented to person, place, and time. Laboratory data:   I have independently reviewed the followinglabs:  CBC with Differential:   Recent Labs     01/26/23  0514 01/27/23  0500   WBC 11.0 10.6   HGB 12.4 12.3   HCT 40.5 39.4    232   LYMPHOPCT 3* 3*   MONOPCT 5 3       BMP:   Recent Labs     01/26/23  1803 01/27/23  0500    140   K 5.1 4.9   * 110*   CO2 23 22   BUN 39* 41*   CREATININE 0.75 0.80   MG 1.7  --        Hepatic Function Panel:   No results for input(s): PROT, LABALBU, BILIDIR, IBILI, BILITOT, ALKPHOS, ALT, AST in the last 72 hours. Lab Results   Component Value Date/Time    PROCAL 0.13 01/26/2023 05:14 AM    PROCAL 0.55 01/22/2023 01:40 PM     Lab Results   Component Value Date/Time    CRP 22.2 01/26/2023 05:14 AM    CRP 37.7 01/23/2023 05:13 AM    CRP 0.5 03/26/2018 03:47 PM     No results found for: Leenicho Valdes      Lab Results   Component Value Date/Time    DDIMER 1.81 01/26/2023 06:58 AM    DDIMER 0.86 03/26/2018 03:47 PM     No results found for: FERRITIN  No results found for: LDH  No results found for: FIBRINOGEN    Results in Past 30 Days  Result Component Current Result Ref Range Previous Result Ref Range   SARS-CoV-2, Rapid DETECTED (A) (1/22/2023) Not Detected Not in Time Range      Lab Results   Component Value Date/Time    COVID19 DETECTED 01/22/2023 12:30 AM    COVID19 Not Detected 02/09/2022 05:46 PM       No results for input(s): [facility] in the last 72 hours.     Imaging Studies:   ONE XRAY VIEW OF THE CHEST 1/26/2023 6:44 am    Impression   Stable right perihilar patchy opacities, concerning for aspiration/pneumonia. ONE XRAY VIEW OF THE CHEST 1/24/2023 5:36 am  Impression   More prominent bilateral interstitial and ground-glass opacities, which may   represent developing edema versus multifocal infection. Grossly similar   appearance of small effusions. Cultures:     COVID-19, Rapid [[id number]] (Abnormal) Collected: 01/22/23 0030   Order Status: Completed Specimen: Nasopharyngeal Swab Updated: 01/22/23 0058    Specimen Description . NASOPHARYNGEAL SWAB    SARS-CoV-2, Rapid DETECTED Abnormal     Comment:        Rapid NAAT: The specimen is POSITIVE for SARS-Cov-2, the novel coronavirus associated with   COVID-19. This test has been authorized by the FDA under an Emergency Use Authorization (EUA) for use   by authorized laboratories. The ID NOW COVID-19 assay is designed to detect the virus that causes COVID-19 in patients   with signs and symptoms of infection who are suspected of COVID-19. An individual without symptoms of COVID-19 and who is not shedding SARS-CoV-2 virus would   expect to have a negative (not detected) result in this assay.    Fact sheet for Healthcare Providers: http://www.nacho-bar.ángel/   Fact sheet for Patients: http://www.nacho-bar.bijames/         Methodology: Isothermal Nucleic Acid Amplification         Results reported to the appropriate Health Department          Medications:      levoFLOXacin  750 mg Oral Q48H    dexamethasone  6 mg Oral Daily    sodium chloride flush  5-40 mL IntraVENous 2 times per day    [Held by provider] carvedilol  12.5 mg Oral BID WC    sacubitril-valsartan  1 tablet Oral BID    Vitamin D  2,000 Units Oral Daily    vitamin B-12  1,000 mcg Oral Daily    lactobacillus  1 tablet Oral Daily    cholestyramine light  4 g Oral BID    pantoprazole  40 mg Oral QAM AC    insulin lispro  0-8 Units SubCUTAneous TID WC    insulin lispro  0-4 Units SubCUTAneous Nightly    glimepiride 4 mg Oral BID WC       Electronically signed by Kayla Maldonado MD on 1/27/2023 at 10:25 AM      [facility]  Kayla Maldonado MD  [employer]  OFFICE: [phone]    Thank you for allowing us to participate in the care of this patient. Please call with questions. This note is created with the assistance of a speech recognition program.  While intending to generate a document that actually reflects the content of the visit, the document can still have some errors including those of syntax and sound a like substitutions which may escape proof reading. In such instances, actual meaning can be extrapolated by contextual diversion.

## 2023-01-27 NOTE — PROGRESS NOTES
[address]  [phone]          Progress Note    Patient Name:  Neris Alcaraz    :  10/23/1932  2023 4:49 PM      SUBJECTIVE       Ms. Awilda Vasquez remains confused  OBJECTIVE     Vital signs:    /63   Pulse 69   Temp 97.8 °F (36.6 °C) (Oral)   Resp 22   Ht 5' 4\" (1.626 m)   Wt 139 lb 12.8 oz (63.4 kg)   SpO2 99%   BMI 24.00 kg/m²  2.5 L/min  .tro    Admit Weight:  150 lb (68 kg)    Last 3 weights: Wt Readings from Last 3 Encounters:   23 139 lb 12.8 oz (63.4 kg)   23 150 lb 1.6 oz (68.1 kg)   10/20/22 150 lb (68 kg)       BMI: Body mass index is 24 kg/m². Input/Output:       Intake/Output Summary (Last 24 hours) at 2023 1649  Last data filed at 2023 1100  Gross per 24 hour   Intake --   Output 350 ml   Net -350 ml       Date 23 0000 - 23 2359   Shift 9813-0535 [id number] 2637-6688 24 Hour Total   INTAKE   Shift Total(mL/kg)       OUTPUT   Urine(mL/kg/hr)  350(0.7)  350   Shift Total(mL/kg)  350(5.5)  350(5.5)   Weight (kg) 63.4 63.4 63.4 63.4     Exam:     General appearance: awake and alert moves all ext   Lungs: no rhonchi, no wheezes, no rales  Heart: S1 and S2 no murmur  Abdomen: positive bowel sounds, no bruits, no masses  Extremities: warm and dry, no cyanosis, no clubbing        Laboratory Studies:     CBC:   Recent Labs     23  0510 23  0514 23  0500   WBC 10.2 11.0 10.6   HGB 11.8* 12.4 12.3   HCT 38.1 40.5 39.4   MCV 90.7 89.6 89.3    233 232     BMP:   Recent Labs     23  0514 23  1803 23  0500    140 140   K 4.8 5.1 4.9   * 110* 110*   CO2    BUN 43* 39* 41*   CREATININE 1.00* 0.75 0.80     PT/INR: No results for input(s): PROTIME, INR in the last 72 hours. APTT: No results for input(s): APTT in the last 72 hours.   MAG:   Recent Labs     23  1803   MG 1.7     D Dimer:   Recent Labs     23  0658   DDIMER 1.81*     Troponin  No results for input(s): TROPHS in the last 72 hours. BNP No results for input(s): BNP in the last 72 hours. Recent Labs     23  0510   PROBNP 4,334*         Pulse Ox: SpO2  Av.5 %  Min: 81 %  Max: 100 %  Supplemental O2: O2 Flow Rate (L/min): 2.5 L/min     Current Meds:    dexamethasone  6 mg IntraVENous Daily    levoFLOXacin  750 mg Oral Q48H    sodium chloride flush  5-40 mL IntraVENous 2 times per day    [Held by provider] carvedilol  12.5 mg Oral BID WC    sacubitril-valsartan  1 tablet Oral BID    cholestyramine light  4 g Oral BID    pantoprazole  40 mg Oral QAM AC    insulin lispro  0-8 Units SubCUTAneous TID WC    insulin lispro  0-4 Units SubCUTAneous Nightly    [Held by provider] glimepiride  4 mg Oral BID WC     Continuous Infusions:    sodium chloride 20 mL/hr at 23 2225    dextrose           CT HEAD WO CONTRAST    Result Date: 2023  No acute intracranial abnormality. CT CHEST WO CONTRAST    Result Date: 2023  Multifocal ground-glass opacities indeterminate for COVID-19. Right greater than left pleural effusions. Ileus. Cardiomegaly and coronary artery disease. IR FLUORO GUIDED CVA DEVICE PLMT/REPLACE/REMOVAL    Result Date: 2023  Successful ultrasound and fluoroscopy guided non-tunneled right IJ triple-lumen central venous catheter placement. XR CHEST PORTABLE    Result Date: 2023  More prominent bilateral interstitial and ground-glass opacities, which may represent developing edema versus multifocal infection. Grossly similar appearance of small effusions. XR CHEST PORTABLE    Result Date: 2023  New small right effusion and airspace disease. Possible mild CHF. XR CHEST 1 VIEW    Result Date: 2023  Stable right perihilar patchy opacities, concerning for aspiration/pneumonia.        Echo:      ASSESSMENT     Principal Problem:    COVID-19  Active Problems:    Non-pressure chronic ulcer of left calf with fat layer exposed ([address]  Resolved Problems:    * No resolved hospital problems. *      PLAN     A. fib with controlled ventricular response continue to hold beta-blocker given her most recent bouts of hypotension and bradycardia. Mental status change likely toxic metabolic. Per primary service.       Electronically signed by Yola Hudson MD on 1/27/2023 at 4:49 PM

## 2023-01-27 NOTE — PROGRESS NOTES
Pulmonary Critical Care Progress Note  Jenny Mccarty MD     Patient seen for the follow up of  COVID-19 acute hypoxic respiratory insufficiency, pulmonary edema, pleural effusions    Subjective:  Yesterday patient was bradycardic and hypotensive so she was transferred to the ICU for closer monitoring. She had PICC line placed and given IV fluid bolus as well as midodrine 1 time. She has been in A. fib her rate has been anywhere from the 30s to 70s. Her Coreg has been placed on hold. Her heart rate has been better in the 70s. She has not had any issues with blood pressure. She never required any pressors or dopamine. She is currently awake, alert her daughter is at bedside. She denies significant shortness of breath or chest pain. She has congested cough. She remains confused. Daughter noted her to have trouble with her medications earlier and would like swallow evaluation. Examination:  Vitals: /73   Pulse 70   Temp 97.8 °F (36.6 °C) (Oral)   Resp 17   Ht 5' 4\" (1.626 m)   Wt 139 lb 12.8 oz (63.4 kg)   SpO2 95%   BMI 24.00 kg/m²   General appearance: Awake, confused, very hard of hearing. Neck: No JVD  Lungs: Moderate to decreased air exchange, no significant wheezing, occasional crackles  Heart: regular rate and rhythm, S1, S2 normal, no gallop  Abdomen: Soft, non tender, + BS  Extremities: no cyanosis or clubbing. Trace edema.     LABs:  CBC:   Recent Labs     01/25/23  0510 01/26/23  0514 01/27/23  0500   WBC 10.2 11.0 10.6   HGB 11.8* 12.4 12.3   HCT 38.1 40.5 39.4    233 232     BMP:   Recent Labs     01/25/23  0510 01/26/23  0514 01/26/23  1803 01/27/23  0500    141 140 140   K 4.6 4.8 5.1 4.9   CO2 20 23 23 22   BUN 46* 43* 39* 41*   CREATININE 1.26* 1.00* 0.75 0.80   LABGLOM 41* 54* >60 >60   GLUCOSE 182* 221* 203* 183*        Latest Reference Range & Units Most Recent   Procalcitonin <0.09 ng/mL 0.55 (H)  1/22/23 13:40   (H): Data is abnormally high    Radiology:  CT chest 1/22/2023      X-ray chest 1/21/2023      Impression:  Acute hypoxic respiratory failure  COVID infection  Multifocal groundglass opacities,  pneumonia versus pulmonary edema  Small bilateral pleural effusions right greater than left  Elevated troponin and BNP,? Decompensated CHF versus pulmonary hypertension  History of A-fib, not on anticoag d/t age, frailty and hx of GIB per cardiology   Allergic rhinitis, DM, HTN  Ileus    Recommendations:  Oxygen via nasal cannula, keep SPO2 90% or greater   Incentive spirometry every hour while awake   Albuterol every 6 hours as needed  Oral Levaquin to complete 7-day course  Oral Decadron 6 milligrams daily x10 days  Infectious disease on consult, no indication for antiviral or immunosuppressive therapy at this time  Cardiology following  Coreg on hold- monitor BP/HR   CT head negative for any acute process   Neurology following   Consult speech therapy for swallow evaluation   DVT prophylaxis, Lovenox  GI prophylaxis, Protonix  PT/OT/Rehab discharge planning per primary team  Palliative care on consult   Discussed with daughter at bedside.   Will follow with you    Electronically signed by     Glenys Verma MD on 1/27/2023 at 3:00 PM  Pulmonary Critical Care and Sleep Medicine,  [facility]  Cell: [phone]  Office: [phone]

## 2023-01-27 NOTE — PLAN OF CARE
Problem: Discharge Planning  Goal: Discharge to home or other facility with appropriate resources  Outcome: Progressing  Flowsheets (Taken 1/26/2023 2000)  Discharge to home or other facility with appropriate resources: Identify barriers to discharge with patient and caregiver     Problem: Skin/Tissue Integrity  Goal: Absence of new skin breakdown  Description: 1. Monitor for areas of redness and/or skin breakdown  2. Assess vascular access sites hourly  3. Every 4-6 hours minimum:  Change oxygen saturation probe site  4. Every 4-6 hours:  If on nasal continuous positive airway pressure, respiratory therapy assess nares and determine need for appliance change or resting period.   Outcome: Progressing     Problem: Safety - Adult  Goal: Free from fall injury  Outcome: Progressing  Flowsheets (Taken 1/27/2023 0300)  Free From Fall Injury: Instruct family/caregiver on patient safety     Problem: ABCDS Injury Assessment  Goal: Absence of physical injury  Outcome: Progressing  Flowsheets (Taken 1/27/2023 0300)  Absence of Physical Injury: Implement safety measures based on patient assessment     Problem: Chronic Conditions and Co-morbidities  Goal: Patient's chronic conditions and co-morbidity symptoms are monitored and maintained or improved  Outcome: Progressing  Flowsheets (Taken 1/26/2023 2000)  Care Plan - Patient's Chronic Conditions and Co-Morbidity Symptoms are Monitored and Maintained or Improved: Monitor and assess patient's chronic conditions and comorbid symptoms for stability, deterioration, or improvement     Problem: Cardiovascular - Adult  Goal: Maintains optimal cardiac output and hemodynamic stability  Outcome: Progressing  Flowsheets (Taken 1/26/2023 2000)  Maintains optimal cardiac output and hemodynamic stability: Monitor blood pressure and heart rate     Problem: Respiratory - Adult  Goal: Achieves optimal ventilation and oxygenation  Outcome: Progressing  Flowsheets (Taken 1/26/2023 2000)  Achieves optimal ventilation and oxygenation: Assess for changes in respiratory status     Problem: Gastrointestinal - Adult  Goal: Maintains or returns to baseline bowel function  Outcome: Progressing  Flowsheets (Taken 1/26/2023 2000)  Maintains or returns to baseline bowel function: Assess bowel function     Problem: Metabolic/Fluid and Electrolytes - Adult  Goal: Glucose maintained within prescribed range  Outcome: Progressing  Flowsheets (Taken 1/26/2023 2000)  Glucose maintained within prescribed range: Monitor blood glucose as ordered     Problem: Musculoskeletal - Adult  Goal: Return mobility to safest level of function  Outcome: Progressing  Flowsheets (Taken 1/26/2023 2000)  Return Mobility to Safest Level of Function: Assess patient stability and activity tolerance for standing, transferring and ambulating with or without assistive devices  Goal: Return ADL status to a safe level of function  Outcome: Progressing  Flowsheets (Taken 1/26/2023 2000)  Return ADL Status to a Safe Level of Function: Assess activities of daily living deficits and provide assistive devices as needed     Problem: Pain  Goal: Verbalizes/displays adequate comfort level or baseline comfort level  Outcome: Progressing  Flowsheets  Taken 1/27/2023 0000  Verbalizes/displays adequate comfort level or baseline comfort level: Assess pain using appropriate pain scale  Taken 1/26/2023 2000  Verbalizes/displays adequate comfort level or baseline comfort level: Assess pain using appropriate pain scale     Problem: Nutrition Deficit:  Goal: Optimize nutritional status  Outcome: Progressing     Problem: Neurosensory - Adult  Goal: Achieves maximal functionality and self care  Outcome: Progressing  Flowsheets (Taken 1/26/2023 2000)  Achieves maximal functionality and self care: Monitor swallowing and airway patency with patient fatigue and changes in neurological status

## 2023-01-27 NOTE — PROGRESS NOTES
[address] Hospitalist        1/27/2023   5:24 PM    Name:  Rolando Snider  MRN:    [id number]     [place]:     [de-identified]   Room:  [address] [place] [address] Day: 5     Admit Date: 1/21/2023 11:39 PM  PCP: Dominguez Plaza MD    C/C:   Chief Complaint   Patient presents with    Shortness of Breath       Assessment:      Acute on chronic congestive heart failure  COVID-19 pneumonia  Hypotension  Acute metabolic encephalopathy  Nonvaccinated against SARS-CoV-2  Respiratory failure with hypoxia requiring oxygen via nasal cannula  Hypomagnesemia  Hypokalemia  Question of bacterial pneumonia  Essential hypertension  Diabetes mellitus type 2  Paroxysmal atrial fibrillation  Congestive heart failure, type unknown  Osteoarthritis, multiple joints  History of colon cancer  History of skin cancer  Right eye vision loss      Plan:      Patient has become hypotensive, bradycardic, further she is more altered, will transfer patient to medical ICU  Critical care following  Patient remains in ICU, discussed with daughter at bedside patient is refusing most of the care  Spoken to daughter at length, she has convinced the patient now patient agrees, RN will go back in and give her medications   Patient also have minimal p.o. intake    Monitor vitals closely  Keep SPO2 above 90%  I's and O's  IV heplock  Pain control  Antiemetics as needed  Levaquin, Decadron  Bumex, Coreg, Entresto  Accu-Cheks before meals and at bedtime  Insulin sliding scale medium  Hypoglycemia protocol  And has elevated blood glucose and is refusing insulin  CBC , BMP, BNP  Pulmonary consulted  Consult cardiology  Cardiology recommend heparin infusion until they can evaluate patient and determine why she is not on anticoagulation  DVT and GI prophylaxis.   Continue medication as below  Discussed with daughter at bedside in detail      Scheduled Meds:   dexamethasone  6 mg IntraVENous Daily    levoFLOXacin  750 mg Oral Q48H    sodium chloride flush 5-40 mL IntraVENous 2 times per day    [Held by provider] carvedilol  12.5 mg Oral BID WC    sacubitril-valsartan  1 tablet Oral BID    cholestyramine light  4 g Oral BID    pantoprazole  40 mg Oral QAM AC    insulin lispro  0-8 Units SubCUTAneous TID WC    insulin lispro  0-4 Units SubCUTAneous Nightly    [Held by provider] glimepiride  4 mg Oral BID WC     Continuous Infusions:   sodium chloride 20 mL/hr at 23 2225    dextrose       PRN Meds:  sodium chloride flush, 10 mL, PRN  sodium chloride, , PRN  magnesium sulfate, 1,000 mg, PRN  ondansetron, 4 mg, Q8H PRN   Or  ondansetron, 4 mg, Q6H PRN  polyethylene glycol, 17 g, Daily PRN  acetaminophen, 650 mg, Q6H PRN   Or  acetaminophen, 650 mg, Q6H PRN  glucose, 4 tablet, PRN  dextrose bolus, 125 mL, PRN   Or  dextrose bolus, 250 mL, PRN  glucagon (rDNA), 1 mg, PRN  dextrose, , Continuous PRN  albuterol, 2.5 mg, Q6H PRN          Subjective:       I have seen and examined patient today, patient last 24 hours events were reviewed also discussed with nursing staff  No new complaints  Overnight patient did not had any acute issues  No nausea vomiting diarrhea  Afebrile  Patient blood pressure is improved  Mentation is somewhat better than yesterday    ROS:  Unable to assess due to patient mental status        Physical Examination:      Vitals:  /62   Pulse 81   Temp 97.6 °F (36.4 °C) (Axillary)   Resp 16   Ht 5' 4\" (1.626 m)   Wt 139 lb 12.8 oz (63.4 kg)   SpO2 98%   BMI 24.00 kg/m²   Temp (24hrs), Av.6 °F (36.4 °C), Min:97 °F (36.1 °C), Max:97.8 °F (36.6 °C)    Weight:   Wt Readings from Last 3 Encounters:   23 139 lb 12.8 oz (63.4 kg)   23 150 lb 1.6 oz (68.1 kg)   10/20/22 150 lb (68 kg)     I/O last 3 completed shifts:  I/O last 3 completed shifts:   In: 240 [P.O.:240]  Out: 200 [Urine:200]     Recent Labs     23  0838 23  1215 23  1128 23  1625   POCGLU 190* 209* 149* 182*           General appearance -patient is sleepy  Mental status -not oriented to person, place, and time with normal affect  Head - normocephalic and atraumatic  Eyes - pupils equal and reactive, extraocular eye movements intact, conjunctiva clear  Ears - hearing appears to be intact  Nose - no drainage noted  Mouth - mucous membranes moist  Neck - supple, no carotid bruits, thyroid not palpable  Chest - clear to auscultation, normal effort  Heart - normal rate, regular rhythm, no murmur  Abdomen - soft, nontender, nondistended, bowel sounds present all four quadrants, no masses, hepatomegaly or splenomegaly  Neurological -unable to assess  Extremities - peripheral pulses palpable, no pedal edema or calf pain with palpation  Skin - no gross lesions, rashes, or induration noted        Medications: Allergies:    Allergies   Allergen Reactions    Acetaminophen-Codeine Nausea Only    Codeine Nausea Only    Furosemide      Other reaction(s): GI Disturbance    Linagliptin      Other reaction(s): SOB    Sitagliptin      Other reaction(s): felt poorly    Other Itching     Animal Dander       Current Meds:   Current Facility-Administered Medications:     dexamethasone (PF) (DECADRON) injection 6 mg, 6 mg, IntraVENous, Daily, Sylvia Bolaños MD, 6 mg at 01/27/23 1325    levoFLOXacin (LEVAQUIN) tablet 750 mg, 750 mg, Oral, Q48H, Leandra Gonzales MD, 750 mg at 01/26/23 1533    sodium chloride flush 0.9 % injection 5-40 mL, 5-40 mL, IntraVENous, 2 times per day, Gardenia A Lump, APRN - CNP, 10 mL at 01/27/23 0910    sodium chloride flush 0.9 % injection 10 mL, 10 mL, IntraVENous, PRN, Gardenia A Lump, APRN - CNP, 10 mL at 01/22/23 1616    0.9 % sodium chloride infusion, , IntraVENous, PRN, Gardenia A Lump, APRN - CNP, Last Rate: 20 mL/hr at 01/26/23 2225, New Bag at 01/26/23 2225    magnesium sulfate 1000 mg in dextrose 5% 100 mL IVPB, 1,000 mg, IntraVENous, PRN, Keyana Grade Lump, APRN - CNP, Stopped at 01/22/23 1715    ondansetron (ZOFRAN-ODT) disintegrating tablet 4 mg, 4 mg, Oral, Q8H PRN, 4 mg at 01/23/23 2013 **OR** ondansetron (ZOFRAN) injection 4 mg, 4 mg, IntraVENous, Q6H PRN, Gardenia A Lump, APRN - CNP    polyethylene glycol (GLYCOLAX) packet 17 g, 17 g, Oral, Daily PRN, Gardenia A Lump, APRN - CNP    acetaminophen (TYLENOL) tablet 650 mg, 650 mg, Oral, Q6H PRN, 650 mg at 01/24/23 0907 **OR** acetaminophen (TYLENOL) suppository 650 mg, 650 mg, Rectal, Q6H PRN, Gardenia A Lump, APRN - CNP    glucose chewable tablet 16 g, 4 tablet, Oral, PRN, Gardenia A Lump, APRN - CNP    dextrose bolus 10% 125 mL, 125 mL, IntraVENous, PRN **OR** dextrose bolus 10% 250 mL, 250 mL, IntraVENous, PRN, Gardenia A Lump, APRN - CNP    glucagon (rDNA) injection 1 mg, 1 mg, SubCUTAneous, PRN, Gardenia A Lump, APRN - CNP    dextrose 10 % infusion, , IntraVENous, Continuous PRN, Gardenia A Lump, APRN - CNP    albuterol (PROVENTIL) nebulizer solution 2.5 mg, 2.5 mg, Nebulization, Q6H PRN, Gardenia A Lump, APRN - CNP    [Held by provider] carvedilol (COREG) tablet 12.5 mg, 12.5 mg, Oral, BID Haleigh GUARDADO MD, 12.5 mg at 01/26/23 1229    sacubitril-valsartan (ENTRESTO) 24-26 MG per tablet 1 tablet, 1 tablet, Oral, BID, Haleigh Wilson MD, 1 tablet at 01/26/23 1229    cholestyramine light packet 4 g, 4 g, Oral, BID, Haleigh Wilson MD, 4 g at 01/26/23 0047    pantoprazole (PROTONIX) tablet 40 mg, 40 mg, Oral, QAM AC, Haleigh Wilson MD, 40 mg at 01/24/23 0800    insulin lispro (HUMALOG) injection vial 0-8 Units, 0-8 Units, SubCUTAneous, TID Haleigh GUARDADO MD, 2 Units at 01/26/23 1224    insulin lispro (HUMALOG) injection vial 0-4 Units, 0-4 Units, SubCUTAneous, Nightly, Haleigh Wilson MD, 4 Units at 01/22/23 2149    [Held by provider] glimepiride (AMARYL) tablet 4 mg (Patient Supplied), 4 mg, Oral, BID Haleigh GUARDADO MD, 4 mg at 01/25/23 1903      I/O (24Hr):     Intake/Output Summary (Last 24 hours) at 1/27/2023 1724  Last data filed at 1/27/2023 1100  Gross per 24 hour   Intake --   Output 350 ml   Net -350 ml         Data: Labs:    Hematology:  Recent Labs     01/25/23  0510 01/26/23  0514 01/26/23  0658 01/27/23  0500   WBC 10.2 11.0  --  10.6   RBC 4.20 4.52  --  4.41   HGB 11.8* 12.4  --  12.3   HCT 38.1 40.5  --  39.4   MCV 90.7 89.6  --  89.3   MCH 28.1 27.4  --  27.9   MCHC 31.0 30.6  --  31.2   RDW 14.8* 14.7*  --  14.7*    233  --  232   MPV 10.6 10.4  --  10.5   CRP  --  22.2*  --   --    DDIMER  --   --  1.81*  --        Chemistry:  Recent Labs     01/25/23  0510 01/26/23  0514 01/26/23  1803 01/27/23  0500    141 140 140   K 4.6 4.8 5.1 4.9    108* 110* 110*   CO2 20 23 23 22   GLUCOSE 182* 221* 203* 183*   BUN 46* 43* 39* 41*   CREATININE 1.26* 1.00* 0.75 0.80   MG  --   --  1.7  --    ANIONGAP 11 10 7* 8*   LABGLOM 41* 54* >60 >60   CALCIUM 7.8* 8.7 8.5* 8.6   PROBNP 4,334*  --   --   --        Recent Labs     01/25/23  1630 01/25/23  2124 01/26/23  0838 01/26/23  1215 01/27/23  1128 01/27/23  1625   POCGLU 185* 249* 190* 209* 149* 182*         Lab Results   Component Value Date/Time    SPECIAL NOT REPORTED 11/05/2017 01:55 PM     Lab Results   Component Value Date/Time    CULTURE ESCHERICHIA COLI >048131 CFU/ML (A) 10/20/2022 05:48 PM       No results found for: POCPH, PHART, PH, POCPCO2, MQY0AIO, PCO2, POCPO2, PO2ART, PO2, POCHCO3, JOQ6OIM, HCO3, NBEA, PBEA, BEART, BE, THGBART, THB, XKL0QWZ, PDFE9MGC, O8SOMEEB, O2SAT, FIO2    Radiology:    CT CHEST WO CONTRAST    Result Date: 1/22/2023  Multifocal ground-glass opacities indeterminate for COVID-19. Right greater than left pleural effusions. Ileus. Cardiomegaly and coronary artery disease. XR CHEST PORTABLE    Result Date: 1/24/2023  More prominent bilateral interstitial and ground-glass opacities, which may represent developing edema versus multifocal infection. Grossly similar appearance of small effusions. XR CHEST PORTABLE    Result Date: 1/21/2023  New small right effusion and airspace disease. Possible mild CHF.          All radiological studies reviewed  Code Status:  Full Code        Electronically signed by Jake Thacker MD on 1/27/2023 at 5:24 PM    This note was created with the assistance of a speech-recognition program.  Although the intention is to generate a document that actually reflects the content of the visit, no guarantees can be provided that every mistake has been identified and corrected by editing. Note was updated later by me after  physical examination and  completion of the assessment.

## 2023-01-27 NOTE — PROGRESS NOTES
Physical Therapy  DATE: 2023    NAME: Annie Dempsey  MRN: [id number]   : 10/23/1932    Patient not seen this date for Physical Therapy due to:      [] Cancel by RN or physician due to:    [x] Transfer to ICU & continues with increasing confusion & agitation, N/A for PT this date    [] Critical Lab Value Level     [] Blood transfusion in progress    [] Acute or unstable cardiovascular status   _MAP < 55 or more than >115  _HR < 40 or > 130    [] Acute or unstable pulmonary status   -FiO2 > 60%   _RR < 5 or >40    _O2 sats < 85%    [] Strict Bedrest    [] Off Unit for surgery or procedure    [] Off Unit for testing       [] Pending imaging to R/O fracture    [] Refusal by Patient      [] Other      [] PT being discontinued at this time. Patient independent. No further needs. [] PT being discontinued at this time as the patient has been transferred to hospice care. No further needs.       [address], PT

## 2023-01-27 NOTE — PROGRESS NOTES
End Of Shift Note  [facility]  Summary of shift: Pt refused to eat/drink/take meds throughout shift; pt repositioned multiple times with pillows and wiggled body around until pillows were out from underneath her; lab unable to obtain blood specimen, new order received to draw labs from central line; Magnesium 2g replacement given for Mg 1. 7(Dr. Duron Courser said to keep Mg 2>). Pt only oriented to self. Vitals:    Vitals:    01/27/23 0515 01/27/23 0530 01/27/23 0545 01/27/23 0600   BP: (!) 178/166      Pulse: 74 66 66 78   Resp: 22 24 18 25   Temp:       TempSrc:       SpO2: 100% 94% 95% 94%   Weight:       Height:            I&O: No intake or output data in the 24 hours ending 01/27/23 0652    Resp Status: Diminished and Rhonchi throughout, ineffective clearance with moist cough, encouraged to cough and deep breathe, unable to follow some commands; denies dyspnea/SOB, maintained 94-96% 2L O2.     Ventilator Settings:     / / / NA    Critical Care IV infusions:   sodium chloride 20 mL/hr at 01/26/23 2225    dextrose          LDA:   CVC Triple Lumen 01/26/23 Right Internal jugular (Active)   Number of days: 0       Peripheral IV 01/22/23 Right Hand (Active)   Number of days: 5       External Urinary Catheter (Active)   Number of days: 5       Wound 01/10/23 Pretibial Right;Medial (Active)   Number of days: 17

## 2023-01-27 NOTE — ACP (ADVANCE CARE PLANNING)
Advance Care Planning     Advance Care Planning (ACP) Physician/NP/PA Conversation    Date of Conversation: 1/27/2023  Conducted with: Spoke to patient daughter Fiona Christy:  Patient is  and has 3 biological children Swhetha Fountain, and Judge Frank. Alejo Ness believes patient completed [facility] POA and she is POA I requested her to bring a copy of paperwork in. Without Paperwork the majority vote of three children would determine plan of care per [place] law         Care Preferences:    Hospitalization: \"If your health worsens and it becomes clear that your chance of recovery is unlikely, what would be your preference regarding hospitalization? \"  Hospitalized     Ventilation: \"If you were unable to breath on your own and your chance of recovery was unlikely, what would be your preference about the use of a ventilator (breathing machine) if it was available to you? \"  Full code     Resuscitation: \"In the event your heart stopped as a result of an underlying serious health condition, would you want attempts made to restart your heart, or would you prefer a natural death? \"  Full code     Conversation Outcomes / Follow-Up Plan:  Palliative Interaction:  I went to bedside and spoke with patient and her daughter Alejo Ness and introduced myself and my palliative care role. Patient is  and has 3 biological children Shwetha Fountain and Judge Frank. Alejo Ness believes patient completed [facility] POA and she is POA I requested her to bring a copy of paperwork in. Without Paperwork the majority vote of three children would determine plan of care per [place] law      We discussed code status levels and discussed the different levels of each. Alejo Ness states she would like to have discussion with specialists prior to making any decisions. I updated Ryland Anurag to have specialists to reach out to daughter if she is not present. We discussed patient weakness and may need SNF for strengthening.  Patient daughter states patient has been at facility previous and did not have good experience and slightly concerned about that. Emotional support provided Palliative care will follow up once daughter speaks with specialists as she requests.      Length of Voluntary ACP Conversation in minutes:  16 minutes    PATRICK Gipson - NP

## 2023-01-27 NOTE — PROGRESS NOTES
Pt refused Ionized calcium lab draw, will attempt draw in AM with rest of labs with the assist of this nurse; pt refused medication despite encouragement, pt only oriented to person; refuses to drink fluids and will say \"no\" when asked if she wants a drink; this nurse tried to perform mouth care with cold mouth swab and pt sucked on the swab lightly and said she didn't want anymore. Pt denies pain and any needs at this time, call light within reach and telesitter present.

## 2023-01-27 NOTE — CARE COORDINATION
Social work: [facility] and [employer] continue to follow for potential placement at [facility]. Not ready for dc at this time. Will f/u with both facilities Monday. HENS started.

## 2023-01-27 NOTE — PROGRESS NOTES
Patient refusing to eat or drink, refusing all meds. Dr Jen Garcia notified, will review meds. No new orders at this time.

## 2023-01-27 NOTE — PROGRESS NOTES
[facility]  Speech Language Pathology    Date: 1/27/2023  Patient Name: Angy Camarillo  YOB: 1932   AGE: 80 y.o. MRN: [id number]        Patient Not Available for Speech Therapy     Due to:  [] Testing  [] Hemodialysis  [] Cancelled by RN  [] Surgery   [] Intubation/Sedation/Pain Medication  [] Medical instability  [x] Other:  Attempted BSSE. Pt raised her voice at SLP and repeatedly asked what game SLP was playing. Education provided. Pt was offered trials of Puree and liquids. Patient accused SLP of trying to poison her and refused all trials. Next scheduled treatment: as cooperative  Completed by:  Antony Pereira, SLP, AL[facility] [place]

## 2023-01-27 NOTE — PROGRESS NOTES
Sharmaine Akhtar, PATRICK messaged regarding Magnesium level of 1.7, Dr. Luz Elena Cano put in progress note to keep mag level above 2; new order given to infuse 2g of magnesium and ionized calcium draw.

## 2023-01-27 NOTE — CONSULTS
Palliative Care Inpatient Consult    NAME:  [facility] RECORD NUMBER:  [id number]  AGE: 80 y.o. GENDER: female  : 10/23/1932  TODAY'S DATE:  2023    Reasons for Consultation:    Goals of care   Code status discussion  Family support     Members of PC team contributing to this consultation are :  Katerine Quinonez CNP  Palliative Care     Summary  Patient is a full code status   She is currently on oxygen per [place] and pulse ox 95%  Patient is  and has 3 biological children Jaycee Carrillo, and Brain. Patient daughter believes patient has [facility] [employer] [facility]. POA and encouraged her to bring in   Without POA paperwork the majority of three children would determine plan of care      Plan      Palliative Interaction:  I went to bedside and spoke with patient and her daughter Jenni Ribeiro and introduced myself and my palliative care role. Patient is  and has 3 biological children Jaycee Carrillo, and Abraham Mejia. Jenni Ribeiro believes patient completed [facility] [employer]. POA and she is POA I requested her to bring a copy of paperwork in. We discussed code status levels and discussed the different levels of each. Jenni Ribeiro states she would like to have discussion with specialists prior to making any decisions. I updated Florie Libman to have specialists to reach out to daughter if she is not present. We discussed patient weakness and may need SNF for strengthening. Patient daughter states patient has been at facility previous and did not have good experience and slightly concerned about that. Emotional support provided Palliative care will follow up once daughter speaks with specialists as she requests.      Education/support to family  Education/support to patient  Discharge planning/helping to coordinate care  Communications with primary service  Pharmacologic pain management  Providing support for coping/adaptation/distress of family  Providing support for coping/adaptation/distress of patient  Discussing meaning/purpose   Caregiver support/education  Continue with current plan of care  Full code status   Principle Problem/Diagnosis:  COVID-19    Additional Assessments:   Principal Problem:    COVID-19  Active Problems:    Non-pressure chronic ulcer of left calf with fat layer exposed ([facility] [address]  Resolved Problems:    * No resolved hospital problems. *     1- Symptom management/ pain control     Pain Assessment:  Tylenol                Anxiety:  none                          Dyspnea:   oxygen as needed                           Fatigue:   generalized weakness     Other:      2- Goals of care evaluation   The patient goals of care are improve or maintain function/quality of life   Goals of care discussed with:    [] Patient independently    [] Patient and Family    [x] Family or Healthcare DPOA independently    [] Unable to discuss with patient, family/DPOA not present    3- Code Status  Full Code    4- Other recommendations   - We will continue to provide comfort and support to the patient and the family    Palliative Care will continue to follow Ms. Cristobal's care as needed. History of Present Illness     The patient is a 80 y.o. Non- / non  female who presents with Shortness of Breath      Referred to Palliative Care by   [x] Physician   [] Nursing  [] Family Request   [] Other:       She was admitted to the ICU service for Hypomagnesemia [E83.42]  Acute on chronic congestive heart failure, unspecified heart failure type ([address] [I50.9]  COVID-19 [U07.1]. Her hospital course has been associated with COVID-19 Atrial fib, confusion. The patient has a complicated medical history and has been hospitalized since 1/21/2023 11:39 PM.    Eulalio Velasco is a 80year old female with the following medical history Adenocarcinoma of colon, arthritis, atrial fib, melanoma, CHF, Diabetes, hypertension, osteoporosis. Patient was taken to ER in [place] with complaints of dyspnea and neck pain.  She was recently discharged from hospital for treatment of CHF. Patient complains of progressively getting more SOB with exertion and then even at rest. Patient daughter recently diagnosed with Covid and patient is not vaccinated. Patient has had the following scans during hospitalization CT head, CT chest , and Chest xray. The results are below. CT head  FINDINGS:   BRAIN/VENTRICLES: There is no acute intracranial hemorrhage, mass effect or   midline shift. No abnormal extra-axial fluid collection. The gray-white   differentiation is maintained without evidence of an acute infarct. There is   no evidence of hydrocephalus. ORBITS: The visualized portion of the orbits demonstrate no acute abnormality. SINUSES: The visualized paranasal sinuses and mastoid air cells demonstrate   no acute abnormality. SOFT TISSUES/SKULL:  No acute abnormality of the visualized skull or soft   tissues. Chest Xray   FINDINGS:   Stable right perihilar patchy opacities, concerning for aspiration/pneumonia. Unchanged enlarged cardiomediastinal silhouette. No pleural effusion. No   pneumothorax. Bony structures unremarkable. CT chest   FINDINGS:   Mediastinum: Cardiomegaly and calcific coronary artery disease. No   pathologic mediastinal or hilar lymphadenopathy. Heart and great vessels   otherwise unremarkable. Lungs/pleura: Multifocal ground-glass opacities right greater than left. Small bilateral pleural effusions. Bibasilar consolidations. Upper Abdomen: Multiple air-fluid levels in the small bowel. Soft Tissues/Bones: Kyphosis and spondylosis. Patient is positive for Covid and is getting Decadron and Levaquin.  She has some small bilateral pleural effusions on her CT of chest.     Patient has the following consults Palliative care for Goals of care, Code status discussion, and family support, Neurology for new confusion and hallucinations, Cardiology due to CHF, pulmonology due to Covid and hypoxia and ID due to covid. Palliative care will continue to follow     Active Hospital Problems    Diagnosis Date Noted    COVID-19 [U07.1] 01/22/2023     Priority: Medium    Non-pressure chronic ulcer of left calf with fat layer exposed [facility]) [L97.222] 02/25/2022     Priority: Medium       PAST MEDICAL HISTORY      Diagnosis Date    Adenocarcinoma ([facility] [address]     colon    Arthritis     Atrial fibrillation ([facility] [address]     r/t sepsis     Cancer ([facility] [address]     colon, skin    CHF (congestive heart failure) (HCC)     Chronic anticoagulation     Eliquis for a fib stroke prophylaxis    Diabetes mellitus ([facility] [address]     Diverticulitis     Femur fracture (HCC)     Hypertension     Melanoma ([facility] [address]     nose    Osteoporosis     Overweight     Sepsis ([facility] [address]     Serum creatinine raised        PAST SURGICAL HISTORY  Past Surgical History:   Procedure Laterality Date    APPENDECTOMY      CHOLECYSTECTOMY      COLON SURGERY      FEMUR SURGERY      s/p fracture       SOCIAL HISTORY  Social History     Tobacco Use    Smoking status: Never    Smokeless tobacco: Never   Vaping Use    Vaping Use: Never used   Substance Use Topics    Alcohol use: No    Drug use: No       FAMILY HISTORY  . .No family history on file.      ALLERGIES  Allergies   Allergen Reactions    Acetaminophen-Codeine Nausea Only    Codeine Nausea Only    Furosemide      Other reaction(s): GI Disturbance    Linagliptin      Other reaction(s): SOB    Sitagliptin      Other reaction(s): felt poorly    Other Itching     Animal Dander         MEDICATIONS  Current Medications    levoFLOXacin  750 mg Oral Q48H    dexamethasone  6 mg Oral Daily    sodium chloride flush  5-40 mL IntraVENous 2 times per day    [Held by provider] carvedilol  12.5 mg Oral BID WC    sacubitril-valsartan  1 tablet Oral BID    Vitamin D  2,000 Units Oral Daily    vitamin B-12  1,000 mcg Oral Daily    lactobacillus  1 tablet Oral Daily    cholestyramine light  4 g Oral BID    pantoprazole  40 mg Oral QAM AC    insulin lispro  0-8 Units SubCUTAneous TID     insulin lispro  0-4 Units SubCUTAneous Nightly    glimepiride  4 mg Oral BID      sodium chloride flush, sodium chloride, magnesium sulfate, ondansetron **OR** ondansetron, polyethylene glycol, acetaminophen **OR** acetaminophen, glucose, dextrose bolus **OR** dextrose bolus, glucagon (rDNA), dextrose, albuterol  IV Drips/Infusions   sodium chloride 20 mL/hr at 01/26/23 2225    dextrose       Home Medications  No current facility-administered medications on file prior to encounter.      Current Outpatient Medications on File Prior to Encounter   Medication Sig Dispense Refill    lactobacillus (BACID) TABS Take 1 tablet by mouth daily 30 tablet 1    cholestyramine light 4 g packet Take 1 packet by mouth 2 times daily 60 packet 3    pantoprazole (PROTONIX) 40 MG tablet Take 1 tablet by mouth every morning (before breakfast) 30 tablet 3    Cholecalciferol 50 MCG (2000 UT) CAPS Take 2,000 Units by mouth daily      fexofenadine (ALLEGRA) 180 MG tablet Take 180 mg by mouth daily      Cyanocobalamin 1000 MCG/15ML LIQD Take 1,000 mcg by mouth daily      bumetanide (BUMEX) 2 MG tablet Take 2 mg by mouth daily      carvedilol (COREG) 12.5 MG tablet Take 12.5 mg by mouth 2 times daily (with meals)      sacubitril-valsartan (ENTRESTO) 24-26 MG per tablet Take 1 tablet by mouth 2 times daily      ibuprofen (ADVIL;MOTRIN) 200 MG tablet Take 400 mg by mouth every 6 hours as needed for Pain      glimepiride (AMARYL) 4 MG tablet Take 4 mg by mouth 2 times daily         Data         /64   Pulse 77   Temp 97.8 °F (36.6 °C) (Oral)   Resp 19   Ht 5' 4\" (1.626 m)   Wt 139 lb 12.8 oz (63.4 kg)   SpO2 99%   BMI 24.00 kg/m²     Wt Readings from Last 3 Encounters:   01/26/23 139 lb 12.8 oz (63.4 kg)   01/13/23 150 lb 1.6 oz (68.1 kg)   10/20/22 150 lb (68 kg)        Lab Results   Component Value Date    WBC 10.6 01/27/2023    HGB 12.3 01/27/2023    HCT 39.4 01/27/2023    MCV 89.3 01/27/2023     01/27/2023    ,   Lab Results   Component Value Date/Time     01/27/2023 05:00 AM    K 4.9 01/27/2023 05:00 AM     01/27/2023 05:00 AM    CO2 22 01/27/2023 05:00 AM    BUN 41 01/27/2023 05:00 AM    CREATININE 0.80 01/27/2023 05:00 AM    GLUCOSE 183 01/27/2023 05:00 AM    CALCIUM 8.6 01/27/2023 05:00 AM    ,   Lab Results   Component Value Date/Time    MG 1.7 01/26/2023 06:03 PM    ,  Lab Results   Component Value Date    CALCIUM 8.6 01/27/2023        Xray Result (most recent):  XR CHEST LIMITED ONE VIEW 01/26/2023    Narrative  EXAMINATION:  ONE XRAY VIEW OF THE CHEST    1/26/2023 6:44 am    COMPARISON:  01/24/2023    HISTORY:  ORDERING SYSTEM PROVIDED HISTORY: shortness of breath  TECHNOLOGIST PROVIDED HISTORY:  shortness of breath  Reason for Exam: shortness of breath  Additional signs and symptoms: shortness of breath    FINDINGS:  Stable right perihilar patchy opacities, concerning for aspiration/pneumonia. Unchanged enlarged cardiomediastinal silhouette. No pleural effusion. No  pneumothorax. Bony structures unremarkable. Impression  Stable right perihilar patchy opacities, concerning for aspiration/pneumonia. MRI Result (most recent):  No results found for this or any previous visit from the past 3650 days. CT Result (most recent):  CT HEAD WO CONTRAST 01/26/2023    Narrative  EXAMINATION:  CT OF THE HEAD WITHOUT CONTRAST  1/26/2023 3:02 pm    TECHNIQUE:  CT of the head was performed without the administration of intravenous  contrast. Automated exposure control, iterative reconstruction, and/or weight  based adjustment of the mA/kV was utilized to reduce the radiation dose to as  low as reasonably achievable. COMPARISON:  None.     HISTORY:  ORDERING SYSTEM PROVIDED HISTORY: new confusion/agitation/hallucinations  TECHNOLOGIST PROVIDED HISTORY:  new confusion/agitation/hallucinations    Reason for Exam: new confusion/agitation/hallucinations    FINDINGS:  BRAIN/VENTRICLES: There is no acute intracranial hemorrhage, mass effect or  midline shift. No abnormal extra-axial fluid collection. The gray-white  differentiation is maintained without evidence of an acute infarct. There is  no evidence of hydrocephalus. ORBITS: The visualized portion of the orbits demonstrate no acute abnormality. SINUSES: The visualized paranasal sinuses and mastoid air cells demonstrate  no acute abnormality. SOFT TISSUES/SKULL:  No acute abnormality of the visualized skull or soft  tissues. Impression  No acute intracranial abnormality. Code Status: Full Code     ADVANCED CARE PLANNING:  Patient has capacity for medical decisions:  Slightly forgetful presently   [address]:  daughter to bring in   Living Will:  unsure       Personal, Social, and Family History  Marital Status:   Living situation:Patient lives alone but daughter helps her with groceries and wound care   Importance of ivan/Mu-ism/spiritual beliefs: [] Very [] Somewhat [] Not   Psychological Distress: moderate  Does patient understand diagnosis/treatment? Slightly forgetful   Does caregiver understand diagnosis/treatment? yes      Assessment        REVIEW OF SYSTEMS  Constitutional: Patient is forgetful answers some questions and follows some commands   Eyes: no eye pain or blurred vision  ENT: no hearing loss, congestion, or difficulty swallowing   Respiratory: respirations relaxed oxygen per NC Covid isolation   Cardiovascular: Patient in atrial fib per monitor   Gastrointestinal: Decreased appetite and intake   Genitourinary: no dysuria, frequency,hematuria , or nocturia   Musculoskeletal: generalized weakness   Skin: no rashes or sores   Neurological: no focal weakness, numbness, tingling, or headache, no seizures    PHYSICAL ASSESSMENT:  Constitutional: Patient is forgetful answers some questions and follows some commands   Head: Normocephalic and atraumatic.    Eyes: EOM are normal. Pupils are equal, round   Neck: Normal range of motion. Neck supple. No tracheal deviation present. Cardiovascular: patient with atrial fib per monitor   Pulmonary/Chest: respirations relaxed no distress oxygen per NC   Abdomen: Soft. No tenderness, not distended, no ascites, no organomegaly   Musculoskeletal: generalized weakness   Neurological: Patient is forgetful answers some questions and follows some commands   Skin: Normal turgor, no bleeding, no bruising     Palliative Performance Scale:  ___60%  Ambulation reduced; Significant disease; Can't do hobbies/housework; intake normal or reduced; occasional assist; LOC full/confusion  ___50%  Mainly sit/lie; Extensive disease; Can't do any work; Considerable assist; intake normal or reduced; LOC full/confusion  __x_40%  Mainly in bed; Extensive disease; Mainly assist; intake normal or reduced; LOC full/confusion   ___30%  Bed Bound; Extensive disease; Total care; intake reduced; LOCfull/confusion  ___20%  Bed Bound; Extensive disease; Total care; intake minimal; Drowsy/coma  ___10%  Bed Bound; Extensive disease; Total care; Mouth care only; Drowsy/coma  ___0       Death        Thank you for allowing Palliative Care to participate in the care of Ms. Jaycee Dickinson . This note has been dictated by dragon, typing errors may be a possibility. The total time I spent in seeing the patient, discussing goals of care, advanced directives, code status and other major issues was more than 60 minutes      Electronically signed by   PATRICK Doyle NP  Palliative Care Team  on 1/27/2023 at 11:38 AM    [address] [facility] Number [phone]    [address] Number [phone] [address] Number [phone]    Please call with any palliative questions or concerns. Palliative Care Team is available via perfect serve or via phone.

## 2023-01-27 NOTE — PLAN OF CARE
Problem: Discharge Planning  Goal: Discharge to home or other facility with appropriate resources  1/27/2023 1126 by Yun Paz RN  Outcome: Progressing  Flowsheets (Taken 1/27/2023 0800)  Discharge to home or other facility with appropriate resources: Identify barriers to discharge with patient and caregiver  1/27/2023 0327 by Ananya Graham RN  Outcome: Progressing  Flowsheets (Taken 1/26/2023 2000)  Discharge to home or other facility with appropriate resources: Identify barriers to discharge with patient and caregiver     Problem: Skin/Tissue Integrity  Goal: Absence of new skin breakdown  Description: 1. Monitor for areas of redness and/or skin breakdown  2. Assess vascular access sites hourly  3. Every 4-6 hours minimum:  Change oxygen saturation probe site  4. Every 4-6 hours:  If on nasal continuous positive airway pressure, respiratory therapy assess nares and determine need for appliance change or resting period.   1/27/2023 1126 by Yun Paz RN  Outcome: Progressing  1/27/2023 0327 by Ananya Graham RN  Outcome: Progressing     Problem: Safety - Adult  Goal: Free from fall injury  1/27/2023 1126 by Yun Paz RN  Outcome: Progressing  1/27/2023 0327 by Ananya Graham RN  Outcome: Progressing  Flowsheets (Taken 1/27/2023 0300)  Free From Fall Injury: Instruct family/caregiver on patient safety     Problem: ABCDS Injury Assessment  Goal: Absence of physical injury  1/27/2023 1126 by Yun Paz RN  Outcome: Progressing  1/27/2023 0327 by Ananya Graham RN  Outcome: Progressing  Flowsheets (Taken 1/27/2023 0300)  Absence of Physical Injury: Implement safety measures based on patient assessment     Problem: Chronic Conditions and Co-morbidities  Goal: Patient's chronic conditions and co-morbidity symptoms are monitored and maintained or improved  1/27/2023 1126 by Yun Paz RN  Outcome: Progressing  Flowsheets (Taken 1/27/2023 0800)  Care Plan - Patient's Chronic Conditions and Co-Morbidity Symptoms are Monitored and Maintained or Improved: Monitor and assess patient's chronic conditions and comorbid symptoms for stability, deterioration, or improvement  1/27/2023 0327 by Shy Mata RN  Outcome: Progressing  Flowsheets (Taken 1/26/2023 2000)  Care Plan - Patient's Chronic Conditions and Co-Morbidity Symptoms are Monitored and Maintained or Improved: Monitor and assess patient's chronic conditions and comorbid symptoms for stability, deterioration, or improvement     Problem: Cardiovascular - Adult  Goal: Maintains optimal cardiac output and hemodynamic stability  1/27/2023 1126 by Makayla Mccarty RN  Outcome: Progressing  Flowsheets (Taken 1/27/2023 0800)  Maintains optimal cardiac output and hemodynamic stability: Monitor blood pressure and heart rate  1/27/2023 0327 by Shy Mata RN  Outcome: Progressing  Flowsheets (Taken 1/26/2023 2000)  Maintains optimal cardiac output and hemodynamic stability: Monitor blood pressure and heart rate     Problem: Respiratory - Adult  Goal: Achieves optimal ventilation and oxygenation  1/27/2023 1126 by Makayla Mccarty RN  Outcome: Progressing  Flowsheets (Taken 1/27/2023 0800)  Achieves optimal ventilation and oxygenation: Assess for changes in respiratory status  1/27/2023 0327 by Shy Mata RN  Outcome: Progressing  Flowsheets (Taken 1/26/2023 2000)  Achieves optimal ventilation and oxygenation: Assess for changes in respiratory status     Problem: Gastrointestinal - Adult  Goal: Maintains or returns to baseline bowel function  1/27/2023 1126 by Makayla Mccarty RN  Outcome: Progressing  Flowsheets (Taken 1/27/2023 0800)  Maintains or returns to baseline bowel function:   Assess bowel function   Encourage oral fluids to ensure adequate hydration  1/27/2023 0327 by Shy Mata RN  Outcome: Progressing  Flowsheets (Taken 1/26/2023 2000)  Maintains or returns to baseline bowel function: Assess bowel function     Problem: Metabolic/Fluid and Electrolytes - Adult  Goal: Glucose maintained within prescribed range  1/27/2023 1126 by Skye Harmon RN  Outcome: Progressing  Flowsheets (Taken 1/27/2023 0800)  Glucose maintained within prescribed range:   Monitor blood glucose as ordered   Assess for signs and symptoms of hyperglycemia and hypoglycemia  1/27/2023 0327 by Mary Curry RN  Outcome: Progressing  Flowsheets (Taken 1/26/2023 2000)  Glucose maintained within prescribed range: Monitor blood glucose as ordered     Problem: Musculoskeletal - Adult  Goal: Return mobility to safest level of function  1/27/2023 1126 by Skye Harmon RN  Outcome: Progressing  Flowsheets (Taken 1/27/2023 0800)  Return Mobility to Safest Level of Function:   Assess patient stability and activity tolerance for standing, transferring and ambulating with or without assistive devices   Assist with transfers and ambulation using safe patient handling equipment as needed   Ensure adequate protection for wounds/incisions during mobilization  1/27/2023 0327 by Mary Curry RN  Outcome: Progressing  Flowsheets (Taken 1/26/2023 2000)  Return Mobility to Safest Level of Function: Assess patient stability and activity tolerance for standing, transferring and ambulating with or without assistive devices  Goal: Return ADL status to a safe level of function  1/27/2023 1126 by Skye Harmon RN  Outcome: Progressing  Flowsheets (Taken 1/27/2023 0800)  Return ADL Status to a Safe Level of Function:   Administer medication as ordered   Assess activities of daily living deficits and provide assistive devices as needed   Obtain physical therapy/occupational therapy consults as needed  1/27/2023 0327 by Mary Curry RN  Outcome: Progressing  Flowsheets (Taken 1/26/2023 2000)  Return ADL Status to a Safe Level of Function: Assess activities of daily living deficits and provide assistive devices as needed     Problem: Pain  Goal: Verbalizes/displays adequate comfort level or baseline comfort level  1/27/2023 1126 by Brayden Salamanca RN  Outcome: Progressing  Flowsheets (Taken 1/27/2023 0400 by Vanna Ribeiro RN)  Verbalizes/displays adequate comfort level or baseline comfort level: Assess pain using appropriate pain scale  1/27/2023 0327 by Vanna Ribeiro RN  Outcome: Progressing  Flowsheets  Taken 1/27/2023 0000  Verbalizes/displays adequate comfort level or baseline comfort level: Assess pain using appropriate pain scale  Taken 1/26/2023 2000  Verbalizes/displays adequate comfort level or baseline comfort level: Assess pain using appropriate pain scale     Problem: Nutrition Deficit:  Goal: Optimize nutritional status  1/27/2023 1126 by Brayden Salamanca RN  Outcome: Progressing  1/27/2023 0327 by Vanna Ribeiro RN  Outcome: Progressing     Problem: Neurosensory - Adult  Goal: Achieves maximal functionality and self care  1/27/2023 1126 by Brayden Salamanca RN  Outcome: Progressing  Flowsheets (Taken 1/27/2023 0800)  Achieves maximal functionality and self care: Monitor swallowing and airway patency with patient fatigue and changes in neurological status  1/27/2023 0327 by Vanna Ribeiro RN  Outcome: Progressing  Flowsheets (Taken 1/26/2023 2000)  Achieves maximal functionality and self care: Monitor swallowing and airway patency with patient fatigue and changes in neurological status

## 2023-01-28 NOTE — PROGRESS NOTES
Nurse entered room to start D5 1/2 NS gtt (per Dr. Dinesh Traylor request). Patient asked nurse \"What's going on? \" Nurse explained that patient has COVID and is be treated with antibiotics so that she can get better. Patient then asked Annie Deshpande are the antibiotics? \" Nurse explained that the antibiotics are going through patient's IV. Patient verbalized understanding, patient denied pain. Nurse asked if patient is thirsty, patient responded yes. Failed attempts to have patient drink water through straw, patient able to appropriately use swabs to wet mouth. Will continue to monitor patient closely.

## 2023-01-28 NOTE — PROGRESS NOTES
Patient refusing medications and refusing to comply with care. Nurse administered insulin and IV medications as patient tolerated/allowed. Will continue to monitor.

## 2023-01-28 NOTE — PROGRESS NOTES
Nurse paged Dr. Avtar Merino requesting to change PO Protonix to IV Protonix, along with notifying that Dr. Brian Moran recommends TPN since patient is not eating/drinking. Dr. Avtar Merino agreed and requested that dietician be consulted for TPN recommendations. See orders.

## 2023-01-28 NOTE — PROGRESS NOTES
[facility]  Progress Note    Yamileth Martin  MRN: [id number]  Date: 1/28/2023  LOS: 6     Reason for F/U :   COVID-19 virus infection    Impression :   COVID-19 virus infection tested + 1/22/2023  Unvaccinated adult patient  Acute respiratory insufficiency requiring supplemental oxygen   Congestive heart failure with mild congestive changes on chest x-ray  Multifocal opacities in the bases/ pneumonia  Confusion  Right-sided greater than left-sided pleural effusion    Recommendations:   COVID therapy: The patient is not a candidate for antiviral therapy  Given the hypoxia the patient will continue Decadron-the patient has not been taking this as she has been refusing oral intake and this was switched to IV 1/27/2023  The patient is not a candidate for immunosuppressive therapy at this time and the CRP is not overly elevated  The CRP is decreased and the procalcitonin level remains low    Antibiotic therapy:  Continue levofloxacin through 1/28/2023 the last dose has been switched to IV today  The patient remains on 2 -3 L of oxygen by nasal cannula  Continue supportive therapy    Infection Control Recommendations:   Droplet plus precautions    Discharge Planning:   Estimated Length of IV antimicrobials: 5 days  Patient will need Midline Catheter Insertion/ PICC line Insertion: No  Patient will need: Home IV , [place],  SNF,  LTAC: Undetermined  Patient willneed outpatient wound care: No    Medical Decision making / Summary of Stay:   Yamileth Martin is a 80y.o.-year-old female who was initially admitted on 1/21/2023.    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 She does not report any subjective fevers, chills no significant cough, no abdominal pain, no nausea vomiting, has chronic diarrhea. The patient was reporting some neck pain and again the neck pain has been previously evaluated with no source found. She reports that her daughter recently tested positive for COVID and work-up in the emergency department included a chest x-ray that showed a new small right effusion and airspace disease and possible mild CHF. COVID testing was done that was positive and the patient was admitted with COVID-19 virus infection as well as concern for congestive heart failure. The patient has since been seen by the pulmonary critical care service and was started on supplemental oxygen, IV Decadron, intravenous levofloxacin as there was concern for right basilar infiltrate/pneumonia and I was asked to evaluate and help with antibiotic choice. Current evaluation:2023    BP (!) 112/47   Pulse 73   Temp (!) 96 °F (35.6 °C) (Temporal)   Resp 25   Ht 5' 4\" (1.626 m)   Wt 139 lb 12.8 oz (63.4 kg)   SpO2 95%   BMI 24.00 kg/m²     Temperature Range: Temp: (!) 96 °F (35.6 °C) Temp  Av.2 °F (36.2 °C)  Min: 96 °F (35.6 °C)  Max: 97.8 °F (36.6 °C)  The patient is seen and evaluated at bedside and is awake and alert and is oriented to self. The patient has a telemetry sitter in place and there is a sitter in the room as well. The care was discussed with the nurse and she continues not to take any oral intake in terms of medications or food. Remains on 2 L of oxygen by nasal cannula. Blood pressure has been stable    Review of Systems   Unable to perform ROS: Other     Physical Examination :     Physical Exam  Constitutional:       Appearance: She is well-developed. She is cachectic. HENT:      Head: Normocephalic and atraumatic. Mouth/Throat:      Mouth: Mucous membranes are dry.    Cardiovascular:      Rate and Rhythm: Regular rhythm. Heart sounds: Normal heart sounds. Pulmonary:      Effort: Pulmonary effort is normal.      Breath sounds: Rales present. Abdominal:      General: Bowel sounds are normal.      Palpations: Abdomen is soft. Musculoskeletal:      Cervical back: Normal range of motion and neck supple. Right lower leg: Edema present. Left lower leg: Edema present. Skin:     General: Skin is warm and dry. Neurological:      Mental Status: She is alert and oriented to person, place, and time. Laboratory data:   I have independently reviewed the followinglabs:  CBC with Differential:   Recent Labs     01/27/23  0500 01/28/23  0534   WBC 10.6 10.6   HGB 12.3 11.3*   HCT 39.4 36.4    217   LYMPHOPCT 3* 3*   MONOPCT 3 6       BMP:   Recent Labs     01/26/23  1803 01/27/23  0500 01/28/23  0534    140 143   K 5.1 4.9 4.8   * 110* 112*   CO2 23 22 23   BUN 39* 41* 42*   CREATININE 0.75 0.80 0.83   MG 1.7  --   --        Hepatic Function Panel:   No results for input(s): PROT, LABALBU, BILIDIR, IBILI, BILITOT, ALKPHOS, ALT, AST in the last 72 hours.         Lab Results   Component Value Date/Time    PROCAL 0.13 01/26/2023 05:14 AM    PROCAL 0.55 01/22/2023 01:40 PM     Lab Results   Component Value Date/Time    CRP 22.2 01/26/2023 05:14 AM    CRP 37.7 01/23/2023 05:13 AM    CRP 0.5 03/26/2018 03:47 PM     No results found for: Malcolm Essex      Lab Results   Component Value Date/Time    DDIMER 1.81 01/26/2023 06:58 AM    DDIMER 0.86 03/26/2018 03:47 PM     No results found for: FERRITIN  No results found for: LDH  No results found for: FIBRINOGEN    Results in Past 30 Days  Result Component Current Result Ref Range Previous Result Ref Range   SARS-CoV-2, Rapid DETECTED (A) (1/22/2023) Not Detected Not in Time Range      Lab Results   Component Value Date/Time    COVID19 DETECTED 01/22/2023 12:30 AM    COVID19 Not Detected 02/09/2022 05:46 PM       No results for input(s): [address] in the last 72 hours. Imaging Studies:   ONE XRAY VIEW OF THE CHEST 1/26/2023 6:44 am    Impression   Stable right perihilar patchy opacities, concerning for aspiration/pneumonia. ONE XRAY VIEW OF THE CHEST 1/24/2023 5:36 am  Impression   More prominent bilateral interstitial and ground-glass opacities, which may   represent developing edema versus multifocal infection. Grossly similar   appearance of small effusions. Cultures:     COVID-19, Rapid [[id number]] (Abnormal) Collected: 01/22/23 0030   Order Status: Completed Specimen: Nasopharyngeal Swab Updated: 01/22/23 0058    Specimen Description . NASOPHARYNGEAL SWAB    SARS-CoV-2, Rapid DETECTED Abnormal     Comment:        Rapid NAAT: The specimen is POSITIVE for SARS-Cov-2, the novel coronavirus associated with   COVID-19. This test has been authorized by the FDA under an Emergency Use Authorization (EUA) for use   by authorized laboratories. The ID NOW COVID-19 assay is designed to detect the virus that causes COVID-19 in patients   with signs and symptoms of infection who are suspected of COVID-19. An individual without symptoms of COVID-19 and who is not shedding SARS-CoV-2 virus would   expect to have a negative (not detected) result in this assay.    Fact sheet for Healthcare Providers: http://www.tara.ángel/   Fact sheet for Patients: http://www.nacho-bar.ángel/         Methodology: Isothermal Nucleic Acid Amplification         Results reported to the appropriate Health Department          Medications:      dexamethasone  6 mg IntraVENous Daily    QUEtiapine  25 mg Oral BID    levoFLOXacin  750 mg Oral Q48H    sodium chloride flush  5-40 mL IntraVENous 2 times per day    [Held by provider] carvedilol  12.5 mg Oral BID WC    sacubitril-valsartan  1 tablet Oral BID    cholestyramine light  4 g Oral BID    pantoprazole  40 mg Oral QAM AC    insulin lispro  0-8 Units SubCUTAneous TID WC    insulin lispro  0-4 Units SubCUTAneous Nightly    [Held by provider] glimepiride  4 mg Oral BID WC       Electronically signed by Keon Biggs MD on 1/28/2023 at 8:59 AM      [facility]  Keon Biggs MD  [employer] messaging  OFFICE: [phone]    Thank you for allowing us to participate in the care of this patient. Please call with questions. This note is created with the assistance of a speech recognition program.  While intending to generate a document that actually reflects the content of the visit, the document can still have some errors including those of syntax and sound a like substitutions which may escape proof reading. In such instances, actual meaning can be extrapolated by contextual diversion.

## 2023-01-28 NOTE — PROGRESS NOTES
[address] Hospitalist        1/28/2023   4:30 PM    Name:  Rolando Snider  MRN:    [id number]     Acct:     [de-identified]   Room:  [address] [place], [place] [address]  IP Day: 10     Admit Date: 1/21/2023 11:39 PM  PCP: Dominguez Plaza MD    C/C:   Chief Complaint   Patient presents with    Shortness of Breath       Assessment:      Acute on chronic congestive heart failure  COVID-19 pneumonia  Hypotension  Acute metabolic encephalopathy  Nonvaccinated against SARS-CoV-2  Respiratory failure with hypoxia requiring oxygen via nasal cannula  Hypomagnesemia  Hypokalemia  Question of bacterial pneumonia  Essential hypertension  Diabetes mellitus type 2  Paroxysmal atrial fibrillation  Congestive heart failure, type unknown  Osteoarthritis, multiple joints  History of colon cancer  History of skin cancer  Right eye vision loss      Plan:      Patient has become hypotensive, bradycardic, further she is more altered, will transfer patient to medical ICU  Critical care following  Patient also have minimal p.o. intake patient continues to refuse care, has minimal p.o. intake  Consult dietitian, start patient on TPN    Monitor vitals closely  Keep SPO2 above 90%  I's and O's  IV heplock  Pain control  Antiemetics as needed  Levaquin, Decadron  Bumex, Coreg, Entresto  Accu-Cheks before meals and at bedtime  Insulin sliding scale medium  Hypoglycemia protocol  And has elevated blood glucose and is refusing insulin  CBC , BMP, BNP  Pulmonary consulted  Consult cardiology  Cardiology recommend heparin infusion until they can evaluate patient and determine why she is not on anticoagulation  DVT and GI prophylaxis.   Continue medication as below  Discussed with daughter at bedside in detail      Scheduled Meds:   pantoprazole (PROTONIX) 40 mg injection  40 mg IntraVENous Daily    fat emulsion  100 mL IntraVENous Daily    dexamethasone  6 mg IntraVENous Daily    QUEtiapine  25 mg Oral BID    sodium chloride flush  5-40 mL IntraVENous 2 times per day    [Held by provider] carvedilol  12.5 mg Oral BID     sacubitril-valsartan  1 tablet Oral BID    cholestyramine light  4 g Oral BID    insulin lispro  0-8 Units SubCUTAneous TID     insulin lispro  0-4 Units SubCUTAneous Nightly    [Held by provider] glimepiride  4 mg Oral BID WC     Continuous Infusions:   dextrose 5 % and 0.45 % NaCl 75 mL/hr at 23 1149    dexmedetomidine (PRECEDEX) IV infusion Stopped (23 1130)    PN-Adult 2-in-1 Central Line (Standard)      sodium chloride 20 mL/hr at 23 2225    dextrose       PRN Meds:  sodium chloride flush, 10 mL, PRN  sodium chloride, , PRN  magnesium sulfate, 1,000 mg, PRN  ondansetron, 4 mg, Q8H PRN   Or  ondansetron, 4 mg, Q6H PRN  polyethylene glycol, 17 g, Daily PRN  acetaminophen, 650 mg, Q6H PRN   Or  acetaminophen, 650 mg, Q6H PRN  glucose, 4 tablet, PRN  dextrose bolus, 125 mL, PRN   Or  dextrose bolus, 250 mL, PRN  glucagon (rDNA), 1 mg, PRN  dextrose, , Continuous PRN  albuterol, 2.5 mg, Q6H PRN          Subjective:   Patient seen and examined at bedside and reviewed  last 24 hrs events with nursing staff  No acute events overnight. Patient denies any acute complaints. Afebrile  Patient denies any chest pain, shortness of Breath, palpitation, headache, dizziness, cough, nausea, vomiting, abdominal pain, pain, changes in urination or bowel habit or rash.         ROS:  Unable to assess due to patient mental status        Physical Examination:      Vitals:  BP (!) 119/55   Pulse 69   Temp 97.2 °F (36.2 °C) (Temporal)   Resp 22   Ht 5' 4\" (1.626 m)   Wt 139 lb 12.8 oz (63.4 kg)   SpO2 97%   BMI 24.00 kg/m²   Temp (24hrs), Av °F (36.1 °C), Min:96 °F (35.6 °C), Max:97.5 °F (36.4 °C)    Weight:   Wt Readings from Last 3 Encounters:   23 139 lb 12.8 oz (63.4 kg)   23 150 lb 1.6 oz (68.1 kg)   10/20/22 150 lb (68 kg)     I/O last 3 completed shifts:  I/O last 3 completed shifts:  In: -   Out: 350 [Urine:350]     Recent Labs     01/27/23  1625 01/27/23  1946 01/28/23  0807 01/28/23  1058   POCGLU 182* 275* 242* 190*           General appearance -patient is sleepy but arousable, mentation is better  Mental status -not oriented to person, place, and time with normal affect  Head - normocephalic and atraumatic  Eyes - pupils equal and reactive, extraocular eye movements intact, conjunctiva clear  Ears - hearing appears to be intact  Nose - no drainage noted  Mouth - mucous membranes moist  Neck - supple, no carotid bruits, thyroid not palpable  Chest - clear to auscultation, normal effort  Heart - normal rate, regular rhythm, no murmur  Abdomen - soft, nontender, nondistended, bowel sounds present all four quadrants, no masses, hepatomegaly or splenomegaly  Neurological -unable to assess  Extremities - peripheral pulses palpable, no pedal edema or calf pain with palpation  Skin - no gross lesions, rashes, or induration noted        Medications: Allergies:    Allergies   Allergen Reactions    Acetaminophen-Codeine Nausea Only    Codeine Nausea Only    Furosemide      Other reaction(s): GI Disturbance    Linagliptin      Other reaction(s): SOB    Sitagliptin      Other reaction(s): felt poorly    Other Itching     Animal Dander       Current Meds:   Current Facility-Administered Medications:     pantoprazole (PROTONIX) 40 mg in sodium chloride (PF) 0.9 % 10 mL injection, 40 mg, IntraVENous, Daily, Sylvia Bolaños MD    dextrose 5 % and 0.45 % sodium chloride infusion, , IntraVENous, Continuous, Luis Holder MD, Last Rate: 75 mL/hr at 01/28/23 1149, New Bag at 01/28/23 1149    dexmedetomidine (PRECEDEX) 400 mcg in sodium chloride 0.9 % 100 mL infusion, 0.1-1.5 mcg/kg/hr, IntraVENous, Continuous, Luis Holder MD, Held at 01/28/23 1130    PN-Adult 2-in-1 Central Line (Custom), , IntraVENous, Continuous TPN, Sylvia Bolaños MD    fat emulsion 20 % infusion 100 mL, 100 mL, IntraVENous, Daily, Sylvia Mai Pink MD    dexamethasone (PF) (DECADRON) injection 6 mg, 6 mg, IntraVENous, Daily, [place] Becca Bolaños MD, 6 mg at 01/28/23 0844    QUEtiapine (SEROQUEL) tablet 25 mg, 25 mg, Oral, BID, Luz Maria Osuna MD    sodium chloride flush 0.9 % injection 5-40 mL, 5-40 mL, IntraVENous, 2 times per day, Gardenia A Lump, APRN - CNP, 10 mL at 01/28/23 0850    sodium chloride flush 0.9 % injection 10 mL, 10 mL, IntraVENous, PRN, Gardenia A Lump, APRN - CNP, 10 mL at 01/22/23 1616    0.9 % sodium chloride infusion, , IntraVENous, PRN, Caden Craft Lump, APRN - CNP, Last Rate: 20 mL/hr at 01/26/23 2225, New Bag at 01/26/23 2225    magnesium sulfate 1000 mg in dextrose 5% 100 mL IVPB, 1,000 mg, IntraVENous, PRN, Caden Craft Lump, APRN - CNP, Stopped at 01/22/23 1715    ondansetron (ZOFRAN-ODT) disintegrating tablet 4 mg, 4 mg, Oral, Q8H PRN, 4 mg at 01/23/23 2013 **OR** ondansetron (ZOFRAN) injection 4 mg, 4 mg, IntraVENous, Q6H PRN, Gardenia A Lump, APRN - CNP    polyethylene glycol (GLYCOLAX) packet 17 g, 17 g, Oral, Daily PRN, Gardenia A Lump, APRN - CNP    acetaminophen (TYLENOL) tablet 650 mg, 650 mg, Oral, Q6H PRN, 650 mg at 01/24/23 0907 **OR** acetaminophen (TYLENOL) suppository 650 mg, 650 mg, Rectal, Q6H PRN, Gardenia A Lump, APRN - CNP    glucose chewable tablet 16 g, 4 tablet, Oral, PRN, Gardenia A Lump, APRN - CNP    dextrose bolus 10% 125 mL, 125 mL, IntraVENous, PRN **OR** dextrose bolus 10% 250 mL, 250 mL, IntraVENous, PRN, Gardenia A Lump, APRN - CNP    glucagon (rDNA) injection 1 mg, 1 mg, SubCUTAneous, PRN, Gardenia A Lump, APRN - CNP    dextrose 10 % infusion, , IntraVENous, Continuous PRN, Gardenia A Lump, APRN - CNP    albuterol (PROVENTIL) nebulizer solution 2.5 mg, 2.5 mg, Nebulization, Q6H PRN, Gardenia A Lump, APRN - CNP    [Held by provider] carvedilol (COREG) tablet 12.5 mg, 12.5 mg, Oral, BID WC, Haleigh Wilson MD, 12.5 mg at 01/26/23 1229    sacubitril-valsartan (ENTRESTO) 24-26 MG per tablet 1 tablet, 1 tablet, Oral, BID, Haleigh Wilson MD, 1 tablet at 01/26/23 1229    cholestyramine light packet 4 g, 4 g, Oral, BID, Haleigh Wilson MD, 4 g at 01/26/23 0047    insulin lispro (HUMALOG) injection vial 0-8 Units, 0-8 Units, SubCUTAneous, TID , Haleigh Wilson MD, 2 Units at 01/28/23 0844    insulin lispro (HUMALOG) injection vial 0-4 Units, 0-4 Units, SubCUTAneous, Nightly, Haleigh Wilson MD, 4 Units at 01/22/23 2149    [Held by provider] glimepiride (AMARYL) tablet 4 mg (Patient Supplied), 4 mg, Oral, BID Haleigh MD, 4 mg at 01/25/23 1903      I/O (24Hr):   No intake or output data in the 24 hours ending 01/28/23 1630      Data:           Labs:    Hematology:  Recent Labs     01/26/23  0514 01/26/23  0658 01/27/23  0500 01/28/23  0534   WBC 11.0  --  10.6 10.6   RBC 4.52  --  4.41 4.06   HGB 12.4  --  12.3 11.3*   HCT 40.5  --  39.4 36.4   MCV 89.6  --  89.3 89.7   MCH 27.4  --  27.9 27.8   MCHC 30.6  --  31.2 31.0   RDW 14.7*  --  14.7* 14.9*     --  232 217   MPV 10.4  --  10.5 10.0   CRP 22.2*  --   --   --    DDIMER  --  1.81*  --   --        Chemistry:  Recent Labs     01/26/23  1803 01/27/23  0500 01/28/23  0534 01/28/23  1340    140 143  --    K 5.1 4.9 4.8  --    * 110* 112*  --    CO2 23 22 23  --    GLUCOSE 203* 183* 245*  --    BUN 39* 41* 42*  --    CREATININE 0.75 0.80 0.83  --    MG 1.7  --   --   --    ANIONGAP 7* 8* 8*  --    LABGLOM >60 >60 >60  --    CALCIUM 8.5* 8.6 8.4*  --    PROBNP  --   --   --  7,046*   TROPHS  --   --   --  47*       Recent Labs     01/26/23  1215 01/27/23  1128 01/27/23  1625 01/27/23  1946 01/28/23  0807 01/28/23  1058   POCGLU 209* 149* 182* 275* 242* 190*         Lab Results   Component Value Date/Time    SPECIAL NOT REPORTED 11/05/2017 01:55 PM     Lab Results   Component Value Date/Time    CULTURE ESCHERICHIA COLI >[id number] CFU/ML (A) 10/20/2022 05:48 PM       No results found for: POCPH, PHART, PH, POCPCO2, XGR3CWH, PCO2, POCPO2, PO2ART, PO2, POCHCO3, DYR8OHS, HCO3, NBEA, PBEA, BEART, BE, THGBART, THB, OUT0SGN, OGUG6WSL, X7LGBAVL, O2SAT, FIO2    Radiology:    CT CHEST WO CONTRAST    Result Date: 1/22/2023  Multifocal ground-glass opacities indeterminate for COVID-19. Right greater than left pleural effusions. Ileus. Cardiomegaly and coronary artery disease. XR CHEST PORTABLE    Result Date: 1/24/2023  More prominent bilateral interstitial and ground-glass opacities, which may represent developing edema versus multifocal infection. Grossly similar appearance of small effusions. XR CHEST PORTABLE    Result Date: 1/21/2023  New small right effusion and airspace disease. Possible mild CHF. All radiological studies reviewed  Code Status:  Full Code        Electronically signed by Kurt De Santiago MD on 1/28/2023 at 4:30 PM    This note was created with the assistance of a speech-recognition program.  Although the intention is to generate a document that actually reflects the content of the visit, no guarantees can be provided that every mistake has been identified and corrected by editing. Note was updated later by me after  physical examination and  completion of the assessment.

## 2023-01-28 NOTE — PROGRESS NOTES
Patient asking Mikel Villarreal we all set\" and continuing to take blankets off and attempt to get out of bed. Patient believes she is going home. Nurse and PCT attempting to reorient patient. Will continue to monitor.

## 2023-01-28 NOTE — PROGRESS NOTES
DATE: 2023    NAME: Ajit Tineo  MRN: [id number]   : 10/23/1932    Patient not seen this date for Occupational Therapy due to:      [x] Cancel by RN or physician due to:HALEY Smith canceled d/t increased confusion and agitation. OT will cont to follow. [] Hemodialysis    [] Critical Lab Value Level     [] Blood transfusion in progress    [] Acute or unstable cardiovascular status   _MAP < 55 or more than >115  _HR < 40 or > 130    [] Acute or unstable pulmonary status   -FiO2 > 60%   _RR < 5 or >40    _O2 sats < 85%    [] Strict Bedrest    [] Off Unit for surgery or procedure    [] Off Unit for testing       [] Pending imaging to R/O fracture    [] Refusal by Patient      [] Other      [] OT being discontinued at this time. Patient independent. No further needs. [] OT being discontinued at this time as the patient has been transferred to hospice care. No further needs.       DANIEL Stahl

## 2023-01-28 NOTE — PROGRESS NOTES
Patient refusing PO meds and refusing to eat/drink. Nurse contacted Dr. Maia Wing via perfect serve to request PO Levaquin be switched to IV levaquin. Dr. Maia Wing switched PO Levaquin to IV Levaquin (see MAR).

## 2023-01-28 NOTE — PROGRESS NOTES
Patient refused all oral meds, and it was previously reported that patient refused water. Offered patient water via oral swab and patient requested more water using swab. Had to have aide sit with patient d/t patient attempting to get out of bed. Telesitter was ineffective, wasn't notified by telesitting, when writer walked by room and seen patient leg over bed and pillows on the floor.

## 2023-01-28 NOTE — PLAN OF CARE
Problem: Discharge Planning  Goal: Discharge to home or other facility with appropriate resources  Outcome: Progressing  Flowsheets (Taken 1/27/2023 2000)  Discharge to home or other facility with appropriate resources:   Identify barriers to discharge with patient and caregiver   Arrange for needed discharge resources and transportation as appropriate   Identify discharge learning needs (meds, wound care, etc)   Refer to discharge planning if patient needs post-hospital services based on physician order or complex needs related to functional status, cognitive ability or social support system     Problem: Skin/Tissue Integrity  Goal: Absence of new skin breakdown  Description: 1. Monitor for areas of redness and/or skin breakdown  2. Assess vascular access sites hourly  3. Every 4-6 hours minimum:  Change oxygen saturation probe site  4. Every 4-6 hours:  If on nasal continuous positive airway pressure, respiratory therapy assess nares and determine need for appliance change or resting period.   Outcome: Progressing     Problem: Safety - Adult  Goal: Free from fall injury  Outcome: Progressing  Flowsheets (Taken 1/27/2023 2000)  Free From Fall Injury:   Instruct family/caregiver on patient safety   Based on caregiver fall risk screen, instruct family/caregiver to ask for assistance with transferring infant if caregiver noted to have fall risk factors     Problem: ABCDS Injury Assessment  Goal: Absence of physical injury  Outcome: Progressing  Flowsheets (Taken 1/27/2023 2000)  Absence of Physical Injury: Implement safety measures based on patient assessment     Problem: Chronic Conditions and Co-morbidities  Goal: Patient's chronic conditions and co-morbidity symptoms are monitored and maintained or improved  Outcome: Progressing  Flowsheets (Taken 1/27/2023 2000)  Care Plan - Patient's Chronic Conditions and Co-Morbidity Symptoms are Monitored and Maintained or Improved:   Monitor and assess patient's chronic conditions and comorbid symptoms for stability, deterioration, or improvement   Collaborate with multidisciplinary team to address chronic and comorbid conditions and prevent exacerbation or deterioration   Update acute care plan with appropriate goals if chronic or comorbid symptoms are exacerbated and prevent overall improvement and discharge     Problem: Cardiovascular - Adult  Goal: Maintains optimal cardiac output and hemodynamic stability  Outcome: Progressing  Flowsheets (Taken 1/27/2023 2000)  Maintains optimal cardiac output and hemodynamic stability:   Monitor blood pressure and heart rate   Monitor urine output and notify Licensed Independent Practitioner for values outside of normal range   Assess for signs of decreased cardiac output   Administer fluid and/or volume expanders as ordered     Problem: Respiratory - Adult  Goal: Achieves optimal ventilation and oxygenation  Outcome: Progressing  Flowsheets (Taken 1/27/2023 2000)  Achieves optimal ventilation and oxygenation:   Assess for changes in respiratory status   Assess for changes in mentation and behavior   Position to facilitate oxygenation and minimize respiratory effort   Oxygen supplementation based on oxygen saturation or arterial blood gases   Respiratory therapy support as indicated   Assess and instruct to report shortness of breath or any respiratory difficulty   Assess the need for suctioning and aspirate as needed     Problem: Gastrointestinal - Adult  Goal: Maintains or returns to baseline bowel function  Outcome: Progressing  Flowsheets (Taken 1/27/2023 2000)  Maintains or returns to baseline bowel function:   Assess bowel function   Encourage oral fluids to ensure adequate hydration   Administer IV fluids as ordered to ensure adequate hydration     Problem: Metabolic/Fluid and Electrolytes - Adult  Goal: Glucose maintained within prescribed range  Outcome: Progressing  Flowsheets (Taken 1/27/2023 2000)  Glucose maintained within prescribed range:   Monitor blood glucose as ordered   Assess for signs and symptoms of hyperglycemia and hypoglycemia   Instruct patient on self management of diabetes and initiate consult as needed     Problem: Musculoskeletal - Adult  Goal: Return mobility to safest level of function  Outcome: Progressing  Flowsheets (Taken 1/27/2023 2000)  Return Mobility to Safest Level of Function:   Assess patient stability and activity tolerance for standing, transferring and ambulating with or without assistive devices   Instruct patient/family in ordered activity level  Goal: Return ADL status to a safe level of function  Outcome: Progressing  Flowsheets (Taken 1/27/2023 2000)  Return ADL Status to a Safe Level of Function:   Administer medication as ordered   Assist and instruct patient to increase activity and self care as tolerated     Problem: Pain  Goal: Verbalizes/displays adequate comfort level or baseline comfort level  Outcome: Progressing  Flowsheets (Taken 1/27/2023 2000)  Verbalizes/displays adequate comfort level or baseline comfort level:   Encourage patient to monitor pain and request assistance   Assess pain using appropriate pain scale   Administer analgesics based on type and severity of pain and evaluate response   Implement non-pharmacological measures as appropriate and evaluate response   Notify Licensed Independent Practitioner if interventions unsuccessful or patient reports new pain     Problem: Nutrition Deficit:  Goal: Optimize nutritional status  Outcome: Progressing     Problem: Neurosensory - Adult  Goal: Achieves maximal functionality and self care  Outcome: Progressing  Flowsheets (Taken 1/27/2023 2000)  Achieves maximal functionality and self care:   Monitor swallowing and airway patency with patient fatigue and changes in neurological status   Encourage and assist patient to increase activity and self care with guidance from physical therapy/occupational therapy   Encourage visually impaired, hearing impaired and aphasic patients to use assistive/communication devices

## 2023-01-28 NOTE — PLAN OF CARE
Problem: Discharge Planning  Goal: Discharge to home or other facility with appropriate resources  1/28/2023 1610 by Jennifer Temple RN  Outcome: Progressing  1/28/2023 0229 by Doc Elliott RN  Outcome: Progressing  Flowsheets (Taken 1/27/2023 2000)  Discharge to home or other facility with appropriate resources:   Identify barriers to discharge with patient and caregiver   Arrange for needed discharge resources and transportation as appropriate   Identify discharge learning needs (meds, wound care, etc)   Refer to discharge planning if patient needs post-hospital services based on physician order or complex needs related to functional status, cognitive ability or social support system     Problem: Skin/Tissue Integrity  Goal: Absence of new skin breakdown  Description: 1. Monitor for areas of redness and/or skin breakdown  2. Assess vascular access sites hourly  3. Every 4-6 hours minimum:  Change oxygen saturation probe site  4. Every 4-6 hours:  If on nasal continuous positive airway pressure, respiratory therapy assess nares and determine need for appliance change or resting period. 1/28/2023 1610 by Jennifer Temple RN  Outcome: Progressing  1/28/2023 0229 by Doc Elliott RN  Outcome: Progressing     Problem: Safety - Adult  Goal: Free from fall injury  1/28/2023 1610 by Jennifer Temple RN  Outcome: Progressing  Note: Pt fall risk, fall band present, falling star, safety alarm activated and in use as needed. Hourly rounding performed. Pt encouraged to use call light. See Dalia Esqueda fall risk assessment. Will continue to monitor patient closely.   1/28/2023 0229 by Doc Elliott RN  Outcome: Progressing  Flowsheets (Taken 1/27/2023 2000)  Free From Fall Injury:   Instruct family/caregiver on patient safety   Based on caregiver fall risk screen, instruct family/caregiver to ask for assistance with transferring infant if caregiver noted to have fall risk factors     Problem: ABCDS Injury Assessment  Goal: Absence of physical injury  1/28/2023 1610 by Montez Loera RN  Outcome: Progressing  1/28/2023 0229 by oTm Jiménez RN  Outcome: Progressing  Flowsheets (Taken 1/27/2023 2000)  Absence of Physical Injury: Implement safety measures based on patient assessment     Problem: Chronic Conditions and Co-morbidities  Goal: Patient's chronic conditions and co-morbidity symptoms are monitored and maintained or improved  1/28/2023 1610 by Montez Loera RN  Outcome: Progressing  1/28/2023 0229 by Tom Jiménez RN  Outcome: Progressing  Flowsheets (Taken 1/27/2023 2000)  Care Plan - Patient's Chronic Conditions and Co-Morbidity Symptoms are Monitored and Maintained or Improved:   Monitor and assess patient's chronic conditions and comorbid symptoms for stability, deterioration, or improvement   Collaborate with multidisciplinary team to address chronic and comorbid conditions and prevent exacerbation or deterioration   Update acute care plan with appropriate goals if chronic or comorbid symptoms are exacerbated and prevent overall improvement and discharge     Problem: Cardiovascular - Adult  Goal: Maintains optimal cardiac output and hemodynamic stability  1/28/2023 1610 by Montez Loera RN  Outcome: Progressing  1/28/2023 0229 by Tom Jiménez RN  Outcome: Progressing  Flowsheets (Taken 1/27/2023 2000)  Maintains optimal cardiac output and hemodynamic stability:   Monitor blood pressure and heart rate   Monitor urine output and notify Licensed Independent Practitioner for values outside of normal range   Assess for signs of decreased cardiac output   Administer fluid and/or volume expanders as ordered     Problem: Respiratory - Adult  Goal: Achieves optimal ventilation and oxygenation  1/28/2023 1610 by Montez Loera RN  Outcome: Progressing  Note: Patient on 2L NC, tolerating well. Will continue to monitor patient closely.   1/28/2023 0229 by Tom Jiménez RN  Outcome: Progressing  Flowsheets (Taken 1/27/2023 2000)  Achieves optimal ventilation and oxygenation:   Assess for changes in respiratory status   Assess for changes in mentation and behavior   Position to facilitate oxygenation and minimize respiratory effort   Oxygen supplementation based on oxygen saturation or arterial blood gases   Respiratory therapy support as indicated   Assess and instruct to report shortness of breath or any respiratory difficulty   Assess the need for suctioning and aspirate as needed     Problem: Gastrointestinal - Adult  Goal: Maintains or returns to baseline bowel function  1/28/2023 1610 by Kathi Vasquez RN  Outcome: Progressing  1/28/2023 0229 by Chris Pérez RN  Outcome: Progressing  [address] (Taken 1/27/2023 2000)  Maintains or returns to baseline bowel function:   Assess bowel function   Encourage oral fluids to ensure adequate hydration   Administer IV fluids as ordered to ensure adequate hydration     Problem: Metabolic/Fluid and Electrolytes - Adult  Goal: Glucose maintained within prescribed range  1/28/2023 1610 by Kathi Vasquez RN  Outcome: Progressing  1/28/2023 0229 by Chris Pérez RN  Outcome: Progressing  Flowsheets (Taken 1/27/2023 2000)  Glucose maintained within prescribed range:   Monitor blood glucose as ordered   Assess for signs and symptoms of hyperglycemia and hypoglycemia   Instruct patient on self management of diabetes and initiate consult as needed     Problem: Musculoskeletal - Adult  Goal: Return mobility to safest level of function  1/28/2023 1610 by Kathi Vasquez RN  Outcome: Progressing  1/28/2023 0229 by Chris Pérez RN  Outcome: Progressing  Flowsheets (Taken 1/27/2023 2000)  Return Mobility to Safest Level of Function:   Assess patient stability and activity tolerance for standing, transferring and ambulating with or without assistive devices   Instruct patient/family in ordered activity level  Goal: Return ADL status to a safe level of function  1/28/2023 1610 by Kathi Vasquez RN  Outcome: Progressing  1/28/2023 0229 by Julienne Rivas RN  Outcome: Progressing  Flowsheets (Taken 1/27/2023 2000)  Return ADL Status to a Safe Level of Function:   Administer medication as ordered   Assist and instruct patient to increase activity and self care as tolerated     Problem: Pain  Goal: Verbalizes/displays adequate comfort level or baseline comfort level  1/28/2023 1610 by Christina Hunter RN  Outcome: Progressing  1/28/2023 0229 by Julienne Rivas RN  Outcome: Progressing  Flowsheets (Taken 1/27/2023 2000)  Verbalizes/displays adequate comfort level or baseline comfort level:   Encourage patient to monitor pain and request assistance   Assess pain using appropriate pain scale   Administer analgesics based on type and severity of pain and evaluate response   Implement non-pharmacological measures as appropriate and evaluate response   Notify Licensed Independent Practitioner if interventions unsuccessful or patient reports new pain     Problem: Nutrition Deficit:  Goal: Optimize nutritional status  1/28/2023 1610 by Christina Hunter RN  Outcome: Progressing  Flowsheets (Taken 1/28/2023 1318 by Odalys Natarajan, AISHA, LD)  Nutrient intake appropriate for improving, restoring, or maintaining nutritional needs:   Assess nutritional status and recommend course of action   Recommend, monitor, and adjust tube feedings and TPN/PPN based on assessed needs  1/28/2023 0229 by Julienne Rivas RN  Outcome: Progressing     Problem: Neurosensory - Adult  Goal: Achieves maximal functionality and self care  1/28/2023 1610 by Christina Hunter RN  Outcome: Progressing  1/28/2023 0229 by Julienne Rivas RN  Outcome: Progressing  Flowsheets (Taken 1/27/2023 2000)  Achieves maximal functionality and self care:   Monitor swallowing and airway patency with patient fatigue and changes in neurological status   Encourage and assist patient to increase activity and self care with guidance from physical therapy/occupational therapy   Encourage visually impaired, hearing impaired and aphasic patients to use assistive/communication devices

## 2023-01-28 NOTE — PROGRESS NOTES
Speech Language Pathology  Facility/Department: [facility]   CLINICAL BEDSIDE SWALLOW EVALUATION    NAME: Chelly Uribe  : 10/23/1932  MRN: [id number]    ADMISSION DATE: 2023    ADMITTING DIAGNOSIS: has Lower GI bleed; Acute on chronic diastolic congestive heart failure ([address] Allergic rhinitis; Arthritis of right knee; Bilateral lower extremity edema; Chronic allergic conjunctivitis; Congestive heart failure ([address] Type 2 diabetes mellitus without complication, without long-term current use of insulin ([address] Essential hypertension; Non-pressure chronic ulcer of left calf with fat layer exposed ([address] Overweight; Paroxysmal atrial fibrillation ([address] Venous ulcer of right leg ([address] Pseudogout of right knee; Vasomotor rhinitis; Sensorineural hearing loss (SNHL), bilateral; Serum creatinine raised; Tinnitus of both ears; Diarrhea; Chronic combined systolic (congestive) and diastolic (congestive) heart failure ([address] and COVID-19 on their problem list.    ONSET DATE: 2023    Recent Chest Xray/CT of Chest: (2023)  Stable right perihilar patchy opacities, concerning for aspiration/pneumonia        Date of Eval: 2023  Evaluating Therapist: MARNIE Us    Current Diet level:   Easy to Chew  Thin LIquids      Primary Complaint   Chelly Uribe is a 80y.o.-year-old female who was initially admitted on 2023. Pt has been dx with:  COVID-19 virus infection tested + 2023  Acute respiratory insufficiency requiring supplemental oxygen   Congestive heart failure with mild congestive changes on chest x-ray  Multifocal opacities in the bases/ pneumonia  Confusion  Right-sided greater than left-sided pleural effusion           .   IMPRESSIONS  80year old woman with confusion d/t multifactorial dx above    Pt appears able to safely swallow small amounts of pureed and thin liquids with assistance and no overt clinical s/s of aspiration    Pt doesn't appear to know how to take food off spoon, sip from straw or drink from cup; requires total assist     Given her confusion, a Modified Barium Swallow Study (MBSS) is not advised currently but as her confusion clears, it may be            RECOMMENDATIONS  1) Supplemental nutrition as pt appears too confused to adequately get enough nutrition via PO with total assist    2) If pt willing to eat, would suggest PO trials of Pureed/Dysphagia I and thin liquids as tolerated    3) Calorie counts and Dietary consult          . Pain:  Pain Assessment  Pain Assessment: None - Denies Pain  Pain Level: 0  Patient's Stated Pain Goal: 3    Reason for Referral  Rolando Snider was referred for a bedside swallow evaluation to assess the efficiency of her swallow function, identify signs and symptoms of aspiration and make recommendations regarding safe dietary consistencies, effective compensatory strategies, and safe eating environment. Impression  Dysphagia Diagnosis: No clinical indicators of dysphagia  Dysphagia Impression : Concerns for dysphagia given acute confusion; however no clinical indicators on small amounts of thin and pureed            Recommended Diet and Intervention   Pureed and Thin       Recommended Form of Meds: Meds in puree  Recommendations: Total feed  Therapeutic Interventions: Diet tolerance monitoring;Oral care    Compensatory Swallowing Strategies  Compensatory Swallowing Strategies : No straws;Upright as possible for all oral intake;Eat/Feed slowly    Treatment/Goals  Dysphagia Goals: The patient will tolerate recommended diet without observed clinical signs of aspiration; The patient will tolerate instrumental swallowing procedure      Oral Phase Dysfunction  Oral Phase  Oral Phase: Exceptions (doesn't know \"how to\" use a straw, take food off a spoon or purse lips to drink from cup.  ??  Apraxia versus confusion s/p encephalopathy)     Indicators of Pharyngeal Phase Dysfunction   Pharyngeal Phase   Pharyngeal Phase: No overt s/s of aspiration on tsp of pureed (applesauce) or thin via cup poured by SLP.     Prognosis  Individuals consulted  Consulted and agree with results and recommendations: Patient;RN    Education  Patient Education Response: No evidence of learning (pt acutely confused; asks same questions over and over)             Therapy Time   1:00 pm - 1:26 pm            MARNIE Martínez  1/28/2023 1:38 PM

## 2023-01-28 NOTE — PROGRESS NOTES
Pulmonary Critical Care Progress Note       Patient seen for the follow up of  COVID-19 acute hypoxic respiratory insufficiency, pulmonary edema, pleural effusions    Subjective:  She still ICU. Blood pressure remained stable without hypotension. She is still confused requiring a sitter. She is on oxygen at 2 L nasal cannula. She bradycardic in A. fib and Coreg was held. She is oriented to self does not volunteer history. .  She denies significant shortness of breath or chest pain. She had no significant oral intake. She has been trying to get out of bed  Examination:  Vitals: BP (!) 123/93   Pulse 78   Temp 97.2 °F (36.2 °C) (Temporal)   Resp 21   Ht 5' 4\" (1.626 m)   Wt 139 lb 12.8 oz (63.4 kg)   SpO2 93%   BMI 24.00 kg/m²   General appearance: Awake, confused, very hard of hearing oriented to self. Neck: No JVD  Lungs: Decreased breath sounds no significant wheezing, occasional crackles  Heart: regular rate and rhythm, S1, S2 normal, no gallop  Abdomen: Soft, non tender, + BS  Extremities: no cyanosis or clubbing. Trace edema. LABs:  CBC:   Recent Labs     01/26/23  0514 01/27/23  0500 01/28/23  0534   WBC 11.0 10.6 10.6   HGB 12.4 12.3 11.3*   HCT 40.5 39.4 36.4    232 217       BMP:   Recent Labs     01/26/23  0514 01/26/23  1803 01/27/23  0500 01/28/23  0534    140 140 143   K 4.8 5.1 4.9 4.8   CO2 23 23 22 23   BUN 43* 39* 41* 42*   CREATININE 1.00* 0.75 0.80 0.83   LABGLOM 54* >60 >60 >60   GLUCOSE 221* 203* 183* 245*          Latest Reference Range & Units Most Recent   Procalcitonin <0.09 ng/mL 0.55 (H)  1/22/23 13:40   (H): Data is abnormally high    Radiology:  CT chest 1/22/2023      X-ray chest 1/26/2023    Stable right perihilar patchy opacities, concerning for aspiration/pneumonia. Unchanged enlarged cardiomediastinal silhouette. No pleural effusion. No   pneumothorax. Bony structures unremarkable.        Impression:  Acute hypoxic respiratory failure  COVID pneumonia  Pulmonary edema  Small bilateral pleural effusions right greater than left  Elevated troponin / History of A-fib, not on anticoag d/t age, frailty and hx of GIB per cardiology   Allergic rhinitis, DM, HTN  Ileus    Recommendations:  Oxygen via nasal cannula, keep SPO2 90% or greater   Incentive spirometry every hour while awake   Precedex drip for agitation  Albuterol every 6 hours as needed  Status post Levaquin course  IV Decadron 6 milligrams daily x10 days  Infectious disease on consult, no indication for antiviral or immunosuppressive therapy at this time  CXR/ VBG  Cardiology following  Coreg on hold- monitor BP/HR   Consult dietitian/start TPN/n.p.o.  Neurology following  PT/OT  Rehab discharge planning per primary team  Palliative care on consult   Discussed with RN  Peptic ulcer disease prophylaxis  DVT prophylaxis    Electronically signed by     Deon Hu MD on 1/28/2023 at 12:33 PM  Pulmonary Critical Care and Sleep Medicine,  [facility]: [phone]

## 2023-01-28 NOTE — PROGRESS NOTES
Comprehensive Nutrition Assessment    Type and Reason for Visit:  Consult (TPN ordering and management)    Nutrition Recommendations/Plan:   Continue current diet and supplements. Start TPN via central line custom 2/1 at 41.6 ml/hr (1000 ml total volume) with 100 ml lipids, 20 units insulin, remove MVI and trace elements. Monitor PO intakes, TPN rate/tolerance, weight, and labs. Malnutrition Assessment:  Malnutrition Status: At risk for malnutrition (Comment) (01/24/23 4562)    Context:  Acute Illness     Findings of the 6 clinical characteristics of malnutrition:  Energy Intake:  50% or less of estimated energy requirements for 5 or more days  Weight Loss:  No significant weight loss     Body Fat Loss:  No significant body fat loss     Muscle Mass Loss:  Unable to assess    Fluid Accumulation:  Unable to assess     Strength:  Normal  strength    Nutrition Assessment:    RN reports patient is confused, combative, refusing to eat, has a sitter. On Oxygen 2L via nasal canula, hard of hearing, COREG on hold. Will start TPN via central line at 41.6 ml/hr (1000 ml total volume), monitor TPN rate/tolerance, GI status, weight, and labs. Noted 14 lbs weight loss in 4 days likely due to poor or no oral intakes for 7days. Nutrition Related Findings:    Edema: trace all extremities. Bowel sounds active. Diarrhea. Brown large stool. Wound Type: Venous Stasis       Current Nutrition Intake & Therapies:    Average Meal Intake: 0%  Average Supplements Intake: 0%  ADULT DIET; Easy to Chew; 4 carb choices (60 gm/meal);  Low Fat/Low Chol/High Fiber/SHAHZAD  ADULT ORAL NUTRITION SUPPLEMENT; Breakfast, Dinner; Diabetic Oral Supplement  Current Parenteral Nutrition Orders:  Type and Formula: Premix Central, 2-in-1 Custom   Lipids: 100ml  Duration: Continuous  Rate/Volume: 41.6 ml/hr 1000 ml total volume  Current PN Order Provides: 1080 kcal, 50 gm protein  Goal PN Orders Provides: 1800-8381 kcal, 83-90 gm protein Anthropometric Measures:  Height: 5' 4\" (162.6 cm)  Ideal Body Weight (IBW): 120 lbs (55 kg)       Current Body Weight: 139 lb 12.8 oz (63.4 kg), 127.5 % IBW. Weight Source: Bed Scale  Current BMI (kg/m2): 24        Weight Adjustment For: No Adjustment                 BMI Categories: Normal Weight (BMI 22.0 to 24.9) age over 72    Estimated Daily Nutrient Needs:  Energy Requirements Based On: Kcal/kg  Weight Used for Energy Requirements: Current  Energy (kcal/day): 8066-4530 kcal (20-23 kcal/kg)  Weight Used for Protein Requirements: Current  Protein (g/day): 83-90 gm of protein (1.2-1.3 gm/kg)       Nutrition Diagnosis:   Inadequate protein-energy intake related to inadequate protein-energy intake as evidenced by intake 0-25%, nutrition support - parenteral nutrition    Nutrition Interventions:   Food and/or Nutrient Delivery: Continue Current Diet, Continue Oral Nutrition Supplement, Start Parenteral Nutrition  Nutrition Education/Counseling: Education not indicated  Coordination of Nutrition Care: Continue to monitor while inpatient       Goals:     Goals: Meet at least 75% of estimated needs, Initiate nutrition support, Tolerate nutrition support at goal rate       Nutrition Monitoring and Evaluation:   Behavioral-Environmental Outcomes: None Identified  Food/Nutrient Intake Outcomes: Diet Advancement/Tolerance, Food and Nutrient Intake, Supplement Intake  Physical Signs/Symptoms Outcomes: Biochemical Data, Fluid Status or Edema, Skin, Weight    Discharge Planning:     Too soon to determine       Some areas of assessment may be incomplete due to COVID-19 precautions    Nicole Anne RD, LD  Office phone [phone]

## 2023-01-29 NOTE — PROGRESS NOTES
Per patient's daughter, Reginotesfaye symptoms began 1/15. Patient's daughter questioning when patient will be removed from isolation. Nurse paged Dr. Maia Wing to inform of onset of symptoms/ask if patient can be taken out of isolation. Dr. Maia Wing stated if patient's daughter is sure patient's symptoms began 1/15 then patient could be taken out of isolation, otherwise COVID positive patient's are typically taken out of isolation 10 days from admission testing. Pure daughter, patient's symptoms for sure began 1/15. Nurse will discuss removing patient out of isolation with infection prevention nurse tomorrow.

## 2023-01-29 NOTE — PLAN OF CARE
Problem: Discharge Planning  Goal: Discharge to home or other facility with appropriate resources  1/29/2023 0510 by Gabriela Watson RN  Outcome: Progressing  Flowsheets (Taken 1/28/2023 1930)  Discharge to home or other facility with appropriate resources:   Identify barriers to discharge with patient and caregiver   Arrange for needed discharge resources and transportation as appropriate   Identify discharge learning needs (meds, wound care, etc)  1/28/2023 1610 by Vance Nelson RN  Outcome: Progressing     Problem: Skin/Tissue Integrity  Goal: Absence of new skin breakdown  Description: 1. Monitor for areas of redness and/or skin breakdown  2. Assess vascular access sites hourly  3. Every 4-6 hours minimum:  Change oxygen saturation probe site  4. Every 4-6 hours:  If on nasal continuous positive airway pressure, respiratory therapy assess nares and determine need for appliance change or resting period. 1/29/2023 0510 by Gabriela Watson RN  Outcome: Progressing  1/28/2023 1610 by Vance Nelson RN  Outcome: Progressing     Problem: Safety - Adult  Goal: Free from fall injury  1/29/2023 0510 by Gabriela Watson RN  Outcome: Progressing  1/28/2023 1610 by Vance Nelson RN  Outcome: Progressing  Note: Pt fall risk, fall band present, falling star, safety alarm activated and in use as needed. Hourly rounding performed. Pt encouraged to use call light. See Debby Locke fall risk assessment. Will continue to monitor patient closely.      Problem: ABCDS Injury Assessment  Goal: Absence of physical injury  1/29/2023 0510 by Gabriela Watson RN  Outcome: Progressing  1/28/2023 1610 by Vance Nelson RN  Outcome: Progressing     Problem: Chronic Conditions and Co-morbidities  Goal: Patient's chronic conditions and co-morbidity symptoms are monitored and maintained or improved  1/29/2023 0510 by Gabriela Watson RN  Outcome: Progressing  Flowsheets (Taken 1/28/2023 1930)  Care Plan - Patient's Chronic Conditions and Co-Morbidity Symptoms are Monitored and Maintained or Improved:   Monitor and assess patient's chronic conditions and comorbid symptoms for stability, deterioration, or improvement   Collaborate with multidisciplinary team to address chronic and comorbid conditions and prevent exacerbation or deterioration  1/28/2023 1610 by Yang Lindo RN  Outcome: Progressing     Problem: Cardiovascular - Adult  Goal: Maintains optimal cardiac output and hemodynamic stability  1/29/2023 0510 by Collins Antoine RN  Outcome: Progressing  1/28/2023 1610 by Yang Lindo RN  Outcome: Progressing     Problem: Respiratory - Adult  Goal: Achieves optimal ventilation and oxygenation  1/29/2023 0510 by Collins Antoine RN  Outcome: Progressing  1/28/2023 1610 by Yang Lindo RN  Outcome: Progressing  Note: Patient on 2L NC, tolerating well. Will continue to monitor patient closely.      Problem: Gastrointestinal - Adult  Goal: Maintains or returns to baseline bowel function  1/29/2023 0510 by Collins Antoine RN  Outcome: Progressing  1/28/2023 1610 by Yang Lindo RN  Outcome: Progressing     Problem: Metabolic/Fluid and Electrolytes - Adult  Goal: Glucose maintained within prescribed range  1/29/2023 0510 by Collins Antoine RN  Outcome: Progressing  Flowsheets (Taken 1/28/2023 1930)  Glucose maintained within prescribed range:   Monitor blood glucose as ordered   Assess for signs and symptoms of hyperglycemia and hypoglycemia  1/28/2023 1610 by Yang Lindo RN  Outcome: Progressing     Problem: Musculoskeletal - Adult  Goal: Return mobility to safest level of function  1/29/2023 0510 by Collins Antoine RN  Outcome: Progressing  1/28/2023 1610 by Yang Lindo RN  Outcome: Progressing  Goal: Return ADL status to a safe level of function  1/29/2023 0510 by Collins Antoine RN  Outcome: Progressing  1/28/2023 1610 by Yang Lindo RN  Outcome: Progressing     Problem: Pain  Goal: Verbalizes/displays adequate comfort level or baseline comfort level  1/29/2023 0510 by Luisa Hernandez RN  Outcome: Progressing  1/28/2023 1610 by Betzy Pimentel RN  Outcome: Progressing     Problem: Nutrition Deficit:  Goal: Optimize nutritional status  1/28/2023 1610 by Betzy Pimentel RN  Outcome: Progressing  Flowsheets (Taken 1/28/2023 1318 by Bhavin Limon, RD, LD)  Nutrient intake appropriate for improving, restoring, or maintaining nutritional needs:   Assess nutritional status and recommend course of action   Recommend, monitor, and adjust tube feedings and TPN/PPN based on assessed needs     Problem: Neurosensory - Adult  Goal: Achieves maximal functionality and self care  1/29/2023 0510 by Luisa Hernandez RN  Outcome: Progressing  1/28/2023 1610 by Betzy Pimentel RN  Outcome: Progressing

## 2023-01-29 NOTE — PROGRESS NOTES
[address] Hospitalist        1/29/2023   1:56 PM    Name:  Chelly Uribe  MRN:    [id number]     [place]:     [de-identified]   Room:  [address] [place] [address] Day: 7     Admit Date: 1/21/2023 11:39 PM  PCP: Teodora Mora MD    C/C:   Chief Complaint   Patient presents with    Shortness of Breath       Assessment:      Acute on chronic congestive heart failure  COVID-19 pneumonia  Hypotension  Acute metabolic encephalopathy  Nonvaccinated against SARS-CoV-2  Respiratory failure with hypoxia requiring oxygen via nasal cannula  Hypomagnesemia  Hypokalemia  Question of bacterial pneumonia  Essential hypertension  Diabetes mellitus type 2  Paroxysmal atrial fibrillation  Congestive heart failure, type unknown  Osteoarthritis, multiple joints  History of colon cancer  History of skin cancer  Right eye vision loss      Plan:      Patient has become hypotensive, bradycardic, further she is more altered, will transfer patient to medical ICU  Critical care following  Patient also have minimal p.o. intake patient continues to refuse care, has minimal p.o. intake  Consult dietitian, start patient on TPN    Monitor vitals closely  Keep SPO2 above 90%  I's and O's  IV heplock  Pain control  Antiemetics as needed  Levaquin completed  Decadron  Bumex, Coreg, Entresto  Accu-Cheks before meals and at bedtime  Insulin sliding scale medium  Hypoglycemia protocol  And has elevated blood glucose and is refusing insulin  CBC , BMP, BNP  Pulmonary consulted  Consult cardiology recommended patient was not on anticoagulation due to GI bleed in the past  Prognosis is guarded  DVT and GI prophylaxis.   Continue medication as below  Discussed with daughter at bedside in detail      Scheduled Meds:   insulin lispro  0-8 Units SubCUTAneous Q6H    pantoprazole (PROTONIX) 40 mg injection  40 mg IntraVENous Daily    fat emulsion  100 mL IntraVENous Daily    dexamethasone  6 mg IntraVENous Daily    QUEtiapine  25 mg Oral BID    sodium chloride flush  5-40 mL IntraVENous 2 times per day    [Held by provider] carvedilol  12.5 mg Oral BID WC    sacubitril-valsartan  1 tablet Oral BID    cholestyramine light  4 g Oral BID    [Held by provider] glimepiride  4 mg Oral BID WC     Continuous Infusions:   PN-Adult 2-in-1 Central Line (Standard)      dextrose 5 % and 0.45 % NaCl Stopped (23 1700)    dexmedetomidine (PRECEDEX) IV infusion Stopped (23 0349)    PN-Adult 2-in-1 Central Line (Standard) 42.8 mL/hr at 23 0513    sodium chloride Stopped (235)    dextrose       PRN Meds:  sodium phosphate IVPB, 10 mmol, PRN   Or  sodium phosphate IVPB, 15 mmol, PRN   Or  sodium phosphate IVPB, 20 mmol, PRN  sodium chloride flush, 10 mL, PRN  sodium chloride, , PRN  magnesium sulfate, 1,000 mg, PRN  ondansetron, 4 mg, Q8H PRN   Or  ondansetron, 4 mg, Q6H PRN  polyethylene glycol, 17 g, Daily PRN  acetaminophen, 650 mg, Q6H PRN   Or  acetaminophen, 650 mg, Q6H PRN  glucose, 4 tablet, PRN  dextrose bolus, 125 mL, PRN   Or  dextrose bolus, 250 mL, PRN  glucagon (rDNA), 1 mg, PRN  dextrose, , Continuous PRN  albuterol, 2.5 mg, Q6H PRN          Subjective:     I have seen and examined patient today, patient last 24 hours events were reviewed also discussed with nursing staff  No new complaints  Overnight patient did not had any acute issues  No nausea vomiting diarrhea  Afebrile  Pt. Denies any CP, SOB, palpitation, HA, dizziness, chills, cough, cold, changes in urination, BM or skin changes .    Patient remain sleepy  Remains uncooperative    ROS:  Unable to assess due to patient mental status        Physical Examination:      Vitals:  /66   Pulse 65   Temp 97 °F (36.1 °C) (Temporal)   Resp 21   Ht 5' 4\" (1.626 m)   Wt 139 lb 12.8 oz (63.4 kg)   SpO2 98%   BMI 24.00 kg/m²   Temp (24hrs), Av.3 °F (36.3 °C), Min:96.8 °F (36 °C), Max:97.9 °F (36.6 °C)    Weight:   Wt Readings from Last 3 Encounters:   23 139 lb 12.8 oz (63.4 kg)   01/13/23 150 lb 1.6 oz (68.1 kg)   10/20/22 150 lb (68 kg)     I/O last 3 completed shifts:  I/O last 3 completed shifts: In: 1145.3 [I.V.:545.6]  Out: -      Recent Labs     01/28/23 2004 01/29/23  0257 01/29/23  0722 01/29/23  1036   POCGLU 266* 306* 352* 297*           General appearance -patient is sleepy but arousable, mentation is better  Mental status -not oriented to person, place, and time with normal affect  Head - normocephalic and atraumatic  Eyes - pupils equal and reactive, extraocular eye movements intact, conjunctiva clear  Ears - hearing appears to be intact  Nose - no drainage noted  Mouth - mucous membranes moist  Neck - supple, no carotid bruits, thyroid not palpable  Chest - clear to auscultation, normal effort  Heart - normal rate, regular rhythm, no murmur  Abdomen - soft, nontender, nondistended, bowel sounds present all four quadrants, no masses, hepatomegaly or splenomegaly  Neurological -unable to assess  Extremities - peripheral pulses palpable, no pedal edema or calf pain with palpation  Skin - no gross lesions, rashes, or induration noted        Medications: Allergies:    Allergies   Allergen Reactions    Acetaminophen-Codeine Nausea Only    Codeine Nausea Only    Furosemide      Other reaction(s): GI Disturbance    Linagliptin      Other reaction(s): SOB    Sitagliptin      Other reaction(s): felt poorly    Other Itching     Animal Dander       Current Meds:   Current Facility-Administered Medications:     insulin lispro (HUMALOG) injection vial 0-8 Units, 0-8 Units, SubCUTAneous, Q6H, PATRICK Jha - CNP, 4 Units at 01/29/23 1040    sodium phosphate 10 mmol in sodium chloride 0.9 % 250 mL IVPB, 10 mmol, IntraVENous, PRN, Stopped at 01/29/23 1100 **OR** sodium phosphate 15 mmol in sodium chloride 0.9 % 250 mL IVPB, 15 mmol, IntraVENous, PRN **OR** sodium phosphate 20 mmol in sodium chloride 0.9 % 500 mL IVPB, 20 mmol, IntraVENous, PRN, PATRICK Ornelas - CNP    PN-Adult 2-in-1 Central Line (Standard), , IntraVENous, Continuous TPN, Sylvia Bolaños MD    pantoprazole (PROTONIX) 40 mg in sodium chloride (PF) 0.9 % 10 mL injection, 40 mg, IntraVENous, Daily, Sylvia Bolaños MD, 40 mg at 01/29/23 0733    dextrose 5 % and 0.45 % sodium chloride infusion, , IntraVENous, Continuous, Krysten Brown MD, Stopped at 01/28/23 1700    dexmedetomidine (PRECEDEX) 400 mcg in sodium chloride 0.9 % 100 mL infusion, 0.1-1.5 mcg/kg/hr, IntraVENous, Continuous, Krysten Brown MD, Stopped at 01/29/23 0518    PN-Adult 2-in-1 Central Line (Custom), , IntraVENous, Continuous TPN, Kyle Faye MD, Last Rate: 42.8 mL/hr at 01/29/23 0513, Rate Verify at 01/29/23 0513    fat emulsion 20 % infusion 100 mL, 100 mL, IntraVENous, Daily, Kyle Faye MD, Stopped at 01/29/23 0528    dexamethasone (PF) (DECADRON) injection 6 mg, 6 mg, IntraVENous, Daily, Desean Bolaños MD, 6 mg at 01/29/23 5594    QUEtiapine (SEROQUEL) tablet 25 mg, 25 mg, Oral, BID, Viry Almendarez MD    sodium chloride flush 0.9 % injection 5-40 mL, 5-40 mL, IntraVENous, 2 times per day, Gardenia A Lump, APRN - CNP, 10 mL at 01/29/23 0800    sodium chloride flush 0.9 % injection 10 mL, 10 mL, IntraVENous, PRN, Gardenia A Lump, APRN - CNP, 10 mL at 01/22/23 1616    0.9 % sodium chloride infusion, , IntraVENous, PRN, Melissa Chamber Lump, APRN - CNP, Stopped at 01/26/23 2225    magnesium sulfate 1000 mg in dextrose 5% 100 mL IVPB, 1,000 mg, IntraVENous, PRN, Campus Chamber Lump, APRN - CNP, Stopped at 01/22/23 1715    ondansetron (ZOFRAN-ODT) disintegrating tablet 4 mg, 4 mg, Oral, Q8H PRN, 4 mg at 01/23/23 2013 **OR** ondansetron (ZOFRAN) injection 4 mg, 4 mg, IntraVENous, Q6H PRN, Gardenia A Lump, APRN - CNP    polyethylene glycol (GLYCOLAX) packet 17 g, 17 g, Oral, Daily PRN, Gardenia A Lump, APRN - CNP    acetaminophen (TYLENOL) tablet 650 mg, 650 mg, Oral, Q6H PRN, 650 mg at 01/24/23 0907 **OR** acetaminophen (TYLENOL) suppository 650 mg, 650 mg, Rectal, Q6H PRN, Gardenia A Lump, APRN - CNP    glucose chewable tablet 16 g, 4 tablet, Oral, PRN, Gardenia A Lump, APRN - CNP    dextrose bolus 10% 125 mL, 125 mL, IntraVENous, PRN **OR** dextrose bolus 10% 250 mL, 250 mL, IntraVENous, PRN, Gardenia A Lump, APRN - CNP    glucagon (rDNA) injection 1 mg, 1 mg, SubCUTAneous, PRN, Gardenia A Lump, APRN - CNP    dextrose 10 % infusion, , IntraVENous, Continuous PRN, Gardenia A Lump, APRN - CNP    albuterol (PROVENTIL) nebulizer solution 2.5 mg, 2.5 mg, Nebulization, Q6H PRN, Gardenia A Lump, APRN - CNP    [Held by provider] carvedilol (COREG) tablet 12.5 mg, 12.5 mg, Oral, BID , Haleigh Wilson MD, 12.5 mg at 01/26/23 1229    sacubitril-valsartan (ENTRESTO) 24-26 MG per tablet 1 tablet, 1 tablet, Oral, BID, Haleigh Wilson MD, 1 tablet at 01/26/23 1229    cholestyramine light packet 4 g, 4 g, Oral, BID, Haleigh Wilson MD, 4 g at 01/26/23 0047    [Held by provider] glimepiride (AMARYL) tablet 4 mg (Patient Supplied), 4 mg, Oral, BID , Haleigh Wilson MD, 4 mg at 01/25/23 1903      I/O (24Hr):     Intake/Output Summary (Last 24 hours) at 1/29/2023 1356  Last data filed at 1/29/2023 0513  Gross per 24 hour   Intake 1145.28 ml   Output --   Net 1145.28 ml         Data:           Labs:    Hematology:  Recent Labs     01/27/23  0500 01/28/23  0534 01/29/23  0300   WBC 10.6 10.6 8.3   RBC 4.41 4.06 3.70*   HGB 12.3 11.3* 10.3*   HCT 39.4 36.4 33.3*   MCV 89.3 89.7 90.0   MCH 27.9 27.8 27.8   MCHC 31.2 31.0 30.9   RDW 14.7* 14.9* 14.9*    217 186   MPV 10.5 10.0 10.7       Chemistry:  Recent Labs     01/26/23  1803 01/27/23  0500 01/28/23  0534 01/28/23  1340 01/29/23  0300    140 143  --  140   K 5.1 4.9 4.8  --  5.1   * 110* 112*  --  112*   CO2 23 22 23  --  24   GLUCOSE 203* 183* 245*  --  377*   BUN 39* 41* 42*  --  38*   CREATININE 0.75 0.80 0.83  --  0.78   MG 1.7  --   --   --   --    ANIONGAP 7* 8* 8*  --  4*   LABGLOM >60 >60 >60  --  >60   CALCIUM 8.5* 8.6 8.4* --  8.3*   PHOS  --   --   --   --  2.3*   PROBNP  --   --   --  7,046*  --    TROPHS  --   --   --  47*  --        Recent Labs     01/28/23  1058 01/28/23  1711 01/28/23 2004 01/29/23  0257 01/29/23  0300 01/29/23  0722 01/29/23  1036   PROT  --   --   --   --  4.4*  --   --    LABALBU  --   --   --   --  2.2*  --   --    AST  --   --   --   --  30  --   --    ALT  --   --   --   --  16  --   --    ALKPHOS  --   --   --   --  96  --   --    BILITOT  --   --   --   --  0.4  --   --    TRIG  --   --   --   --  112  --   --    POCGLU 190* 218* 266* 306*  --  352* 297*         Lab Results   Component Value Date/Time    SPECIAL NOT REPORTED 11/05/2017 01:55 PM     Lab Results   Component Value Date/Time    CULTURE ESCHERICHIA COLI >472222 CFU/ML (A) 10/20/2022 05:48 PM       No results found for: POCPH, PHART, PH, POCPCO2, EFL2QDR, PCO2, POCPO2, PO2ART, PO2, POCHCO3, RMS7BAP, HCO3, NBEA, PBEA, BEART, BE, THGBART, THB, IQP3HTJ, AYTS2FRT, Q0TTIOFH, O2SAT, FIO2    Radiology:    CT CHEST WO CONTRAST    Result Date: 1/22/2023  Multifocal ground-glass opacities indeterminate for COVID-19. Right greater than left pleural effusions. Ileus. Cardiomegaly and coronary artery disease. XR CHEST PORTABLE    Result Date: 1/24/2023  More prominent bilateral interstitial and ground-glass opacities, which may represent developing edema versus multifocal infection. Grossly similar appearance of small effusions. XR CHEST PORTABLE    Result Date: 1/21/2023  New small right effusion and airspace disease. Possible mild CHF.          All radiological studies reviewed  Code Status:  Full Code        Electronically signed by Brandy Ferguson MD on 1/29/2023 at 1:56 PM    This note was created with the assistance of a speech-recognition program.  Although the intention is to generate a document that actually reflects the content of the visit, no guarantees can be provided that every mistake has been identified and corrected by editing. Note was updated later by me after  physical examination and  completion of the assessment.

## 2023-01-29 NOTE — PROGRESS NOTES
Sitter and telesitter remained at bedside throughout the night. Patient was confused and intermittently agitated throughout the night. Started precedex around midnight to help agitation and it seemed to keep patient calm. Stopped precedex drip due to low heart rate and blood pressure. Patient remained on 2 L NC. Gave 6 units of humalog during the night for coverage. Encouraged and helped patient reposition throughout the night.

## 2023-01-29 NOTE — PROGRESS NOTES
Pulmonary Critical Care Progress Note       Patient seen for the follow up of  COVID-19 acute hypoxic respiratory insufficiency, pulmonary edema, pleural effusions    Subjective: Kristina Bonilla She is still confused requiring a sitter. She is on oxygen at 2 L nasal cannula. She became bradycardic on precedex . She had low HR and A. fib and Coreg was held. She is oriented to self does not volunteer history. .  She denies significant shortness of breath or chest pain. She had no significant oral intake. TPN was started. Examination:  Vitals: BP (!) 121/51   Pulse 71   Temp 97 °F (36.1 °C) (Temporal)   Resp 21   Ht 5' 4\" (1.626 m)   Wt 139 lb 12.8 oz (63.4 kg)   SpO2 99%   BMI 24.00 kg/m²   General appearance: Awake, confused, very hard of hearing oriented to self. Neck: No JVD  Lungs: Decreased breath sounds no significant wheezing, occasional crackles  Heart: regular rate and rhythm, S1, S2 normal, no gallop  Abdomen: Soft, non tender, + BS  Extremities: no cyanosis or clubbing. Trace edema. LABs:  CBC:   Recent Labs     01/27/23  0500 01/28/23  0534 01/29/23  0300   WBC 10.6 10.6 8.3   HGB 12.3 11.3* 10.3*   HCT 39.4 36.4 33.3*    217 186       BMP:   Recent Labs     01/26/23  1803 01/27/23  0500 01/28/23  0534 01/29/23  0300    140 143 140   K 5.1 4.9 4.8 5.1   CO2 23 22 23 24   BUN 39* 41* 42* 38*   CREATININE 0.75 0.80 0.83 0.78   LABGLOM >60 >60 >60 >60   GLUCOSE 203* 183* 245* 377*          Latest Reference Range & Units Most Recent   Procalcitonin <0.09 ng/mL 0.55 (H)  1/22/23 13:40   (H): Data is abnormally high    Radiology:  CT chest 1/22/2023      X-ray chest 1/26/2023    Stable right perihilar patchy opacities, concerning for aspiration/pneumonia. Unchanged enlarged cardiomediastinal silhouette. No pleural effusion. No   pneumothorax. Bony structures unremarkable.        Impression:  Acute hypoxic respiratory failure  COVID pneumonia  Pulmonary edema  Small bilateral pleural effusions right greater than left  Elevated troponin / History of A-fib, not on anticoag d/t age, frailty and hx of GIB per cardiology   Allergic rhinitis, DM, HTN  Ileus    Recommendations:  Oxygen via nasal cannula, keep SPO2 90% or greater   Incentive spirometry every hour while awake   Discontinue Precedex  Ativan 0.25 IV every 4 hours as needed/Haldol 2 mg IV every 4 hours as needed for backup  Albuterol every 6 hours as needed  Status post Levaquin course  IV Decadron 6 milligrams daily x10 days  Infectious disease on consult, no indication for antiviral or immunosuppressive therapy at this time  Cardiology following  Coreg on hold- monitor BP/HR   TPN/n.p.o. for now/consider PEG  Neurology following  PT/OT  Palliative care on consult   Discussed with RN and herman  Peptic ulcer disease prophylaxis  DVT prophylaxis    Electronically signed by     Mandeep Chen MD on 1/29/2023 at 4:07 PM  Pulmonary Critical Care and Sleep Medicine,  [facility]: [phone]

## 2023-01-29 NOTE — PROGRESS NOTES
Comprehensive Nutrition Assessment    Type and Reason for Visit:  Reassess    Nutrition Recommendations/Plan:   Diet and oral supplements per nursing discretion as accepted by patient. Continue TPN via central line custom 2/1 at 41.6 ml/hr (1000 ml total volume) with 100 ml lipids. Increase insulin from 20 to 40 units, no MVI and trace elements. Monitor PO intakes, TPN rate/tolerance, weight, and labs. Malnutrition Assessment:  Malnutrition Status: At risk for malnutrition (Comment) (01/24/23 3244)    Context:  Acute Illness     Findings of the 6 clinical characteristics of malnutrition:  Energy Intake:  50% or less of estimated energy requirements for 5 or more days  Weight Loss:  No significant weight loss     Body Fat Loss:  No significant body fat loss     Muscle Mass Loss:  Unable to assess    Fluid Accumulation:  Unable to assess     Strength:  Normal  strength    Nutrition Assessment:    Patient remains confused, agitated, refusing to eat or take oral medications. RN reports when food put in her mouth she didn't know what to do with it. But will keep trying oral intakes. RN said she will work with dietary not to send full meal tray. RN said blood sugars are high due to steroids, she is started on insulin sliding scale but would like insulin increased in TPN. Will continue TPN at 41.6 ml/hr (1000 ml total volume), 100 ml lipids, no MVI or trace elements, and 40 units of insulin. Monitor TPN rate/tolerance, oral ontakes if any, weight, and labs. Nutrition Related Findings:    Edema: trace all extremities. Bowel sounds active. Hx of colon and skin cancer. Phosphorus replacement 1/28. Wound Type: Venous Stasis       Current Nutrition Intake & Therapies:    Average Meal Intake: 0%, Refusing to eat  Average Supplements Intake: Refusing to take, 0%  ADULT DIET; Easy to Chew; 4 carb choices (60 gm/meal);  Low Fat/Low Chol/High Fiber/SHAHZAD  ADULT ORAL NUTRITION SUPPLEMENT; Breakfast, Dinner; Diabetic Oral Supplement  PN-Adult 2-in-1 Central Line (Custom)    Anthropometric Measures:  Height: 5' 4\" (162.6 cm)  Ideal Body Weight (IBW): 120 lbs (55 kg)       Current Body Weight: 139 lb 12.8 oz (63.4 kg), 127.5 % IBW. Weight Source: Bed Scale  Current BMI (kg/m2): 24        Weight Adjustment For: No Adjustment                 BMI Categories: Normal Weight (BMI 22.0 to 24.9) age over 72    Estimated Daily Nutrient Needs:  Energy Requirements Based On: Kcal/kg  Weight Used for Energy Requirements: Current  Energy (kcal/day): 3481-8441 kcal (20-23 kcal/kg)  Weight Used for Protein Requirements: Current  Protein (g/day): 83-90 gm of protein (1.2-1.3 gm/kg)       Nutrition Diagnosis:   Inadequate protein-energy intake related to inadequate protein-energy intake as evidenced by intake 0-25%, nutrition support - parenteral nutrition    Nutrition Interventions:   Food and/or Nutrient Delivery: Continue Current Diet, Continue Oral Nutrition Supplement, Start Parenteral Nutrition  Nutrition Education/Counseling: Education not indicated  Coordination of Nutrition Care: Continue to monitor while inpatient       Goals:     Goals: Meet at least 75% of estimated needs, Initiate nutrition support, Tolerate nutrition support at goal rate       Nutrition Monitoring and Evaluation:   Behavioral-Environmental Outcomes: None Identified  Food/Nutrient Intake Outcomes: Diet Advancement/Tolerance, Food and Nutrient Intake, Supplement Intake  Physical Signs/Symptoms Outcomes: Biochemical Data, Fluid Status or Edema, Skin, Weight    Discharge Planning:     Too soon to determine       Some areas of assessment may be incomplete due to COVID-19 precautions    Page AISHA Curtis, LD  Office phone [phone]

## 2023-01-29 NOTE — PROGRESS NOTES
[facility])    Progress Note    2023 9:12 AM      Subjective:  Ms. Anabel Haider  is confused  Not taking oral meds due to confusion  Overnight had some imelda, but precedex stopped           LABS:     CBC:   Recent Labs     23  0500 23  0534 23  0300   WBC 10.6 10.6 8.3   HGB 12.3 11.3* 10.3*   HCT 39.4 36.4 33.3*   MCV 89.3 89.7 90.0    217 186     BMP:   Recent Labs     23  0500 23  0534 23  0300    143 140   K 4.9 4.8 5.1   * 112* 112*   CO2  24   PHOS  --   --  2.3*   BUN 41* 42* 38*   CREATININE 0.80 0.83 0.78     PT/INR: No results for input(s): PROTIME, INR in the last 72 hours. APTT: No results for input(s): APTT in the last 72 hours. MAG:   Recent Labs     23  1803   MG 1.7     D Dimer: No results for input(s): DDIMER in the last 72 hours. Troponin T No results for input(s): TROPONINT in the last 72 hours. Pro-BNP No results for input(s): BNP in the last 72 hours. Pulse Ox:  SpO2  Av.8 %  Min: 85 %  Max: 100 %  Supplemental O2: O2 Flow Rate (L/min): 2 L/min     Current Meds: insulin lispro (HUMALOG) injection vial 0-8 Units, Q6H  sodium phosphate 10 mmol in sodium chloride 0.9 % 250 mL IVPB, PRN   Or  sodium phosphate 15 mmol in sodium chloride 0.9 % 250 mL IVPB, PRN   Or  sodium phosphate 20 mmol in sodium chloride 0.9 % 500 mL IVPB, PRN  pantoprazole (PROTONIX) 40 mg in sodium chloride (PF) 0.9 % 10 mL injection, Daily  dextrose 5 % and 0.45 % sodium chloride infusion, Continuous  dexmedetomidine (PRECEDEX) 400 mcg in sodium chloride 0.9 % 100 mL infusion, Continuous  PN-Adult 2-in-1 Central Line (Custom), Continuous TPN  fat emulsion 20 % infusion 100 mL, Daily  dexamethasone (PF) (DECADRON) injection 6 mg, Daily  QUEtiapine (SEROQUEL) tablet 25 mg, BID  sodium chloride flush 0.9 % injection 5-40 mL, 2 times per day  sodium chloride flush 0.9 % injection 10 mL, PRN  0.9 % sodium chloride infusion, PRN  magnesium sulfate 1000 mg in dextrose 5% 100 mL IVPB, PRN  ondansetron (ZOFRAN-ODT) disintegrating tablet 4 mg, Q8H PRN   Or  ondansetron (ZOFRAN) injection 4 mg, Q6H PRN  polyethylene glycol (GLYCOLAX) packet 17 g, Daily PRN  acetaminophen (TYLENOL) tablet 650 mg, Q6H PRN   Or  acetaminophen (TYLENOL) suppository 650 mg, Q6H PRN  glucose chewable tablet 16 g, PRN  dextrose bolus 10% 125 mL, PRN   Or  dextrose bolus 10% 250 mL, PRN  glucagon (rDNA) injection 1 mg, PRN  dextrose 10 % infusion, Continuous PRN  albuterol (PROVENTIL) nebulizer solution 2.5 mg, Q6H PRN  [Held by provider] carvedilol (COREG) tablet 12.5 mg, BID WC  sacubitril-valsartan (ENTRESTO) 24-26 MG per tablet 1 tablet, BID  cholestyramine light packet 4 g, BID  [Held by provider] glimepiride (AMARYL) tablet 4 mg (Patient Supplied), BID WC      Continuous Infusions:    dextrose 5 % and 0.45 % NaCl Stopped (01/28/23 1700)    dexmedetomidine (PRECEDEX) IV infusion Stopped (01/29/23 0349)    PN-Adult 2-in-1 Central Line (Standard) 42.8 mL/hr at 01/29/23 0513    sodium chloride Stopped (01/26/23 2225)    dextrose            VITAL SIGNS:    /66   Pulse 77   Temp 97.9 °F (36.6 °C) (Temporal)   Resp 25   Ht 5' 4\" (1.626 m)   Wt 139 lb 12.8 oz (63.4 kg)   SpO2 98%   BMI 24.00 kg/m²  2 L/min      Admit Weight:  150 lb (68 kg)    Last 3 weights: Wt Readings from Last 3 Encounters:   01/26/23 139 lb 12.8 oz (63.4 kg)   01/13/23 150 lb 1.6 oz (68.1 kg)   10/20/22 150 lb (68 kg)       BMI: Body mass index is 24 kg/m². INPUT/OUTPUT:        Intake/Output Summary (Last 24 hours) at 1/29/2023 0912  Last data filed at 1/29/2023 0513  Gross per 24 hour   Intake 1145.28 ml   Output --   Net 1145.28 ml       EKG: tele rate controlled a.fib  Echo 1/23 at [facility]    Left Ventricle: Systolic function is moderately to severely decreased   with an ejection fraction of 30-35%. Tricuspid Valve:  The right ventricular systolic pressure is severely elevated. RVSP calculated at 61 mmHg. RVSP is based on RA pressure of 8   mmHg. There is severe pulmonary hypertension. RV pressure overload noted. Mitral Valve: There is moderate to severe regurgitation. There is no   evidence of mitral valve stenosis. Left Ventricle   Left ventricle appears normal in size. Wall thickness is normal. Systolic function is moderately to severely decreased with an ejection fraction of 30-35%. See wall score diagram for wall motion abnormalities. There is abnormal septal motion. Unable to assess diastolic function. EXAM:       Lungs: dimished  Heart: irregular  Abdomen: Soft nontender      Principal Problem:    COVID-19  Active Problems:    Non-pressure chronic ulcer of left calf with fat layer exposed ([address]  Resolved Problems:    * No resolved hospital problems. *      ASSESSMENT / PLAN    Admitted with COVID 19 respiratory failure  H/o of a.fib  HFrEF acute on chronic      Not taking oral meds  Not on a/c due to prior GI bleed  Give diuretics as needed  Poor prognosis given advanced age and co morbidities.  3rd hospital admission in January (1 at [facility], 2 at [address] [place] [address])    Electronically signed by Ger Ireland MD on 1/29/2023 at 9:12 AM

## 2023-01-29 NOTE — PROGRESS NOTES
[facility]  Progress Note    Aide Butler  MRN: [id number]  Date: 1/29/2023  LOS: 7     Reason for F/U :   COVID-19 virus infection    Impression :   COVID-19 virus infection tested + 1/22/2023  Unvaccinated adult patient  Acute respiratory insufficiency requiring supplemental oxygen   Congestive heart failure with mild congestive changes on chest x-ray  Multifocal opacities in the bases/ pneumonia  Confusion  Right-sided greater than left-sided pleural effusion    Recommendations:   COVID therapy: The patient is not a candidate for antiviral therapy  Given the hypoxia the patient will continue Decadron-the patient has not been taking this as she has been refusing oral intake and this was switched to IV 1/27/2023  The patient is not a candidate for immunosuppressive therapy at this time and the CRP is not overly elevated  The CRP is decreased and the procalcitonin level remains low    Antibiotic therapy: The patient has completed a course of levofloxacin through 1/28/2023 t  The patient remains on 2 -3 L of oxygen by nasal cannula  Continue supportive therapy    Infection Control Recommendations:   Droplet plus precautions    Discharge Planning:   Estimated Length of IV antimicrobials: 5 days  Patient will need Midline Catheter Insertion/ PICC line Insertion: No  Patient will need: Home IV , [facility],  SNF,  LTAC: Undetermined  Patient willneed outpatient wound care: No    Medical Decision making / Summary of Stay:   Aide Butler is a 80y.o.-year-old female who was initially admitted on 1/21/2023.    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 She does not report any subjective fevers, chills no significant cough, no abdominal pain, no nausea vomiting, has chronic diarrhea. The patient was reporting some neck pain and again the neck pain has been previously evaluated with no source found. She reports that her daughter recently tested positive for COVID and work-up in the emergency department included a chest x-ray that showed a new small right effusion and airspace disease and possible mild CHF. COVID testing was done that was positive and the patient was admitted with COVID-19 virus infection as well as concern for congestive heart failure. The patient has since been seen by the pulmonary critical care service and was started on supplemental oxygen, IV Decadron, intravenous levofloxacin as there was concern for right basilar infiltrate/pneumonia and I was asked to evaluate and help with antibiotic choice. Current evaluation:2023    /66   Pulse 78   Temp 97.9 °F (36.6 °C) (Temporal)   Resp 23   Ht 5' 4\" (1.626 m)   Wt 139 lb 12.8 oz (63.4 kg)   SpO2 97%   BMI 24.00 kg/m²     Temperature Range: Temp: 97.9 °F (36.6 °C) Temp  Av.3 °F (36.3 °C)  Min: 96.8 °F (36 °C)  Max: 97.9 °F (36.6 °C)  The patient is seen and evaluated at bedside she is lying in bed sleeping and the sitter is in the room with her at the time of my evaluation. She remains on 2 L of oxygen by nasal cannula. They had not attempted to start on Precedex but she did get some bradycardia and this was discontinued. The patient was started on TPN/PPN    Review of Systems   Unable to perform ROS: Other     Physical Examination :     Physical Exam  Constitutional:       Appearance: She is well-developed. She is cachectic. HENT:      Head: Normocephalic and atraumatic. Mouth/Throat:      Mouth: Mucous membranes are dry. Cardiovascular:      Rate and Rhythm: Regular rhythm. Heart sounds: Normal heart sounds. Pulmonary:      Effort: Pulmonary effort is normal.      Breath sounds: Rales present. Abdominal:      General: Bowel sounds are normal.      Palpations: Abdomen is soft. Musculoskeletal:      Cervical back: Normal range of motion and neck supple. Right lower leg: Edema present. Left lower leg: Edema present. Skin:     General: Skin is warm and dry. Neurological:      Mental Status: She is alert and oriented to person, place, and time. Laboratory data:   I have independently reviewed the followinglabs:  CBC with Differential:   Recent Labs     01/28/23  0534 01/29/23  0300   WBC 10.6 8.3   HGB 11.3* 10.3*   HCT 36.4 33.3*    186   LYMPHOPCT 3* 1*   MONOPCT 6 5       BMP:   Recent Labs     01/26/23  1803 01/27/23  0500 01/28/23  0534 01/29/23  0300      < > 143 140   K 5.1   < > 4.8 5.1   *   < > 112* 112*   CO2 23   < > 23 24   BUN 39*   < > 42* 38*   CREATININE 0.75   < > 0.83 0.78   MG 1.7  --   --   --     < > = values in this interval not displayed.        Hepatic Function Panel:   Recent Labs     01/29/23  0300   PROT 4.4*   LABALBU 2.2*   BILITOT 0.4   ALKPHOS 96   ALT 16   AST 30           Lab Results   Component Value Date/Time    PROCAL 0.13 01/26/2023 05:14 AM    PROCAL 0.55 01/22/2023 01:40 PM     Lab Results   Component Value Date/Time    CRP 22.2 01/26/2023 05:14 AM    CRP 37.7 01/23/2023 05:13 AM    CRP 0.5 03/26/2018 03:47 PM     No results found for: [address]      Lab Results   Component Value Date/Time    DDIMER 1.81 01/26/2023 06:58 AM    DDIMER 0.86 03/26/2018 03:47 PM     No results found for: FERRITIN  No results found for: LDH  No results found for: FIBRINOGEN    Results in Past 30 Days  Result Component Current Result Ref Range Previous Result Ref Range   SARS-CoV-2, Rapid DETECTED (A) (1/22/2023) Not Detected Not in Time Range      Lab Results   Component Value Date/Time    COVID19 DETECTED 01/22/2023 12:30 AM    COVID19 Not Detected 02/09/2022 05:46 PM       No results for input(s): BJ in the last 72 hours. Imaging Studies:   ONE XRAY VIEW OF THE CHEST 1/28/2023 1:08 pm  Impression   Right central line tip near the cavoatrial junction, otherwise no significant   change compared with 01/24/2023. ONE XRAY VIEW OF THE CHEST 1/26/2023 6:44 am    Impression   Stable right perihilar patchy opacities, concerning for aspiration/pneumonia. ONE XRAY VIEW OF THE CHEST 1/24/2023 5:36 am  Impression   More prominent bilateral interstitial and ground-glass opacities, which may   represent developing edema versus multifocal infection. Grossly similar   appearance of small effusions. Cultures:     COVID-19, Rapid [[id number]] (Abnormal) Collected: 01/22/23 0030   Order Status: Completed Specimen: Nasopharyngeal Swab Updated: 01/22/23 0058    Specimen Description . NASOPHARYNGEAL SWAB    SARS-CoV-2, Rapid DETECTED Abnormal     Comment:        Rapid NAAT: The specimen is POSITIVE for SARS-Cov-2, the novel coronavirus associated with   COVID-19. This test has been authorized by the FDA under an Emergency Use Authorization (EUA) for use   by authorized laboratories. The ID NOW COVID-19 assay is designed to detect the virus that causes COVID-19 in patients   with signs and symptoms of infection who are suspected of COVID-19. An individual without symptoms of COVID-19 and who is not shedding SARS-CoV-2 virus would   expect to have a negative (not detected) result in this assay.    Fact sheet for Healthcare Providers: http://www.tara.ángel/   Fact sheet for Patients: http://www.tara.ángel/         Methodology: Isothermal Nucleic Acid Amplification         Results reported to the appropriate Health Department          Medications:      insulin lispro  0-8 Units SubCUTAneous Q6H    pantoprazole (PROTONIX) 40 mg injection  40 mg IntraVENous Daily    fat emulsion  100 mL IntraVENous Daily dexamethasone  6 mg IntraVENous Daily    QUEtiapine  25 mg Oral BID    sodium chloride flush  5-40 mL IntraVENous 2 times per day    [Held by provider] carvedilol  12.5 mg Oral BID WC    sacubitril-valsartan  1 tablet Oral BID    cholestyramine light  4 g Oral BID    [Held by provider] glimepiride  4 mg Oral BID WC       Electronically signed by Bharat Oneal MD on 1/29/2023 at 8:42 AM      [facility]  Bharat Oneal MD  [employer] messaging  OFFICE: [phone]    Thank you for allowing us to participate in the care of this patient. Please call with questions. This note is created with the assistance of a speech recognition program.  While intending to generate a document that actually reflects the content of the visit, the document can still have some errors including those of syntax and sound a like substitutions which may escape proof reading. In such instances, actual meaning can be extrapolated by contextual diversion.

## 2023-01-29 NOTE — PROGRESS NOTES
Patient becoming agitated and trying to get out of bed and pulling on wires. Unable to redirect patient, started precedex drip. Will continue to monitor and titrate when needed.

## 2023-01-29 NOTE — PROGRESS NOTES
Messaged on call for Dr Sadie Cogan regarding morning phosphorus and total protein value. A phosphorus replacement scale was added and phosphorus will be replaced.

## 2023-01-29 NOTE — PROGRESS NOTES
Messaged on call for Dr Melva Culver regarding insulin coverage for Q 6 blood sugar checks. Let them know that patient's blood sugar was 306 and asked if sliding scale needed to be added. A medium dose sliding scale every 6 hours was added.

## 2023-01-30 NOTE — PROGRESS NOTES
Occupational [address]  Occupational Therapy Not Seen Note    Patient not available for Occupational Therapy due to:    [] Testing:    [] Hemodialysis    [] Cancelled by RN:    []Refusal by Patient:    [] Surgery:     [] Intubation:     [] Pain Medication:    [] Sedation:     [] Spine Precautions :    [] Medical Instability:    [x] Other: 1/30: Attempted to see pt twice in AM, for OT tx session, other staff members in room both attempts. Will continue to follow.      Adia Gregg, OT

## 2023-01-30 NOTE — PROGRESS NOTES
..    Palliative Care Progress Note    NAME:  [facility] RECORD NUMBER:  [id number]  AGE: 80 y.o. GENDER: female  : 10/23/1932  TODAY'S DATE:  2023    Reason for Consult:  goals of care, code status discussion, hospice discussion, and support       Plan        Palliative Interaction: I went to see patient and she is lethargic with telesitter and 1:1 present. Patient is on [facility]. I introduced myself but patient did not respond after several attempts even by PCT. I spoke to RN and she reports that patient has not ate/drank since the , and respiratory status worsened last evening requiring 4LNC. No family was currently present, I informed her that we will reach out via phone today. I received PS that daughter/POA Is now here. Vanessa Roles and I went to meet with her. Daughter was willing to come outside of room at door to speak with us. Daughter reports, \"I don't think we will need palliative care, because of her condition now\" I asked daughter why she thought that, and she reports that her mother is now unresponsive and condition is worsening. I explained to her that was what we were seeing as well, but hat palliative care would then be here to support her and discuss goals with declining condition. I discussed patients code status. Daughter is a Pediatrician but relates that she doesn't know about adults. I reviewed code status classifications with her and discussed CPR at patients age and current condition. Daughter discussed the possibility of looking at comfort care/hospice care and asked how this worked. I discussed hospice care in detail and explained the different levels. Daughter reports wanting to talk with family first. I asked her if she would like to change code status to [facility] until that decision is made and she said that she wanted her mother to remain a full code for now. I offered informational hospice meeting to her and she will let staff know.      I discussed JESSE and that we need a copy on file. She confirmed that she is is POA. I informed her that without this document then Next of Kin would be her 3 children together or atleast 2 of 3. She reports that she communicates with one sibling but other one is somewhat estranged. She reports that she will have to go home to get copy. I discussed spiritual needs and Vidal offered to contact her Jehovah's witness, Patient is Scientologist. We discussed anointing of sick but daughter has spoke to their .     Daughter discussed wanting other family members to come up but would like patient retested for Covid-19. Test day as on 22nd at [address] and daughter reports that patient was not tested prior but they figured she had it. I did mention that if aggressive measures were continued that peg tube would likely be discussed. I offered daughter support and updated RN at this time of her request. We discussed that if comfort care was decided that we would ask unit manager about additional visitors. Education/support to staff  Education/support to family  Communications with primary service  Providing support for coping/adaptation/distress of family  Discussing meaning/purpose   Specific spiritual beliefs/practices  Decision making regarding life prolonging treatment  Decisional capacity assessed  Continue with current plan of care  Clarification of medical condition to patient and family  Code status clarified: Full Code  Palliative care orders introduced  Provided information about hospice  Validating patient/family distress  Continued communication updates  Recognizing, reflecting, and empathizing with family members' anticipatory grief      Principle Problem/Diagnosis:  COVID-19    Additional Assessments:  Principal Problem:    COVID-19  Active Problems:    Non-pressure chronic ulcer of left calf with fat layer exposed ([facility] [address]  Resolved Problems:    * No resolved hospital problems.  *    1- Symptom management/ pain control Pain Assessment:  The patient is not having any pain. Anxiety:  none                          Dyspnea:  acute dyspnea                          Fatigue:  exercise intolerance    Other: We feel the patient symptoms are being controlled. her current regimen is reviewed by myself and discussed with the staff. 2- Goals of care evaluation   The patient goals of care are support for family/caregiver   Goals of care discussed with:    [] Patient independently    [] Patient and Family    [x] Family or Healthcare DPOA independently    [] Unable to discuss with patient, family/DPOA not present    3- Code Status  Full Code    4- Other recommendations  - We will continue to provide comfort and support to the patient and the family      History of Present Illness     The patient is a 80 y.o. Non- / non  female who presents with Shortness of Breath      Referred to Palliative Care by  [x] Physician   [] Nursing  [] Family Request   [] Other:       She was admitted to the primary service for Hypomagnesemia [E83.42]  Acute on chronic congestive heart failure, unspecified heart failure type ([facility] [address] [I50.9]  COVID-19 [U07.1]. Her hospital course has been associated with Covid-19, acute respiratory failure requiring 02, CHF, Pneumonia, AMS, and pleural effusions. The patient has a complicated medical history and has been hospitalized since 1/21/2023 11:39 PM. Patient is on IV Decadron and completed course of ATB. Patient is a full code. Palliative care following for goals, code status discussion, and support. OVERNIGHT EVENTS: Patient 02 requirements went up over night and patient is less responsive. RN reports no intake since the 25th. Patient is on TPN.       MEDICATIONS  Current Medications    insulin lispro  0-8 Units SubCUTAneous Q6H    pantoprazole (PROTONIX) 40 mg injection  40 mg IntraVENous Daily    fat emulsion  100 mL IntraVENous Daily    dexamethasone  6 mg IntraVENous Daily QUEtiapine  25 mg Oral BID    sodium chloride flush  5-40 mL IntraVENous 2 times per day    [Held by provider] carvedilol  12.5 mg Oral BID     sacubitril-valsartan  1 tablet Oral BID    cholestyramine light  4 g Oral BID    [Held by provider] glimepiride  4 mg Oral BID      sodium phosphate IVPB **OR** sodium phosphate IVPB **OR** sodium phosphate IVPB, haloperidol lactate, LORazepam, sodium chloride flush, sodium chloride, magnesium sulfate, ondansetron **OR** ondansetron, polyethylene glycol, acetaminophen **OR** acetaminophen, glucose, dextrose bolus **OR** dextrose bolus, glucagon (rDNA), dextrose, albuterol  IV Drips/Infusions   PN-Adult 2-in-1 Central Line (Standard) 42.8 mL/hr at 01/29/23 1725    dextrose 5 % and 0.45 % NaCl Stopped (01/28/23 1700)    sodium chloride 10 mL/hr at 01/29/23 2057    dextrose       Home Medications  No current facility-administered medications on file prior to encounter.      Current Outpatient Medications on File Prior to Encounter   Medication Sig Dispense Refill    lactobacillus (BACID) TABS Take 1 tablet by mouth daily 30 tablet 1    cholestyramine light 4 g packet Take 1 packet by mouth 2 times daily 60 packet 3    pantoprazole (PROTONIX) 40 MG tablet Take 1 tablet by mouth every morning (before breakfast) 30 tablet 3    Cholecalciferol 50 MCG (2000 UT) CAPS Take 2,000 Units by mouth daily      fexofenadine (ALLEGRA) 180 MG tablet Take 180 mg by mouth daily      Cyanocobalamin 1000 MCG/15ML LIQD Take 1,000 mcg by mouth daily      bumetanide (BUMEX) 2 MG tablet Take 2 mg by mouth daily      carvedilol (COREG) 12.5 MG tablet Take 12.5 mg by mouth 2 times daily (with meals)      sacubitril-valsartan (ENTRESTO) 24-26 MG per tablet Take 1 tablet by mouth 2 times daily      ibuprofen (ADVIL;MOTRIN) 200 MG tablet Take 400 mg by mouth every 6 hours as needed for Pain      glimepiride (AMARYL) 4 MG tablet Take 4 mg by mouth 2 times daily         PAST MEDICAL HISTORY  Past Medical History:   Diagnosis Date    Adenocarcinoma ([facility]     colon    Arthritis     Atrial fibrillation (HCC)     r/t sepsis     Cancer (HCC)     colon, skin    CHF (congestive heart failure) (HCC)     Chronic anticoagulation     Eliquis for a fib stroke prophylaxis    Diabetes mellitus ([facility]     Diverticulitis     Femur fracture (HCC)     Hypertension     Melanoma ([facility]     nose    Osteoporosis     Overweight     Sepsis ([facility]     Serum creatinine raised        FAMILY HISTORY  No family history on file.     SOCIAL HISTORY  Social History       Tobacco History       Smoking Status  Never      Smokeless Tobacco Use  Never              Alcohol History       Alcohol Use Status  No              Drug Use       Drug Use Status  No              Sexual Activity       Sexually Active  Not Asked                     ALLERGIES  Allergies   Allergen Reactions    Acetaminophen-Codeine Nausea Only    Codeine Nausea Only    Furosemide      Other reaction(s): GI Disturbance    Linagliptin      Other reaction(s): SOB    Sitagliptin      Other reaction(s): felt poorly    Other Itching     Animal Dander       Data      /73   Pulse 67   Temp 97.4 °F (36.3 °C) (Axillary)   Resp 21   Ht 5' 4\" (1.626 m)   Wt 150 lb 12.7 oz (68.4 kg)   SpO2 97%   BMI 25.88 kg/m²     Wt Readings from Last 3 Encounters:   01/30/23 150 lb 12.7 oz (68.4 kg)   01/13/23 150 lb 1.6 oz (68.1 kg)   10/20/22 150 lb (68 kg)        Lab Results   Component Value Date    WBC 10.9 01/30/2023    HGB 10.5 (L) 01/30/2023    HCT 33.5 (L) 01/30/2023    MCV 90.3 01/30/2023     01/30/2023    ,   Lab Results   Component Value Date/Time     01/30/2023 03:15 AM    K 4.8 01/30/2023 03:15 AM     01/30/2023 03:15 AM    CO2 26 01/30/2023 03:15 AM    BUN 36 01/30/2023 03:15 AM    CREATININE 0.64 01/30/2023 03:15 AM    GLUCOSE 220 01/30/2023 03:15 AM    CALCIUM 8.9 01/30/2023 03:15 AM    ,   Lab Results   Component Value Date    INR 1.2 01/22/2023    INR 1.1 01/11/2023    PROTIME 14.9 (H) 01/22/2023    PROTIME 14.4 (H) 01/11/2023   , . Lab Results   Component Value Date    ALT 16 01/29/2023    AST 30 01/29/2023    ALKPHOS 96 01/29/2023    BILITOT 0.4 01/29/2023   , No results found for: CKTOTAL, CKMB, CKMBINDEX, TROPONINI,   Lab Results   Component Value Date/Time    COLORU Yellow 01/27/2023 11:00 AM    NITRU NEGATIVE 01/27/2023 11:00 AM    GLUCOSEU NEGATIVE 01/27/2023 11:00 AM    KETUA NEGATIVE 01/27/2023 11:00 AM    UROBILINOGEN Normal 01/27/2023 11:00 AM    BILIRUBINUR NEGATIVE 01/27/2023 11:00 AM   , No results found for: AMYLASE,   Lab Results   Component Value Date    LIPASE 32 07/09/2016   ,   Lab Results   Component Value Date    DDIMER 1.81 (H) 01/26/2023   , No results found for: BNP, No results found for: CEA, No results found for: , No results found for: AFPAMN, No results found for: LACTA,     CT Result (most recent):  CT HEAD WO CONTRAST 01/26/2023    Narrative  EXAMINATION:  CT OF THE HEAD WITHOUT CONTRAST  1/26/2023 3:02 pm    TECHNIQUE:  CT of the head was performed without the administration of intravenous  contrast. Automated exposure control, iterative reconstruction, and/or weight  based adjustment of the mA/kV was utilized to reduce the radiation dose to as  low as reasonably achievable. COMPARISON:  None. HISTORY:  ORDERING SYSTEM PROVIDED HISTORY: new confusion/agitation/hallucinations  TECHNOLOGIST PROVIDED HISTORY:  new confusion/agitation/hallucinations    Reason for Exam: new confusion/agitation/hallucinations    FINDINGS:  BRAIN/VENTRICLES: There is no acute intracranial hemorrhage, mass effect or  midline shift. No abnormal extra-axial fluid collection. The gray-white  differentiation is maintained without evidence of an acute infarct. There is  no evidence of hydrocephalus. ORBITS: The visualized portion of the orbits demonstrate no acute abnormality.     SINUSES: The visualized paranasal sinuses and mastoid air cells demonstrate  no acute abnormality. SOFT TISSUES/SKULL:  No acute abnormality of the visualized skull or soft  tissues. Impression  No acute intracranial abnormality. Xray Result (most recent):  XR CHEST PORTABLE 01/28/2023    Narrative  EXAMINATION:  ONE XRAY VIEW OF THE CHEST    1/28/2023 1:08 pm    COMPARISON:  01/24/2023 and prior    HISTORY:  ORDERING SYSTEM PROVIDED HISTORY: edema  TECHNOLOGIST PROVIDED HISTORY:  edema  Reason for Exam: Edema    FINDINGS:  Unchanged mild cardiomegaly. Unchanged hazy opacities throughout both lungs,  right greater than left. Vascular congestion. Unchanged trace bilateral  pleural effusions. No pneumothorax. No acute osseous abnormality. Right  central line tip near the cavoatrial junction. Impression  Right central line tip near the cavoatrial junction, otherwise no significant  change compared with 01/24/2023. MRI Result (most recent):  No results found for this or any previous visit from the past 3650 days. No results found for this or any previous visit. No results found for this or any previous visit (from the past 4464 hour(s)).        Assessment        REVIEW OF SYSTEMS    [x]   UNABLE TO OBTAIN: Lethargic    Constitutional:  []   Chills   []  Fatigue   []  Fevers   []  Malaise   []  Weight loss   [] Other:     Respiratory:   []  Cough    []  Shortness of breath    []  Chest pain    [] Other:     Cardiovascular:   []  Chest pain  []  Dyspnea    []  Exertional chest pressure/discomfort     [] Fatigue      []  Palpitations    []  Syncope   [] Other:     Gastrointestinal:   []  Abdominal pain   []  Constipation    []  Diarrhea    []   Dysphagia   []  Reflux             []  Vomiting   [] Other:     Genitourinary:  []  Dysuria     []  Frequency   []  Hematuria   [] Nocturia   []  Urinary incontinence   [] Other:     Musculoskeletal:   [] Back pain    []  Muscle weakness   []  Myalgias    []  Neck pain   []  Stiff joints   []  Other: Behavioral/Psych:   [] Anxiety    []  Depression     []  Mood swings   [] Other:     PHYSICAL ASSESSMENT:     General: []  Oriented x3      [] well appearing      [] Intubated      [x] ill appearing      [] Other:    Mental Status: [] normal mental status exam      [x] drowsy      [x] Confused      [] Other:     Cardiovascular: []  Regular rate/rhythm      [x] Arrhythmia      [] Other:     Chest: [x] Effort normal      [x] lungs clear      [] respiratory distress      [] Tachypnea      [x]  Other: 2LNC    Abdomen: [] Soft/non-tender      [x]  Normal appearance      [] Distended      [] Ascites      [] Other:    Neurological: [] Normal Speech      [] Normal Sensation      [x]  Deficits present: Unresponsive to verbal stimuli    Extremity:  [x] normal skin color/temp      [] clubbing/cyanosis      []  No edema      [] Other:     Palliative Performance Scale:  ___60%  Ambulation reduced; Significant disease; Can't do hobbies/housework; intake normal or reduced; occasional assist; LOC full/confusion  ___50%  Mainly sit/lie; Extensive disease; Can't do any work; Considerable assist; intake normal or reduced; LOC full/confusion  ___40%  Mainly in bed; Extensive disease; Mainly assist; intake normal or reduced; LOC full/confusion   ___30%  Bed Bound; Extensive disease; Total care; intake reduced; LOCfull/confusion  _x__20%  Bed Bound; Extensive disease; Total care; intake minimal; Drowsy/coma  ___10%  Bed Bound; Extensive disease; Total care; Mouth care only; Drowsy/coma  ___0       Death        Please call with any palliative questions or concerns. Palliative Care Team is available via perfect serve or via phone. Palliative Care will continue to follow Ms. Cristobal's care as needed. The note has been dictated by dragon, typing errors may be a possibility     Thank you for allowing Palliative Care to participate in the care of Ms. Ricardo Appiah .        Electronically signed by   PATRICK Key CNP  Palliative Care Team  on 1/30/2023 at 10:16 AM    Palliative care office: [phone]

## 2023-01-30 NOTE — PROGRESS NOTES
Physical Therapy  Facility/Department: [facility] ICU  Daily Treatment Note  NAME: Rupali Daniel  : 10/23/1932  MRN: [id number]    Date of Service: 2023    Discharge Recommendations:  Patient would benefit from continued therapy after discharge        Patient Diagnosis(es): The primary encounter diagnosis was Acute on chronic congestive heart failure, unspecified heart failure type ([address] Diagnoses of COVID-19, Hypomagnesemia, and Venous ulcer of right leg ([address] were also pertinent to this visit. Assessment   Assessment: Pt very lethargic today but able to repsond 100% of the time to questions and by end of treatment her eyes were open and she was facetiming w/ her grandson (grand daughter holding phone). Pt family very supportive and helpful throughout treatment. Pt did not complain of fatigue but trunk muscles fatigued to point of pt head was no longer being supported so assisted patient back in bed. Worked on laying supine in full extention and rolling each direction 3x for repositioning and assist w/ hygiene. Pt resumed her only position of comfort (for years now) just lies on her right side. Will continue to progress and work with patient. Pt family goal is to work on getting her healthy again. Pt currently with global weakness; decreased arousal but w/ appropriate responses throughout session. Activity Tolerance: Patient tolerated evaluation without incident     Plan    Physcial Therapy Plan  General Plan: 5-7 times per week  Specific Instructions for Next Treatment: positioning, dangle, try luzma stedy if more alert  Current Treatment Recommendations: Strengthening;Balance training;Functional mobility training;Transfer training; Endurance training;Gait training;Stair training;Home exercise program;Safety education & training;Patient/Caregiver education & training;Neuromuscular re-education     Restrictions  Restrictions/Precautions  Restrictions/Precautions: General Precautions, Fall Risk  Position Activity Restriction  Other position/activity restrictions: Up with assist, telemetry, Heels off bed at all times, ext urinary catheter, RUE IV, droplet+ isolation 2* positive Covid,  VERY Campo, 4LO2 via NC, ALARMS, pt agitates easily     Subjective    Subjective  Subjective: Pt lethargic this date but cooperative and seemed to enjoy sitting up as after asking 5 times she kept saying to sit up longer. Pt daughter, son and grand daugther present during treatment. Objective     Bed Mobility Training  Bed Mobility Training: Yes  Overall Level of Assistance: Moderate assistance;Assist X1  Interventions: Manual cues; Verbal cues; Tactile cues; Weight shifting training/pressure relief  Rolling: Moderate assistance;Assist X1  Supine to Sit: Moderate assistance;Assist X1  Sit to Supine: Moderate assistance;Assist X1  Scooting: Moderate assistance;Assist X2  Duration: 9 (minutes)  Transfer Training  Transfer Training: No - not alert enough this date     Sitting balance and worked on finding center of gravity w/ tactile cueing. Pt is at risk for skin breakdown. Patient positioned appropriately after treatment with all high risk areas elevated or propped. Pt reports being comfortable at end of treatment. PT Exercises  Exercise Treatment: while sitting: A/ZENAIDA LARA LAGASTON, posture correction; worked on finding center of gravity; worked on lung expansion w/ patient singing amazing fred. Other Specialty Interventions  Other Treatments/Modalities: seonsory stimulation and re-orientation. Safety Devices  Type of Devices: Bed alarm in place;Gait belt; Heels elevated for pressure relief;Patient at risk for falls; Left in bed;Nurse notified       Goals  Short Term Goals  Time Frame for Short Term Goals: 12 visits  Short Term Goal 1: Inc bed-mobility & transfers to independent to enable pt to safely get in/OOB & chair to return to PLOF & decrease risk for falls; Short Term Goal 2:  Inc gait to amb 75ft or > indep w/ RW to enable pt to return to previous level of independence & able to demonstrate indep/ safe use of RW in functional activities including approaching surfaces and turning to sit. Short Term Goal 3: Pt able to go up/down 3 steps with Stephane rail indep  Short Term Goal 4: Inc strength to [address] standing balance to good with device to facilitate pt independence for performance of ADL's & functional mobility, & reduce fall risk  Short Term Goal 5: Pt able to tolerate 30 min of activity to include 15-20 reps of ex, NMR & functional mobility with device to facilitate activity tolerance to [facility]  Additional Goals?: Yes  Short Term Goal 6: Ed pt on home ex's, safety, balance & endurance training, pressure relief with Pt able to demonstrate effective pressure relief techniques, breathing techniques, energy conservation(planning, pacing, prioritizing & positioning), fall prevention, & issue written Pt Ed    Education  Patient Education  Education Given To: Patient; Family  Education Provided Comments: sensory stimulation and re-orientation cueing  Education Outcome: Continued education needed    Therapy Time   Individual   Time In 1320   Time Out 1421   Minutes 64      CELE DYER, PT

## 2023-01-30 NOTE — PROGRESS NOTES
Pulmonary Critical Care Progress Note       Patient seen for the follow up of  COVID-19 acute hypoxic respiratory insufficiency, pulmonary edema, pleural effusions    Subjective: Rudolfo Screen She is still confused requiring a sitter. She is on oxygen at 2 L nasal cannula. She  has been off Precedex. . She had low HR and A. fib and Coreg was held. She is oriented to self does not volunteer history. .  She   Does not volunteer history. She is on TPN   And has been NPO. Examination:  Vitals: /73   Pulse 56   Temp 97.4 °F (36.3 °C) (Axillary)   Resp 22   Ht 5' 4\" (1.626 m)   Wt 150 lb 12.7 oz (68.4 kg)   SpO2 98%   BMI 25.88 kg/m²   General appearance: Awake, confused, very hard of hearing oriented to self. Neck: No JVD  Lungs: Decreased breath sounds no significant wheezing, occasional crackles  Heart: regular rate and rhythm, S1, S2 normal, no gallop  Abdomen: Soft, non tender, + BS  Extremities: no cyanosis or clubbing. Trace edema. LABs:  CBC:   Recent Labs     01/28/23  0534 01/29/23  0300 01/30/23  0315   WBC 10.6 8.3 10.9   HGB 11.3* 10.3* 10.5*   HCT 36.4 33.3* 33.5*    186 179       BMP:   Recent Labs     01/28/23  0534 01/29/23  0300 01/30/23  0315    140 143   K 4.8 5.1 4.8   CO2 23 24 26   BUN 42* 38* 36*   CREATININE 0.83 0.78 0.64   LABGLOM >60 >60 >60   GLUCOSE 245* 377* 220*          Latest Reference Range & Units Most Recent   Procalcitonin <0.09 ng/mL 0.55 (H)  1/22/23 13:40   (H): Data is abnormally high    Radiology:  CT chest 1/22/2023      X-ray chest 1/26/2023    Stable right perihilar patchy opacities, concerning for aspiration/pneumonia. Unchanged enlarged cardiomediastinal silhouette. No pleural effusion. No   pneumothorax. Bony structures unremarkable.        Impression:  Acute hypoxic respiratory failure  COVID pneumonia  Pulmonary edema  Small bilateral pleural effusions right greater than left  Elevated troponin / History of A-fib, not on anticoag d/t age, frailty and hx of GIB per cardiology   Allergic rhinitis, DM, HTN  Ileus    Recommendations:  Oxygen via nasal cannula, keep SPO2 90% or greater   Incentive spirometry every hour while awake    Off Precedex/Seroquel 25 b.I.d. Ativan 0.25 IV every 4 hours as needed/Haldol 2 mg IV every 4 hours as needed for backup   Hold Seroquel and Haldol if QTC above 450  Albuterol every 6 hours as needed  Status post Levaquin course  IV Decadron 6 milligrams daily x10 days  Infectious disease on consult, no indication for antiviral or immunosuppressive therapy at this time  Cardiology following/off anticoagulation for A. fib for history of GI bleed  Coreg on hold- monitor BP/HR   TPN/n.p.o. for now/consider PEG   Discontinue D5 half-normal saline  Neurology following    Bedside physical therapy  Palliative care on consult   Discussed with  daughter who   Is a pediatrician at bedside. All questions answered.   Peptic ulcer disease prophylaxis  DVT prophylaxis/start heparin 5000 every 12 hours    Electronically signed by     Alexandre Shaw MD on 1/30/2023 at 3:26 PM  Pulmonary Critical Care and Sleep Medicine,  [facility]: [phone]

## 2023-01-30 NOTE — PROGRESS NOTES
Increased patient Oxygen from 2L to 4L, increased patient temperature in the room d/t patient being cold to the touch. Attempted to place warm blanket on patient. Patient removed blankets and became agitated.

## 2023-01-30 NOTE — PROGRESS NOTES
Comprehensive Nutrition Assessment    Type and Reason for Visit:  Reassess    Nutrition Recommendations/Plan:   Diet and oral supplements per nursing discretion as accepted by patient. Continue TPN via central line custom 2/1 at 41.6 ml/hr (1000 ml total volume) with 100 ml lipids. Increase insulin from 40 to 50 units, add MVI and trace elements. Monitor PO intakes, TPN rate/tolerance, PEG status, weight, and labs. Malnutrition Assessment:  Malnutrition Status: At risk for malnutrition (Comment) (01/24/23 0846)    Context:  Acute Illness     Findings of the 6 clinical characteristics of malnutrition:  Energy Intake:  50% or less of estimated energy requirements for 5 or more days  Weight Loss:  No significant weight loss     Body Fat Loss:  No significant body fat loss     Muscle Mass Loss:  Unable to assess    Fluid Accumulation:  Unable to assess     Strength:  Normal  strength    Nutrition Assessment:    Patient remains confused requiring sitter, refusing oral intakes or medications. Blood sugars are high blood improved. RN reports TPN to continue at this time. Bowel sounds active, noted in physician notes possibilty of PEG if patient continues to refuse oral ntakes. Will continue TPN at 41.6 ml/hr (1000 ml total volume), 100 ml lipids, add MVI and trace elements, increase insulin to 50 units, adjusted electrolytes. Monitor TPN rate/tolerance, oral intakes if any, PEG status, weight, and labs. Nutrition Related Findings:    Edema: trace all extremities. Bowel sounds active. Hx of colon and skin cancer. Palliative Care consult in place. Wound Type: Venous Stasis       Current Nutrition Intake & Therapies:    Average Meal Intake: Refusing to eat  Average Supplements Intake: Refusing to take  ADULT DIET; Easy to Chew; 4 carb choices (60 gm/meal);  Low Fat/Low Chol/High Fiber/SHAHZAD  ADULT ORAL NUTRITION SUPPLEMENT; Breakfast, Dinner; Diabetic Oral Supplement  PN-Adult 2-in-1 [facility] (Standard)  Current Parenteral Nutrition Orders:  Type and Formula: Premix Central, 2-in-1 Custom   Lipids: 100ml  Duration: Continuous  Rate/Volume: 41.6 ml/hr 1000 ml total volume  Current PN Order Provides: 1080 kcal, 50 gm protein  Goal PN Orders Provides: 8225-2547 kcal, 83-90 gm protein     Anthropometric Measures:  Height: 5' 4\" (162.6 cm)  Ideal Body Weight (IBW): 120 lbs (55 kg)       Current Body Weight: 150 lb 12.7 oz (68.4 kg) (Not sure if weights are correct. Has mild edema. Likely weighing process or scale error.), 127.5 % IBW. Weight Source: Bed Scale  Current BMI (kg/m2): 25.9        Weight Adjustment For: No Adjustment                 BMI Categories: Overweight (BMI 25.0-29. 9)    Estimated Daily Nutrient Needs:  Energy Requirements Based On: Kcal/kg  Weight Used for Energy Requirements: Current  Energy (kcal/day): 2595-7542 kcal (20-23 kcal/kg)  Weight Used for Protein Requirements: Current  Protein (g/day): 83-90 gm of protein (1.2-1.3 gm/kg)       Nutrition Diagnosis:   Inadequate protein-energy intake related to inadequate protein-energy intake as evidenced by intake 0-25%, nutrition support - parenteral nutrition    Nutrition Interventions:   Food and/or Nutrient Delivery: Continue Current Diet, Continue Oral Nutrition Supplement, Modify Parenteral Nutrition  Nutrition Education/Counseling: Education not indicated  Coordination of Nutrition Care: Continue to monitor while inpatient       Goals:     Goals: Meet at least 75% of estimated needs, Initiate nutrition support, Tolerate nutrition support at goal rate       Nutrition Monitoring and Evaluation:   Behavioral-Environmental Outcomes: None Identified  Food/Nutrient Intake Outcomes: Diet Advancement/Tolerance, Parenteral Nutrition Intake/Tolerance, Food and Nutrient Intake, Supplement Intake  Physical Signs/Symptoms Outcomes: Biochemical Data, Fluid Status or Edema, Skin, Weight    Discharge Planning:     Too soon to determine       Some areas of assessment may be incomplete due to COVID-19 precautions    Jaime Wright RD, LD  Office phone [phone]

## 2023-01-30 NOTE — PROGRESS NOTES
intern shadowed Palliative Care Nurse Practitioner and offered Spiritual Care to adult daughter of patient. Confirmed that patient is  and has three (3) biological children:  Janice Estes, and Cassandra Foster. Toby Eddy stated (again) she believes patient completed HC POA and named Toby Eddy as POA. Requested her to bring a copy of paperwork in, today if possible and reminded her that without paperwork the majority vote of three children would determine plan of care per [place] law. Patient's daughter confirmed patient is Orthodoxy and stated that patient is member of [employer] [facility] [address], but that she has been in contact with  at [address] the [facility] about offering her mother last rites (anointing of the sick). Gave patient's daughter spiritual care business card and offered words of encouragement. Chaplains are available as needed/requested for emotional and spiritual support. 01/30/23 1239   Encounter Summary   Encounter Overview/Reason  Palliative Care   Service Provided For: Family   Referral/Consult From: Palliative Care   Support System Children   Last Encounter  01/30/23   Complexity of Encounter Moderate   Begin Time 1200   End Time  1210   Total Time Calculated 10 min   Encounter    Type Family Care   Spiritual/Emotional needs   Type Spiritual Support   Advance Care Planning   Type ACP conversation   Assessment/Intervention/Outcome   Assessment Coping; Sad   Intervention Active listening;Discussed belief system/Sikh practices/ivan; Explored Coping Skills/Resources;Sustaining Presence/Ministry of presence   Outcome Engaged in conversation;Expressed Gratitude   Plan and Referrals   Plan/Referrals Continue Support (comment)

## 2023-01-30 NOTE — PROGRESS NOTES
[facility]  Progress Note    Andrez Mccurdy  MRN: [id number]  Date: 1/30/2023  LOS: 8     Reason for F/U :   COVID-19 virus infection    Impression :   COVID-19 virus infection tested + 1/22/2023  Unvaccinated adult patient  Acute respiratory insufficiency requiring supplemental oxygen   Congestive heart failure with mild congestive changes on chest x-ray  Multifocal opacities in the bases/ pneumonia  Confusion  Right-sided greater than left-sided pleural effusion    Recommendations:   COVID therapy: The patient is not a candidate for antiviral therapy  Given the hypoxia the patient will continue Decadron-and this was switched to IV 1/27/2023  The patient is not a candidate for immunosuppressive therapy at this time and the CRP is not overly elevated  The follow-up CRP is decreased and the procalcitonin level remains low    Antibiotic therapy: The patient has completed a course of levofloxacin through 1/28/2023   The patient remains on 2 -3 L of oxygen by nasal cannula  Continue supportive therapy    Infection Control Recommendations:   Droplet plus precautions-patient can come out of isolation after tomorrow    Discharge Planning:   Estimated Length of IV antimicrobials: 5 days  Patient will need Midline Catheter Insertion/ PICC line Insertion: No  [facility],  LTAC: Undetermined  Patient willneed outpatient wound care: No    Medical Decision making / Summary of Stay:   Andrez Mccurdy is a 80y.o.-year-old female who was initially admitted on 1/21/2023.    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 She does not report any subjective fevers, chills no significant cough, no abdominal pain, no nausea vomiting, has chronic diarrhea. The patient was reporting some neck pain and again the neck pain has been previously evaluated with no source found. She reports that her daughter recently tested positive for COVID and work-up in the emergency department included a chest x-ray that showed a new small right effusion and airspace disease and possible mild CHF. COVID testing was done that was positive and the patient was admitted with COVID-19 virus infection as well as concern for congestive heart failure. The patient has since been seen by the pulmonary critical care service and was started on supplemental oxygen, IV Decadron, intravenous levofloxacin as there was concern for right basilar infiltrate/pneumonia and I was asked to evaluate and help with antibiotic choice. Current evaluation:2023    BP (!) 115/50   Pulse 79   Temp 97.2 °F (36.2 °C) (Axillary)   Resp 23   Ht 5' 4\" (1.626 m)   Wt 150 lb 12.7 oz (68.4 kg)   SpO2 97%   BMI 25.88 kg/m²     Temperature Range: Temp: 97.2 °F (36.2 °C) Temp  Av.2 °F (36.2 °C)  Min: 97 °F (36.1 °C)  Max: 97.5 °F (36.4 °C)  The patient is seen and evaluated at bedside she does have a sitter in the room with her. She does not really answer any questions for me. She does moan with vigorous stimulation. She remains on TPN/PPN. Review of Systems   Unable to perform ROS: Other     Physical Examination :     Physical Exam  Constitutional:       Appearance: She is well-developed. HENT:      Head: Normocephalic and atraumatic. Mouth/Throat:      Mouth: Mucous membranes are dry. Cardiovascular:      Rate and Rhythm: Regular rhythm. Heart sounds: Normal heart sounds. Pulmonary:      Effort: Pulmonary effort is normal.      Breath sounds: Rales present.    Abdominal:      General: Bowel sounds are normal.      Palpations: Abdomen is soft. Musculoskeletal:      Cervical back: Normal range of motion and neck supple. Right lower leg: Edema present. Left lower leg: Edema present. Skin:     General: Skin is warm and dry. Neurological:      Mental Status: She is alert and oriented to person, place, and time. Laboratory data:   I have independently reviewed the followinglabs:  CBC with Differential:   Recent Labs     01/29/23  0300 01/30/23  0315   WBC 8.3 10.9   HGB 10.3* 10.5*   HCT 33.3* 33.5*    179   LYMPHOPCT 1* 3*   MONOPCT 5 4       BMP:   Recent Labs     01/29/23  0300 01/30/23  0315    143   K 5.1 4.8   * 113*   CO2 24 26   BUN 38* 36*   CREATININE 0.78 0.64       Hepatic Function Panel:   Recent Labs     01/29/23  0300   PROT 4.4*   LABALBU 2.2*   BILITOT 0.4   ALKPHOS 96   ALT 16   AST 30           Lab Results   Component Value Date/Time    PROCAL 0.13 01/26/2023 05:14 AM    PROCAL 0.55 01/22/2023 01:40 PM     Lab Results   Component Value Date/Time    CRP 22.2 01/26/2023 05:14 AM    CRP 37.7 01/23/2023 05:13 AM    CRP 0.5 03/26/2018 03:47 PM     No results found for: Soledad Ornelaster      Lab Results   Component Value Date/Time    DDIMER 1.81 01/26/2023 06:58 AM    DDIMER 0.86 03/26/2018 03:47 PM     No results found for: FERRITIN  No results found for: LDH  No results found for: FIBRINOGEN    Results in Past 30 Days  Result Component Current Result Ref Range Previous Result Ref Range   SARS-CoV-2, Rapid DETECTED (A) (1/22/2023) Not Detected Not in Time Range      Lab Results   Component Value Date/Time    COVID19 DETECTED 01/22/2023 12:30 AM    COVID19 Not Detected 02/09/2022 05:46 PM       No results for input(s): BJ in the last 72 hours. Imaging Studies:   ONE XRAY VIEW OF THE CHEST 1/28/2023 1:08 pm  Impression   Right central line tip near the cavoatrial junction, otherwise no significant   change compared with 01/24/2023.            ONE XRAY VIEW OF THE CHEST 1/26/2023 6:44 am    Impression   Stable right perihilar patchy opacities, concerning for aspiration/pneumonia. ONE XRAY VIEW OF THE CHEST 1/24/2023 5:36 am  Impression   More prominent bilateral interstitial and ground-glass opacities, which may   represent developing edema versus multifocal infection. Grossly similar   appearance of small effusions. Cultures:     COVID-19, Rapid [[id number]] (Abnormal) Collected: 01/22/23 0030   Order Status: Completed Specimen: Nasopharyngeal Swab Updated: 01/22/23 0058    Specimen Description . NASOPHARYNGEAL SWAB    SARS-CoV-2, Rapid DETECTED Abnormal     Comment:        Rapid NAAT: The specimen is POSITIVE for SARS-Cov-2, the novel coronavirus associated with   COVID-19. This test has been authorized by the FDA under an Emergency Use Authorization (EUA) for use   by authorized laboratories. The ID NOW COVID-19 assay is designed to detect the virus that causes COVID-19 in patients   with signs and symptoms of infection who are suspected of COVID-19. An individual without symptoms of COVID-19 and who is not shedding SARS-CoV-2 virus would   expect to have a negative (not detected) result in this assay.    Fact sheet for Healthcare Providers: http://www.tara.ángel/   Fact sheet for Patients: http://www.nacho-bar.ángel/         Methodology: Isothermal Nucleic Acid Amplification         Results reported to the appropriate Health Department          Medications:      insulin lispro  0-8 Units SubCUTAneous Q6H    pantoprazole (PROTONIX) 40 mg injection  40 mg IntraVENous Daily    fat emulsion  100 mL IntraVENous Daily    dexamethasone  6 mg IntraVENous Daily    QUEtiapine  25 mg Oral BID    sodium chloride flush  5-40 mL IntraVENous 2 times per day    [Held by provider] carvedilol  12.5 mg Oral BID     sacubitril-valsartan  1 tablet Oral BID    cholestyramine light  4 g Oral BID    [Held by provider] glimepiride  4 mg Oral BID  Electronically signed by Sergio Rand MD on 1/30/2023 at 8:44 AM      [facility]  Sergio Rand MD  [employer] messaging  OFFICE: [phone]    Thank you for allowing us to participate in the care of this patient. Please call with questions. This note is created with the assistance of a speech recognition program.  While intending to generate a document that actually reflects the content of the visit, the document can still have some errors including those of syntax and sound a like substitutions which may escape proof reading. In such instances, actual meaning can be extrapolated by contextual diversion.

## 2023-01-30 NOTE — PLAN OF CARE
Problem: Discharge Planning  Goal: Discharge to home or other facility with appropriate resources  Outcome: Progressing  Flowsheets (Taken 1/29/2023 2000)  Discharge to home or other facility with appropriate resources:   Identify barriers to discharge with patient and caregiver   Arrange for needed discharge resources and transportation as appropriate   Identify discharge learning needs (meds, wound care, etc)   Refer to discharge planning if patient needs post-hospital services based on physician order or complex needs related to functional status, cognitive ability or social support system     Problem: Skin/Tissue Integrity  Goal: Absence of new skin breakdown  Description: 1. Monitor for areas of redness and/or skin breakdown  2. Assess vascular access sites hourly  3. Every 4-6 hours minimum:  Change oxygen saturation probe site  4. Every 4-6 hours:  If on nasal continuous positive airway pressure, respiratory therapy assess nares and determine need for appliance change or resting period.   Outcome: Progressing     Problem: Safety - Adult  Goal: Free from fall injury  Outcome: Progressing  Flowsheets (Taken 1/29/2023 2000)  Free From Fall Injury:   Instruct family/caregiver on patient safety   Based on caregiver fall risk screen, instruct family/caregiver to ask for assistance with transferring infant if caregiver noted to have fall risk factors     Problem: ABCDS Injury Assessment  Goal: Absence of physical injury  Outcome: Progressing  Flowsheets (Taken 1/29/2023 2000)  Absence of Physical Injury: Implement safety measures based on patient assessment     Problem: Chronic Conditions and Co-morbidities  Goal: Patient's chronic conditions and co-morbidity symptoms are monitored and maintained or improved  Outcome: Progressing  Flowsheets (Taken 1/29/2023 2000)  Care Plan - Patient's Chronic Conditions and Co-Morbidity Symptoms are Monitored and Maintained or Improved:   Monitor and assess patient's chronic conditions and comorbid symptoms for stability, deterioration, or improvement   Collaborate with multidisciplinary team to address chronic and comorbid conditions and prevent exacerbation or deterioration   Update acute care plan with appropriate goals if chronic or comorbid symptoms are exacerbated and prevent overall improvement and discharge     Problem: Cardiovascular - Adult  Goal: Maintains optimal cardiac output and hemodynamic stability  Outcome: Progressing  Flowsheets (Taken 1/29/2023 2000)  Maintains optimal cardiac output and hemodynamic stability:   Monitor blood pressure and heart rate   Monitor urine output and notify Licensed Independent Practitioner for values outside of normal range   Assess for signs of decreased cardiac output   Administer fluid and/or volume expanders as ordered   Administer vasoactive medications as ordered     Problem: Respiratory - Adult  Goal: Achieves optimal ventilation and oxygenation  Outcome: Progressing  Flowsheets (Taken 1/29/2023 2000)  Achieves optimal ventilation and oxygenation:   Assess for changes in respiratory status   Assess for changes in mentation and behavior   Position to facilitate oxygenation and minimize respiratory effort   Oxygen supplementation based on oxygen saturation or arterial blood gases   Encourage broncho-pulmonary hygiene including cough, deep breathe, incentive spirometry   Assess the need for suctioning and aspirate as needed   Assess and instruct to report shortness of breath or any respiratory difficulty   Respiratory therapy support as indicated     Problem: Gastrointestinal - Adult  Goal: Maintains or returns to baseline bowel function  Outcome: Progressing     Problem: Metabolic/Fluid and Electrolytes - Adult  Goal: Glucose maintained within prescribed range  Outcome: Progressing  Flowsheets (Taken 1/29/2023 2000)  Glucose maintained within prescribed range:   Monitor blood glucose as ordered   Assess for signs and symptoms of hyperglycemia and hypoglycemia   Administer ordered medications to maintain glucose within target range   Instruct patient on self management of diabetes and initiate consult as needed     Problem: Musculoskeletal - Adult  Goal: Return mobility to safest level of function  Outcome: Progressing  Flowsheets (Taken 1/29/2023 2000)  Return Mobility to Safest Level of Function:   Assess patient stability and activity tolerance for standing, transferring and ambulating with or without assistive devices   Assist with transfers and ambulation using safe patient handling equipment as needed   Ensure adequate protection for wounds/incisions during mobilization   Obtain physical therapy/occupational therapy consults as needed   Instruct patient/family in ordered activity level  Goal: Return ADL status to a safe level of function  Outcome: Progressing  Flowsheets (Taken 1/29/2023 2000)  Return ADL Status to a Safe Level of Function:   Administer medication as ordered   Assess activities of daily living deficits and provide assistive devices as needed   Obtain physical therapy/occupational therapy consults as needed   Assist and instruct patient to increase activity and self care as tolerated     Problem: Pain  Goal: Verbalizes/displays adequate comfort level or baseline comfort level  Outcome: Progressing  Flowsheets (Taken 1/29/2023 2000)  Verbalizes/displays adequate comfort level or baseline comfort level:   Assess pain using appropriate pain scale   Encourage patient to monitor pain and request assistance   Administer analgesics based on type and severity of pain and evaluate response   Implement non-pharmacological measures as appropriate and evaluate response   Notify Licensed Independent Practitioner if interventions unsuccessful or patient reports new pain     Problem: Nutrition Deficit:  Goal: Optimize nutritional status  Outcome: Progressing     Problem: Neurosensory - Adult  Goal: Achieves maximal functionality and self care  Outcome: Progressing

## 2023-01-30 NOTE — PROGRESS NOTES
[address] Hospitalist        1/30/2023   6:40 PM    Name:  Rolando Snider  MRN:    [id number]     [place]:     [de-identified]   Room:  [address] [place] [address]  IP Day: 6     Admit Date: 1/21/2023 11:39 PM  PCP: Dominguez Plaza MD    C/C:   Chief Complaint   Patient presents with    Shortness of Breath       Assessment:      Acute on chronic congestive heart failure  Acute hypoxic respiratory failure  COVID-19 pneumonia  Pulmonary edema  Bilateral pleural effusions  Hypotension  Acute metabolic encephalopathy  Nonvaccinated against SARS-CoV-2  Respiratory failure with hypoxia requiring oxygen via nasal cannula  Hypomagnesemia  Hypokalemia  Question of bacterial pneumonia  Essential hypertension  Diabetes mellitus type 2  Paroxysmal atrial fibrillation  Congestive heart failure, type unknown  Osteoarthritis, multiple joints  History of colon cancer  History of skin cancer  Right eye vision loss  I will yes      Plan:      Patient has become hypotensive, bradycardic, further she is more altered, will transfer patient to medical ICU  Critical care following  Patient also have minimal p.o. intake patient continues to refuse care, has minimal p.o. intake  Consult dietitian, start patient on TPN    Monitor vitals closely  Keep SPO2 above 90%  I's and O's  IV heplock  Pain control  Antiemetics as needed  Levaquin completed  Decadron  Bumex, Coreg, Entresto  Accu-Cheks before meals and at bedtime  Insulin sliding scale medium  Hypoglycemia protocol  And has elevated blood glucose and is refusing insulin  CBC , BMP, BNP  Pulmonary consulted  Consult cardiology recommended patient was not on anticoagulation due to GI bleed in the past  Prognosis is guarded  DVT and GI prophylaxis.   Discussed with RN  Palliative care on consult  Continue medication as below  Discussed with daughter at bedside in detail      Scheduled Meds:   heparin (porcine)  5,000 Units SubCUTAneous BID    insulin lispro  0-8 Units SubCUTAneous Q6H pantoprazole (PROTONIX) 40 mg injection  40 mg IntraVENous Daily    fat emulsion  100 mL IntraVENous Daily    dexamethasone  6 mg IntraVENous Daily    QUEtiapine  25 mg Oral BID    sodium chloride flush  5-40 mL IntraVENous 2 times per day    [Held by provider] carvedilol  12.5 mg Oral BID     sacubitril-valsartan  1 tablet Oral BID    [Held by provider] cholestyramine light  4 g Oral BID    [Held by provider] glimepiride  4 mg Oral BID WC     Continuous Infusions:   PN-Adult 2-in-1 Central Line (Standard) 41.7 mL/hr at 01/30/23 1734    sodium chloride 10 mL/hr at 01/29/23 2057    dextrose       PRN Meds:  sodium phosphate IVPB, 10 mmol, PRN   Or  sodium phosphate IVPB, 15 mmol, PRN   Or  sodium phosphate IVPB, 20 mmol, PRN  haloperidol lactate, 2 mg, Q4H PRN  LORazepam, 0.25 mg, Q4H PRN  sodium chloride flush, 10 mL, PRN  sodium chloride, , PRN  magnesium sulfate, 1,000 mg, PRN  ondansetron, 4 mg, Q8H PRN   Or  ondansetron, 4 mg, Q6H PRN  polyethylene glycol, 17 g, Daily PRN  acetaminophen, 650 mg, Q6H PRN   Or  acetaminophen, 650 mg, Q6H PRN  glucose, 4 tablet, PRN  dextrose bolus, 125 mL, PRN   Or  dextrose bolus, 250 mL, PRN  glucagon (rDNA), 1 mg, PRN  dextrose, , Continuous PRN  albuterol, 2.5 mg, Q6H PRN          Subjective:     I have seen and examined patient today, patient last 24 hours events were reviewed also discussed with nursing staff  No new complaints  Overnight patient did not have any acute issues  No nausea vomiting diarrhea  Afebrile  Pt. Denies any CP, SOB, palpitation, HA, dizziness, chills, cough, cold, changes in urination, BM or skin changes .    Patient remain lethargic  Was given Ativan earlier  She was more alert with PT earlier apparently    ROS:  Unable to assess due to patient mental status        Physical Examination:      Vitals:  BP (!) 146/70   Pulse 56   Temp 97.4 °F (36.3 °C) (Axillary)   Resp 18   Ht 5' 4\" (1.626 m)   Wt 150 lb 12.7 oz (68.4 kg)   SpO2 97%   BMI 25.88 kg/m²   Temp (24hrs), Av.3 °F (36.3 °C), Min:97.1 °F (36.2 °C), Max:97.5 °F (36.4 °C)    Weight:   Wt Readings from Last 3 Encounters:   23 150 lb 12.7 oz (68.4 kg)   23 150 lb 1.6 oz (68.1 kg)   10/20/22 150 lb (68 kg)     I/O last 3 completed shifts:  I/O last 3 completed shifts: In: 1145.3 [I.V.:545.6]  Out: 100 [Urine:100]     Recent Labs     23  2245 23  0318 23  1116 23  1621   POCGLU 193* 225* 206* 196*           General appearance -patient is sleepy but arousable, mentation is better  Mental status -not oriented to person, place, and time with normal affect  Head - normocephalic and atraumatic  Eyes - pupils equal and reactive, extraocular eye movements intact, conjunctiva clear  Ears - hearing appears to be intact  Nose - no drainage noted  Mouth - mucous membranes moist  Neck - supple, no carotid bruits, thyroid not palpable  Chest - clear to auscultation, normal effort  Heart - normal rate, regular rhythm, no murmur  Abdomen - soft, nontender, nondistended, bowel sounds present all four quadrants, no masses, hepatomegaly or splenomegaly  Neurological -unable to assess  Extremities - peripheral pulses palpable, no pedal edema or calf pain with palpation  Skin - no gross lesions, rashes, or induration noted        Medications: Allergies:    Allergies   Allergen Reactions    Acetaminophen-Codeine Nausea Only    Codeine Nausea Only    Furosemide      Other reaction(s): GI Disturbance    Linagliptin      Other reaction(s): SOB    Sitagliptin      Other reaction(s): felt poorly    Other Itching     Animal Dander       Current Meds:   Current Facility-Administered Medications:     PN-Adult 2-in-1 Central Line (Standard), , IntraVENous, Continuous TPN, Sylvia Bolaños MD, Last Rate: 41.7 mL/hr at 23 1734, New Bag at 23 173    heparin (porcine) injection 5,000 Units, 5,000 Units, SubCUTAneous, BID, Leydi Pickard MD    insulin lispro (HUMALOG) injection vial 0-8 Units, 0-8 Units, SubCUTAneous, Q6H, Nargis CHRISTIE Schelkun, APRN - CNP, 2 Units at 01/30/23 1231    sodium phosphate 10 mmol in sodium chloride 0.9 % 250 mL IVPB, 10 mmol, IntraVENous, PRN, Stopped at 01/29/23 1100 **OR** sodium phosphate 15 mmol in sodium chloride 0.9 % 250 mL IVPB, 15 mmol, IntraVENous, PRN **OR** sodium phosphate 20 mmol in sodium chloride 0.9 % 500 mL IVPB, 20 mmol, IntraVENous, PRN, Nargis L Schelkun, APRN - CNP    haloperidol lactate (HALDOL) injection 2 mg, 2 mg, IntraVENous, Q4H PRN, Kartik Cunningham MD    LORazepam (ATIVAN) injection 0.25 mg, 0.25 mg, IntraVENous, Q4H PRN, Kartik Cunningham MD, 0.25 mg at 01/30/23 1616    pantoprazole (PROTONIX) 40 mg in sodium chloride (PF) 0.9 % 10 mL injection, 40 mg, IntraVENous, Daily, Sylvia Bolaños MD, 40 mg at 01/30/23 1016    fat emulsion 20 % infusion 100 mL, 100 mL, IntraVENous, Daily, Sylvia Bolaños MD, Last Rate: 8 mL/hr at 01/30/23 1735, 100 mL at 01/30/23 1735    dexamethasone (PF) (DECADRON) injection 6 mg, 6 mg, IntraVENous, Daily, Sylvia Bolaños MD, 6 mg at 01/30/23 1016    QUEtiapine (SEROQUEL) tablet 25 mg, 25 mg, Oral, BID, Gabe Swain MD    sodium chloride flush 0.9 % injection 5-40 mL, 5-40 mL, IntraVENous, 2 times per day, Gardenia A Lump, APRN - CNP, 10 mL at 01/29/23 2100    sodium chloride flush 0.9 % injection 10 mL, 10 mL, IntraVENous, PRN, Gardenia A Lump, APRN - CNP, 10 mL at 01/22/23 1616    0.9 % sodium chloride infusion, , IntraVENous, PRN, Gardenia A Lump, APRN - CNP, Last Rate: 10 mL/hr at 01/29/23 2057, New Bag at 01/29/23 2057    magnesium sulfate 1000 mg in dextrose 5% 100 mL IVPB, 1,000 mg, IntraVENous, PRN, Gardenia A Lump, APRN - CNP, Stopped at 01/22/23 1715    ondansetron (ZOFRAN-ODT) disintegrating tablet 4 mg, 4 mg, Oral, Q8H PRN, 4 mg at 01/23/23 2013 **OR** ondansetron (ZOFRAN) injection 4 mg, 4 mg, IntraVENous, Q6H PRN, Gardenia A Lump, APRN - CNP    polyethylene glycol (GLYCOLAX) packet 17 g, 17 g, Oral, Daily PRN, Karel Searing Lump, APRN - CNP    acetaminophen (TYLENOL) tablet 650 mg, 650 mg, Oral, Q6H PRN, 650 mg at 01/24/23 0907 **OR** acetaminophen (TYLENOL) suppository 650 mg, 650 mg, Rectal, Q6H PRN, Gardenia A Lump, APRN - CNP    glucose chewable tablet 16 g, 4 tablet, Oral, PRN, Gardenia A Lump, APRN - CNP    dextrose bolus 10% 125 mL, 125 mL, IntraVENous, PRN **OR** dextrose bolus 10% 250 mL, 250 mL, IntraVENous, PRN, Gardenia A Lump, APRN - CNP    glucagon (rDNA) injection 1 mg, 1 mg, SubCUTAneous, PRN, Gardenia A Lump, APRN - CNP    dextrose 10 % infusion, , IntraVENous, Continuous PRN, Gardenia A Lump, APRN - CNP    albuterol (PROVENTIL) nebulizer solution 2.5 mg, 2.5 mg, Nebulization, Q6H PRN, Gardenia A Lump, APRN - CNP    [Held by provider] carvedilol (COREG) tablet 12.5 mg, 12.5 mg, Oral, BID Haleigh GUARDADO MD, 12.5 mg at 01/26/23 1229    sacubitril-valsartan (ENTRESTO) 24-26 MG per tablet 1 tablet, 1 tablet, Oral, BID, Haleigh Wilson MD, 1 tablet at 01/26/23 1229    [Held by provider] cholestyramine light packet 4 g, 4 g, Oral, BID, Haleigh Wilson MD, 4 g at 01/26/23 0047    [Held by provider] glimepiride (AMARYL) tablet 4 mg (Patient Supplied), 4 mg, Oral, BID Haleigh MD, 4 mg at 01/25/23 1903      I/O (24Hr):     Intake/Output Summary (Last 24 hours) at 1/30/2023 1840  Last data filed at 1/29/2023 2256  Gross per 24 hour   Intake --   Output 100 ml   Net -100 ml         Data:           Labs:    Hematology:  Recent Labs     01/28/23  0534 01/29/23  0300 01/30/23  0315   WBC 10.6 8.3 10.9   RBC 4.06 3.70* 3.71*   HGB 11.3* 10.3* 10.5*   HCT 36.4 33.3* 33.5*   MCV 89.7 90.0 90.3   MCH 27.8 27.8 28.3   MCHC 31.0 30.9 31.3   RDW 14.9* 14.9* 14.9*    186 179   MPV 10.0 10.7 10.7       Chemistry:  Recent Labs     01/28/23  0534 01/28/23  1340 01/29/23  0300 01/30/23  0315     --  140 143   K 4.8  --  5.1 4.8   *  --  112* 113*   CO2 23  --  24 26   GLUCOSE 245*  --  377* 220*   BUN 42*  --  38* 36*   CREATININE 0.83  --  0.78 0.64   MG  --   --   --  2.0   ANIONGAP 8*  --  4* 4*   LABGLOM >60  --  >60 >60   CALCIUM 8.4*  --  8.3* 8.9   PHOS  --   --  2.3* 2.8   PROBNP  --  7,046*  --   --    TROPHS  --  47*  --   --        Recent Labs     01/29/23  0300 01/29/23  0722 01/29/23  1036 01/29/23  1503 01/29/23  2245 01/30/23  0318 01/30/23  1116 01/30/23  1621   PROT 4.4*  --   --   --   --   --   --   --    LABALBU 2.2*  --   --   --   --   --   --   --    AST 30  --   --   --   --   --   --   --    ALT 16  --   --   --   --   --   --   --    ALKPHOS 96  --   --   --   --   --   --   --    BILITOT 0.4  --   --   --   --   --   --   --    TRIG 112  --   --   --   --   --   --   --    POCGLU  --    < > 297* 308* 193* 225* 206* 196*    < > = values in this interval not displayed. Lab Results   Component Value Date/Time    SPECIAL NOT REPORTED 11/05/2017 01:55 PM     Lab Results   Component Value Date/Time    CULTURE ESCHERICHIA COLI >304114 CFU/ML (A) 10/20/2022 05:48 PM       No results found for: POCPH, PHART, PH, POCPCO2, OQW6IWG, PCO2, POCPO2, PO2ART, PO2, POCHCO3, TUS4MCK, HCO3, NBEA, PBEA, BEART, BE, THGBART, THB, ZGT0CIS, KLCB4XLB, X2ABMTZS, O2SAT, FIO2    Radiology:    CT CHEST WO CONTRAST    Result Date: 1/22/2023  Multifocal ground-glass opacities indeterminate for COVID-19. Right greater than left pleural effusions. Ileus. Cardiomegaly and coronary artery disease. XR CHEST PORTABLE    Result Date: 1/24/2023  More prominent bilateral interstitial and ground-glass opacities, which may represent developing edema versus multifocal infection. Grossly similar appearance of small effusions. XR CHEST PORTABLE    Result Date: 1/21/2023  New small right effusion and airspace disease. Possible mild CHF.          All radiological studies reviewed  Code Status:  Full Code        Electronically signed by Mj Means MD on 1/30/2023 at 6:40 PM    This note was created with the assistance of a speech-recognition program.  Although the intention is to generate a document that actually reflects the content of the visit, no guarantees can be provided that every mistake has been identified and corrected by editing. Note was updated later by me after  physical examination and  completion of the assessment.

## 2023-01-31 NOTE — PROGRESS NOTES
InEndotracheal Intubation Procedure Note  At 0104 Dr Amalia Hodgson at bedside. Indication for endotracheal intubation: impending respiratory failure, impending airway compromise, and abnormal ABG: CO2 128. PH 6.9. Sedation: etomidate 20 mg IV at 0106  Paralytic: vecuronium 10 mg IV given at 0108    Equipment: 7.5mm cuffed endotracheal tube 24 cm at the teeth    Number of attempts: 1  ETT location confirmed by by auscultation, by CXR, and ETCO2 monitor.

## 2023-01-31 NOTE — PROGRESS NOTES
Patient receives Sacrament of the Sick (anointing) from [facility].    [facility] will follow as needed. (writer charting for [facility].)   PT: anointed for the sacrament of the sick by Fr. Evy Leija     01/31/23 1042   Encounter Summary   Encounter Overview/Reason  Reema Flood Encounter   Service Provided For: Patient   Referral/Consult From: Amadeo   Last Encounter  01/31/23   Rituals, Rites and Sacraments   Type Sacrament of Sick; Anointing

## 2023-01-31 NOTE — PROGRESS NOTES
Comprehensive Nutrition Assessment    Type and Reason for Visit:  Consult (Tube feeding ordering and management)    Nutrition Recommendations/Plan:   Diet NPO  Recommend Glucerna 1.5 Tor goal rate 33 mL/hr (792 mL total volume, 1188 kcal and 65 gm of protein). Water flush 30 mL every 4 hours  Monitor vent status, tube feeding rate/ tolerance, GI function and labs     Malnutrition Assessment:  Malnutrition Status: At risk for malnutrition (Comment) (01/24/23 1730)        Nutrition Assessment:    Patient was intubated and sedated around midnight (1/31). RN reports patient has been refusing care and refusion to eat prior to intubation. Nutrition consult received for tube feeding order and management. RN states that physician directed for TPN to be weaned off and tube feedings to start. Recommend Glucerna 1.5 Tor goal rate 33 mL/hr (792 mL total volume). Monitor vent status, tube feeding rate/ tolerance and labs. Nutrition Related Findings:    Edema: +2 pitting BUE, +1 pitting BLE, +1 facial edema. Active bowel sounds. Wound Type: Venous Stasis       Current Nutrition Intake & Therapies:    Average Meal Intake: NPO  Average Supplements Intake: NPO  PN-Adult 2-in-1 Central Line (Standard)  Diet NPO  ADULT TUBE FEEDING; Nasogastric; Diabetic; Continuous; 15; Yes; 15; Q 4 hours; 33; 30; Q 4 hours  Current Tube Feeding (TF) Orders:  Feeding Route: Nasogastric  Formula: Diabetic  Schedule: Continuous  Feeding Regimen: Glucerna 1.5 Tor 33 mL/hr (792 mL total volume)  Water Flushes: 30 mL every 4 hours  Current TF & Flush Orders Provides: 1188 kcal and 65 gm of protein  Goal TF & Flush Orders Provides: 1180 kcal and 88 gm of protein    Anthropometric Measures:  Height: 5' 4\" (162.6 cm)  Ideal Body Weight (IBW): 120 lbs (55 kg)       Current Body Weight: 150 lb (68 kg), 125 % IBW.  Weight Source: Bed Scale  Current BMI (kg/m2): 25.7        Weight Adjustment For: No Adjustment                 BMI Categories: Overweight (BMI 25.0-29. 9)    Estimated Daily Nutrient Needs:  Energy Requirements Based On: Formula  Weight Used for Energy Requirements: Current  Energy (kcal/day): 1180 kcal [facility] 2003)  Weight Used for Protein Requirements: Current  Protein (g/day): 88 gm of protein (1.3 gm/kg)  Method Used for Fluid Requirements: 1 ml/kcal  Fluid (ml/day): 1180 mL    Nutrition Diagnosis:   Inadequate oral intake related to inadequate protein-energy intake as evidenced by NPO or clear liquid status due to medical condition, intubation, nutrition support - enteral nutrition    Nutrition Interventions:   Food and/or Nutrient Delivery: Continue NPO, Start Tube Feeding, Discontinue Parenteral Nutrition  Nutrition Education/Counseling: Education not indicated  Coordination of Nutrition Care: Continue to monitor while inpatient       Goals:     Goals: Meet at least 75% of estimated needs, Tolerate nutrition support at goal rate       Nutrition Monitoring and Evaluation:   Behavioral-Environmental Outcomes: None Identified  Food/Nutrient Intake Outcomes: Enteral Nutrition Intake/Tolerance  Physical Signs/Symptoms Outcomes: Biochemical Data, Fluid Status or Edema, Skin, Weight    Discharge Planning:     Too soon to determine         Jason CRUZN, RDN, LDN  Lead Clinical Dietitian  RD Office Phone [phone]

## 2023-01-31 NOTE — PROGRESS NOTES
Pt status rapidly declining. Received in report pt had not been verbally responsive x2 shifts, but did have short episode of being alert for PT/OT during day. Pt was not verbally responsive but would respond to pain and open eyes to voice. HR elevating 110's, RR 30's and shallow, not responsive to pain or voice. Notified charge RN and RT, both to bedside. ABG drawn. Critical care Dr. notified of results and changing status. Orders to contact ED Dr for rapid intubation. Telephone orders received for CT head, sedation and levophed and to transfer to ICU status. Report given to ICU RN, aided with questions and intubation.

## 2023-01-31 NOTE — PLAN OF CARE
Problem: Respiratory - Adult  Goal: Achieves optimal ventilation and oxygenation  Outcome: Progressing  Flowsheets (Taken 1/30/2023 2000 by Tenisha Dobson RN)  Achieves optimal ventilation and oxygenation:   Assess for changes in respiratory status   Assess for changes in mentation and behavior   Position to facilitate oxygenation and minimize respiratory effort   Oxygen supplementation based on oxygen saturation or arterial blood gases

## 2023-01-31 NOTE — CARE COORDINATION
Discharge planning    Chart reviewed. Patient transferred back to ICU. SS has mike gonzáles and darrick thomas following. [facility] was also following for home 02 earlier in admission when patient refusing placement . Per epic patient intubated at 0139 today. Palliative care did speak with family yesterday and will continue to follow. .. LTAC vs SNF vs hospice. Nelson Hay

## 2023-01-31 NOTE — PROGRESS NOTES
Respiratory Shift Summary:  Routine Ventilator care given. Patient remains on PRVC on the Ventilator throughout the shift as charted in the flowsheets. Dr Norris Rinne aware.

## 2023-01-31 NOTE — PROGRESS NOTES
[facility]  Progress Note    Aide Butler  MRN: [id number]  Date: 1/31/2023  LOS: 9     Reason for F/U :   COVID-19 virus infection    Impression :   COVID-19 virus infection tested + 1/22/2023  Unvaccinated adult patient  Acute respiratory insufficiency requiring supplemental oxygen   Hypercapnic respiratory failure-intubated 1/31/2023  for respiratory acidosis  Congestive heart failure with mild congestive changes on chest x-ray  Multifocal opacities in the bases/ pneumonia  Confusion  Right-sided greater than left-sided pleural effusion    Recommendations:   COVID therapy: The patient is not a candidate for antiviral therapy  Given the hypoxia the patient will continue Decadron-and this was switched to IV 1/27/2023  The patient is not a candidate for immunosuppressive therapy at this time and the CRP is not overly elevated  The follow-up CRP is decreased and the procalcitonin level remains low    Antibiotic therapy: The patient has completed a course of levofloxacin through 1/28/2023   The patient developed hypercapnic respiratory failure and does not have any evidence of pneumonia-doubt aspiration  The patient did get a dose of antibiotics overnight but does not need to continue these at this time    Infection Control Recommendations:   Droplet plus precautions-patient can come out of isolation after tomorrow    Discharge Planning:   Estimated Length of IV antimicrobials: 5 days  Patient will need Midline Catheter Insertion/ PICC line Insertion: No  Patient will need: Home IV , [place],  [facility],  LTAC: Undetermined  Patient willneed outpatient wound care: No    Medical Decision making / Summary of Stay:   Aide Butler is a 80y.o.-year-old female who was initially admitted on 1/21/2023.    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 She does not report any subjective fevers, chills no significant cough, no abdominal pain, no nausea vomiting, has chronic diarrhea. The patient was reporting some neck pain and again the neck pain has been previously evaluated with no source found. She reports that her daughter recently tested positive for COVID and work-up in the emergency department included a chest x-ray that showed a new small right effusion and airspace disease and possible mild CHF. COVID testing was done that was positive and the patient was admitted with COVID-19 virus infection as well as concern for congestive heart failure. The patient has since been seen by the pulmonary critical care service and was started on supplemental oxygen, IV Decadron, intravenous levofloxacin as there was concern for right basilar infiltrate/pneumonia and I was asked to evaluate and help with antibiotic choice. Current evaluation:2023    /66   Pulse 62   Temp 96.8 °F (36 °C) (Infrared)   Resp 22   Ht 5' 4\" (1.626 m)   Wt 150 lb (68 kg)   SpO2 91%   BMI 25.75 kg/m²     Temperature Range: Temp: 96.8 °F (36 °C) Temp  Av.4 °F (36.3 °C)  Min: 96.8 °F (36 °C)  Max: 98.2 °F (36.8 °C)  Patient is seen and evaluated at bedside she is currently intubated on the ventilator at 35% FiO2 and 8 of PEEP    Review of Systems   Unable to perform ROS: Intubated     Physical Examination :     Physical Exam  Constitutional:       Appearance: She is well-developed. Interventions: She is intubated. HENT:      Head: Normocephalic and atraumatic. Mouth/Throat:      Mouth: Mucous membranes are dry. Cardiovascular:      Rate and Rhythm: Regular rhythm. Heart sounds: Normal heart sounds.    Pulmonary: Effort: She is intubated. Breath sounds: Rales present. Abdominal:      General: Bowel sounds are normal.      Palpations: Abdomen is soft. Musculoskeletal:      Cervical back: Neck supple. Right lower leg: Edema present. Left lower leg: Edema present. Skin:     General: Skin is warm and dry. Laboratory data:   I have independently reviewed the followinglabs:  CBC with Differential:   Recent Labs     01/30/23  0315 01/31/23  0532   WBC 10.9 10.6   HGB 10.5* 10.1*   HCT 33.5* 32.2*    151   LYMPHOPCT 3* 4*   MONOPCT 4 7       BMP:   Recent Labs     01/30/23  0315 01/31/23  0532    142   K 4.8 4.1   * 113*   CO2 26 24   BUN 36* 38*   CREATININE 0.64 0.63   MG 2.0  --        Hepatic Function Panel:   Recent Labs     01/29/23  0300   PROT 4.4*   LABALBU 2.2*   BILITOT 0.4   ALKPHOS 96   ALT 16   AST 30           Lab Results   Component Value Date/Time    PROCAL 0.13 01/26/2023 05:14 AM    PROCAL 0.55 01/22/2023 01:40 PM     Lab Results   Component Value Date/Time    CRP 22.2 01/26/2023 05:14 AM    CRP 37.7 01/23/2023 05:13 AM    CRP 0.5 03/26/2018 03:47 PM     No results found for: Jethro England      Lab Results   Component Value Date/Time    DDIMER 1.81 01/26/2023 06:58 AM    DDIMER 0.86 03/26/2018 03:47 PM     No results found for: FERRITIN  No results found for: LDH  No results found for: FIBRINOGEN    Results in Past 30 Days  Result Component Current Result Ref Range Previous Result Ref Range   SARS-CoV-2, Rapid DETECTED (A) (1/22/2023) Not Detected Not in Time Range      Lab Results   Component Value Date/Time    COVID19 DETECTED 01/22/2023 12:30 AM    COVID19 Not Detected 02/09/2022 05:46 PM       No results for input(s): [facility] in the last 72 hours.     Component Ref Range & Units 1/31/23 0007    POC pH 7.350 - 7.450 6.963 Low Panic     POC pCO2 35.0 - 48.0 mm Hg 128.5 High Panic     POC PO2 83.0 - 108.0 mm Hg 98.3    POC HCO3 21.0 - 28.0 mmol/L 29.1 High     Negative Base Excess, Art 0.0 - 2.0 6 High     POC O2 SAT 94.0 - 98.0 % 90 Low     O2 Device/Flow/%  Cannula    Resulting Agency  [facility]              Specimen Collected: 01/31/23 00:07 EST Last Resulted: 01/31/23 00:11 EST                Imaging Studies:   CT OF THE HEAD WITHOUT CONTRAST  1/31/2023 4:50 am  Impression   No acute intracranial abnormality. Variable sinus opacification attributed to presence of NG and endotracheal   tubes. ONE XRAY VIEW OF THE CHEST 1/31/2023 2:08 am  Impression   Tubes and lines in satisfactory position although consider advancing the OG   tube 3-4 cm. Findings consistent with mild congestive failure. ONE XRAY VIEW OF THE CHEST 1/28/2023 1:08 pm  Impression   Right central line tip near the cavoatrial junction, otherwise no significant   change compared with 01/24/2023. ONE XRAY VIEW OF THE CHEST 1/26/2023 6:44 am    Impression   Stable right perihilar patchy opacities, concerning for aspiration/pneumonia. ONE XRAY VIEW OF THE CHEST 1/24/2023 5:36 am  Impression   More prominent bilateral interstitial and ground-glass opacities, which may   represent developing edema versus multifocal infection. Grossly similar   appearance of small effusions. Cultures:     COVID-19, Rapid [[id number]] (Abnormal) Collected: 01/22/23 0030   Order Status: Completed Specimen: Nasopharyngeal Swab Updated: 01/22/23 0058    Specimen Description . NASOPHARYNGEAL SWAB    SARS-CoV-2, Rapid DETECTED Abnormal     Comment:        Rapid NAAT: The specimen is POSITIVE for SARS-Cov-2, the novel coronavirus associated with   COVID-19. This test has been authorized by the FDA under an Emergency Use Authorization (EUA) for use   by authorized laboratories. The ID NOW COVID-19 assay is designed to detect the virus that causes COVID-19 in patients   with signs and symptoms of infection who are suspected of COVID-19.    An individual without symptoms of COVID-19 and who is not shedding SARS-CoV-2 virus would   expect to have a negative (not detected) result in this assay. Fact sheet for Healthcare Providers: http://www.tara.ángel/   Fact sheet for Patients: http://www.tara.ángel/         Methodology: Isothermal Nucleic Acid Amplification         Results reported to the appropriate Health Department          Medications:      heparin (porcine)  5,000 Units SubCUTAneous BID    insulin lispro  0-8 Units SubCUTAneous Q6H    pantoprazole (PROTONIX) 40 mg injection  40 mg IntraVENous Daily    fat emulsion  100 mL IntraVENous Daily    QUEtiapine  25 mg Oral BID    sodium chloride flush  5-40 mL IntraVENous 2 times per day    [Held by provider] carvedilol  12.5 mg Oral BID WC    sacubitril-valsartan  1 tablet Oral BID    [Held by provider] cholestyramine light  4 g Oral BID    [Held by provider] glimepiride  4 mg Oral BID WC       Electronically signed by Roberto Tinajero MD on 1/31/2023 at 9:20 AM      [facility]  Roberto Tinajero MD  [employer] messaging  OFFICE: [phone]    Thank you for allowing us to participate in the care of this patient. Please call with questions. This note is created with the assistance of a speech recognition program.  While intending to generate a document that actually reflects the content of the visit, the document can still have some errors including those of syntax and sound a like substitutions which may escape proof reading. In such instances, actual meaning can be extrapolated by contextual diversion.

## 2023-01-31 NOTE — PROGRESS NOTES
Physical Therapy  DATE: 2023    NAME: Andrez Mccurdy  MRN: [id number]   : 10/23/1932    Patient not seen this date for Physical Therapy due to:      [] Cancel by RN or physician due to:    [] Hemodialysis    [] Critical Lab Value Level     [] Blood transfusion in progress    [] Acute or unstable cardiovascular status   _MAP < 55 or more than >115  _HR < 40 or > 130    [x] Acute or unstable pulmonary status - patient is now intubated. [] Strict Bedrest    [] Off Unit for surgery or procedure    [] Off Unit for testing       [] Pending imaging to R/O fracture    [] Refusal by Patient      [] Other      [] PT being discontinued at this time. Patient independent. No further needs. [] PT being discontinued at this time as the patient has been transferred to hospice care. No further needs.       Olya Moore, PT

## 2023-01-31 NOTE — PLAN OF CARE
Problem: Discharge Planning  Goal: Discharge to home or other facility with appropriate resources  1/31/2023 1151 by Carlota De La Cruz RN  Outcome: Progressing  1/31/2023 0752 by Roma Espino RN  Outcome: Progressing  Flowsheets  Taken 1/31/2023 0400 by Roma Espino RN  Discharge to home or other facility with appropriate resources: Identify barriers to discharge with patient and caregiver  Taken 1/31/2023 0000 by Roma Espino RN  Discharge to home or other facility with appropriate resources: Identify barriers to discharge with patient and caregiver  Taken 1/30/2023 2000 by Alena Faulkner RN  Discharge to home or other facility with appropriate resources:   Identify barriers to discharge with patient and caregiver   Arrange for needed discharge resources and transportation as appropriate   Identify discharge learning needs (meds, wound care, etc)   Refer to discharge planning if patient needs post-hospital services based on physician order or complex needs related to functional status, cognitive ability or social support system     Problem: Skin/Tissue Integrity  Goal: Absence of new skin breakdown  Description: 1. Monitor for areas of redness and/or skin breakdown  2. Assess vascular access sites hourly  3. Every 4-6 hours minimum:  Change oxygen saturation probe site  4. Every 4-6 hours:  If on nasal continuous positive airway pressure, respiratory therapy assess nares and determine need for appliance change or resting period.   1/31/2023 1151 by Carlota De La Cruz RN  Outcome: Progressing  1/31/2023 0752 by Roma Espino RN  Outcome: Progressing     Problem: Safety - Adult  Goal: Free from fall injury  1/31/2023 1151 by Carlota De La Cruz RN  Outcome: Progressing  1/31/2023 0752 by Roma Espino RN  Outcome: Progressing  Flowsheets (Taken 1/31/2023 0733)  Free From Fall Injury: Instruct family/caregiver on patient safety     Problem: ABCDS Injury Assessment  Goal: Absence of physical injury  1/31/2023 1151 by Deven Andrews RN  Outcome: Progressing  1/31/2023 0752 by Tammi Snyder RN  Outcome: Progressing  Flowsheets (Taken 1/31/2023 0039)  Absence of Physical Injury: Implement safety measures based on patient assessment     Problem: Chronic Conditions and Co-morbidities  Goal: Patient's chronic conditions and co-morbidity symptoms are monitored and maintained or improved  1/31/2023 1151 by Deven Andrews RN  Outcome: Progressing  1/31/2023 0752 by Tammi Snyder RN  Outcome: Not Progressing  Flowsheets  Taken 1/31/2023 0400 by Lis Hannah 34 - Patient's Chronic Conditions and Co-Morbidity Symptoms are Monitored and Maintained or Improved: Monitor and assess patient's chronic conditions and comorbid symptoms for stability, deterioration, or improvement  Taken 1/31/2023 0000 by Tammi Snyder RN  Care Plan - Patient's Chronic Conditions and Co-Morbidity Symptoms are Monitored and Maintained or Improved: Monitor and assess patient's chronic conditions and comorbid symptoms for stability, deterioration, or improvement  Taken 1/30/2023 2000 by Lis Lyman 34 - Patient's Chronic Conditions and Co-Morbidity Symptoms are Monitored and Maintained or Improved:   Monitor and assess patient's chronic conditions and comorbid symptoms for stability, deterioration, or improvement   Collaborate with multidisciplinary team to address chronic and comorbid conditions and prevent exacerbation or deterioration   Update acute care plan with appropriate goals if chronic or comorbid symptoms are exacerbated and prevent overall improvement and discharge     Problem: Cardiovascular - Adult  Goal: Maintains optimal cardiac output and hemodynamic stability  1/31/2023 1151 by Deven Andrews RN  Outcome: Progressing  Flowsheets (Taken 1/31/2023 0800)  Maintains optimal cardiac output and hemodynamic stability:   Monitor blood pressure and heart rate Monitor urine output and notify Licensed Independent Practitioner for values outside of normal range  1/31/2023 0752 by Edwina Chawla RN  Outcome: Progressing  Flowsheets  Taken 1/31/2023 0400 by Edwina Chawla RN  Maintains optimal cardiac output and hemodynamic stability: Monitor blood pressure and heart rate  Taken 1/31/2023 0000 by Edwina Chawla RN  Maintains optimal cardiac output and hemodynamic stability: Monitor blood pressure and heart rate  Taken 1/30/2023 2000 by Juancarlos Darby RN  Maintains optimal cardiac output and hemodynamic stability: Monitor blood pressure and heart rate     Problem: Respiratory - Adult  Goal: Achieves optimal ventilation and oxygenation  1/31/2023 1151 by Roberta Echevarria RN  Outcome: Progressing  Flowsheets (Taken 1/31/2023 0800)  Achieves optimal ventilation and oxygenation: Assess for changes in respiratory status  1/31/2023 0752 by Edwina Chawla RN  Outcome: Progressing  1/31/2023 0733 by Zayda Kumar RCP  Outcome: Progressing  Flowsheets  Taken 1/31/2023 0400 by Edwina Chawla RN  Achieves optimal ventilation and oxygenation: Assess for changes in respiratory status  Taken 1/31/2023 0000 by Edwina Chawla RN  Achieves optimal ventilation and oxygenation: Assess for changes in respiratory status  Taken 1/30/2023 2000 by Juancarlos Darby RN  Achieves optimal ventilation and oxygenation:   Assess for changes in respiratory status   Assess for changes in mentation and behavior   Position to facilitate oxygenation and minimize respiratory effort   Oxygen supplementation based on oxygen saturation or arterial blood gases     Problem: Gastrointestinal - Adult  Goal: Maintains or returns to baseline bowel function  1/31/2023 1151 by Roberta Echevarria RN  Outcome: Progressing  Flowsheets (Taken 1/31/2023 0800)  Maintains or returns to baseline bowel function: Assess bowel function  1/31/2023 0752 by Edwina Chawla RN  Outcome: Progressing  Flowsheets  Taken 1/31/2023 0400 by Yael Alfonso RN  Maintains or returns to baseline bowel function: Assess bowel function  Taken 1/31/2023 0000 by Yael Alfonso RN  Maintains or returns to baseline bowel function: Assess bowel function  Taken 1/30/2023 2000 by Dennise Funez RN  Maintains or returns to baseline bowel function: Assess bowel function     Problem: Metabolic/Fluid and Electrolytes - Adult  Goal: Glucose maintained within prescribed range  1/31/2023 1151 by Phu Telles RN  Outcome: Progressing  1/31/2023 0752 by Yael Alfonso RN  Outcome: Progressing  Flowsheets  Taken 1/31/2023 0400 by Yael Alfonso RN  Glucose maintained within prescribed range: Monitor blood glucose as ordered  Taken 1/31/2023 0000 by Yael Alfonso RN  Glucose maintained within prescribed range: Monitor blood glucose as ordered  Taken 1/30/2023 2000 by Dennise Funez RN  Glucose maintained within prescribed range: Monitor blood glucose as ordered     Problem: Musculoskeletal - Adult  Goal: Return mobility to safest level of function  1/31/2023 1151 by Phu Telles RN  Outcome: Progressing  Flowsheets (Taken 1/31/2023 0800)  Return Mobility to Safest Level of Function: Assess patient stability and activity tolerance for standing, transferring and ambulating with or without assistive devices  1/31/2023 0752 by Yael Alfonso RN  Outcome: Progressing  Flowsheets (Taken 1/31/2023 0000)  Return Mobility to Safest Level of Function: Assess patient stability and activity tolerance for standing, transferring and ambulating with or without assistive devices  Goal: Return ADL status to a safe level of function  1/31/2023 1151 by Phu Telles RN  Outcome: Progressing  Flowsheets (Taken 1/31/2023 0800)  Return ADL Status to a Safe Level of Function: Administer medication as ordered  1/31/2023 0752 by Yael Alfonso RN  Outcome: Progressing  Flowsheets (Taken 1/31/2023 0000)  Return ADL Status to a Safe Level of Function: Administer medication as ordered     Problem: Pain  Goal: Verbalizes/displays adequate comfort level or baseline comfort level  1/31/2023 1151 by Deven Andrews RN  Outcome: Progressing  1/31/2023 0752 by Tammi Snyder RN  Outcome: Progressing  Flowsheets  Taken 1/31/2023 0400 by Tammi Snyder RN  Verbalizes/displays adequate comfort level or baseline comfort level: Assess pain using appropriate pain scale  Taken 1/31/2023 0000 by Tammi Snyder RN  Verbalizes/displays adequate comfort level or baseline comfort level: Assess pain using appropriate pain scale  Taken 1/30/2023 2000 by Raquel Galindo RN  Verbalizes/displays adequate comfort level or baseline comfort level:   Administer analgesics based on type and severity of pain and evaluate response   Assess pain using appropriate pain scale     Problem: Nutrition Deficit:  Goal: Optimize nutritional status  1/31/2023 1151 by Deven Andrews RN  Outcome: Progressing  1/31/2023 0752 by Tammi Snyder RN  Outcome: Progressing     Problem: Neurosensory - Adult  Goal: Achieves maximal functionality and self care  1/31/2023 1151 by Deven Andrews RN  Outcome: Progressing  Flowsheets (Taken 1/31/2023 0800)  Achieves maximal functionality and self care:   Encourage and assist patient to increase activity and self care with guidance from physical therapy/occupational therapy   Monitor swallowing and airway patency with patient fatigue and changes in neurological status  1/31/2023 0752 by Tammi Snyder RN  Outcome: Progressing  Flowsheets  Taken 1/31/2023 0400 by Tammi Snyder RN  Achieves maximal functionality and self care: Encourage and assist patient to increase activity and self care with guidance from physical therapy/occupational therapy  Taken 1/31/2023 0000 by Tammi Snyder RN  Achieves maximal functionality and self care: Encourage and assist patient to increase activity and self care with guidance from physical therapy/occupational therapy  Taken 1/30/2023 2000 by Nain Loaiza RN  Achieves maximal functionality and self care: Encourage and assist patient to increase activity and self care with guidance from physical therapy/occupational therapy     Problem: Safety - Medical Restraint  Goal: Remains free of injury from restraints (Restraint for Interference with Medical Device)  Description: INTERVENTIONS:  1. Determine that other, less restrictive measures have been tried or would not be effective before applying the restraint  2. Evaluate the patient's condition at the time of restraint application  3. Inform patient/family regarding the reason for restraint  4.  Q2H: Monitor safety, psychosocial status, comfort, nutrition and hydration  1/31/2023 1151 by Stuart Gaines RN  Outcome: Progressing  Flowsheets (Taken 1/31/2023 0800)  Remains free of injury from restraints (restraint for interference with medical device):   Determine that other, less restrictive measures have been tried or would not be effective before applying the restraint   Evaluate the patient's condition at the time of restraint application   Every 2 hours: Monitor safety, psychosocial status, comfort, nutrition and hydration  1/31/2023 0752 by Doris Garduno RN  Outcome: Progressing  Flowsheets (Taken 1/31/2023 0116)  Remains free of injury from restraints (restraint for interference with medical device): Determine that other, less restrictive measures have been tried or would not be effective before applying the restraint     Problem: Chronic Conditions and Co-morbidities  Goal: Patient's chronic conditions and co-morbidity symptoms are monitored and maintained or improved  1/31/2023 1151 by Stuart Gaines RN  Outcome: Progressing  1/31/2023 0752 by Doris Garduno RN  Outcome: Not Progressing  Flowsheets  Taken 1/31/2023 0400 by Lis Harmon 34 - Patient's Chronic Conditions and Co-Morbidity Symptoms are Monitored and Maintained or Improved: Monitor and assess patient's chronic conditions and comorbid symptoms for stability, deterioration, or improvement  Taken 1/31/2023 0000 by Robby Rubalcava RN  Care Plan - Patient's Chronic Conditions and Co-Morbidity Symptoms are Monitored and Maintained or Improved: Monitor and assess patient's chronic conditions and comorbid symptoms for stability, deterioration, or improvement  Taken 1/30/2023 2000 by Lanette Robertson RN  Care Plan - Patient's Chronic Conditions and Co-Morbidity Symptoms are Monitored and Maintained or Improved:   Monitor and assess patient's chronic conditions and comorbid symptoms for stability, deterioration, or improvement   Collaborate with multidisciplinary team to address chronic and comorbid conditions and prevent exacerbation or deterioration   Update acute care plan with appropriate goals if chronic or comorbid symptoms are exacerbated and prevent overall improvement and discharge

## 2023-01-31 NOTE — PROGRESS NOTES
Pulmonary Critical Care Progress Note       Patient seen for the follow up of  COVID-19 acute hypoxic respiratory insufficiency, pulmonary edema, pleural effusions    Subjective: Vicky Paez She  developed obtundation yesterday without receiving any sedation. ABG was done she was found to have increased CO2 level above 100. I was contacted by RN initiated BiPAP. She did not do well with BiPAP support. I ordered intubation and stat CT scan of the brain  . She was intubated  By ED physician. .  I had ordered Levophed for hypotension after intubation. I ordered   Vanco and cefepime  1 does each empirically end advised to contact ID for possible escalation  /management of antibiotics. . She is off Levophed now. She is on Versed drip at 4 mg an hour unresponsive on the ventilator. .  She has been on TPN. Decadron was stopped after 5 days. Examination:  Vitals: /68   Pulse 73   Temp 96.9 °F (36.1 °C) (Infrared)   Resp 22   Ht 5' 4\" (1.626 m)   Wt 150 lb (68 kg)   SpO2 100%   BMI 25.75 kg/m²   General appearance:   Intubated sedated on the ventilator  Neck: No JVD  Lungs: Decreased breath sounds no significant wheezing, occasional crackles  Heart: regular rate and rhythm, S1, S2 normal, no gallop  Abdomen: Soft, non tender, + BS  Extremities: no cyanosis or clubbing. Trace edema.     LABs:  CBC:   Recent Labs     01/29/23  0300 01/30/23  0315 01/31/23  0532   WBC 8.3 10.9 10.6   HGB 10.3* 10.5* 10.1*   HCT 33.3* 33.5* 32.2*    179 151       BMP:   Recent Labs     01/29/23  0300 01/30/23  0315 01/31/23  0532    143 142   K 5.1 4.8 4.1   CO2 24 26 24   BUN 38* 36* 38*   CREATININE 0.78 0.64 0.63   LABGLOM >60 >60 >60   GLUCOSE 377* 220* 179*        Latest Reference Range & Units 1/31/23 00:07 1/31/23 02:44   POC pH 7.350 - 7.450  6.963 (LL) 7.418   POC pCO2 35.0 - 48.0 mm Hg 128.5 (HH) 34.0 (L)   POC PO2 83.0 - 108.0 mm Hg 98.3 148.7 (H)   POC HCO3 21.0 - 28.0 mmol/L 29.1 (H) 22.0   POC O2 SAT 94.0 - 98.0 % 90 (L) 99 (H)   POC Ionized Calcium 1.15 - 1.33 mmol/L 1.60 (H)    POC Potassium 3.5 - 4.5 mmol/L 5.1 (H)       Latest Reference Range & Units Most Recent   Procalcitonin <0.09 ng/mL 0.55 (H)  1/22/23 13:40   (H): Data is abnormally high    Radiology:   CT brain 1/31    No acute intracranial abnormality. Variable sinus opacification attributed to presence of NG and endotracheal   tubes. Chest x-ray  1/31  Tubes and lines in satisfactory position although consider advancing the OG   tube 3-4 cm. Findings consistent with mild congestive failure. CT chest 1/22/2023      X-ray chest 1/26/2023    Stable right perihilar patchy opacities, concerning for aspiration/pneumonia. Unchanged enlarged cardiomediastinal silhouette. No pleural effusion. No   pneumothorax. Bony structures unremarkable. Impression:  Acute hypoxic respiratory failure  COVID pneumonia  Pulmonary edema  Small bilateral pleural effusions right greater than left  Elevated troponin / History of A-fib, not on anticoag d/t age, frailty and hx of GIB per cardiology   Allergic rhinitis, DM, HTN  Ileus    Recommendations:   Assist control ventilation   Versed for sedation  Seroquel 25 b.I.d. Discontinue Ativan 0.25 IV every 4 hours as needed/Haldol 2 mg IV every 4 hours as needed for backup  Albuterol every 6 hours as needed  Status post Levaquin course    Resume IV Decadron 6 milligrams daily  Infectious disease on consult, no indication for antiviral or immunosuppressive therapy at this    Consult neurology/monitor mental status check ammonia  and TSH level and liver  function troponin  Cardiology following/off anticoagulation for A. fib for history of GI bleed  Coreg on hold- monitor BP/HR    Start NG feeds /discontinue TPN per dietitian  Neurology following  Palliative care on consult  /full code   Previous discussed with  daughter .     Peptic ulcer disease prophylaxis  DVT prophylaxis/ / heparin 5000 every 12 hours    Electronically signed by     Kartik Cunningham MD on 1/31/2023 at 3:57 PM  Pulmonary Critical Care and Sleep Medicine,  [facility]  Office: [phone]

## 2023-01-31 NOTE — PLAN OF CARE
Problem: Discharge Planning  Goal: Discharge to home or other facility with appropriate resources  Outcome: Progressing  Flowsheets  Taken 1/31/2023 0400 by John Pruett RN  Discharge to home or other facility with appropriate resources: Identify barriers to discharge with patient and caregiver  Taken 1/31/2023 0000 by John Pruett RN  Discharge to home or other facility with appropriate resources: Identify barriers to discharge with patient and caregiver  Taken 1/30/2023 2000 by Riri Garcia RN  Discharge to home or other facility with appropriate resources:   Identify barriers to discharge with patient and caregiver   Arrange for needed discharge resources and transportation as appropriate   Identify discharge learning needs (meds, wound care, etc)   Refer to discharge planning if patient needs post-hospital services based on physician order or complex needs related to functional status, cognitive ability or social support system     Problem: Skin/Tissue Integrity  Goal: Absence of new skin breakdown  Description: 1. Monitor for areas of redness and/or skin breakdown  2. Assess vascular access sites hourly  3. Every 4-6 hours minimum:  Change oxygen saturation probe site  4. Every 4-6 hours:  If on nasal continuous positive airway pressure, respiratory therapy assess nares and determine need for appliance change or resting period.   Outcome: Progressing     Problem: Safety - Adult  Goal: Free from fall injury  Outcome: Progressing  Flowsheets (Taken 1/31/2023 0733)  Free From Fall Injury: Instruct family/caregiver on patient safety     Problem: ABCDS Injury Assessment  Goal: Absence of physical injury  Outcome: Progressing  Flowsheets (Taken 1/31/2023 0733)  Absence of Physical Injury: Implement safety measures based on patient assessment     Problem: Chronic Conditions and Co-morbidities  Goal: Patient's chronic conditions and co-morbidity symptoms are monitored and maintained or improved  Outcome: Not Progressing  Flowsheets  Taken 1/31/2023 0400 by Lis Kohler 34 - Patient's Chronic Conditions and Co-Morbidity Symptoms are Monitored and Maintained or Improved: Monitor and assess patient's chronic conditions and comorbid symptoms for stability, deterioration, or improvement  Taken 1/31/2023 0000 by Lis Kohler 34 - Patient's Chronic Conditions and Co-Morbidity Symptoms are Monitored and Maintained or Improved: Monitor and assess patient's chronic conditions and comorbid symptoms for stability, deterioration, or improvement  Taken 1/30/2023 2000 by Alec Frances RN  Care Plan - Patient's Chronic Conditions and Co-Morbidity Symptoms are Monitored and Maintained or Improved:   Monitor and assess patient's chronic conditions and comorbid symptoms for stability, deterioration, or improvement   Collaborate with multidisciplinary team to address chronic and comorbid conditions and prevent exacerbation or deterioration   Update acute care plan with appropriate goals if chronic or comorbid symptoms are exacerbated and prevent overall improvement and discharge     Problem: Cardiovascular - Adult  Goal: Maintains optimal cardiac output and hemodynamic stability  Outcome: Progressing  Flowsheets  Taken 1/31/2023 0400 by Jeimy Wakefield RN  Maintains optimal cardiac output and hemodynamic stability: Monitor blood pressure and heart rate  Taken 1/31/2023 0000 by Jeimy Wakefield RN  Maintains optimal cardiac output and hemodynamic stability: Monitor blood pressure and heart rate  Taken 1/30/2023 2000 by Alec Frances RN  Maintains optimal cardiac output and hemodynamic stability: Monitor blood pressure and heart rate     Problem: Respiratory - Adult  Goal: Achieves optimal ventilation and oxygenation  1/31/2023 0752 by Jeimy Wakefield RN  Outcome: Progressing  1/31/2023 0733 by Audrey Jones RCP  Outcome: Progressing  Flowsheets  Taken 1/31/2023 0400 by Quintin Strickland RN  Achieves optimal ventilation and oxygenation: Assess for changes in respiratory status  Taken 1/31/2023 0000 by Quintin Strickland RN  Achieves optimal ventilation and oxygenation: Assess for changes in respiratory status  Taken 1/30/2023 2000 by Sri Lyn RN  Achieves optimal ventilation and oxygenation:   Assess for changes in respiratory status   Assess for changes in mentation and behavior   Position to facilitate oxygenation and minimize respiratory effort   Oxygen supplementation based on oxygen saturation or arterial blood gases     Problem: Gastrointestinal - Adult  Goal: Maintains or returns to baseline bowel function  Outcome: Progressing  Flowsheets  Taken 1/31/2023 0400 by Quintin Strickland RN  Maintains or returns to baseline bowel function: Assess bowel function  Taken 1/31/2023 0000 by Quintin Strickland RN  Maintains or returns to baseline bowel function: Assess bowel function  Taken 1/30/2023 2000 by Sri Lyn RN  Maintains or returns to baseline bowel function: Assess bowel function     Problem: Metabolic/Fluid and Electrolytes - Adult  Goal: Glucose maintained within prescribed range  Outcome: Progressing  Flowsheets  Taken 1/31/2023 0400 by Quintin Strickland RN  Glucose maintained within prescribed range: Monitor blood glucose as ordered  Taken 1/31/2023 0000 by Quintin Strickland RN  Glucose maintained within prescribed range: Monitor blood glucose as ordered  Taken 1/30/2023 2000 by Sri Lyn RN  Glucose maintained within prescribed range: Monitor blood glucose as ordered     Problem: Musculoskeletal - Adult  Goal: Return mobility to safest level of function  Outcome: Progressing  Flowsheets (Taken 1/31/2023 0000)  Return Mobility to Safest Level of Function: Assess patient stability and activity tolerance for standing, transferring and ambulating with or without assistive devices  Goal: Return ADL status to a safe level of function  Outcome: Progressing  Flowsheets (Taken 1/31/2023 0000)  Return ADL Status to a Safe Level of Function: Administer medication as ordered     Problem: Pain  Goal: Verbalizes/displays adequate comfort level or baseline comfort level  Outcome: Progressing  Flowsheets  Taken 1/31/2023 0400 by Mary Curry RN  Verbalizes/displays adequate comfort level or baseline comfort level: Assess pain using appropriate pain scale  Taken 1/31/2023 0000 by Mary Curry RN  Verbalizes/displays adequate comfort level or baseline comfort level: Assess pain using appropriate pain scale  Taken 1/30/2023 2000 by Umang Lindsey RN  Verbalizes/displays adequate comfort level or baseline comfort level:   Administer analgesics based on type and severity of pain and evaluate response   Assess pain using appropriate pain scale     Problem: Nutrition Deficit:  Goal: Optimize nutritional status  Outcome: Progressing     Problem: Neurosensory - Adult  Goal: Achieves maximal functionality and self care  Outcome: Progressing  Flowsheets  Taken 1/31/2023 0400 by Mary Curry RN  Achieves maximal functionality and self care: Encourage and assist patient to increase activity and self care with guidance from physical therapy/occupational therapy  Taken 1/31/2023 0000 by Mary Curry RN  Achieves maximal functionality and self care: Encourage and assist patient to increase activity and self care with guidance from physical therapy/occupational therapy  Taken 1/30/2023 2000 by Umang Lindsey RN  Achieves maximal functionality and self care: Encourage and assist patient to increase activity and self care with guidance from physical therapy/occupational therapy     Problem: Safety - Medical Restraint  Goal: Remains free of injury from restraints (Restraint for Interference with Medical Device)  Description: INTERVENTIONS:  1.  Determine that other, less restrictive measures have been tried or would not be effective before applying the restraint  2. Evaluate the patient's condition at the time of restraint application  3. Inform patient/family regarding the reason for restraint  4.  Q2H: Monitor safety, psychosocial status, comfort, nutrition and hydration  Outcome: Progressing  Flowsheets (Taken 1/31/2023 0116)  Remains free of injury from restraints (restraint for interference with medical device): Determine that other, less restrictive measures have been tried or would not be effective before applying the restraint     Problem: Chronic Conditions and Co-morbidities  Goal: Patient's chronic conditions and co-morbidity symptoms are monitored and maintained or improved  Outcome: Not Progressing  Flowsheets  Taken 1/31/2023 0400 by Lis Corbin 34 - Patient's Chronic Conditions and Co-Morbidity Symptoms are Monitored and Maintained or Improved: Monitor and assess patient's chronic conditions and comorbid symptoms for stability, deterioration, or improvement  Taken 1/31/2023 0000 by Lis Corbin 34 - Patient's Chronic Conditions and Co-Morbidity Symptoms are Monitored and Maintained or Improved: Monitor and assess patient's chronic conditions and comorbid symptoms for stability, deterioration, or improvement  Taken 1/30/2023 2000 by Aurora Wilson RN  Care Plan - Patient's Chronic Conditions and Co-Morbidity Symptoms are Monitored and Maintained or Improved:   Monitor and assess patient's chronic conditions and comorbid symptoms for stability, deterioration, or improvement   Collaborate with multidisciplinary team to address chronic and comorbid conditions and prevent exacerbation or deterioration   Update acute care plan with appropriate goals if chronic or comorbid symptoms are exacerbated and prevent overall improvement and discharge

## 2023-01-31 NOTE — PROGRESS NOTES
Pt daughter requested pt to remain a full code but no CPR, meds and defibrillation ok; this nurse reached out to GISEL Petit NP regarding status change; NP requested to speak with daughter to educate; daughter decided she did not want to change anything at this time and would like to discuss more with family, pt remains full code at this time.

## 2023-01-31 NOTE — PROGRESS NOTES
Progress Note    Patient Name:  Charis Nelson    :  10/23/1932  2023 12:06 PM      SUBJECTIVE       Ms. Salinas Santizo  is intubated and sedated. Family is at bedside. Met with palliative and remains full code. OBJECTIVE     Vital signs:    BP (!) 106/57   Pulse 74   Temp 96.9 °F (36.1 °C) (Infrared)   Resp 22   Ht 5' 4\" (1.626 m)   Wt 150 lb (68 kg)   SpO2 99%   BMI 25.75 kg/m²  2 L/min      Admit Weight:  150 lb (68 kg)    Last 3 weights: Wt Readings from Last 3 Encounters:   23 150 lb (68 kg)   23 150 lb 1.6 oz (68.1 kg)   10/20/22 150 lb (68 kg)       BMI: Body mass index is 25.75 kg/m². Input/Output:       Intake/Output Summary (Last 24 hours) at 2023 1206  Last data filed at 2023 0549  Gross per 24 hour   Intake 3287.37 ml   Output --   Net 3287.37 ml         Exam:     General appearance: intubated and sedated   Lungs: coarse with diminished bases, no wheezes, no rales  Heart: irregularly irregular, S1 and S2 mild murmur  Abdomen: positive bowel sounds, no bruits, no masses  Extremities: warm and dry, no cyanosis, no clubbing        Laboratory Studies:     CBC:   Recent Labs     23  0300 23  0315 23  0532   WBC 8.3 10.9 10.6   HGB 10.3* 10.5* 10.1*   HCT 33.3* 33.5* 32.2*   MCV 90.0 90.3 90.7    179 151     BMP:   Recent Labs     23  0300 23  0315 23  0532    143 142   K 5.1 4.8 4.1   * 113* 113*   CO2 24 26 24   PHOS 2.3* 2.8 1.9*   BUN 38* 36* 38*   CREATININE 0.78 0.64 0.63     PT/INR: No results for input(s): PROTIME, INR in the last 72 hours. APTT: No results for input(s): APTT in the last 72 hours. MAG:   Recent Labs     23  0315   MG 2.0     D Dimer: No results for input(s): DDIMER in the last 72 hours. Troponin  No results for input(s): TROPONINI in the last 72 hours. No results for input(s): TROPONINT in the last 72 hours. BNP No results for input(s): BNP in the last 72 hours. Recent Labs     23  1340   PROBNP 7,046*         Pulse Ox: SpO2  Av.6 %  Min: 89 %  Max: 100 %  Supplemental O2: O2 Flow Rate (L/min): 2 L/min     Current Meds:    heparin (porcine)  5,000 Units SubCUTAneous BID    insulin lispro  0-8 Units SubCUTAneous Q6H    pantoprazole (PROTONIX) 40 mg injection  40 mg IntraVENous Daily    fat emulsion  100 mL IntraVENous Daily    QUEtiapine  25 mg Oral BID    sodium chloride flush  5-40 mL IntraVENous 2 times per day    [Held by provider] carvedilol  12.5 mg Oral BID     sacubitril-valsartan  1 tablet Oral BID    [Held by provider] cholestyramine light  4 g Oral BID    [Held by provider] glimepiride  4 mg Oral BID      Continuous Infusions:    midazolam 4 mg/hr (23 0615)    norepinephrine (LEVOPHED) infusion Stopped (23)    PN-Adult 2-in-1 Central Line (Standard) 41.7 mL/hr at 23 0549    sodium chloride 20 mL/hr at 23 0549    dextrose              ASSESSMENT     Principal Problem:    COVID-19   - intubated overnight, not on pressors  Active Problems:    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 She remains intubated and sedated on versed infusion. Her heart rate is in atrial fibrillation with controlled rates ranging form the 40s to the 90s with occasional PVCs and pauses, longest pause 2.5 seconds. Last echo from the beginning of this month shows decreased function at 30-35%. She is currently on entresto with lower end Bps. Family has met with palliative and patient remains a full code at this time.      Poor prognosis given advanced age and co morbidities. 3rd hospital admission in January (1 at [facility], 2 at [facility])    Addendum    This is a 81 yo female with PMH of pAfib, HFrEF with EF of 35% with COVID PNA. From a cardiac standpoint, she is stable. Oral medications were on hold and entresto now has been resumed. May slowly resume GDMT if tolerated. Not on a/c due to prior GI bleed  Continue IV prn diuretics. Poor prognosis given advanced age and co morbidities. Goals of care discussions ongoing. Continue with supportive treatment. Please call us if you have any further questions.      Ngoc Fuentes MD

## 2023-01-31 NOTE — PROGRESS NOTES
.. PALLIATIVE CARE TEAM    Patient: Celia Shultz  Room: 3249/3308-20    Reason For Consult   Goals of care evaluation  Distress management  Symptom Management  Guidance and support  Facilitate communications  Assistance in coordinating care  Recommendations for the above    Impression: Celia Shultz is a 80y.o. year old female with  has a past medical history of Adenocarcinoma ([facility] Arthritis, Atrial fibrillation ([facility] Cancer ([facility] CHF (congestive heart failure) ([facility] Chronic anticoagulation, Diabetes mellitus ([facility] Diverticulitis, Femur fracture ([facility] Hypertension, Mel[facility]oma ([facility] Osteoporosis, Overweight, Sepsis ([facility] and Serum creatinine raised. .  Currently hospitalized for the management of Covid 19 . The Palliative Care Team is following to assist with goals of care and family support. Code Status  Full Code  PLAN:   - patient is intubated sedated with Versed, and she is responding and is on FIO2 of 35% and peep of 8.   - I call the patient's daughter Brianne Aguiar and I ask if she had found a copy   - Brianne Aguiar expresses that she thought that we were questioning her decision about her Mom's care, and she states\" we are not doing anything wrong\"   - I assured Brianne Aguiar that we are not questioning the care decisions for her Mom, but we do need to have a legal decision maker in place and that if there is no HCPOA then she and her 2 brothers are equal decision makers  - I update staff about he decision maker and at this time there is no HCPOA  - will follow for goals of care and family support.      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 The vent is FIO2 at 35% and peep of 8. Her FIO2 was originally at 60% FIO2. She is on Versed for sedation. The bedside nurse updates me that family was here earlier, and that they had left. I ask Estrella Solorzano if the daughter had brought the [facility], and she states that she did not receive any paperwork. I call the patient's daughter Francois Bernard. I introduce my palliative care role to her. I ask about the HCPOA , and if she was able to find it and to bring us a copy. She states\" well it says that if she is not in a permanently unconscious state that she wants treatment, and we are not doing anything wrong. \" She states that the cardiology NP states that everything with her heart is status quo. \" She says that yes she is a full code. She states\" does the hospital want to stop everything and just push her out? \"   I tell her that the [facility] has nothing to do with her continued care and that we just need to have a legal decision maker in place. All care will continue as is and that does not change. I again remind Aneta Head that the reason for my call is for support and to find out about a HCPOA that may be in place, and not to question the care that was decided for her Mom.    I update Geovanny Giangkeren that if she cannot make her own decisions at this time, and we need to know who her legal decision maker is, so that they can be her voice as she cannot be right now. I tell Raymond Albright that if there is no HCPOA, that she and her 2 brothers are all legal decision makers. I ask her about her brothers and what their names are. She states [employer] and [employer]. She gives me Rogerio's number and does not have Evangelist's at this time. I again request that if a copy of the HCPOA cannot be found, then she and her 2 brothers, with at least 2 of the 3 of them will need to be on the same page while making decisions for their Mom. I offer support to Lilian Peña and will follow for goals of care and family support.                          Electronically signed by   Katiana Mcclure RN  Palliative Care Team  on 1/31/2023 at 2:00 PM

## 2023-01-31 NOTE — ACP (ADVANCE CARE PLANNING)
..Advance Care Planning     Advance Care Planning Activator (Inpatient)  Conversation Note      Date of ACP Conversation: 1/31/2023     [employer] with: I spoke with the patient's daughter Abhishek Oro and I asked her if she had the copy of the HCPOA, and she states\" I can't find it. \" I tell her if there is not a HCPOA completed that she and her 2 brothers are equal decision makers and that the 2 out of the 3 will need to be on the same page making decisions if her Mom cannot be her own voice. ACP Activator: Giovana Nobles RN          Health Care Decision Maker: Abhishek Oro [phone] Cyril Salazar [phone] and Wisnerluz marina Woody ( awaiting family to provide his number)   Current Designated Health Care Decision Maker:     Click here to complete Healthcare Decision Makers including section of the Healthcare Decision Maker Relationship (ie \"Primary\")      Care Preferences    Ventilation: \"If you were in your present state of health and suddenly became very ill and were unable to breathe on your own, what would your preference be about the use of a ventilator (breathing machine) if it were available to you? \"      Would the patient desire the use of ventilator (breathing machine)?: yes    \"If your health worsens and it becomes clear that your chance of recovery is unlikely, what would your preference be about the use of a ventilator (breathing machine) if it were available to you? \"     Would the patient desire the use of ventilator (breathing machine)?: Yes      Resuscitation  \"CPR works best to restart the heart when there is a sudden event, like a heart attack, in someone who is otherwise healthy. Unfortunately, CPR does not typically restart the heart for people who have serious health conditions or who are very sick. \"    \"In the event your heart stopped as a result of an underlying serious health condition, would you want attempts to be made to restart your heart (answer \"yes\" for attempt to resuscitate) or would you prefer a natural death (answer \"no\" for do not attempt to resuscitate)? \" yes       [x] Yes   [] No   Educated Patient / Hussein Tanner regarding differences between Advance Directives and portable DNR orders.     Length of ACP Conversation in minutes:      Conversation Outcomes:  [x] ACP discussion completed  [] Existing advance directive reviewed with patient; no changes to patient's previously recorded wishes  [] New Advance Directive completed  [] Portable Do Not Rescitate prepared for Provider review and signature  [] POLST/POST/MOLST/MOST prepared for Provider review and signature      Follow-up plan:    [x] Schedule follow-up conversation to continue planning  [] Referred individual to Provider for additional questions/concerns   [] Advised patient/agent/surrogate to review completed ACP document and update if needed with changes in condition, patient preferences or care setting    [] This note routed to one or more involved healthcare providers

## 2023-01-31 NOTE — PROGRESS NOTES
Spoke with patients daughter Marleny Patton regarding recent ABG result and need for intubation. Marleny Patton ok with plan for intubation at this time.

## 2023-02-01 PROBLEM — G93.40 ENCEPHALOPATHY ACUTE: Status: ACTIVE | Noted: 2023-01-01

## 2023-02-01 NOTE — PROGRESS NOTES
[facility]  Progress Note    Annie Dempsey  MRN: [id number]  Date: 2/1/2023  LOS: 8     Reason for F/U :   COVID-19 virus infection    Impression :   COVID-19 virus infection, tested + 1/22/2023  Unvaccinated adult patient  Acute respiratory insufficiency requiring supplemental oxygen  Hypercapnic respiratory failure-intubated 1/31/2023 for respiratory acidosis  Congestive heart failure with mild cognitive changes on chest x-ray  Multifocal opacities in the bases/pneumonia  Confusion  Right-sided greater than left sided pleural effusion    Recommendations:   COVID-19 therapy: The patient is not a candidate for antiviral therapy  Given the hypoxia, the patient can continue on Decadron, this was switched to IV 1/27/2023  She is not a candidate for immunosuppressive therapy at this time and the CRP is not overly elevated, but CRP has trended down    Antibiotic therapy: The patient has completed a course of levofloxacin through 1/28/2023  The patient did receive a dose of cefepime and vancomycin 1/31/2023; however, there was no evidence of pneumonia and these were stopped  Continue supportive care    Infection Control Recommendations:   Droplet plus precautions-the patient was removed from isolation today    Discharge Planning:     Patient will need Midline Catheter Insertion/ PICC line Insertion: No  [facility],  LTAC: Undetermined  Patient willneed outpatient wound care: No    Medical Decision making / Summary of Stay:   Annie Dempsey is a 80y.o.-year-old female who was initially admitted on 1/21/2023.    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 She does not report any subjective fevers, chills no significant cough, no abdominal pain, no nausea vomiting, has chronic diarrhea. The patient was reporting some neck pain and again the neck pain has been previously evaluated with no source found. She reports that her daughter recently tested positive for COVID and work-up in the emergency department included a chest x-ray that showed a new small right effusion and airspace disease and possible mild CHF. COVID testing was done that was positive and the patient was admitted with COVID-19 virus infection as well as concern for congestive heart failure. The patient has since been seen by the pulmonary critical care service and was started on supplemental oxygen, IV Decadron, intravenous levofloxacin as there was concern for right basilar infiltrate/pneumonia and I was asked to evaluate and help with antibiotic choice. Current evaluation:2023    BP (!) 120/58   Pulse 66   Temp 97.3 °F (36.3 °C) (Axillary)   Resp 16   Ht 5' 4\" (1.626 m)   Wt 150 lb (68 kg)   SpO2 100%   BMI 25.75 kg/m²     Temperature Range: Temp: 97.3 °F (36.3 °C) Temp  Av.3 °F (36.3 °C)  Min: 96.9 °F (36.1 °C)  Max: 97.7 °F (36.5 °C)    The patient was seen and evaluated at bedside, RN is at bedside  The patient remains ventilator dependent  She is on Versed    Review of Systems   Unable to perform ROS: Intubated     Physical Examination :     Physical Exam  Constitutional:       Interventions: She is sedated and intubated. HENT:      Head: Normocephalic and atraumatic. Cardiovascular:      Rate and Rhythm: Normal rate and regular rhythm. Pulmonary:      Effort: Pulmonary effort is normal. No respiratory distress. She is intubated. Breath sounds: Normal breath sounds. Abdominal:      General: Abdomen is flat. Bowel sounds are normal. There is no distension. Palpations: Abdomen is soft. Skin:     General: Skin is warm and dry. Coloration: Skin is not jaundiced. Laboratory data:   I have independently reviewed the followinglabs:  CBC with Differential:   Recent Labs     01/31/23  0532 02/01/23  0400   WBC 10.6 8.3   HGB 10.1* 10.5*   HCT 32.2* 33.0*    146   LYMPHOPCT 4* 4*   MONOPCT 7 4     BMP:   Recent Labs     01/30/23  0315 01/31/23  0532 02/01/23  0400    142 144   K 4.8 4.1 4.5   * 113* 112*   CO2 26 24 24   BUN 36* 38* 38*   CREATININE 0.64 0.63 0.64   MG 2.0  --   --      Hepatic Function Panel:   Recent Labs     01/31/23  1700   PROT 4.1*   LABALBU 2.2*   BILIDIR 0.3*   IBILI 0.4   BILITOT 0.7   ALKPHOS 148*   ALT 25   AST 33*         Lab Results   Component Value Date/Time    PROCAL 0.13 01/26/2023 05:14 AM    PROCAL 0.55 01/22/2023 01:40 PM     Lab Results   Component Value Date/Time    CRP 22.2 01/26/2023 05:14 AM    CRP 37.7 01/23/2023 05:13 AM    CRP 0.5 03/26/2018 03:47 PM     No results found for: SEDRATE      Lab Results   Component Value Date/Time    DDIMER 1.81 01/26/2023 06:58 AM    DDIMER 0.86 03/26/2018 03:47 PM     No results found for: FERRITIN  No results found for: LDH  No results found for: FIBRINOGEN    Results in Past 30 Days  Result Component Current Result Ref Range Previous Result Ref Range   SARS-CoV-2, Rapid DETECTED (A) (1/22/2023) Not Detected Not in Time Range      Lab Results   Component Value Date/Time    COVID19 DETECTED 01/22/2023 12:30 AM    COVID19 Not Detected 02/09/2022 05:46 PM       No results for input(s): VANCOTROUGH in the last 72 hours. Imaging Studies:   Chest xray  Impression:        Stable tubes and lines. Stable mild cardiomegaly.      Decreased pulmonary vascular congestion and significantly improved right lung   aeration since yesterday's exam.      Cultures:     Culture, Blood 1 [[id number]] Collected: 01/31/23 0209   Order Status: Completed Specimen: Blood Updated: 02/01/23 0847    Specimen Description Viktoriya Helms BLOOD    Special Requests 1ML LT HAND    Culture NO GROWTH 1 DAY   Culture, Blood 1 [[id number]] Collected: 01/31/23 0200   Order Status: Completed Specimen: Blood Updated: 02/01/23 0844    Specimen Description . BLOOD    Special Requests 1ML LT HAND    Culture NO GROWTH 1 DAY   COVID-19, Rapid [[id number]] (Abnormal) Collected: 01/22/23 0030   Order Status: Completed Specimen: Nasopharyngeal Swab Updated: 01/22/23 0058    Specimen Description . NASOPHARYNGEAL SWAB    SARS-CoV-2, Rapid DETECTED Abnormal        Medications:      dexamethasone  6 mg IntraVENous Daily    heparin (porcine)  5,000 Units SubCUTAneous BID    insulin lispro  0-8 Units SubCUTAneous Q6H    pantoprazole (PROTONIX) 40 mg injection  40 mg IntraVENous Daily    QUEtiapine  25 mg Oral BID    sodium chloride flush  5-40 mL IntraVENous 2 times per day    [Held by provider] carvedilol  12.5 mg Oral BID WC    sacubitril-valsartan  1 tablet Oral BID    [Held by provider] cholestyramine light  4 g Oral BID    [Held by provider] glimepiride  4 mg Oral BID WC       Electronically signed by PATRICK Jaimes CNP on 2/1/2023 at 10:18 AM      [facility]  [place], [address] messaging  OFFICE: [phone]    Thank you for allowing us to participate in the care of this patient. Please call with questions. This note is created with the assistance of a speech recognition program.  While intending to generate a document that actually reflects the content of the visit, the document can still have some errors including those of syntax and sound a like substitutions which may escape proof reading. In such instances, actual meaning can be extrapolated by contextual diversion.

## 2023-02-01 NOTE — PROGRESS NOTES
Patient remains COVID positive. Writer provided a silent prayer of continued healing, comfort, and rest.     Spiritual care will maintain daily follow ups and support as needed or requested.      02/01/23 1333   Encounter Summary   Service Provided For: Patient   Referral/Consult From: Palliative Care   Last Encounter  02/01/23   Complexity of Encounter Low   Begin Time 1300   End Time  1305   Total Time Calculated 5 min   Palliative Care   Type Palliative Care, Follow-up   Assessment/Intervention/Outcome   Assessment Unable to assess   Intervention Prayer (assurance of)/Hillister;Sustaining Presence/Ministry of presence   Outcome Did not respond   Plan and Referrals   Plan/Referrals Continue Support (comment)

## 2023-02-01 NOTE — PROGRESS NOTES
..    Palliative Care Progress Note    NAME:  [id number] [facility] RECORD NUMBER:  [id number]  AGE: 80 y.o. GENDER: female  : 10/23/1932  TODAY'S DATE:  2023    Reason for Consult:  goals of care and support    Plan        Palliative Interaction: I received update from RN that patients 3 bio children are now Next of Kin because HCPOA was not found. She reports that patient is doing well on vent today and suspects that they may wean sedation and do C-pap trials later today. She reports that CC will be in around 2 PM.     I went to see patient and she is now on vent and sedated. Daughter Brianne Aguiar is at bedside. I reminded her of the palliative role. I discussed patients recent changes with patient needing intubated. Daughter reports that she is surprised because she reported \"mid day patient was sitting up on side of bed with PT singing. \" She acknowledged that ABG's showed pH 6.9 and C02 of 128. I discussed patient being out of isolation and she reports that her two brothers were here yesterday to visit. I discussed palliative nurse visit yesterday and asked her if she had any questions. Brianne Aguiar reports understanding patients condition and is awaiting CC rounds to get overall update. I discussed patients advanced age and CPR with daughter. She reports \"speaking with NP yesterday and not realizing that medications would not be effective if CPR was not done. \" We discussed the differences in [facility] and full code status and I provided her with a code classification sheet for review. I explained how CPR can sometimes be more burdensome than beneficial at patients advanced age with underlying conditions. She reports, \"I am just trying to follow my mothers wishes. \" She reports losing her dad during Covid and how he did not want to be intubated. She reports that her mother said, \"that she would want to at least try. \" Brianne Aguiar reports that she would not want trach/peg.  She reports that her mother does not have a terminal disease and wants to give her a chance to see if she can recover. I discussed how patient is doing well on vent and that there is discussion that she may be able to decrease sedation and do weaning trials soon. I offered daughter support and reminded her that we don't try to change patient/families minds, that we just make sure they fully understand what is going on so they can make decisions. We also discuss wishes of patients to make sure we have a good understanding as a team. I encouraged daughter to continue the above conversations with her mother as her health changes and as she continues to age. Education/support to staff  Education/support to family  Communications with primary service  Providing support for coping/adaptation/distress of family  Discussing meaning/purpose   Decision making regarding life prolonging treatment  Continue with current plan of care  Clarification of medical condition to patient and family  Code status clarified: Full Code  Palliative care orders introduced  Continued communication updates    Principle Problem/Diagnosis:  COVID-19    Additional Assessments:  Principal Problem:    COVID-19  Active Problems:    Non-pressure chronic ulcer of left calf with fat layer exposed ([address]  Resolved Problems:    * No resolved hospital problems. *    1- Symptom management/ pain control     Pain Assessment:  The patient is not having any pain. Anxiety:  none                          Dyspnea:  acute dyspnea- on vent 30% Fi02 and peep 8                           Fatigue:   on sedation     Other: NG tube feeds    We feel the patient symptoms are being controlled. her current regimen is reviewed by myself and discussed with the staff.      2- Goals of care evaluation   The patient goals of care are live longer, improve or maintain function/quality of life, and support for family/caregiver   Goals of care discussed with:    [] Patient independently    [] Patient and Family    [x] Family or Healthcare DPOA independently    [] Unable to discuss with patient, family/DPOA not present    3- Code Status  Full Code    4- Other recommendations  - We will continue to provide comfort and support to the patient and the family      History of Present Illness     The patient is a 80 y.o. Non- / non  female who presents with Shortness of Breath      Referred to Palliative Care by  [x] Physician   [] Nursing  [] Family Request   [] Other:       She was admitted to the primary service for Hypomagnesemia [E83.42]  Acute on chronic congestive heart failure, unspecified heart failure type ([facility] [address] [I50.9]  COVID-19 [U07.1]. Her hospital course has been associated with Covid-19, acute respiratory failure with hypoxia and hypercapnia requiring intubation, CHF, Pneumonia, AMS, and pleural effusions. . The patient has a complicated medical history and has been hospitalized since 1/21/2023 11:39 PM. Patient developed obtundation on 31th without sedation. ABG done and she was found to have increased C02 level above 100. Bipap was attempted but she didn't do well so patient was intubated. Levophed was required post intubation but patient if off now. Decadron was restarted and ID re-consulted for ATB management. TPN discontinued and NG tube feeds were initiated. Cardio reported some pauses with longest at 2.5 seconds. Patient remains a full code. Palliative care following for review of goals and support. OVERNIGHT EVENTS: Patient was intubated yesterday and is currently sedated. She has elizabeth and NG tubed feeds.      MEDICATIONS  Current Medications    dexamethasone  6 mg IntraVENous Daily    heparin (porcine)  5,000 Units SubCUTAneous BID    insulin lispro  0-8 Units SubCUTAneous Q6H    pantoprazole (PROTONIX) 40 mg injection  40 mg IntraVENous Daily    QUEtiapine  25 mg Oral BID    sodium chloride flush  5-40 mL IntraVENous 2 times per day    [Held by provider] carvedilol  12.5 mg Oral BID     sacubitril-valsartan  1 tablet Oral BID    [Held by provider] cholestyramine light  4 g Oral BID    [Held by provider] glimepiride  4 mg Oral BID      midazolam, fentanNYL, sodium phosphate IVPB **OR** sodium phosphate IVPB **OR** sodium phosphate IVPB, sodium chloride flush, sodium chloride, magnesium sulfate, ondansetron **OR** ondansetron, polyethylene glycol, acetaminophen **OR** acetaminophen, glucose, dextrose bolus **OR** dextrose bolus, glucagon (rDNA), dextrose, albuterol  IV Drips/Infusions   midazolam 5 mg/hr (02/01/23 0650)    norepinephrine (LEVOPHED) infusion Stopped (01/31/23 0221)    sodium chloride Stopped (01/31/23 0556)    dextrose       Home Medications  No current facility-administered medications on file prior to encounter.      Current Outpatient Medications on File Prior to Encounter   Medication Sig Dispense Refill    lactobacillus (BACID) TABS Take 1 tablet by mouth daily 30 tablet 1    cholestyramine light 4 g packet Take 1 packet by mouth 2 times daily 60 packet 3    pantoprazole (PROTONIX) 40 MG tablet Take 1 tablet by mouth every morning (before breakfast) 30 tablet 3    Cholecalciferol 50 MCG (2000 UT) CAPS Take 2,000 Units by mouth daily      fexofenadine (ALLEGRA) 180 MG tablet Take 180 mg by mouth daily      Cyanocobalamin 1000 MCG/15ML LIQD Take 1,000 mcg by mouth daily      bumetanide (BUMEX) 2 MG tablet Take 2 mg by mouth daily      carvedilol (COREG) 12.5 MG tablet Take 12.5 mg by mouth 2 times daily (with meals)      sacubitril-valsartan (ENTRESTO) 24-26 MG per tablet Take 1 tablet by mouth 2 times daily      ibuprofen (ADVIL;MOTRIN) 200 MG tablet Take 400 mg by mouth every 6 hours as needed for Pain      glimepiride (AMARYL) 4 MG tablet Take 4 mg by mouth 2 times daily         PAST MEDICAL HISTORY  Past Medical History:   Diagnosis Date    Adenocarcinoma ([address]     colon    Arthritis     Atrial fibrillation (HCC)     r/t sepsis     Cancer (HCC)     colon, skin    CHF (congestive heart failure) (HCC)     Chronic anticoagulation     Eliquis for a fib stroke prophylaxis    Diabetes mellitus ([facility] [address]     Diverticulitis     Femur fracture (HCC)     Hypertension     Melanoma ([facility] [address]     nose    Osteoporosis     Overweight     Sepsis ([facility]     Serum creatinine raised        FAMILY HISTORY  No family history on file. SOCIAL HISTORY  Social History       Tobacco History       Smoking Status  Never      Smokeless Tobacco Use  Never              Alcohol History       Alcohol Use Status  No              Drug Use       Drug Use Status  No              Sexual Activity       Sexually Active  Not Asked                     ALLERGIES  Allergies   Allergen Reactions    Acetaminophen-Codeine Nausea Only    Codeine Nausea Only    Furosemide      Other reaction(s): GI Disturbance    Linagliptin      Other reaction(s): SOB    Sitagliptin      Other reaction(s): felt poorly    Other Itching     Animal Dander       Data      BP (!) 100/56   Pulse 64   Temp 97.3 °F (36.3 °C) (Axillary)   Resp 16   Ht 5' 4\" (1.626 m)   Wt 150 lb (68 kg)   SpO2 99%   BMI 25.75 kg/m²     Wt Readings from Last 3 Encounters:   01/31/23 150 lb (68 kg)   01/13/23 150 lb 1.6 oz (68.1 kg)   10/20/22 150 lb (68 kg)        Lab Results   Component Value Date    WBC 8.3 02/01/2023    HGB 10.5 (L) 02/01/2023    HCT 33.0 (L) 02/01/2023    MCV 86.8 02/01/2023     02/01/2023    ,   Lab Results   Component Value Date/Time     02/01/2023 04:00 AM    K 4.5 02/01/2023 04:00 AM     02/01/2023 04:00 AM    CO2 24 02/01/2023 04:00 AM    BUN 38 02/01/2023 04:00 AM    CREATININE 0.64 02/01/2023 04:00 AM    GLUCOSE 239 02/01/2023 04:00 AM    CALCIUM 8.5 02/01/2023 04:00 AM    ,   Lab Results   Component Value Date    INR 1.2 01/22/2023    INR 1.1 01/11/2023    PROTIME 14.9 (H) 01/22/2023    PROTIME 14.4 (H) 01/11/2023   , .   Lab Results   Component Value Date    ALT 25 01/31/2023    AST 33 (H) 01/31/2023    ALKPHOS 148 (H) 01/31/2023    BILITOT 0.7 01/31/2023   , No results found for: CKTOTAL, CKMB, CKMBINDEX, TROPONINI,   Lab Results   Component Value Date/Time    COLORU Yellow 01/31/2023 02:15 AM    NITRU NEGATIVE 01/31/2023 02:15 AM    GLUCOSEU NEGATIVE 01/31/2023 02:15 AM    KETUA NEGATIVE 01/31/2023 02:15 AM    UROBILINOGEN Normal 01/31/2023 02:15 AM    BILIRUBINUR NEGATIVE 01/31/2023 02:15 AM   , No results found for: AMYLASE,   Lab Results   Component Value Date    LIPASE 32 07/09/2016   ,   Lab Results   Component Value Date    DDIMER 1.81 (H) 01/26/2023   , No results found for: BNP, No results found for: CEA, No results found for: , No results found for: AFPAMN, No results found for: LACTA,     CT Result (most recent):  CT HEAD WO CONTRAST 01/31/2023    Narrative  EXAMINATION:  CT OF THE HEAD WITHOUT CONTRAST  1/31/2023 4:50 am    TECHNIQUE:  CT of the head was performed without the administration of intravenous  contrast. Automated exposure control, iterative reconstruction, and/or weight  based adjustment of the mA/kV was utilized to reduce the radiation dose to as  low as reasonably achievable. COMPARISON:  01/26/2023    HISTORY:  ORDERING SYSTEM PROVIDED HISTORY: change in status  TECHNOLOGIST PROVIDED HISTORY:  change in status      FINDINGS:  BRAIN/VENTRICLES: There is no acute intracranial hemorrhage, mass effect or  midline shift. No abnormal extra-axial fluid collection. The gray-white  differentiation is maintained without evidence of an acute infarct. There is  no evidence of hydrocephalus. Moderate diffuse cerebral atrophy and mild  chronic white matter ischemic change. ORBITS: The visualized portion of the orbits demonstrate no acute abnormality. SINUSES: Variable mild to moderate sinus mucosal thickening and fluid  opacification attributed to endotracheal and NG tubes. SOFT TISSUES/SKULL:  No acute abnormality of the visualized skull or soft  tissues.     Impression  No acute intracranial abnormality. Variable sinus opacification attributed to presence of NG and endotracheal  tubes. Xray Result (most recent):  XR CHEST (SINGLE VIEW FRONTAL) 02/01/2023    Narrative  EXAMINATION:  ONE XRAY VIEW OF THE CHEST    2/1/2023 4:04 am    COMPARISON:  01/31/2023    HISTORY:  ORDERING SYSTEM PROVIDED HISTORY: vent  TECHNOLOGIST PROVIDED HISTORY:  vent  Reason for Exam: vent  Additional signs and symptoms: vent    FINDINGS:  Support tubes and lines are unchanged. Stable mild cardiomegaly. Pulmonary vasculature is mildly congested but  improved from the earlier exam.  Aeration of the right lung has significantly  improved. Possible small right pleural effusion. No pneumothorax evident. Hazy opacity over the mid and lower lung zones on the right treated to  overlying soft tissue. Surrounding structures are unremarkable. Impression  Stable tubes and lines. Stable mild cardiomegaly. Decreased pulmonary vascular congestion and significantly improved right lung  aeration since yesterday's exam.       MRI Result (most recent):  No results found for this or any previous visit from the past 3650 days. No results found for this or any previous visit. No results found for this or any previous visit (from the past 4464 hour(s)).        Assessment        REVIEW OF SYSTEMS    [x]   UNABLE TO OBTAIN: sedated on vent    Constitutional:  []   Chills   []  Fatigue   []  Fevers   []  Malaise   []  Weight loss   [] Other:     Respiratory:   []  Cough    []  Shortness of breath    []  Chest pain    [] Other:     Cardiovascular:   []  Chest pain  []  Dyspnea    []  Exertional chest pressure/discomfort     [] Fatigue      []  Palpitations    []  Syncope   [] Other:     Gastrointestinal:   []  Abdominal pain   []  Constipation    []  Diarrhea    []   Dysphagia   []  Reflux             []  Vomiting   [] Other:     Genitourinary:  []  Dysuria     []  Frequency   []  Hematuria   [] Nocturia   []  Urinary incontinence   [] Other:     Musculoskeletal:   [] Back pain    []  Muscle weakness   []  Myalgias    []  Neck pain   []  Stiff joints   []  Other:     Behavioral/Psych:   [] Anxiety    []  Depression     []  Mood swings   [] Other:     PHYSICAL ASSESSMENT:     General: []  Oriented x3      [] well appearing      [x] Intubated      [] ill appearing      [] Other:    Mental Status: [] normal mental status exam      [x] drowsy      [] Confused      [] Other:     Cardiovascular: []  Regular rate/rhythm      [x] Arrhythmia      [] Other:     Chest: [] Effort normal      [] lungs clear      [] respiratory distress      [] Tachypnea      [x]  Other:vent fi02 30% and peep 8    Abdomen: [] Soft/non-tender      [x]  Normal appearance      [] Distended      [] Ascites      [] Other:    Neurological: [] Normal Speech      [] Normal Sensation      [x]  Deficits present:  sedated    Extremity:  [x] normal skin color/temp      [] clubbing/cyanosis      []  No edema      [] Other:     Palliative Performance Scale:  ___60%  Ambulation reduced; Significant disease; Can't do hobbies/housework; intake normal or reduced; occasional assist; LOC full/confusion  ___50%  Mainly sit/lie; Extensive disease; Can't do any work; Considerable assist; intake normal or reduced; LOC full/confusion  ___40%  Mainly in bed; Extensive disease; Mainly assist; intake normal or reduced; LOC full/confusion   ___30%  Bed Bound; Extensive disease; Total care; intake reduced; LOCfull/confusion  ___20%  Bed Bound; Extensive disease; Total care; intake minimal; Drowsy/coma  _x__10%  Bed Bound; Extensive disease; Total care; Mouth care only; Drowsy/coma  ___0       Death        Please call with any palliative questions or concerns. Palliative Care Team is available via perfect serve or via phone. Palliative Care will continue to follow Ms. Cristobal's care as needed.     The note has been dictated by dragon, typing errors may be a possibility     Thank you for allowing Palliative Care to participate in the care of Ms. Gia Jameson .        Electronically signed by   PATRICK Hernandez - CNP  Palliative Care Team  on 2/1/2023 at 10:47 AM    Palliative care office: [phone]

## 2023-02-01 NOTE — PROGRESS NOTES
Pulmonary Critical Care Progress Note  Dusty Joy MD     Patient seen for the follow up of  COVID-19 acute hypoxic respiratory insufficiency, pulmonary edema, pleural effusions    Subjective:  She developed obtundation  without receiving any sedation with hypercarbia requiring intubation after failing BiPAP on 1/31/2023. She is off Levophed now. She is on Versed drip at 4 mg an hour unresponsive on the ventilator. She is on tube feeds. Examination:  Vitals: BP (!) 108/58   Pulse 50   Temp 97.3 °F (36.3 °C) (Axillary)   Resp 16   Ht 5' 4\" (1.626 m)   Wt 150 lb (68 kg)   SpO2 100%   BMI 25.75 kg/m²   General appearance:   Intubated sedated on the ventilator  Neck: No JVD  Lungs: Decreased breath sounds no significant wheezing, occasional crackles  Heart: regular rate and rhythm, S1, S2 normal, no gallop  Abdomen: Soft, non tender, + BS  Extremities: no cyanosis or clubbing. Trace edema.     LABs:  CBC:   Recent Labs     01/30/23  0315 01/31/23  0532 02/01/23  0400   WBC 10.9 10.6 8.3   HGB 10.5* 10.1* 10.5*   HCT 33.5* 32.2* 33.0*    151 146       BMP:   Recent Labs     01/30/23 0315 01/31/23  0532 02/01/23  0400    142 144   K 4.8 4.1 4.5   CO2 26 24 24   BUN 36* 38* 38*   CREATININE 0.64 0.63 0.64   LABGLOM >60 >60 >60   GLUCOSE 220* 179* 239*        Latest Reference Range & Units 1/31/23 00:07 1/31/23 02:44   POC pH 7.350 - 7.450  6.963 (LL) 7.418   POC pCO2 35.0 - 48.0 mm Hg 128.5 (HH) 34.0 (L)   POC PO2 83.0 - 108.0 mm Hg 98.3 148.7 (H)   POC HCO3 21.0 - 28.0 mmol/L 29.1 (H) 22.0   POC O2 SAT 94.0 - 98.0 % 90 (L) 99 (H)   POC Ionized Calcium 1.15 - 1.33 mmol/L 1.60 (H)    POC Potassium 3.5 - 4.5 mmol/L 5.1 (H)       Latest Reference Range & Units Most Recent   Procalcitonin <0.09 ng/mL 0.55 (H)  1/22/23 13:40   (H): Data is abnormally high    Radiology:  X-ray chest: 2/1/2023  Improving lung aeration     CT brain 1/31: No acute intracranial abnormality     CT chest 1/22/2023      Impression:  Acute hypoxic respiratory failure  COVID pneumonia  Pulmonary edema  Small bilateral pleural effusions right greater than left  Elevated troponin / History of A-fib, not on anticoag d/t age, frailty and hx of GIB per cardiology   Allergic rhinitis, DM, HTN  Ileus    Recommendations:  Assist control ventilation  Versed drip for sedation with RASS score -2  Decrease sedation and do a CPAP trial.  Seroquel 25 b.I.d. Albuterol every 6 hours as needed  S/p Levaquin course  Resume IV Decadron 6 milligrams daily  Infectious disease on consult, IV antibiotics being stopped. Neurology on the consult/monitor mental status check ammonia  and TSH level and liver  function troponin  Cardiology following/off anticoagulation for A. fib for history of GI bleed  Coreg on hold- monitor BP/HR   Continue tube feeds. Palliative care on consult/full code  Previous discussed with daughter .     Peptic ulcer disease prophylaxis  DVT prophylaxis / heparin 5000 every 12 hours    Electronically signed by     Aleksandra Ruiz MD on 2/1/2023 at 2:02 PM  Pulmonary Critical Care and Sleep Medicine,  [facility]: [phone]

## 2023-02-01 NOTE — PLAN OF CARE
Problem: Discharge Planning  Goal: Discharge to home or other facility with appropriate resources  2/1/2023 1222 by Jennie Draper RN  Outcome: Progressing  2/1/2023 0504 by Hal Duran RN  Outcome: Progressing  Flowsheets (Taken 1/31/2023 2000)  Discharge to home or other facility with appropriate resources: Identify barriers to discharge with patient and caregiver     Problem: Skin/Tissue Integrity  Goal: Absence of new skin breakdown  Description: 1. Monitor for areas of redness and/or skin breakdown  2. Assess vascular access sites hourly  3. Every 4-6 hours minimum:  Change oxygen saturation probe site  4. Every 4-6 hours:  If on nasal continuous positive airway pressure, respiratory therapy assess nares and determine need for appliance change or resting period.   2/1/2023 1222 by Jennie Draper RN  Outcome: Progressing  2/1/2023 0504 by Hal Duran RN  Outcome: Progressing     Problem: Safety - Adult  Goal: Free from fall injury  2/1/2023 1222 by Jennie Draper RN  Outcome: Progressing  2/1/2023 0504 by Hal Duran RN  Outcome: Progressing  Flowsheets (Taken 2/1/2023 0500)  Free From Fall Injury: Instruct family/caregiver on patient safety     Problem: ABCDS Injury Assessment  Goal: Absence of physical injury  2/1/2023 1222 by Jennie Draper RN  Outcome: Progressing  2/1/2023 0504 by Hal Duran RN  Outcome: Progressing  Flowsheets (Taken 2/1/2023 0500)  Absence of Physical Injury: Implement safety measures based on patient assessment     Problem: Chronic Conditions and Co-morbidities  Goal: Patient's chronic conditions and co-morbidity symptoms are monitored and maintained or improved  2/1/2023 1222 by Jennie Draper RN  Outcome: Progressing  2/1/2023 0504 by Hal Duran RN  Outcome: Progressing  Flowsheets (Taken 1/31/2023 2000)  Care Plan - Patient's Chronic Conditions and Co-Morbidity Symptoms are Monitored and Maintained or Improved: Monitor and assess patient's chronic conditions and comorbid symptoms for stability, deterioration, or improvement     Problem: Cardiovascular - Adult  Goal: Maintains optimal cardiac output and hemodynamic stability  2/1/2023 1222 by Fuentes Castle RN  Outcome: Progressing  2/1/2023 0504 by Gerry Chen RN  Outcome: Progressing  Flowsheets (Taken 1/31/2023 2000)  Maintains optimal cardiac output and hemodynamic stability: Monitor blood pressure and heart rate     Problem: Respiratory - Adult  Goal: Achieves optimal ventilation and oxygenation  2/1/2023 1222 by Fuentes Castle RN  Outcome: Progressing  2/1/2023 0504 by Gerry Cehn RN  Outcome: Progressing     Problem: Gastrointestinal - Adult  Goal: Maintains or returns to baseline bowel function  2/1/2023 1222 by Fuentes Castle RN  Outcome: Progressing  2/1/2023 0504 by Gerry Chen RN  Outcome: Progressing  Flowsheets  Taken 2/1/2023 0000  Maintains or returns to baseline bowel function: Assess bowel function  Taken 1/31/2023 2000  Maintains or returns to baseline bowel function: Assess bowel function     Problem: Metabolic/Fluid and Electrolytes - Adult  Goal: Glucose maintained within prescribed range  2/1/2023 1222 by Fuentes Castle RN  Outcome: Progressing  2/1/2023 0504 by Gerry Chen RN  Outcome: Progressing  Flowsheets (Taken 1/31/2023 2000)  Glucose maintained within prescribed range: Monitor blood glucose as ordered     Problem: Musculoskeletal - Adult  Goal: Return mobility to safest level of function  2/1/2023 1222 by Fuentes Castle RN  Outcome: Progressing  2/1/2023 0504 by Gerry Chen RN  Outcome: Progressing  Flowsheets (Taken 1/31/2023 2000)  Return Mobility to Safest Level of Function: Assess patient stability and activity tolerance for standing, transferring and ambulating with or without assistive devices  Goal: Return ADL status to a safe level of function  2/1/2023 1222 by Fuentes Castle RN  Outcome: Progressing  2/1/2023 0504 by Robby Rubalcava RN  Outcome: Progressing  Flowsheets (Taken 1/31/2023 2000)  Return ADL Status to a Safe Level of Function: Administer medication as ordered     Problem: Pain  Goal: Verbalizes/displays adequate comfort level or baseline comfort level  2/1/2023 1222 by Ivette Arboleda RN  Outcome: Progressing  2/1/2023 0504 by Robby Rubalcava RN  Outcome: Progressing  Flowsheets  Taken 2/1/2023 0000  Verbalizes/displays adequate comfort level or baseline comfort level: Assess pain using appropriate pain scale  Taken 1/31/2023 2000  Verbalizes/displays adequate comfort level or baseline comfort level: Assess pain using appropriate pain scale     Problem: Nutrition Deficit:  Goal: Optimize nutritional status  2/1/2023 1222 by Ivette Arboleda RN  Outcome: Progressing  2/1/2023 0504 by Robby Rubalcava RN  Outcome: Progressing     Problem: Neurosensory - Adult  Goal: Achieves maximal functionality and self care  2/1/2023 1222 by Ivette Arboleda RN  Outcome: Progressing  2/1/2023 0504 by Robby Rubalcava RN  Outcome: Progressing  Flowsheets (Taken 1/31/2023 2000)  Achieves maximal functionality and self care: Monitor swallowing and airway patency with patient fatigue and changes in neurological status     Problem: Safety - Medical Restraint  Goal: Remains free of injury from restraints (Restraint for Interference with Medical Device)  Description: INTERVENTIONS:  1. Determine that other, less restrictive measures have been tried or would not be effective before applying the restraint  2. Evaluate the patient's condition at the time of restraint application  3. Inform patient/family regarding the reason for restraint  4.  Q2H: Monitor safety, psychosocial status, comfort, nutrition and hydration  2/1/2023 1222 by Ivette Arboleda RN  Outcome: Progressing  2/1/2023 0504 by Robby Rubalcava RN  Outcome: Progressing  Flowsheets (Taken 2/1/2023 0049)  Remains free of injury from restraints (restraint for interference with medical device): Determine that other, less restrictive measures have been tried or would not be effective before applying the restraint     Problem: Discharge Planning  Goal: Discharge to home or other facility with appropriate resources  2/1/2023 1222 by Carlota De La Cruz RN  Outcome: Progressing  2/1/2023 1222 by Carlota De La Cruz RN  Outcome: Progressing  2/1/2023 0504 by Roma Espino RN  Outcome: Progressing  Flowsheets (Taken 1/31/2023 2000)  Discharge to home or other facility with appropriate resources: Identify barriers to discharge with patient and caregiver     Problem: Skin/Tissue Integrity  Goal: Absence of new skin breakdown  Description: 1. Monitor for areas of redness and/or skin breakdown  2. Assess vascular access sites hourly  3. Every 4-6 hours minimum:  Change oxygen saturation probe site  4. Every 4-6 hours:  If on nasal continuous positive airway pressure, respiratory therapy assess nares and determine need for appliance change or resting period.   2/1/2023 1222 by Carlota De La Cruz RN  Outcome: Progressing  2/1/2023 1222 by Carlota De La Cruz RN  Outcome: Progressing  2/1/2023 0504 by Roma Espino RN  Outcome: Progressing     Problem: Safety - Adult  Goal: Free from fall injury  2/1/2023 1222 by Carlota De La Cruz RN  Outcome: Progressing  2/1/2023 1222 by Carlota De La Cruz RN  Outcome: Progressing  2/1/2023 0504 by Roma Espino RN  Outcome: Progressing  Flowsheets (Taken 2/1/2023 0500)  Free From Fall Injury: Instruct family/caregiver on patient safety     Problem: ABCDS Injury Assessment  Goal: Absence of physical injury  2/1/2023 1222 by Carlota De La Cruz RN  Outcome: Progressing  2/1/2023 1222 by Carlota De La Cruz RN  Outcome: Progressing  2/1/2023 0504 by Roma Espino RN  Outcome: Progressing  Flowsheets (Taken 2/1/2023 0500)  Absence of Physical Injury: Implement safety measures based on patient assessment     Problem: Chronic Conditions and Co-morbidities  Goal: Patient's chronic conditions and co-morbidity symptoms are monitored and maintained or improved  2/1/2023 1222 by Abbie Carver RN  Outcome: Progressing  2/1/2023 1222 by Abbie Carver RN  Outcome: Progressing  2/1/2023 0504 by Carl Sacks, RN  Outcome: Progressing  Flowsheets (Taken 1/31/2023 2000)  Care Plan - Patient's Chronic Conditions and Co-Morbidity Symptoms are Monitored and Maintained or Improved: Monitor and assess patient's chronic conditions and comorbid symptoms for stability, deterioration, or improvement     Problem: Cardiovascular - Adult  Goal: Maintains optimal cardiac output and hemodynamic stability  2/1/2023 1222 by Abbie Carver RN  Outcome: Progressing  2/1/2023 1222 by Abbie Carver RN  Outcome: Progressing  2/1/2023 0504 by Carl Sacks, RN  Outcome: Progressing  Flowsheets (Taken 1/31/2023 2000)  Maintains optimal cardiac output and hemodynamic stability: Monitor blood pressure and heart rate     Problem: Respiratory - Adult  Goal: Achieves optimal ventilation and oxygenation  2/1/2023 1222 by Abbie Carver RN  Outcome: Progressing  2/1/2023 1222 by Abbie Carver RN  Outcome: Progressing  2/1/2023 0504 by Carl Sacks, RN  Outcome: Progressing     Problem: Gastrointestinal - Adult  Goal: Maintains or returns to baseline bowel function  2/1/2023 1222 by Abbie Carver RN  Outcome: Progressing  2/1/2023 1222 by Abbie Carver RN  Outcome: Progressing  2/1/2023 0504 by Carl Sacks, RN  Outcome: Progressing  Flowsheets  Taken 2/1/2023 0000  Maintains or returns to baseline bowel function: Assess bowel function  Taken 1/31/2023 2000  Maintains or returns to baseline bowel function: Assess bowel function     Problem: Metabolic/Fluid and Electrolytes - Adult  Goal: Glucose maintained within prescribed range  2/1/2023 1222 by Abbie Carver RN  Outcome: Progressing  2/1/2023 1222 by Mata Palomino RN  Outcome: Progressing  2/1/2023 0504 by Emilee Rodriguez RN  Outcome: Progressing  Flowsheets (Taken 1/31/2023 2000)  Glucose maintained within prescribed range: Monitor blood glucose as ordered     Problem: Musculoskeletal - Adult  Goal: Return mobility to safest level of function  2/1/2023 1222 by Mata Palomino RN  Outcome: Progressing  2/1/2023 1222 by Mata Palomino RN  Outcome: Progressing  2/1/2023 0504 by Emilee Rodriguez RN  Outcome: Progressing  Flowsheets (Taken 1/31/2023 2000)  Return Mobility to Safest Level of Function: Assess patient stability and activity tolerance for standing, transferring and ambulating with or without assistive devices  Goal: Return ADL status to a safe level of function  2/1/2023 1222 by Mata Palomino RN  Outcome: Progressing  2/1/2023 1222 by Mata Palomino RN  Outcome: Progressing  2/1/2023 0504 by Emilee Rodriguez RN  Outcome: Progressing  Flowsheets (Taken 1/31/2023 2000)  Return ADL Status to a Safe Level of Function: Administer medication as ordered     Problem: Pain  Goal: Verbalizes/displays adequate comfort level or baseline comfort level  2/1/2023 1222 by Mata Palomino RN  Outcome: Progressing  2/1/2023 1222 by Mata Palomino RN  Outcome: Progressing  2/1/2023 0504 by Emilee Rodriguez RN  Outcome: Progressing  Flowsheets  Taken 2/1/2023 0000  Verbalizes/displays adequate comfort level or baseline comfort level: Assess pain using appropriate pain scale  Taken 1/31/2023 2000  Verbalizes/displays adequate comfort level or baseline comfort level: Assess pain using appropriate pain scale     Problem: Nutrition Deficit:  Goal: Optimize nutritional status  2/1/2023 1222 by Mata Palomino RN  Outcome: Progressing  2/1/2023 1222 by Mata Palomino RN  Outcome: Progressing  2/1/2023 0504 by Emilee Rodriguez RN  Outcome: Progressing     Problem: Neurosensory - Adult  Goal: Achieves maximal functionality and self care  2/1/2023 1222 by Nikita Mendez RN  Outcome: Progressing  2/1/2023 1222 by Nikita Mendez RN  Outcome: Progressing  2/1/2023 0504 by Amada Herr RN  Outcome: Progressing  Flowsheets (Taken 1/31/2023 2000)  Achieves maximal functionality and self care: Monitor swallowing and airway patency with patient fatigue and changes in neurological status     Problem: Safety - Medical Restraint  Goal: Remains free of injury from restraints (Restraint for Interference with Medical Device)  Description: INTERVENTIONS:  1. Determine that other, less restrictive measures have been tried or would not be effective before applying the restraint  2. Evaluate the patient's condition at the time of restraint application  3. Inform patient/family regarding the reason for restraint  4.  Q2H: Monitor safety, psychosocial status, comfort, nutrition and hydration  2/1/2023 1222 by Nikita Mendez RN  Outcome: Progressing  2/1/2023 1222 by Nikita Mendez RN  Outcome: Progressing  2/1/2023 0504 by Amada Herr RN  Outcome: Progressing  Flowsheets (Taken 2/1/2023 0049)  Remains free of injury from restraints (restraint for interference with medical device): Determine that other, less restrictive measures have been tried or would not be effective before applying the restraint

## 2023-02-01 NOTE — PROGRESS NOTES
[address] Hospitalist        2/1/2023   4:35 PM    Name:  Marlee Nicholson  MRN:    [id number]     [place]:     [de-identified]   Room:  [address] [place] [address]  IP Day: 8     Admit Date: 1/21/2023 11:39 PM  PCP: Maryellen Leonardo MD    C/C:   Chief Complaint   Patient presents with    Shortness of Breath       Assessment:      Acute on chronic congestive heart failure  Acute hypoxic respiratory failure  COVID-19 pneumonia  On vaccinated against SARS-CoV-2  Pulmonary edema  Bilateral pleural effusions  Hypotension  Acute metabolic encephalopathy  Nonvaccinated against SARS-CoV-2  Respiratory failure with hypoxia requiring oxygen via nasal cannula  Hypomagnesemia  Hypokalemia  Question of bacterial pneumonia  Essential hypertension  Diabetes mellitus type 2  Paroxysmal atrial fibrillation  Congestive heart failure, systolic/HFrEF with EF 21%  Osteoarthritis, multiple joints  History of colon cancer  History of skin cancer  Right eye vision loss  Status post endotracheal intubation 1/31/2023      Plan:      Patient has become hypotensive, bradycardic, further she is more altered, will transfer patient to medical [id number]  Critical care following  Patient also have minimal p.o. intake patient continues to refuse care, has minimal p.o. intake  Consult dietitian, start patient on TPN    Monitor vitals closely  Keep SPO2 above 90%  I's and O's  IV heplock  Pain control  Antiemetics as needed  Levaquin completed  Decadron  Bumex, Coreg, Entresto  Accu-Cheks before meals and at bedtime  Insulin sliding scale medium  Hypoglycemia protocol  And has elevated blood glucose and is refusing insulin  CBC , BMP, BNP  Pulmonary consulted  Consult cardiology recommended patient was not on anticoagulation due to GI bleed in the past  Prognosis is guarded  DVT and GI prophylaxis.   Discussed with RN  Palliative care on consult  Full code  Not on anticoagulation secondary to prior GI bleed  Continue medication as below  Discussed with daughter at bedside in detail      Scheduled Meds:   dexamethasone  6 mg IntraVENous Daily    heparin (porcine)  5,000 Units SubCUTAneous BID    insulin lispro  0-8 Units SubCUTAneous Q6H    pantoprazole (PROTONIX) 40 mg injection  40 mg IntraVENous Daily    QUEtiapine  25 mg Oral BID    sodium chloride flush  5-40 mL IntraVENous 2 times per day    [Held by provider] carvedilol  12.5 mg Oral BID     sacubitril-valsartan  1 tablet Oral BID    [Held by provider] cholestyramine light  4 g Oral BID    [Held by provider] glimepiride  4 mg Oral BID      Continuous Infusions:   midazolam Stopped (23 142)    norepinephrine (LEVOPHED) infusion Stopped (23)    sodium chloride Stopped (23)    dextrose       PRN Meds:  midazolam, 1 mg, Q30 Min PRN  fentanNYL, 50 mcg, Q1H PRN  sodium phosphate IVPB, 10 mmol, PRN   Or  sodium phosphate IVPB, 15 mmol, PRN   Or  sodium phosphate IVPB, 20 mmol, PRN  sodium chloride flush, 10 mL, PRN  sodium chloride, , PRN  magnesium sulfate, 1,000 mg, PRN  ondansetron, 4 mg, Q8H PRN   Or  ondansetron, 4 mg, Q6H PRN  polyethylene glycol, 17 g, Daily PRN  acetaminophen, 650 mg, Q6H PRN   Or  acetaminophen, 650 mg, Q6H PRN  glucose, 4 tablet, PRN  dextrose bolus, 125 mL, PRN   Or  dextrose bolus, 250 mL, PRN  glucagon (rDNA), 1 mg, PRN  dextrose, , Continuous PRN  albuterol, 2.5 mg, Q6H PRN          Subjective:     I have seen and examined patient today, patient last 24 hours events were reviewed also discussed with nursing staff  Patient's respiratory status deteriorated overnight  She is presently intubated  Afebrile      ROS:  Unable to assess due to patient mental status        Physical Examination:      Vitals:  /69   Pulse 65   Temp 97.3 °F (36.3 °C) (Axillary)   Resp 22   Ht 5' 4\" (1.626 m)   Wt 150 lb (68 kg)   SpO2 100%   BMI 25.75 kg/m²   Temp (24hrs), Av.6 °F (36.4 °C), Min:97.3 °F (36.3 °C), Max:97.7 °F (36.5 °C)    Weight:   Wt Readings from Last 3 Encounters:   01/31/23 150 lb (68 kg)   01/13/23 150 lb 1.6 oz (68.1 kg)   10/20/22 150 lb (68 kg)     I/O last 3 completed shifts:  I/O last 3 completed shifts: In: 3862.8 [I.V.:416.4; NG/GT:464; IV Piggyback:771.5]  Out: 700 [Urine:700]     Recent Labs     01/31/23  2204 02/01/23  0402 02/01/23  1012 02/01/23  1535   POCGLU 251* 235* 263* 259*           General appearance -sedated   mental status -on vent   head - normocephalic and atraumatic  Eyes - conjunctiva clear  Ears - ext ears normal  Nose - no drainage noted  Mouth - mucous membranes moist  Neck - supple, no carotid bruits, thyroid not palpable  Chest - clear to auscultation, normal effort  Heart - normal rate, regular rhythm, no murmur  Abdomen - soft, nontender, nondistended, bowel sounds present all four quadrants, no masses, hepatomegaly or splenomegaly  Neurological -unable to assess  Extremities - peripheral pulses palpable, no pedal edema or calf pain with palpation  Skin - no gross lesions, rashes, or induration noted        Medications: Allergies:    Allergies   Allergen Reactions    Acetaminophen-Codeine Nausea Only    Codeine Nausea Only    Furosemide      Other reaction(s): GI Disturbance    Linagliptin      Other reaction(s): SOB    Sitagliptin      Other reaction(s): felt poorly    Other Itching     Animal Dander       Current Meds:   Current Facility-Administered Medications:     midazolam (VERSED) 1 mg/mL in NS infusion, 1-10 mg/hr, IntraVENous, Continuous, Aaron Randolph MD, Stopped at 02/01/23 1421    midazolam (VERSED) injection 1 mg, 1 mg, IntraVENous, Q30 Min PRN, Aaron Randolph MD    norepinephrine (LEVOPHED) 16 mg in sodium chloride 0.9 % 250 mL infusion, 1-100 mcg/min, IntraVENous, Continuous, Aaron Randolph MD, Stopped at 01/31/23 0221    dexamethasone (PF) (DECADRON) injection 6 mg, 6 mg, IntraVENous, Daily, Aaron Randolph MD, 6 mg at 02/01/23 0913    fentaNYL (SUBLIMAZE) injection 50 mcg, 50 mcg, IntraVENous, Q1H PRN, Marrian Apgar, MD, 50 mcg at 23 1320    heparin (porcine) injection 5,000 Units, 5,000 Units, SubCUTAneous, BID, Heather Mckay MD, 5,000 Units at 23 0913    insulin lispro (HUMALOG) injection vial 0-8 Units, 0-8 Units, SubCUTAneous, Q6H, Nargis Rich APRN - CNP, 4 Units at 23 1548    sodium phosphate 10 mmol in sodium chloride 0.9 % 250 mL IVPB, 10 mmol, IntraVENous, PRN, Stopped at 23 1100 **OR** sodium phosphate 15 mmol in sodium chloride 0.9 % 250 mL IVPB, 15 mmol, IntraVENous, PRN, Stopped at 23 1359 **OR** sodium phosphate 20 mmol in sodium chloride 0.9 % 500 mL IVPB, 20 mmol, IntraVENous, PRN, Nargis Rich, APRN - CNP    pantoprazole (PROTONIX) 40 mg in sodium chloride (PF) 0.9 % 10 mL injection, 40 mg, IntraVENous, Daily, Sylvia Bolaños MD, 40 mg at 23 0913    QUEtiapine (SEROQUEL) tablet 25 mg, 25 mg, Oral, BID, Hong Kathleen MD, 25 mg at 23 0913    sodium chloride flush 0.9 % injection 5-40 mL, 5-40 mL, IntraVENous, 2 times per day, Gardenia A Lump, APRN - CNP, 10 mL at 23 0914    sodium chloride flush 0.9 % injection 10 mL, 10 mL, IntraVENous, PRN, Gardenia A Lump, APRN - CNP, 10 mL at 23 1616    0.9 % sodium chloride infusion, , IntraVENous, PRN, Darus Cruel Lump, APRN - CNP, Stopped at 23 0556    magnesium sulfate 1000 mg in dextrose 5% 100 mL IVPB, 1,000 mg, IntraVENous, PRN, Darus Cruel Lump, APRN - CNP, Stopped at 23 1715    ondansetron (ZOFRAN-ODT) disintegrating tablet 4 mg, 4 mg, Oral, Q8H PRN, 4 mg at 23 **OR** ondansetron (ZOFRAN) injection 4 mg, 4 mg, IntraVENous, Q6H PRN, Gardenia A Lump, APRN - CNP    polyethylene glycol (GLYCOLAX) packet 17 g, 17 g, Oral, Daily PRN, Gardenia A Lump, APRN - CNP    acetaminophen (TYLENOL) tablet 650 mg, 650 mg, Oral, Q6H PRN, 650 mg at 23 0907 **OR** acetaminophen (TYLENOL) suppository 650 mg, 650 mg, Rectal, Q6H PRN, Michaelus Jez Lump, APRN - CNP    glucose chewable tablet 16 g, 4 tablet, Oral, PRN, Gardenia A Lump, APRN - CNP    dextrose bolus 10% 125 mL, 125 mL, IntraVENous, PRN **OR** dextrose bolus 10% 250 mL, 250 mL, IntraVENous, PRN, Gardenia A Lump, APRN - CNP    glucagon (rDNA) injection 1 mg, 1 mg, SubCUTAneous, PRN, Gardenia A Lump, APRN - CNP    dextrose 10 % infusion, , IntraVENous, Continuous PRN, Gardenia A Lump, APRN - CNP    albuterol (PROVENTIL) nebulizer solution 2.5 mg, 2.5 mg, Nebulization, Q6H PRN, Gardenia A Lump, APRN - CNP    [Held by provider] carvedilol (COREG) tablet 12.5 mg, 12.5 mg, Oral, BID , Haleigh Wilson MD, 12.5 mg at 01/26/23 1229    sacubitril-valsartan (ENTRESTO) 24-26 MG per tablet 1 tablet, 1 tablet, Oral, BID, Haleigh Wilson MD, 1 tablet at 02/01/23 0913    [Held by provider] cholestyramine light packet 4 g, 4 g, Oral, BID, Haleigh Wilson MD, 4 g at 01/26/23 0047    [Held by provider] glimepiride (AMARYL) tablet 4 mg (Patient Supplied), 4 mg, Oral, BID , Haleigh Wilson MD, 4 mg at 01/25/23 1903      I/O (24Hr):     Intake/Output Summary (Last 24 hours) at 2/1/2023 1635  Last data filed at 2/1/2023 0914  Gross per 24 hour   Intake 518.95 ml   Output 700 ml   Net -181.05 ml         Data:           Labs:    Hematology:  Recent Labs     01/30/23  0315 01/31/23  0532 02/01/23  0400   WBC 10.9 10.6 8.3   RBC 3.71* 3.55* 3.80*   HGB 10.5* 10.1* 10.5*   HCT 33.5* 32.2* 33.0*   MCV 90.3 90.7 86.8   MCH 28.3 28.5 27.6   MCHC 31.3 31.4 31.8   RDW 14.9* 14.9* 14.8*    151 146   MPV 10.7 10.7 11.5       Chemistry:  Recent Labs     01/30/23  0315 01/31/23  0532 01/31/23  1700 02/01/23  0400    142  --  144   K 4.8 4.1  --  4.5   * 113*  --  112*   CO2 26 24  --  24   GLUCOSE 220* 179*  --  239*   BUN 36* 38*  --  38*   CREATININE 0.64 0.63  --  0.64   MG 2.0  --   --   --    ANIONGAP 4* 5*  --  8*   LABGLOM >60 >60  --  >60   CALCIUM 8.9 8.5*  --  8.5*   PHOS 2.8 1.9*  --  3.1   TROPHS  --   --  PENDING  --        Recent Labs 01/31/23  1408 01/31/23  1616 01/31/23  1700 01/31/23  2204 02/01/23  0402 02/01/23  1012 02/01/23  1535   PROT  --   --  4.1*  --   --   --   --    LABALBU  --   --  2.2*  --   --   --   --    AST  --   --  33*  --   --   --   --    ALT  --   --  25  --   --   --   --    ALKPHOS  --   --  148*  --   --   --   --    BILITOT  --   --  0.7  --   --   --   --    BILIDIR  --   --  0.3*  --   --   --   --    AMMONIA  --   --  14  --   --   --   --    POCGLU 134* 186*  --  251* 235* 263* 259*         Lab Results   Component Value Date/Time    SPECIAL 1ML LT HAND 01/31/2023 02:09 AM     Lab Results   Component Value Date/Time    CULTURE NO GROWTH 1 DAY 01/31/2023 02:09 AM       Lab Results   Component Value Date/Time    POCPH 7.537 02/01/2023 04:30 AM    POCPCO2 26.4 02/01/2023 04:30 AM    POCPO2 132.3 02/01/2023 04:30 AM    POCHCO3 22.4 02/01/2023 04:30 AM    NBEA 2 01/31/2023 02:44 AM    PBEA 0 02/01/2023 04:30 AM    MESD3SKM 99 02/01/2023 04:30 AM    FIO2 35.0 02/01/2023 04:30 AM       Radiology:    CT CHEST WO CONTRAST    Result Date: 1/22/2023  Multifocal ground-glass opacities indeterminate for COVID-19. Right greater than left pleural effusions. Ileus. Cardiomegaly and coronary artery disease. XR CHEST PORTABLE    Result Date: 1/24/2023  More prominent bilateral interstitial and ground-glass opacities, which may represent developing edema versus multifocal infection. Grossly similar appearance of small effusions. XR CHEST PORTABLE    Result Date: 1/21/2023  New small right effusion and airspace disease. Possible mild CHF.          All radiological studies reviewed  Code Status:  Full Code        Electronically signed by Juanita Bravo MD on 2/1/2023 at 4:35 PM    This note was created with the assistance of a speech-recognition program.  Although the intention is to generate a document that actually reflects the content of the visit, no guarantees can be provided that every mistake has been identified and corrected by editing. Note was updated later by me after  physical examination and  completion of the assessment.

## 2023-02-01 NOTE — PROGRESS NOTES
RT Note:  Pt remained on vent at charted settings throughout day. CPAP trial tolerated for about 3.5 hrs today. CPAP trial to be attempted again tomorrow.

## 2023-02-01 NOTE — PROGRESS NOTES
Pt CPOT score is 5; pt very restless with just the slightest movement and grimacing; GISEL Petit, NP notified and was deferred to crit care d/t pressures; Dr. Wisam Saba notified and new order received for Fentanyl 50mcg Q1 hr PRN pain; daughter updated.

## 2023-02-01 NOTE — CONSULTS
Neurology Consult Note        Reason for Consult:  loss of responsiveness  Requesting Physician:  Dr Teresa Zheng:  unresponsive    History Obtained From:  electronic medical record       HISTORY OF PRESENT ILLNESS:    This is a 79 y/o WF admitted to the emergency room at [facility] where she presented with dyspnea and neck pain. Patient had recently been discharged from the hospital where she was [facility]. Patient says she was progressively getting dyspneic initially with exertion and recently even at rest.  As she had recently had cough as well. Says it has mostly been dry. Her daughter was recently found to have COVID-19. Patient says she has not been immunized against COVID, influenza or pneumonia. She says flu and pneumonia shots made her sick. Never got them again. Regarding COVID-19 she says she lives in her own space and does not interact with people so she did not get it. Apparently while admitted here her O2 requirements had increased and she became progressively somnolent and was then noted to be severely hypercarbic and acidotic and required intubation. Presently she is intubated and sedated. No reported seizure activity.          Past Medical History:        Diagnosis Date    Adenocarcinoma ([facility]     colon    Arthritis     Atrial fibrillation (HCC)     r/t sepsis     Cancer ([facility]     colon, skin    CHF (congestive heart failure) (HCC)     Chronic anticoagulation     Eliquis for a fib stroke prophylaxis    Diabetes mellitus ([facility]     Diverticulitis     Femur fracture (HCC)     Hypertension     Melanoma ([facility]     nose    Osteoporosis     Overweight     Sepsis ([facility]     Serum creatinine raised      Past Surgical History:        Procedure Laterality Date    APPENDECTOMY      CHOLECYSTECTOMY      COLON SURGERY      FEMUR SURGERY      s/p fracture     Current Medications:   Current Facility-Administered Medications: midazolam (VERSED) 1 mg/mL in NS infusion, 1-10 mg/hr, IntraVENous, Continuous  midazolam (VERSED) injection 1 mg, 1 mg, IntraVENous, Q30 Min PRN  norepinephrine (LEVOPHED) 16 mg in sodium chloride 0.9 % 250 mL infusion, 1-100 mcg/min, IntraVENous, Continuous  dexamethasone (PF) (DECADRON) injection 6 mg, 6 mg, IntraVENous, Daily  fentaNYL (SUBLIMAZE) injection 50 mcg, 50 mcg, IntraVENous, Q1H PRN  heparin (porcine) injection 5,000 Units, 5,000 Units, SubCUTAneous, BID  insulin lispro (HUMALOG) injection vial 0-8 Units, 0-8 Units, SubCUTAneous, Q6H  sodium phosphate 10 mmol in sodium chloride 0.9 % 250 mL IVPB, 10 mmol, IntraVENous, PRN **OR** sodium phosphate 15 mmol in sodium chloride 0.9 % 250 mL IVPB, 15 mmol, IntraVENous, PRN **OR** sodium phosphate 20 mmol in sodium chloride 0.9 % 500 mL IVPB, 20 mmol, IntraVENous, PRN  pantoprazole (PROTONIX) 40 mg in sodium chloride (PF) 0.9 % 10 mL injection, 40 mg, IntraVENous, Daily  QUEtiapine (SEROQUEL) tablet 25 mg, 25 mg, Oral, BID  sodium chloride flush 0.9 % injection 5-40 mL, 5-40 mL, IntraVENous, 2 times per day  sodium chloride flush 0.9 % injection 10 mL, 10 mL, IntraVENous, PRN  0.9 % sodium chloride infusion, , IntraVENous, PRN  magnesium sulfate 1000 mg in dextrose 5% 100 mL IVPB, 1,000 mg, IntraVENous, PRN  ondansetron (ZOFRAN-ODT) disintegrating tablet 4 mg, 4 mg, Oral, Q8H PRN **OR** ondansetron (ZOFRAN) injection 4 mg, 4 mg, IntraVENous, Q6H PRN  polyethylene glycol (GLYCOLAX) packet 17 g, 17 g, Oral, Daily PRN  acetaminophen (TYLENOL) tablet 650 mg, 650 mg, Oral, Q6H PRN **OR** acetaminophen (TYLENOL) suppository 650 mg, 650 mg, Rectal, Q6H PRN  glucose chewable tablet 16 g, 4 tablet, Oral, PRN  dextrose bolus 10% 125 mL, 125 mL, IntraVENous, PRN **OR** dextrose bolus 10% 250 mL, 250 mL, IntraVENous, PRN  glucagon (rDNA) injection 1 mg, 1 mg, SubCUTAneous, PRN  dextrose 10 % infusion, , IntraVENous, Continuous PRN  albuterol (PROVENTIL) nebulizer solution 2.5 mg, 2.5 mg, Nebulization, Q6H PRN  [Held by provider] carvedilol (COREG) tablet 12.5 mg, 12.5 mg, Oral, BID WC  sacubitril-valsartan (ENTRESTO) 24-26 MG per tablet 1 tablet, 1 tablet, Oral, BID  [Held by provider] cholestyramine light packet 4 g, 4 g, Oral, BID  [Held by provider] glimepiride (AMARYL) tablet 4 mg (Patient Supplied), 4 mg, Oral, BID   Allergies:  Acetaminophen-codeine, Codeine, Furosemide, Linagliptin, Sitagliptin, and Other    Social History:  TOBACCO:   reports that she has never smoked. She has never used smokeless tobacco.  ETOH:   reports no history of alcohol use. DRUGS:   reports no history of drug use. Family History:   No family history on file. REVIEW OF SYSTEMS:  Review of systems not obtained due to patient factors - intubation    PHYSICAL EXAM:    Vitals:  BP (!) 108/58   Pulse 50   Temp 97.3 °F (36.3 °C) (Axillary)   Resp 16   Ht 5' 4\" (1.626 m)   Wt 150 lb (68 kg)   SpO2 100%   BMI 25.75 kg/m²      General Exam:  Thin and frail body habitus, no acute distress  Intubated and sedated    HEENT:  Normocephalic, atraumatic  Eyes: conjunctiva non-injected, sclera anicteric  Mild opacification of right sclera    Neurologic exam:     Mental status: unresponsive to voice, will make symmetric grimace with supraorbital pressure  No overt visuospatial neglect or gaze preference  . Cranial nerves:  Pupils: miotic OD<OS  Extraocular movements: conjugate and in primary position  Face is symmetric to movement     Motor exam:  Withdrawals extremities symmetrically to nailbed pressure    DTRs           (right/left)  0 throughout        DATA  EMR, labs, meds and head CT reviewed    IMPRESSION:   This is a 79 y/o WF with a recent covid infection who was admitted for management of progressive respiratory issues. SHe became gradually confused and less responsive over a couple of days and then required intubation with a CO2 of >120 and a pH of 6.9.  I think this well explains her change in mental status. Exam is non-focal.               RECOMMENDATIONS:   No further recommendations, will sign off. Ashu Funez. Johnnie Mccoy M.D.   Clinical Neurophysiologist  Neuromuscular Medicine

## 2023-02-01 NOTE — PROGRESS NOTES
[facility] Hospitalist        1/31/2023   8:00 PM    Name:  Angy Camarillo  MRN:    [id number]     Acct:     [de-identified]   Room:  [address] [place] [address]  IP Day: 5     Admit Date: 1/21/2023 11:39 PM  PCP: Jerica Springer MD    C/C:   Chief Complaint   Patient presents with    Shortness of Breath       Assessment:      Acute on chronic congestive heart failure  Acute hypoxic respiratory failure  COVID-19 pneumonia  On vaccinated against SARS-CoV-2  Pulmonary edema  Bilateral pleural effusions  Hypotension  Acute metabolic encephalopathy  Nonvaccinated against SARS-CoV-2  Respiratory failure with hypoxia requiring oxygen via nasal cannula  Hypomagnesemia  Hypokalemia  Question of bacterial pneumonia  Essential hypertension  Diabetes mellitus type 2  Paroxysmal atrial fibrillation  Congestive heart failure, systolic/HFrEF with EF 76%  Osteoarthritis, multiple joints  History of colon cancer  History of skin cancer  Right eye vision loss  Status post endotracheal intubation 1/31/2023      Plan:      Patient has become hypotensive, bradycardic, further she is more altered, will transfer patient to medical [id number]  Critical care following  Patient also have minimal p.o. intake patient continues to refuse care, has minimal p.o. intake  Consult dietitian, start patient on TPN    Monitor vitals closely  Keep SPO2 above 90%  I's and O's  IV heplock  Pain control  Antiemetics as needed  Levaquin completed  Decadron  Bumex, Coreg, Entresto  Accu-Cheks before meals and at bedtime  Insulin sliding scale medium  Hypoglycemia protocol  And has elevated blood glucose and is refusing insulin  CBC , BMP, BNP  Pulmonary consulted  Consult cardiology recommended patient was not on anticoagulation due to GI bleed in the past  Prognosis is guarded  DVT and GI prophylaxis.   Discussed with RN  Palliative care on consult  Full code  Not on anticoagulation secondary to prior GI bleed  Continue medication as below  Discussed with daughter at bedside in detail      Scheduled Meds:   dexamethasone  6 mg IntraVENous Daily    heparin (porcine)  5,000 Units SubCUTAneous BID    insulin lispro  0-8 Units SubCUTAneous Q6H    pantoprazole (PROTONIX) 40 mg injection  40 mg IntraVENous Daily    QUEtiapine  25 mg Oral BID    sodium chloride flush  5-40 mL IntraVENous 2 times per day    [Held by provider] carvedilol  12.5 mg Oral BID     sacubitril-valsartan  1 tablet Oral BID    [Held by provider] cholestyramine light  4 g Oral BID    [Held by provider] glimepiride  4 mg Oral BID      Continuous Infusions:   midazolam 4 mg/hr (23 1513)    norepinephrine (LEVOPHED) infusion Stopped (23)    sodium chloride 20 mL/hr at 23 0549    dextrose       PRN Meds:  midazolam, 1 mg, Q30 Min PRN  sodium phosphate IVPB, 10 mmol, PRN   Or  sodium phosphate IVPB, 15 mmol, PRN   Or  sodium phosphate IVPB, 20 mmol, PRN  sodium chloride flush, 10 mL, PRN  sodium chloride, , PRN  magnesium sulfate, 1,000 mg, PRN  ondansetron, 4 mg, Q8H PRN   Or  ondansetron, 4 mg, Q6H PRN  polyethylene glycol, 17 g, Daily PRN  acetaminophen, 650 mg, Q6H PRN   Or  acetaminophen, 650 mg, Q6H PRN  glucose, 4 tablet, PRN  dextrose bolus, 125 mL, PRN   Or  dextrose bolus, 250 mL, PRN  glucagon (rDNA), 1 mg, PRN  dextrose, , Continuous PRN  albuterol, 2.5 mg, Q6H PRN          Subjective:     I have seen and examined patient today, patient last 24 hours events were reviewed also discussed with nursing staff  Patient's respiratory status deteriorated overnight  She is presently intubated  Afebrile      ROS:  Unable to assess due to patient mental status        Physical Examination:      Vitals:  /65   Pulse 82   Temp 96.9 °F (36.1 °C) (Infrared)   Resp 22   Ht 5' 4\" (1.626 m)   Wt 150 lb (68 kg)   SpO2 100%   BMI 25.75 kg/m²   Temp (24hrs), Av.2 °F (36.2 °C), Min:96.8 °F (36 °C), Max:98.2 °F (36.8 °C)    Weight:   Wt Readings from Last 3 Encounters:   01/31/23 150 lb (68 kg)   01/13/23 150 lb 1.6 oz (68.1 kg)   10/20/22 150 lb (68 kg)     I/O last 3 completed shifts:  I/O last 3 completed shifts: In: 3443.9 [I.V.:341.4; NG/GT:120; IV Piggyback:771.5]  Out: 500 [Urine:500]     Recent Labs     01/31/23  0547 01/31/23  1104 01/31/23  1408 01/31/23  1616   POCGLU 185* 134* 134* 186*           General appearance -sedated   mental status -on vent   head - normocephalic and atraumatic  Eyes - conjunctiva clear  Ears - hearing appears to be intact  Nose - no drainage noted  Mouth - mucous membranes moist  Neck - supple, no carotid bruits, thyroid not palpable  Chest - clear to auscultation, normal effort  Heart - normal rate, regular rhythm, no murmur  Abdomen - soft, nontender, nondistended, bowel sounds present all four quadrants, no masses, hepatomegaly or splenomegaly  Neurological -unable to assess  Extremities - peripheral pulses palpable, no pedal edema or calf pain with palpation  Skin - no gross lesions, rashes, or induration noted        Medications: Allergies:    Allergies   Allergen Reactions    Acetaminophen-Codeine Nausea Only    Codeine Nausea Only    Furosemide      Other reaction(s): GI Disturbance    Linagliptin      Other reaction(s): SOB    Sitagliptin      Other reaction(s): felt poorly    Other Itching     Animal Dander       Current Meds:   Current Facility-Administered Medications:     midazolam (VERSED) 1 mg/mL in NS infusion, 1-10 mg/hr, IntraVENous, Continuous, Tomasa Mcdemrott MD, Last Rate: 4 mL/hr at 01/31/23 1513, 4 mg/hr at 01/31/23 1513    midazolam (VERSED) injection 1 mg, 1 mg, IntraVENous, Q30 Min PRN, Tomasa Mcdermott MD    norepinephrine (LEVOPHED) 16 mg in sodium chloride 0.9 % 250 mL infusion, 1-100 mcg/min, IntraVENous, Continuous, Tomasa Mcdermott MD, Stopped at 01/31/23 0221    dexamethasone (PF) (DECADRON) injection 6 mg, 6 mg, IntraVENous, Daily, Tomasa Mcdermott MD, 6 mg at 01/31/23 1706 heparin (porcine) injection 5,000 Units, 5,000 Units, SubCUTAneous, BID, Estefanía Oshea MD, 5,000 Units at 01/31/23 0853    insulin lispro (HUMALOG) injection vial 0-8 Units, 0-8 Units, SubCUTAneous, Q6H, Nargis Rich APRN - CNP, 2 Units at 01/30/23 1231    sodium phosphate 10 mmol in sodium chloride 0.9 % 250 mL IVPB, 10 mmol, IntraVENous, PRN, Stopped at 01/29/23 1100 **OR** sodium phosphate 15 mmol in sodium chloride 0.9 % 250 mL IVPB, 15 mmol, IntraVENous, PRN, Stopped at 01/31/23 1359 **OR** sodium phosphate 20 mmol in sodium chloride 0.9 % 500 mL IVPB, 20 mmol, IntraVENous, PRN, Nargis Rich, APRN - CNP    pantoprazole (PROTONIX) 40 mg in sodium chloride (PF) 0.9 % 10 mL injection, 40 mg, IntraVENous, Daily, Sylvia Bolaños MD, 40 mg at 01/31/23 9300    QUEtiapine (SEROQUEL) tablet 25 mg, 25 mg, Oral, BID, Wendy Espinoza MD, 25 mg at 01/31/23 8424    sodium chloride flush 0.9 % injection 5-40 mL, 5-40 mL, IntraVENous, 2 times per day, Gardenia A Lump, APRN - CNP, 10 mL at 01/31/23 0854    sodium chloride flush 0.9 % injection 10 mL, 10 mL, IntraVENous, PRN, Gardenia A Lump, APRN - CNP, 10 mL at 01/22/23 1616    0.9 % sodium chloride infusion, , IntraVENous, PRN, Elnita Rockland Lump, APRN - CNP, Last Rate: 20 mL/hr at 01/31/23 0549, Rate Verify at 01/31/23 0549    magnesium sulfate 1000 mg in dextrose 5% 100 mL IVPB, 1,000 mg, IntraVENous, PRN, Elnita Rockland Lump, APRN - CNP, Stopped at 01/22/23 1715    ondansetron (ZOFRAN-ODT) disintegrating tablet 4 mg, 4 mg, Oral, Q8H PRN, 4 mg at 01/23/23 2013 **OR** ondansetron (ZOFRAN) injection 4 mg, 4 mg, IntraVENous, Q6H PRN, Gardenia A Lump, APRN - CNP    polyethylene glycol (GLYCOLAX) packet 17 g, 17 g, Oral, Daily PRN, Gardenia A Lump, APRN - CNP    acetaminophen (TYLENOL) tablet 650 mg, 650 mg, Oral, Q6H PRN, 650 mg at 01/24/23 0907 **OR** acetaminophen (TYLENOL) suppository 650 mg, 650 mg, Rectal, Q6H PRN, Gardenia A Lump, APRN - CNP    glucose chewable tablet 16 g, 4 tablet, Oral, PRN, Raul Candle Lump, APRN - CNP    dextrose bolus 10% 125 mL, 125 mL, IntraVENous, PRN **OR** dextrose bolus 10% 250 mL, 250 mL, IntraVENous, PRN, Gardenia A Lump, APRN - CNP    glucagon (rDNA) injection 1 mg, 1 mg, SubCUTAneous, PRN, Gardenia A Lump, APRN - CNP    dextrose 10 % infusion, , IntraVENous, Continuous PRN, Gardenia A Lump, APRN - CNP    albuterol (PROVENTIL) nebulizer solution 2.5 mg, 2.5 mg, Nebulization, Q6H PRN, Gardenia A Lump, APRN - CNP    [Held by provider] carvedilol (COREG) tablet 12.5 mg, 12.5 mg, Oral, BID Haleigh GUARDADO MD, 12.5 mg at 01/26/23 1229    sacubitril-valsartan (ENTRESTO) 24-26 MG per tablet 1 tablet, 1 tablet, Oral, BID, Haleigh Wilson MD, 1 tablet at 01/31/23 8171    [Held by provider] cholestyramine light packet 4 g, 4 g, Oral, BID, Haleigh Wilson MD, 4 g at 01/26/23 0047    [Held by provider] glimepiride (AMARYL) tablet 4 mg (Patient Supplied), 4 mg, Oral, BID Haleigh MD, 4 mg at 01/25/23 1903      I/O (24Hr):     Intake/Output Summary (Last 24 hours) at 1/31/2023 2000  Last data filed at 1/31/2023 1852  Gross per 24 hour   Intake 3443.88 ml   Output 500 ml   Net 2943.88 ml         Data:           Labs:    Hematology:  Recent Labs     01/29/23  0300 01/30/23  0315 01/31/23  0532   WBC 8.3 10.9 10.6   RBC 3.70* 3.71* 3.55*   HGB 10.3* 10.5* 10.1*   HCT 33.3* 33.5* 32.2*   MCV 90.0 90.3 90.7   MCH 27.8 28.3 28.5   MCHC 30.9 31.3 31.4   RDW 14.9* 14.9* 14.9*    179 151   MPV 10.7 10.7 10.7       Chemistry:  Recent Labs     01/29/23  0300 01/30/23  0315 01/31/23  0532 01/31/23  1700    143 142  --    K 5.1 4.8 4.1  --    * 113* 113*  --    CO2 24 26 24  --    GLUCOSE 377* 220* 179*  --    BUN 38* 36* 38*  --    CREATININE 0.78 0.64 0.63  --    MG  --  2.0  --   --    ANIONGAP 4* 4* 5*  --    LABGLOM >60 >60 >60  --    CALCIUM 8.3* 8.9 8.5*  --    PHOS 2.3* 2.8 1.9*  --    TROPHS  --   --   --  PENDING       Recent Labs     01/29/23  0300 01/29/23  0722 01/30/23  8528 01/31/23  0007 01/31/23  0547 01/31/23  1104 01/31/23  1408 01/31/23  1616 01/31/23  1700   PROT 4.4*  --   --   --   --   --   --   --  4.1*   LABALBU 2.2*  --   --   --   --   --   --   --  2.2*   AST 30  --   --   --   --   --   --   --  33*   ALT 16  --   --   --   --   --   --   --  25   ALKPHOS 96  --   --   --   --   --   --   --  148*   BILITOT 0.4  --   --   --   --   --   --   --  0.7   BILIDIR  --   --   --   --   --   --   --   --  0.3*   AMMONIA  --   --   --   --   --   --   --   --  14   TRIG 112  --   --   --   --   --   --   --   --    POCGLU  --    < > 189* 263* 185* 134* 134* 186*  --     < > = values in this interval not displayed. Lab Results   Component Value Date/Time    SPECIAL 1ML LT HAND 01/31/2023 02:09 AM     Lab Results   Component Value Date/Time    CULTURE NO GROWTH <24 HRS 01/31/2023 02:09 AM       Lab Results   Component Value Date/Time    POCPH 7.418 01/31/2023 02:44 AM    POCPCO2 34.0 01/31/2023 02:44 AM    POCPO2 148.7 01/31/2023 02:44 AM    POCHCO3 22.0 01/31/2023 02:44 AM    NBEA 2 01/31/2023 02:44 AM    UXPA2RMG 99 01/31/2023 02:44 AM    FIO2 60.0 01/31/2023 02:44 AM       Radiology:    CT CHEST WO CONTRAST    Result Date: 1/22/2023  Multifocal ground-glass opacities indeterminate for COVID-19. Right greater than left pleural effusions. Ileus. Cardiomegaly and coronary artery disease. XR CHEST PORTABLE    Result Date: 1/24/2023  More prominent bilateral interstitial and ground-glass opacities, which may represent developing edema versus multifocal infection. Grossly similar appearance of small effusions. XR CHEST PORTABLE    Result Date: 1/21/2023  New small right effusion and airspace disease. Possible mild CHF.          All radiological studies reviewed  Code Status:  Full Code        Electronically signed by Ana Cristina Verma MD on 1/31/2023 at 8:00 PM    This note was created with the assistance of a speech-recognition program.  Although the intention is to generate a document that actually reflects the content of the visit, no guarantees can be provided that every mistake has been identified and corrected by editing. Note was updated later by me after  physical examination and  completion of the assessment.

## 2023-02-01 NOTE — PLAN OF CARE
Problem: Respiratory - Adult  Goal: Achieves optimal ventilation and oxygenation  1/31/2023 2036 by Kelly Marquez RCP  Outcome: Progressing   MECHANICAL VENTILATION     [x]  PROVIDE OPTIMAL VENTILATION  [x]   ASSESS FOR EXTUBATION READINESS  [x]   ASSESS FOR WEANING READINESS  [x]  EXTUBATE AS TOLERATED  [x]  IMPLEMENT ADULT MECHANICAL VENTILATION PROTOCOL  [x]  MAINTAIN ADEQUATE OXYGENATION  [x]  PERFORM SPONTANEOUS WEANING TRIAL AS TOLERATED     Problem: Chronic Conditions and Co-morbidities  Goal: Patient's chronic conditions and co-morbidity symptoms are monitored and maintained or improved  1/31/2023 1151 by Pau Batista RN  Outcome: Progressing  1/31/2023 0752 by Humaira Ureña RN  Outcome: Not Progressing  Flowsheets  Taken 1/31/2023 0400 by Lis Martinez 34 - Patient's Chronic Conditions and Co-Morbidity Symptoms are Monitored and Maintained or Improved: Monitor and assess patient's chronic conditions and comorbid symptoms for stability, deterioration, or improvement  Taken 1/31/2023 0000 by Humaira Ureña RN  Care Plan - Patient's Chronic Conditions and Co-Morbidity Symptoms are Monitored and Maintained or Improved: Monitor and assess patient's chronic conditions and comorbid symptoms for stability, deterioration, or improvement  Taken 1/30/2023 2000 by Venita Chawla RN  Care Plan - Patient's Chronic Conditions and Co-Morbidity Symptoms are Monitored and Maintained or Improved:   Monitor and assess patient's chronic conditions and comorbid symptoms for stability, deterioration, or improvement   Collaborate with multidisciplinary team to address chronic and comorbid conditions and prevent exacerbation or deterioration   Update acute care plan with appropriate goals if chronic or comorbid symptoms are exacerbated and prevent overall improvement and discharge

## 2023-02-01 NOTE — PLAN OF CARE
Problem: Discharge Planning  Goal: Discharge to home or other facility with appropriate resources  Outcome: Progressing  Flowsheets (Taken 1/31/2023 2000)  Discharge to home or other facility with appropriate resources: Identify barriers to discharge with patient and caregiver     Problem: Skin/Tissue Integrity  Goal: Absence of new skin breakdown  Description: 1. Monitor for areas of redness and/or skin breakdown  2. Assess vascular access sites hourly  3. Every 4-6 hours minimum:  Change oxygen saturation probe site  4. Every 4-6 hours:  If on nasal continuous positive airway pressure, respiratory therapy assess nares and determine need for appliance change or resting period.   Outcome: Progressing     Problem: Safety - Adult  Goal: Free from fall injury  Outcome: Progressing  Flowsheets (Taken 2/1/2023 0500)  Free From Fall Injury: Instruct family/caregiver on patient safety     Problem: ABCDS Injury Assessment  Goal: Absence of physical injury  Outcome: Progressing  Flowsheets (Taken 2/1/2023 0500)  Absence of Physical Injury: Implement safety measures based on patient assessment     Problem: Chronic Conditions and Co-morbidities  Goal: Patient's chronic conditions and co-morbidity symptoms are monitored and maintained or improved  Outcome: Progressing  Flowsheets (Taken 1/31/2023 2000)  Care Plan - Patient's Chronic Conditions and Co-Morbidity Symptoms are Monitored and Maintained or Improved: Monitor and assess patient's chronic conditions and comorbid symptoms for stability, deterioration, or improvement     Problem: Cardiovascular - Adult  Goal: Maintains optimal cardiac output and hemodynamic stability  Outcome: Progressing  Flowsheets (Taken 1/31/2023 2000)  Maintains optimal cardiac output and hemodynamic stability: Monitor blood pressure and heart rate     Problem: Respiratory - Adult  Goal: Achieves optimal ventilation and oxygenation  2/1/2023 0504 by Paula Head RN  Outcome: Progressing  1/31/2023 2036 by Beau Redd RCP  Outcome: Progressing  Flowsheets (Taken 1/31/2023 2000 by Robby Rubalcava RN)  Achieves optimal ventilation and oxygenation: Assess for changes in respiratory status     Problem: Gastrointestinal - Adult  Goal: Maintains or returns to baseline bowel function  Outcome: Progressing  Flowsheets  Taken 2/1/2023 0000  Maintains or returns to baseline bowel function: Assess bowel function  Taken 1/31/2023 2000  Maintains or returns to baseline bowel function: Assess bowel function     Problem: Metabolic/Fluid and Electrolytes - Adult  Goal: Glucose maintained within prescribed range  Outcome: Progressing  Flowsheets (Taken 1/31/2023 2000)  Glucose maintained within prescribed range: Monitor blood glucose as ordered     Problem: Musculoskeletal - Adult  Goal: Return mobility to safest level of function  Outcome: Progressing  Flowsheets (Taken 1/31/2023 2000)  Return Mobility to Safest Level of Function: Assess patient stability and activity tolerance for standing, transferring and ambulating with or without assistive devices  Goal: Return ADL status to a safe level of function  Outcome: Progressing  Flowsheets (Taken 1/31/2023 2000)  Return ADL Status to a Safe Level of Function: Administer medication as ordered     Problem: Pain  Goal: Verbalizes/displays adequate comfort level or baseline comfort level  Outcome: Progressing  Flowsheets  Taken 2/1/2023 0000  Verbalizes/displays adequate comfort level or baseline comfort level: Assess pain using appropriate pain scale  Taken 1/31/2023 2000  Verbalizes/displays adequate comfort level or baseline comfort level: Assess pain using appropriate pain scale     Problem: Nutrition Deficit:  Goal: Optimize nutritional status  Outcome: Progressing     Problem: Neurosensory - Adult  Goal: Achieves maximal functionality and self care  Outcome: Progressing  Flowsheets (Taken 1/31/2023 2000)  Achieves maximal functionality and self care: Monitor swallowing and airway patency with patient fatigue and changes in neurological status     Problem: Safety - Medical Restraint  Goal: Remains free of injury from restraints (Restraint for Interference with Medical Device)  Description: INTERVENTIONS:  1. Determine that other, less restrictive measures have been tried or would not be effective before applying the restraint  2. Evaluate the patient's condition at the time of restraint application  3. Inform patient/family regarding the reason for restraint  4.  Q2H: Monitor safety, psychosocial status, comfort, nutrition and hydration  Outcome: Progressing  Flowsheets (Taken 2/1/2023 0049)  Remains free of injury from restraints (restraint for interference with medical device): Determine that other, less restrictive measures have been tried or would not be effective before applying the restraint

## 2023-02-01 NOTE — PROGRESS NOTES
End Of Shift Note  [facility] ICU  Summary of shift: Increased pt Versed to 5 but still biting down on tube; CPOT score 5 and requested PRN pain med; new order for Fentanyl 50mcg Q1 hr PRN pain; maintained MAP>70. Vitals:    Vitals:    02/01/23 0500 02/01/23 0530 02/01/23 0600 02/01/23 0630   BP: 97/61 (!) 96/49 99/61 (!) 97/59   Pulse: 75 67 74 69   Resp: 16 16 16 16   Temp:       TempSrc:       SpO2: 100% 100% 100% 100%   Weight:       Height:            I&O:   Intake/Output Summary (Last 24 hours) at 2/1/2023 0713  Last data filed at 2/1/2023 0650  Gross per 24 hour   Intake 575.46 ml   Output 700 ml   Net -124.54 ml       Resp Status: Clear throughout diminished RLL, %, RT reduced RR to 16 and FiO2 to 30%.     Ventilator Settings:  Vent Mode: AC/PRVC Resp Rate (Set): 16 bmp/Vt (Set, mL): 500 mL/ /FiO2 : 30 %    Critical Care IV infusions:   midazolam 5 mg/hr (02/01/23 0650)    norepinephrine (LEVOPHED) infusion Stopped (01/31/23 0221)    sodium chloride Stopped (01/31/23 0556)    dextrose          LDA:   CVC Triple Lumen 01/26/23 Right Internal jugular (Active)   Number of days: 5       Peripheral IV 01/22/23 Right Hand (Active)   Number of days: 10       NG/OG/NJ/NE Tube Nasogastric 16 fr Left nostril (Active)   Number of days: 1       Urinary Catheter 01/31/23 2 Way (Active)   Number of days: 1       ETT  (Active)   Number of days: 1       Wound 01/10/23 Pretibial Right;Medial (Active)   Number of days: 22

## 2023-02-02 PROBLEM — Z71.89 GOALS OF CARE, COUNSELING/DISCUSSION: Status: ACTIVE | Noted: 2023-01-01

## 2023-02-02 PROBLEM — J96.01 ACUTE RESPIRATORY FAILURE WITH HYPOXIA AND HYPERCAPNIA (HCC): Status: ACTIVE | Noted: 2023-01-01

## 2023-02-02 PROBLEM — J96.02 ACUTE RESPIRATORY FAILURE WITH HYPOXIA AND HYPERCAPNIA (HCC): Status: ACTIVE | Noted: 2023-01-01

## 2023-02-02 PROBLEM — Z71.89 DNR (DO NOT RESUSCITATE) DISCUSSION: Status: ACTIVE | Noted: 2023-01-01

## 2023-02-02 PROBLEM — Z51.5 PALLIATIVE CARE ENCOUNTER: Status: ACTIVE | Noted: 2023-01-01

## 2023-02-02 PROBLEM — Z71.89 ACP (ADVANCE CARE PLANNING): Status: ACTIVE | Noted: 2023-01-01

## 2023-02-02 NOTE — PROGRESS NOTES
..    Palliative Care Progress Note    NAME:  [address] [facility] RECORD NUMBER:  [id number]  AGE: 80 y.o. GENDER: female  : 10/23/1932  TODAY'S DATE:  2023    Reason for Consult:  goals of care and support. Plan        Palliative Interaction: I went to see patient and she was sedated on vent. She is off Versed but RN reports recently giving patient Fentanyl d/t restlessness and ventilator asynchrony. She reports prior to this patient was following commands and doing well on vent. NO family currently present. I informed RN that I will reach out to brothers today to also make sure they have been updated on patients condition and to see if they have any questions. I reached out to son Beena Bolanos and his number was under Rogerio's name so this was corrected. He reports getting updated from his sister. He reports his sister is patients agent. I informed him that his sister was unable to be find POA papers so [place] [facility] of [address] looks to all children as decision makers together. eBena Bolanos reports looking to his sister because she has medical knowledge. I informed him that was ok. I provided him with update that patient has been doing well on vent and following commands. I informed him that sedation drip was stopped and PRN meds available if patient becomes restless. I explained that patient may do C-pap trials soon to see if she would tolerate extubation. Son had many questions on this process. He did not seem to understand why we have to wake patients up prior to extubating. I updated him on Neuro consult. I asked him if he has been updating his brother Kika Aragon and he reports that his sister does. I asked him if he had Rogerio's phone number and he reported that his sister does. He reports knowing that his mother would want Violeta Baker to make decision for her. I informed Beena Bolanos that [facility] that once patient is off vent and A/O x4 we can complete a new one for her.  I offered son support at this time. Education/support to staff  Education/support to family  Communications with primary service  Providing support for coping/adaptation/distress of family  Continue with current plan of care  Clarification of medical condition to patient and family  Palliative care orders introduced  Validating patient/family distress  Continued communication updates      Principle Problem/Diagnosis:  COVID-19    Additional Assessments:  Principal Problem:    COVID-19  Active Problems:    Non-pressure chronic ulcer of left calf with fat layer exposed ([facility] [address]    Encephalopathy acute  Resolved Problems:    * No resolved hospital problems. *    1- Symptom management/ pain control     Pain Assessment:  The patient is not having any pain. Anxiety:  none                          Dyspnea:  acute dyspnea- on vent 30% fi02 and peep 8                           Fatigue:   sedation/vent    Other: TF on hold d/t high residuals per RN    We feel the patient symptoms are being controlled. her current regimen is reviewed by myself and discussed with the staff. 2- Goals of care evaluation   The patient goals of care are live longer, improve or maintain function/quality of life, and support for family/caregiver   Goals of care discussed with:    [] Patient independently    [] Patient and Family    [x] Family or Healthcare DPOA independently    [] Unable to discuss with patient, family/DPOA not present    3- Code Status  Full Code    4- Other recommendations  - We will continue to provide comfort and support to the patient and the family      History of Present Illness     The patient is a 80 y.o.   Non- / non  female who presents with Shortness of Breath      Referred to Palliative Care by  [x] Physician   [] Nursing  [] Family Request   [] Other:       She was admitted to the primary service for Hypomagnesemia [E83.42]  Acute on chronic congestive heart failure, unspecified heart failure type ([facility] [address] [I50.9]  COVID-19 [U07.1]. Her hospital course has been associated with  Covid-19, acute respiratory failure with hypoxia and hypercapnia requiring intubation, CHF, Pneumonia, AMS, and pleural effusions. The patient has a complicated medical history and has been hospitalized since 1/21/2023 11:39 PM. Patient is day # 3 on vent. She is off versed drip. NG tube feeds. Patient is a full code. Palliative care following for goals, and support. OVERNIGHT EVENTS: Patient restless at times per RN off sedation. PRN Fentanyl. BP slightly low in mid 80's. MEDICATIONS  Current Medications    dexamethasone  6 mg IntraVENous Daily    heparin (porcine)  5,000 Units SubCUTAneous BID    insulin lispro  0-8 Units SubCUTAneous Q6H    pantoprazole (PROTONIX) 40 mg injection  40 mg IntraVENous Daily    QUEtiapine  25 mg Oral BID    sodium chloride flush  5-40 mL IntraVENous 2 times per day    [Held by provider] carvedilol  12.5 mg Oral BID     sacubitril-valsartan  1 tablet Oral BID    [Held by provider] cholestyramine light  4 g Oral BID    [Held by provider] glimepiride  4 mg Oral BID      midazolam, fentanNYL, sodium phosphate IVPB **OR** sodium phosphate IVPB **OR** sodium phosphate IVPB, sodium chloride flush, sodium chloride, magnesium sulfate, ondansetron **OR** ondansetron, polyethylene glycol, acetaminophen **OR** acetaminophen, glucose, dextrose bolus **OR** dextrose bolus, glucagon (rDNA), dextrose, albuterol  IV Drips/Infusions   midazolam 1 mg/hr (02/01/23 2201)    norepinephrine (LEVOPHED) infusion Stopped (01/31/23 0221)    sodium chloride Stopped (01/31/23 0556)    dextrose       Home Medications  No current facility-administered medications on file prior to encounter.      Current Outpatient Medications on File Prior to Encounter   Medication Sig Dispense Refill    lactobacillus (BACID) TABS Take 1 tablet by mouth daily 30 tablet 1    cholestyramine light 4 g packet Take 1 packet by mouth 2 times daily 60 packet 3    pantoprazole (PROTONIX) 40 MG tablet Take 1 tablet by mouth every morning (before breakfast) 30 tablet 3    Cholecalciferol 50 MCG (2000 UT) CAPS Take 2,000 Units by mouth daily      fexofenadine (ALLEGRA) 180 MG tablet Take 180 mg by mouth daily      Cyanocobalamin 1000 MCG/15ML LIQD Take 1,000 mcg by mouth daily      bumetanide (BUMEX) 2 MG tablet Take 2 mg by mouth daily      carvedilol (COREG) 12.5 MG tablet Take 12.5 mg by mouth 2 times daily (with meals)      sacubitril-valsartan (ENTRESTO) 24-26 MG per tablet Take 1 tablet by mouth 2 times daily      ibuprofen (ADVIL;MOTRIN) 200 MG tablet Take 400 mg by mouth every 6 hours as needed for Pain      glimepiride (AMARYL) 4 MG tablet Take 4 mg by mouth 2 times daily         PAST MEDICAL HISTORY  Past Medical History:   Diagnosis Date    Adenocarcinoma ([facility] [address]     colon    Arthritis     Atrial fibrillation (HCC)     r/t sepsis     Cancer (HCC)     colon, skin    CHF (congestive heart failure) (HCC)     Chronic anticoagulation     Eliquis for a fib stroke prophylaxis    Diabetes mellitus (HCC)     Diverticulitis     Femur fracture (HCC)     Hypertension     Melanoma ([facility] [address]     nose    Osteoporosis     Overweight     [facility]     Serum creatinine raised        FAMILY HISTORY  No family history on file.     SOCIAL HISTORY  Social History       Tobacco History       Smoking Status  Never      Smokeless Tobacco Use  Never              Alcohol History       Alcohol Use Status  No              Drug Use       Drug Use Status  No              Sexual Activity       Sexually Active  Not Asked                     ALLERGIES  Allergies   Allergen Reactions    Acetaminophen-Codeine Nausea Only    Codeine Nausea Only    Furosemide      Other reaction(s): GI Disturbance    Linagliptin      Other reaction(s): SOB    Sitagliptin      Other reaction(s): felt poorly    Other Itching     Animal Dander       Data      /62   Pulse 92   Temp 97.5 °F (36.4 °C) (Oral) Resp 29   Ht 5' 4\" (1.626 m)   Wt 154 lb 12.2 oz (70.2 kg)   SpO2 92%   BMI 26.57 kg/m²     Wt Readings from Last 3 Encounters:   02/01/23 154 lb 12.2 oz (70.2 kg)   01/13/23 150 lb 1.6 oz (68.1 kg)   10/20/22 150 lb (68 kg)        Lab Results   Component Value Date    WBC 9.9 02/02/2023    HGB 10.9 (L) 02/02/2023    HCT 35.1 (L) 02/02/2023    MCV 88.6 02/02/2023     02/02/2023    ,   Lab Results   Component Value Date/Time     02/02/2023 04:20 AM    K 5.0 02/02/2023 04:20 AM     02/02/2023 04:20 AM    CO2 26 02/02/2023 04:20 AM    BUN 46 02/02/2023 04:20 AM    CREATININE 0.85 02/02/2023 04:20 AM    GLUCOSE 148 02/02/2023 04:20 AM    CALCIUM 8.8 02/02/2023 04:20 AM    ,   Lab Results   Component Value Date    INR 1.2 01/22/2023    INR 1.1 01/11/2023    PROTIME 14.9 (H) 01/22/2023    PROTIME 14.4 (H) 01/11/2023   , .   Lab Results   Component Value Date    ALT 25 01/31/2023    AST 33 (H) 01/31/2023    ALKPHOS 148 (H) 01/31/2023    BILITOT 0.7 01/31/2023   , No results found for: CKTOTAL, CKMB, CKMBINDEX, TROPONINI,   Lab Results   Component Value Date/Time    COLORU Yellow 01/31/2023 02:15 AM    NITRU NEGATIVE 01/31/2023 02:15 AM    GLUCOSEU NEGATIVE 01/31/2023 02:15 AM    KETUA NEGATIVE 01/31/2023 02:15 AM    UROBILINOGEN Normal 01/31/2023 02:15 AM    BILIRUBINUR NEGATIVE 01/31/2023 02:15 AM   , No results found for: AMYLASE,   Lab Results   Component Value Date    LIPASE 32 07/09/2016   ,   Lab Results   Component Value Date    DDIMER 1.81 (H) 01/26/2023   , No results found for: BNP, No results found for: CEA, No results found for: , No results found for: AFPAMN, No results found for: LACTA,     CT Result (most recent):  CT HEAD WO CONTRAST 01/31/2023    Narrative  EXAMINATION:  CT OF THE HEAD WITHOUT CONTRAST  1/31/2023 4:50 am    TECHNIQUE:  CT of the head was performed without the administration of intravenous  contrast. Automated exposure control, iterative reconstruction, and/or weight  based adjustment of the mA/kV was utilized to reduce the radiation dose to as  low as reasonably achievable. COMPARISON:  01/26/2023    HISTORY:  ORDERING SYSTEM PROVIDED HISTORY: change in status  TECHNOLOGIST PROVIDED HISTORY:  change in status      FINDINGS:  BRAIN/VENTRICLES: There is no acute intracranial hemorrhage, mass effect or  midline shift. No abnormal extra-axial fluid collection. The gray-white  differentiation is maintained without evidence of an acute infarct. There is  no evidence of hydrocephalus. Moderate diffuse cerebral atrophy and mild  chronic white matter ischemic change. ORBITS: The visualized portion of the orbits demonstrate no acute abnormality. SINUSES: Variable mild to moderate sinus mucosal thickening and fluid  opacification attributed to endotracheal and NG tubes. SOFT TISSUES/SKULL:  No acute abnormality of the visualized skull or soft  tissues. Impression  No acute intracranial abnormality. Variable sinus opacification attributed to presence of NG and endotracheal  tubes. Xray Result (most recent):  XR CHEST (SINGLE VIEW FRONTAL) 02/02/2023    Narrative  EXAMINATION:  ONE XRAY VIEW OF THE CHEST    2/2/2023 3:53 am    COMPARISON:  02/01/2023    HISTORY:  ORDERING SYSTEM PROVIDED HISTORY: vent  TECHNOLOGIST PROVIDED HISTORY:  vent  Reason for Exam: vent  Additional signs and symptoms: vent    FINDINGS:  Normal heart size and pulmonary vasculature. Support tubes and lines are  unchanged. Lung volumes are low. The left lung is clear. Hazy opacity over  the right lower lung zone may represent loculated effusion. The costophrenic  angles are sharp. No pneumothorax. Surrounding structures show no acute  abnormality. Impression  Possible loculated effusion at the right lower lung zone. Support tubes and lines unchanged. MRI Result (most recent):  No results found for this or any previous visit from the past 3650 days.       No results found for this or any previous visit. No results found for this or any previous visit (from the past 4464 hour(s)).        Assessment        REVIEW OF SYSTEMS    [x]   UNABLE TO OBTAIN: sedated on vent    Constitutional:  []   Chills   []  Fatigue   []  Fevers   []  Malaise   []  Weight loss   [] Other:     Respiratory:   []  Cough    []  Shortness of breath    []  Chest pain    [] Other:     Cardiovascular:   []  Chest pain  []  Dyspnea    []  Exertional chest pressure/discomfort     [] Fatigue      []  Palpitations    []  Syncope   [] Other:     Gastrointestinal:   []  Abdominal pain   []  Constipation    []  Diarrhea    []   Dysphagia   []  Reflux             []  Vomiting   [] Other:     Genitourinary:  []  Dysuria     []  Frequency   []  Hematuria   [] Nocturia   []  Urinary incontinence   [] Other:     Musculoskeletal:   [] Back pain    []  Muscle weakness   []  Myalgias    []  Neck pain   []  Stiff joints   []  Other:     Behavioral/Psych:   [] Anxiety    []  Depression     []  Mood swings   [] Other:     PHYSICAL ASSESSMENT:     General: []  Oriented x3      [] well appearing      [x] Intubated      [] ill appearing      [] Other:    Mental Status: [] normal mental status exam      [x] drowsy      [] Confused      [] Other:     Cardiovascular: []  Regular rate/rhythm      [x] Arrhythmia      [] Other:     Chest: [] Effort normal      [] lungs clear      [] respiratory distress      [] Tachypnea      [x]  Other: vent Fi02 30% and peep 8     Abdomen: [] Soft/non-tender      [x]  Normal appearance      [] Distended      [] Ascites      [] Other:    Neurological: [] Normal Speech      [] Normal Sensation      [x]  Deficits present:  Groggy on vent    Extremity:  [x] normal skin color/temp      [] clubbing/cyanosis      []  No edema      [] Other:     Palliative Performance Scale:  ___60%  Ambulation reduced; Significant disease; Can't do hobbies/housework; intake normal or reduced; occasional assist; LOC full/confusion  ___50%  Mainly sit/lie; Extensive disease; Can't do any work; Considerable assist; intake normal or reduced; LOC full/confusion  ___40%  Mainly in bed; Extensive disease; Mainly assist; intake normal or reduced; LOC full/confusion   ___30%  Bed Bound; Extensive disease; Total care; intake reduced; LOCfull/confusion  ___20%  Bed Bound; Extensive disease; Total care; intake minimal; Drowsy/coma  _x__10%  Bed Bound; Extensive disease; Total care; Mouth care only; Drowsy/coma  ___0       Death        Please call with any palliative questions or concerns. Palliative Care Team is available via perfect serve or via phone. Palliative Care will continue to follow Ms. Cristobal's care as needed. The note has been dictated by dragon, typing errors may be a possibility     Thank you for allowing Palliative Care to participate in the care of Ms. Analia Ramírez .        Electronically signed by   PATRICK Irby CNP  Palliative Care Team  on 2/2/2023 at 10:59 AM    Palliative care office: [phone]

## 2023-02-02 NOTE — PROGRESS NOTES
[facility]  Progress Note    Cong Bonilla  MRN: [id number]  Date: 2/2/2023  LOS: 6     Reason for F/U :   COVID-19 virus infection    Impression :   COVID-19 virus infection, tested + 1/22/2023  Unvaccinated adult patient  Acute respiratory insufficiency requiring supplemental oxygen  Hypercapnic respiratory failure-intubated 1/31/2023 for respiratory acidosis  Congestive heart failure with mild cognitive changes on chest x-ray  Multifocal opacities in the bases/pneumonia  Confusion  Right-sided greater than left sided pleural effusion    Recommendations:   COVID-19 therapy: The patient is not a candidate for antiviral therapy  Given the hypoxia, the patient can continue on Decadron, this was switched to IV 1/27/2023  She is not a candidate for immunosuppressive therapy at this time and the CRP is not overly elevated, but CRP has trended down    Antibiotic therapy: The patient has completed a course of levofloxacin through 1/28/2023  The patient did receive a dose of cefepime and vancomycin 1/31/2023; however, there was no evidence of pneumonia and these were stopped  Infectious disease service will sign off, please call us if needed    Infection Control Recommendations:   Universal precautions    Discharge Planning:     Patient will need Midline Catheter Insertion/ PICC line Insertion: No  [facility]  [facility],  LTAC: Undetermined  Patient willneed outpatient wound care: No    Medical Decision making / Summary of Stay:   Cong Bonilla is a 80y.o.-year-old female who was initially admitted on 1/21/2023.    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 She does not report any subjective fevers, chills no significant cough, no abdominal pain, no nausea vomiting, has chronic diarrhea. The patient was reporting some neck pain and again the neck pain has been previously evaluated with no source found. She reports that her daughter recently tested positive for COVID and work-up in the emergency department included a chest x-ray that showed a new small right effusion and airspace disease and possible mild CHF. COVID testing was done that was positive and the patient was admitted with COVID-19 virus infection as well as concern for congestive heart failure. The patient has since been seen by the pulmonary critical care service and was started on supplemental oxygen, IV Decadron, intravenous levofloxacin as there was concern for right basilar infiltrate/pneumonia and I was asked to evaluate and help with antibiotic choice. Current evaluation:2023    BP (!) 94/48   Pulse 71   Temp 97.5 °F (36.4 °C) (Oral)   Resp 20   Ht 5' 4\" (1.626 m)   Wt 154 lb 12.2 oz (70.2 kg)   SpO2 100%   BMI 26.57 kg/m²     Temperature Range: Temp: 97.5 °F (36.4 °C) Temp  Av.6 °F (36.4 °C)  Min: 97.4 °F (36.3 °C)  Max: 97.8 °F (36.6 °C)    Patient was seen and evaluated at bedside, she remains intubated  She is currently on CPAP    Review of Systems   Unable to perform ROS: Intubated     Physical Examination :     Physical Exam  Constitutional:       Interventions: She is sedated and intubated. HENT:      Head: Normocephalic and atraumatic. Cardiovascular:      Rate and Rhythm: Normal rate and regular rhythm. Pulmonary:      Effort: Pulmonary effort is normal. Tachypnea present. No respiratory distress. She is intubated. Breath sounds: Normal breath sounds. Abdominal:      General: Abdomen is flat. Bowel sounds are normal. There is no distension. Palpations: Abdomen is soft.    Skin: General: Skin is warm and dry. Coloration: Skin is not jaundiced. Laboratory data:   I have independently reviewed the followinglabs:  CBC with Differential:   Recent Labs     02/01/23  0400 02/02/23  0420   WBC 8.3 9.9   HGB 10.5* 10.9*   HCT 33.0* 35.1*    164   LYMPHOPCT 4* 5*   MONOPCT 4 7       BMP:   Recent Labs     02/01/23  0400 02/02/23  0420    145*   K 4.5 5.0   * 113*   CO2 24 26   BUN 38* 46*   CREATININE 0.64 0.85       Hepatic Function Panel:   Recent Labs     01/31/23  1700   PROT 4.1*   LABALBU 2.2*   BILIDIR 0.3*   IBILI 0.4   BILITOT 0.7   ALKPHOS 148*   ALT 25   AST 33*           Lab Results   Component Value Date/Time    PROCAL 0.13 01/26/2023 05:14 AM    PROCAL 0.55 01/22/2023 01:40 PM     Lab Results   Component Value Date/Time    CRP 22.2 01/26/2023 05:14 AM    CRP 37.7 01/23/2023 05:13 AM    CRP 0.5 03/26/2018 03:47 PM     No results found for: Lizzette Plants      Lab Results   Component Value Date/Time    DDIMER 1.81 01/26/2023 06:58 AM    DDIMER 0.86 03/26/2018 03:47 PM     No results found for: FERRITIN  No results found for: LDH  No results found for: FIBRINOGEN    Results in Past 30 Days  Result Component Current Result Ref Range Previous Result Ref Range   SARS-CoV-2, Rapid DETECTED (A) (1/22/2023) Not Detected Not in Time Range      Lab Results   Component Value Date/Time    COVID19 DETECTED 01/22/2023 12:30 AM    COVID19 Not Detected 02/09/2022 05:46 PM       No results for input(s): [facility] in the last 72 hours. Imaging Studies:   Chest xray  Impression:        Possible loculated effusion at the right lower lung zone. Support tubes and lines unchanged. Cultures:     Culture, Blood 1 [[id number]] Collected: 01/31/23 0209   Order Status: Completed Specimen: Blood Updated: 02/01/23 0847    Specimen Description . BLOOD    Special Requests 1ML LT HAND    Culture NO GROWTH 1 DAY   Culture, Blood 1 [[id number]] Collected: 01/31/23 0200   Order Status: Completed Specimen: Blood Updated: 02/01/23 0844    Specimen Description . BLOOD    Special Requests 1ML LT HAND    Culture NO GROWTH 1 DAY   COVID-19, Rapid [[id number]] (Abnormal) Collected: 01/22/23 0030   Order Status: Completed Specimen: Nasopharyngeal Swab Updated: 01/22/23 0058    Specimen Description . NASOPHARYNGEAL SWAB    SARS-CoV-2, Rapid DETECTED Abnormal        Medications:      dexamethasone  6 mg IntraVENous Daily    heparin (porcine)  5,000 Units SubCUTAneous BID    insulin lispro  0-8 Units SubCUTAneous Q6H    pantoprazole (PROTONIX) 40 mg injection  40 mg IntraVENous Daily    QUEtiapine  25 mg Oral BID    sodium chloride flush  5-40 mL IntraVENous 2 times per day    [Held by provider] carvedilol  12.5 mg Oral BID WC    sacubitril-valsartan  1 tablet Oral BID    [Held by provider] cholestyramine light  4 g Oral BID    [Held by provider] glimepiride  4 mg Oral BID WC       Electronically signed by PATRICK Clayton CNP on 2/2/2023 at 8:03 AM      [facility]  Nela Ocasio, [address] messaging  OFFICE: [phone]    Thank you for allowing us to participate in the care of this patient. Please call with questions. This note is created with the assistance of a speech recognition program.  While intending to generate a document that actually reflects the content of the visit, the document can still have some errors including those of syntax and sound a like substitutions which may escape proof reading. In such instances, actual meaning can be extrapolated by contextual diversion.

## 2023-02-02 NOTE — PROGRESS NOTES
[facility]  PROGRESS NOTE    Room # 7619/4930-98   Name: Noemi Humphreys            Worship:      Reason for visit: Code Blue    I visited the family. Admit Date & Time: 1/21/2023 11:39 PM    Assessment:  Noemi Humphreys is a 80 y.o. female who \"went asystole\" in the ICU with daughter/POA, Javid Trevino, present. Jamie Ramsay expressed she did not want chest compressions done, only medications. Pt's pulses came back and pt's code was changed to \"limited. \"    Javid Trevino provided ACP documents and they were verified by Palliative NP Lu Herrmann and myself. Intervention:  I introduced myself and my title as  I offered space for Javid Trevino  to express feelings, needs, and concerns and provided a ministry presence. Outcome:  Javid Trevino was receptive. Plan:  Chaplains will remain available to offer spiritual and emotional support as needed. 02/02/23 1639   Encounter Summary   Service Provided For: Patient not available;Family   Referral/Consult From: Multi-disciplinary team   Support System Children   Last Encounter  02/02/23   Complexity of Encounter High   Crisis   Type Code Blue   Assessment/Intervention/Outcome   Assessment Coping;Calm   Intervention Active listening;Discussed illness injury and its impact; Explored/Affirmed feelings, thoughts, concerns;Sustaining Presence/Ministry of presence   Outcome Receptive       Electronically signed by Mi Noyola on 2/2/2023 at 4:41 PM.  Aditya

## 2023-02-02 NOTE — ACP (ADVANCE CARE PLANNING)
Advance Care Planning     Advance Care Planning (ACP) Physician/NP/PA Conversation    Date of Conversation: 1/21/2023  Conducted with:  Healthcare Decision Maker: Named in Advance Directive or Healthcare Power of  (name) daughter Blenda Fabry Decision Maker:      Primary Decision Maker: Oh Owen - Dariela - [phone]    Secondary Decision Maker: Natan Garza - [phone]    Secondary Decision Maker: Real Luther - Child    Click here to complete Healthcare Decision Makers including selection of the Healthcare Decision Maker Relationship (ie \"Primary\")  Today we documented Decision Maker(s). The patient will provide ACP documents. Care Preferences:    Hospitalization: \"If your health worsens and it becomes clear that your chance of recovery is unlikely, what would be your preference regarding hospitalization? \"  The patient would prefer hospitalization. Ventilation: \"If you were unable to breath on your own and your chance of recovery was unlikely, what would be your preference about the use of a ventilator (breathing machine) if it was available to you? \"  The patient would desire the use of a ventilator. Resuscitation: \"In the event your heart stopped as a result of an underlying serious health condition, would you want attempts made to restart your heart, or would you prefer a natural death? \"  Do all except CPR- Limited code    benefit/burden of treatment options, artificial nutrition, ventilation preferences, hospitalization preferences, and resuscitation preferences    Conversation Outcomes / Follow-Up Plan:  ACP complete - no further action today  Reviewed DNR/DNI and patient is a limited code with no CPR    Length of Voluntary ACP Conversation in minutes:  <16 minutes (Non-Billable)    PATRICK Moise - CNP

## 2023-02-02 NOTE — PROGRESS NOTES
End Of Shift Note  [facility]  Summary of shift: Mostly uneventful shift; pt restarted on Versed @2130 and stopped at 0136, pt was agitated from respiratory, bed bath, and repositioning. Pt does not follow commands but becomes agitated and moves all extremities including biting on the tube; pt relaxes with PRN Fentanyl; x1 episode diarrhea end of shift.     Vitals:    Vitals:    02/02/23 0434 02/02/23 0500 02/02/23 0530 02/02/23 0600   BP:  (!) 75/47 (!) 79/47 (!) 94/48   Pulse: 98 75 73 71   Resp: 16 18 16 18   Temp: 97.5 °F (36.4 °C)      TempSrc: Oral      SpO2: 100% 96% 100% 100%   Weight:       Height:            I&O:   Intake/Output Summary (Last 24 hours) at 2/2/2023 9588  Last data filed at 2/2/2023 0400  Gross per 24 hour   Intake 1042.54 ml   Output 450 ml   Net 592.54 ml       Resp Status: Clear through out, non-labored    Ventilator Settings:  Vent Mode: AC/PRVC Resp Rate (Set): 16 bmp/Vt (Set, mL): 500 mL/ /FiO2 : 30 %    Critical Care IV infusions:   midazolam 1 mg/hr (02/01/23 2201)    norepinephrine (LEVOPHED) infusion Stopped (01/31/23 0221)    sodium chloride Stopped (01/31/23 0556)    dextrose          LDA:   CVC Triple Lumen 01/26/23 Right Internal jugular (Active)   Number of days: 6       Peripheral IV 01/22/23 Right Hand (Active)   Number of days: 11       NG/OG/NJ/NE Tube Nasogastric 16 fr Left nostril (Active)   Number of days: 2       Urinary Catheter 01/31/23 2 Way (Active)   Number of days: 2       ETT  (Active)   Number of days: 2       Wound 01/10/23 Pretibial Right;Medial (Active)   Number of days: 23

## 2023-02-02 NOTE — PLAN OF CARE
Writer heard code blue overhead. Went down to ICU after getting off the phone with other family member. Patient had a pulse and RN reports 1 dose of Epi given. Staff reports that daughter was at bedside with POA papers stating not to do CPR. I met with daughter Heydi Molina and she provided writer with [employer] and she is primary agent. I copied and placed on chart for her. I changed decision maker to her and brothers secondary. I discussed code status with her while  Yolanda Hammond was present. Daughter reports that she wants limited code with everything done except CPR. She reports, \"she received 1 dose of epi and it worked. \" She relates how her mother is strong and a fighter. I had discussion with daughter about re-intubation if patient is able to extubate at some point. She reports that she is currently thinking about this with the family. She reports that patient will not get trach/peg tube. I offered her support at this time.

## 2023-02-02 NOTE — PROGRESS NOTES
[address] Hospitalist        2/2/2023   4:37 PM    Name:  Enio Gaines  MRN:    [id number]     [place]:     [de-identified]   Room:  [address] [place] [address]  IP Day: 6     Admit Date: 1/21/2023 11:39 PM  PCP: Minal Cazares MD    C/C:   Chief Complaint   Patient presents with    Shortness of Breath       Assessment:      Acute on chronic systolic congestive heart failure  Acute hypoxic respiratory failure  COVID-19 pneumonia  On vaccinated against SARS-CoV-2  Pulmonary edema  Bilateral pleural effusions  Hypotension  Acute metabolic encephalopathy  Nonvaccinated against SARS-CoV-2  Respiratory failure with hypoxia requiring oxygen via nasal cannula  Hypomagnesemia  Hypokalemia  Question of bacterial pneumonia  Essential hypertension  Diabetes mellitus type 2  Paroxysmal atrial fibrillation  Congestive heart failure, systolic/HFrEF with EF 20 to 25%  Osteoarthritis, multiple joints  History of colon cancer  History of skin cancer  Right eye vision loss  Status post endotracheal intubation 1/31/2023  Hypotension      Plan:      Patient has become hypotensive, bradycardic, further she is more altered, will transfer patient to medical [id number]  Critical care following  Patient also have minimal p.o. intake patient continues to refuse care, has minimal p.o. intake  Consult dietitian, start patient on TPN    Monitor vitals closely  Keep SPO2 above 90%  I's and O's  IV heplock  Pain control  Antiemetics as needed  Levaquin completed  Decadron  Bumex, Coreg, Entresto  Accu-Cheks before meals and at bedtime  Insulin sliding scale medium  Hypoglycemia protocol  And has elevated blood glucose and is refusing insulin  CBC , BMP, BNP  Pulmonary consulted  Consult cardiology recommended patient was not on anticoagulation due to GI bleed in the past  Prognosis is guarded  DVT and GI prophylaxis.   Discussed with RN  Palliative care on consult  Full code-Limited  Not on anticoagulation secondary to prior GI bleed  Patient had mucous plug  Developed hypotension and arrhythmia likely V-fib  CODE BLUE called  Given epi once which improved rhythm back to normal  Patient placed on Levophed  Palliate care discussed CODE STATUS with family-patient is on limited status now  Discussed with RN  Continue medication as below  Discussed with daughter at bedside in detail      Scheduled Meds:   dexamethasone  6 mg IntraVENous Daily    heparin (porcine)  5,000 Units SubCUTAneous BID    insulin lispro  0-8 Units SubCUTAneous Q6H    pantoprazole (PROTONIX) 40 mg injection  40 mg IntraVENous Daily    QUEtiapine  25 mg Oral BID    sodium chloride flush  5-40 mL IntraVENous 2 times per day    [Held by provider] carvedilol  12.5 mg Oral BID WC    sacubitril-valsartan  1 tablet Oral BID    [Held by provider] cholestyramine light  4 g Oral BID    [Held by provider] glimepiride  4 mg Oral BID WC     Continuous Infusions:   [Held by provider] dexmedetomidine (PRECEDEX) IV infusion Stopped (02/02/23 1248)    EPINEPHrine (ADRENALIN) 5 mg in sodium chloride 0.9% 250 mL infusion 0.08 mcg/kg/min (02/02/23 1453)    norepinephrine 19.2 mcg/min (02/02/23 1347)    midazolam 2 mg/hr (02/02/23 1620)    sodium chloride Stopped (01/31/23 0556)    dextrose       PRN Meds:  EPINEPHrine (ADRENALIN) 5 mg in sodium chloride 0.9% 250 mL infusion, 0.01 mcg/kg/min, Continuous PRN  midazolam, 1 mg, Q30 Min PRN  fentanNYL, 50 mcg, Q1H PRN  sodium phosphate IVPB, 10 mmol, PRN   Or  sodium phosphate IVPB, 15 mmol, PRN   Or  sodium phosphate IVPB, 20 mmol, PRN  sodium chloride flush, 10 mL, PRN  sodium chloride, , PRN  magnesium sulfate, 1,000 mg, PRN  ondansetron, 4 mg, Q8H PRN   Or  ondansetron, 4 mg, Q6H PRN  polyethylene glycol, 17 g, Daily PRN  acetaminophen, 650 mg, Q6H PRN   Or  acetaminophen, 650 mg, Q6H PRN  glucose, 4 tablet, PRN  dextrose bolus, 125 mL, PRN   Or  dextrose bolus, 250 mL, PRN  glucagon (rDNA), 1 mg, PRN  dextrose, , Continuous PRN  albuterol, 2.5 mg, Q6H PRN          Subjective:     I have seen and examined patient today, patient last 24 hours events were reviewed also discussed with nursing staff  Patient's cardiac status deteriorated temporarily  She is presently intubated  Required epinephrine once  Family do not want any chest compressions  Afebrile      ROS:  Unable to assess due to patient mental status        Physical Examination:      Vitals:  /71   Pulse (!) 113   Temp 97.9 °F (36.6 °C) (Oral)   Resp 26   Ht 5' 4\" (1.626 m)   Wt 154 lb 12.2 oz (70.2 kg)   SpO2 99%   BMI 26.57 kg/m²   Temp (24hrs), Av.6 °F (36.4 °C), Min:97.4 °F (36.3 °C), Max:97.9 °F (36.6 °C)    Weight:   Wt Readings from Last 3 Encounters:   23 154 lb 12.2 oz (70.2 kg)   23 150 lb 1.6 oz (68.1 kg)   10/20/22 150 lb (68 kg)     I/O last 3 completed shifts:  I/O last 3 completed shifts: In: 1461.5 [I.V.:121.5; NG/GT:1340]  Out: 650 [Urine:650]     Recent Labs     23  0421 23  1003 23  1102 23  1559   POCGLU 143* 212* 247* 324*           General appearance -sedated   mental status -on vent   head - normocephalic and atraumatic  Eyes - conjunctiva clear  Ears - ext ears normal  Nose - no drainage noted  Mouth - mucous membranes moist  Neck - supple, no carotid bruits, thyroid not palpable  Chest - clear to auscultation, normal effort  Heart - normal rate, regular rhythm, no murmur  Abdomen - soft, nontender, nondistended, bowel sounds present all four quadrants, no masses, hepatomegaly or splenomegaly  Neurological -unable to assess  Extremities - peripheral pulses palpable, no pedal edema or calf pain with palpation  Skin - no gross lesions, rashes, or induration noted        Medications: Allergies:    Allergies   Allergen Reactions    Acetaminophen-Codeine Nausea Only    Codeine Nausea Only    Furosemide      Other reaction(s): GI Disturbance    Linagliptin      Other reaction(s): SOB    Sitagliptin      Other reaction(s): felt poorly    Other Itching     Animal Dander       Current Meds:   Current Facility-Administered Medications:     [Held by provider] dexmedetomidine (PRECEDEX) 400 mcg in sodium chloride 0.9 % 100 mL infusion, 0.1-1.5 mcg/kg/hr, IntraVENous, Continuous, Saba Lara MD, Stopped at 02/02/23 1248    EPINEPHrine (EPINEPHrine HCL) 5 mg in sodium chloride 0.9 % 250 mL infusion, 0.01 mcg/kg/min, IntraVENous, Continuous PRN, Saba Lara MD, Last Rate: 16.8 mL/hr at 02/02/23 1453, 0.08 mcg/kg/min at 02/02/23 1453    norepinephrine (LEVOPHED) 16 mg in sodium chloride 0.9 % 250 mL infusion, 1-100 mcg/min, IntraVENous, Continuous, Leydi Pickard MD, Last Rate: 18 mL/hr at 02/02/23 1347, 19.2 mcg/min at 02/02/23 1347    midazolam (VERSED) 1 mg/mL in NS infusion, 1-10 mg/hr, IntraVENous, Continuous, Leydi Pickard MD, Last Rate: 2 mL/hr at 02/02/23 1620, 2 mg/hr at 02/02/23 1620    midazolam (VERSED) injection 1 mg, 1 mg, IntraVENous, Q30 Min PRN, Leydi Pickard MD    dexamethasone (PF) (DECADRON) injection 6 mg, 6 mg, IntraVENous, Daily, Leydi Pickard MD, 6 mg at 02/02/23 0844    fentaNYL (SUBLIMAZE) injection 50 mcg, 50 mcg, IntraVENous, Q1H PRN, Saba Lara MD, 50 mcg at 02/02/23 1246    heparin (porcine) injection 5,000 Units, 5,000 Units, SubCUTAneous, BID, Leydi Pickard MD, 5,000 Units at 02/02/23 0844    insulin lispro (HUMALOG) injection vial 0-8 Units, 0-8 Units, SubCUTAneous, Q6H, Nargis Rich, APRN - CNP, 2 Units at 02/02/23 1125    sodium phosphate 10 mmol in sodium chloride 0.9 % 250 mL IVPB, 10 mmol, IntraVENous, PRN, Stopped at 01/29/23 1100 **OR** sodium phosphate 15 mmol in sodium chloride 0.9 % 250 mL IVPB, 15 mmol, IntraVENous, PRN, Stopped at 01/31/23 8583 **OR** sodium phosphate 20 mmol in sodium chloride 0.9 % 500 mL IVPB, 20 mmol, IntraVENous, PRN, Nargis Rich, APRN - CNP    pantoprazole (PROTONIX) 40 mg in sodium chloride (PF) 0.9 % 10 mL injection, 40 mg, IntraVENous, Daily, Balta Lange MD, 40 mg at 02/02/23 0843    QUEtiapine (SEROQUEL) tablet 25 mg, 25 mg, Oral, BID, Annika Hernandez MD, 25 mg at 02/02/23 0843    sodium chloride flush 0.9 % injection 5-40 mL, 5-40 mL, IntraVENous, 2 times per day, Manny Mann Lump, APRN - CNP, 10 mL at 02/01/23 2000    sodium chloride flush 0.9 % injection 10 mL, 10 mL, IntraVENous, PRN, Gardenia A Lump, APRN - CNP, 10 mL at 01/22/23 1616    0.9 % sodium chloride infusion, , IntraVENous, PRN, Manny Mann Lump, APRN - CNP, Stopped at 01/31/23 0556    magnesium sulfate 1000 mg in dextrose 5% 100 mL IVPB, 1,000 mg, IntraVENous, PRN, Carrollton Mann Lump, APRN - CNP, Stopped at 01/22/23 1715    ondansetron (ZOFRAN-ODT) disintegrating tablet 4 mg, 4 mg, Oral, Q8H PRN, 4 mg at 01/23/23 2013 **OR** ondansetron (ZOFRAN) injection 4 mg, 4 mg, IntraVENous, Q6H PRN, Gardenia A Lump, APRN - CNP    polyethylene glycol (GLYCOLAX) packet 17 g, 17 g, Oral, Daily PRN, Gardenia A Lump, APRN - CNP    acetaminophen (TYLENOL) tablet 650 mg, 650 mg, Oral, Q6H PRN, 650 mg at 01/24/23 0907 **OR** acetaminophen (TYLENOL) suppository 650 mg, 650 mg, Rectal, Q6H PRN, Gardenia A Lump, APRN - CNP    glucose chewable tablet 16 g, 4 tablet, Oral, PRN, Gardenia A Lump, APRN - CNP    dextrose bolus 10% 125 mL, 125 mL, IntraVENous, PRN **OR** dextrose bolus 10% 250 mL, 250 mL, IntraVENous, PRN, Gardenia A Lump, APRN - CNP    glucagon (rDNA) injection 1 mg, 1 mg, SubCUTAneous, PRN, Gardenia A Lump, APRN - CNP    dextrose 10 % infusion, , IntraVENous, Continuous PRN, Gardenia A Lump, APRN - CNP    albuterol (PROVENTIL) nebulizer solution 2.5 mg, 2.5 mg, Nebulization, Q6H PRGardenia MATOS APRN - CNP, 2.5 mg at 02/02/23 1615    [Held by provider] carvedilol (COREG) tablet 12.5 mg, 12.5 mg, Oral, BID WC, Haleigh Wilson MD, 12.5 mg at 01/26/23 1229    sacubitril-valsartan (ENTRESTO) 24-26 MG per tablet 1 tablet, 1 tablet, Oral, BID, Haleigh Wilson MD, 1 tablet at 02/02/23 0843    [Held by provider] cholestyramine light packet 4 g, 4 g, Oral, BID, Haleigh Wilson MD, 4 g at 01/26/23 0047    [Held by provider] glimepiride (AMARYL) tablet 4 mg (Patient Supplied), 4 mg, Oral, BID WC, Haleigh Wilson MD, 4 mg at 01/25/23 1903      I/O (24Hr): Intake/Output Summary (Last 24 hours) at 2/2/2023 1637  Last data filed at 2/2/2023 1248  Gross per 24 hour   Intake 1032.76 ml   Output 450 ml   Net 582.76 ml         Data:           Labs:    Hematology:  Recent Labs     01/31/23  0532 02/01/23  0400 02/02/23  0420   WBC 10.6 8.3 9.9   RBC 3.55* 3.80* 3.96   HGB 10.1* 10.5* 10.9*   HCT 32.2* 33.0* 35.1*   MCV 90.7 86.8 88.6   MCH 28.5 27.6 27.5   MCHC 31.4 31.8 31.1   RDW 14.9* 14.8* 15.0*    146 164   MPV 10.7 11.5 11.3       Chemistry:  Recent Labs     01/31/23  0532 01/31/23  1700 02/01/23  0400 02/02/23  0420     --  144 145*   K 4.1  --  4.5 5.0   *  --  112* 113*   CO2 24  --  24 26   GLUCOSE 179*  --  239* 148*   BUN 38*  --  38* 46*   CREATININE 0.63  --  0.64 0.85   ANIONGAP 5*  --  8* 6*   LABGLOM >60  --  >60 >60   CALCIUM 8.5*  --  8.5* 8.8   PHOS 1.9*  --  3.1  --    TROPHS  --  PENDING  --   --        Recent Labs     01/31/23  1700 01/31/23 2204 02/01/23  1535 02/01/23 2208 02/02/23  0421 02/02/23  1003 02/02/23  1102 02/02/23  1559   PROT 4.1*  --   --   --   --   --   --   --    LABALBU 2.2*  --   --   --   --   --   --   --    AST 33*  --   --   --   --   --   --   --    ALT 25  --   --   --   --   --   --   --    ALKPHOS 148*  --   --   --   --   --   --   --    BILITOT 0.7  --   --   --   --   --   --   --    BILIDIR 0.3*  --   --   --   --   --   --   --    AMMONIA 14  --   --   --   --   --   --   --    POCGLU  --    < > 259* 253* 143* 212* 247* 324*    < > = values in this interval not displayed.          Lab Results   Component Value Date/Time    SPECIAL 1ML LT HAND 01/31/2023 02:09 AM     Lab Results   Component Value Date/Time    CULTURE NO GROWTH 2 DAYS 01/31/2023 02:09 AM       Lab Results   Component Value Date/Time    POCPH 7.366 02/02/2023 02:08 PM    POCPCO2 39.3 02/02/2023 02:08 PM    POCPO2 169.9 02/02/2023 02:08 PM    POCHCO3 22.5 02/02/2023 02:08 PM    NBEA 3 02/02/2023 02:08 PM    PBEA 0 02/01/2023 04:30 AM    XVTB0AHQ 100 02/02/2023 02:08 PM    FIO2 35.0 02/01/2023 04:30 AM       Radiology:    CT CHEST WO CONTRAST    Result Date: 1/22/2023  Multifocal ground-glass opacities indeterminate for COVID-19. Right greater than left pleural effusions. Ileus. Cardiomegaly and coronary artery disease. XR CHEST PORTABLE    Result Date: 1/24/2023  More prominent bilateral interstitial and ground-glass opacities, which may represent developing edema versus multifocal infection. Grossly similar appearance of small effusions. XR CHEST PORTABLE    Result Date: 1/21/2023  New small right effusion and airspace disease. Possible mild CHF. All radiological studies reviewed  Code Status:  Limited        Electronically signed by Ana Cristina Verma MD on 2/2/2023 at 4:37 PM    This note was created with the assistance of a speech-recognition program.  Although the intention is to generate a document that actually reflects the content of the visit, no guarantees can be provided that every mistake has been identified and corrected by editing. Note was updated later by me after  physical examination and  completion of the assessment.

## 2023-02-02 NOTE — PLAN OF CARE
Problem: Discharge Planning  Goal: Discharge to home or other facility with appropriate resources  Outcome: Progressing  Flowsheets (Taken 2/1/2023 2000)  Discharge to home or other facility with appropriate resources: Identify barriers to discharge with patient and caregiver     Problem: Skin/Tissue Integrity  Goal: Absence of new skin breakdown  Description: 1. Monitor for areas of redness and/or skin breakdown  2. Assess vascular access sites hourly  3. Every 4-6 hours minimum:  Change oxygen saturation probe site  4. Every 4-6 hours:  If on nasal continuous positive airway pressure, respiratory therapy assess nares and determine need for appliance change or resting period.   Outcome: Progressing     Problem: Safety - Adult  Goal: Free from fall injury  Outcome: Progressing  Flowsheets (Taken 2/2/2023 0700)  Free From Fall Injury: Instruct family/caregiver on patient safety     Problem: ABCDS Injury Assessment  Goal: Absence of physical injury  Outcome: Progressing  Flowsheets (Taken 2/2/2023 0700)  Absence of Physical Injury: Implement safety measures based on patient assessment     Problem: Chronic Conditions and Co-morbidities  Goal: Patient's chronic conditions and co-morbidity symptoms are monitored and maintained or improved  Outcome: Progressing  Flowsheets (Taken 2/1/2023 2000)  Care Plan - Patient's Chronic Conditions and Co-Morbidity Symptoms are Monitored and Maintained or Improved: Monitor and assess patient's chronic conditions and comorbid symptoms for stability, deterioration, or improvement     Problem: Cardiovascular - Adult  Goal: Maintains optimal cardiac output and hemodynamic stability  Outcome: Progressing  Flowsheets (Taken 2/1/2023 2000)  Maintains optimal cardiac output and hemodynamic stability: Monitor blood pressure and heart rate     Problem: Respiratory - Adult  Goal: Achieves optimal ventilation and oxygenation  2/2/2023 0722 by Brian Bunch RN  Outcome: Progressing  2/1/2023 1941 by Corie Sena RCP  Outcome: Progressing     Problem: Gastrointestinal - Adult  Goal: Maintains or returns to baseline bowel function  Outcome: Progressing  Flowsheets (Taken 2/1/2023 2000)  Maintains or returns to baseline bowel function: Assess bowel function     Problem: Metabolic/Fluid and Electrolytes - Adult  Goal: Glucose maintained within prescribed range  Outcome: Progressing  Flowsheets (Taken 2/1/2023 2000)  Glucose maintained within prescribed range: Monitor blood glucose as ordered     Problem: Musculoskeletal - Adult  Goal: Return mobility to safest level of function  Outcome: Progressing  Flowsheets (Taken 2/1/2023 2000)  Return Mobility to Safest Level of Function: Assess patient stability and activity tolerance for standing, transferring and ambulating with or without assistive devices  Goal: Return ADL status to a safe level of function  Outcome: Progressing  Flowsheets (Taken 2/1/2023 2000)  Return ADL Status to a Safe Level of Function: Administer medication as ordered     Problem: Pain  Goal: Verbalizes/displays adequate comfort level or baseline comfort level  Outcome: Progressing  Flowsheets  Taken 2/2/2023 0400  Verbalizes/displays adequate comfort level or baseline comfort level: Assess pain using appropriate pain scale  Taken 2/2/2023 0000  Verbalizes/displays adequate comfort level or baseline comfort level: Assess pain using appropriate pain scale  Taken 2/1/2023 2000  Verbalizes/displays adequate comfort level or baseline comfort level: Assess pain using appropriate pain scale     Problem: Nutrition Deficit:  Goal: Optimize nutritional status  Outcome: Progressing     Problem: Neurosensory - Adult  Goal: Achieves maximal functionality and self care  Outcome: Progressing  Flowsheets (Taken 2/1/2023 2000)  Achieves maximal functionality and self care: Monitor swallowing and airway patency with patient fatigue and changes in neurological status     Problem: Safety - Medical Restraint  Goal: Remains free of injury from restraints (Restraint for Interference with Medical Device)  Description: INTERVENTIONS:  1. Determine that other, less restrictive measures have been tried or would not be effective before applying the restraint  2. Evaluate the patient's condition at the time of restraint application  3. Inform patient/family regarding the reason for restraint  4.  Q2H: Monitor safety, psychosocial status, comfort, nutrition and hydration  Outcome: Progressing  Flowsheets (Taken 2/2/2023 0023)  Remains free of injury from restraints (restraint for interference with medical device): Determine that other, less restrictive measures have been tried or would not be effective before applying the restraint

## 2023-02-02 NOTE — PROGRESS NOTES
Patient coded at 12:47. Daughter, Eulalio Velasco, stated she did not want CPR done. Epi and bicarb x 1 given. Epi gtt then started. Levophed added for hypotension. Patient then began waking up more and moving/biting ETT. Nurse requested Dr. Angel Mukherjee input on sedation - Dr. Bairon Chawla stated versed gtt to be used for sedation, not precedex gtt. See CODE narrator. All physicians on care team aware of code. Will continue to monitor.

## 2023-02-02 NOTE — PLAN OF CARE
Problem: Respiratory - Adult  Goal: Achieves optimal ventilation and oxygenation  2/1/2023 1941 by Lianna Brower RCP  Outcome: Progressing

## 2023-02-02 NOTE — PROGRESS NOTES
Patient remains COVID positive and is currently on a C-pap. (No family members present). Writer provided a silent prayer of continued healing, comfort, and rest.     Spiritual care will maintain daily follow ups and support as needed or requested.      02/02/23 1052   Encounter Summary   Service Provided For: Patient   Referral/Consult From: Palliative Care   Last Encounter  02/02/23   Complexity of Encounter Low   Begin Time 1025   End Time  1030   Total Time Calculated 5 min   Palliative Care   Type Palliative Care, Follow-up   Assessment/Intervention/Outcome   Assessment Unable to assess   Intervention Prayer (assurance of)/Jansen   Outcome Did not respond   Plan and Referrals   Plan/Referrals Continue Support (comment)

## 2023-02-02 NOTE — PROGRESS NOTES
Pulmonary Critical Care Progress Note  Sharri Harding MD     Patient seen for the follow up of  COVID-19 acute hypoxic respiratory insufficiency, pulmonary edema, pleural effusions    Subjective:  She developed obtundation  without receiving any sedation with hypercarbia requiring intubation after failing BiPAP on 1/31/2023. She has been off vasopressors for more than 24 hours. She is off sedation and tolerating CPAP trials well. No significant overnight events. She has FMS system for diarrhea after she has been on tube feeds. Examination:  Vitals: /62   Pulse 92   Temp 97.5 °F (36.4 °C) (Oral)   Resp 29   Ht 5' 4\" (1.626 m)   Wt 154 lb 12.2 oz (70.2 kg)   SpO2 92%   BMI 26.57 kg/m²   General appearance:   Intubated sedated on the ventilator  Neck: No JVD  Lungs: Decreased breath sounds no significant wheezing, occasional crackles  Heart: regular rate and rhythm, S1, S2 normal, no gallop  Abdomen: Soft, non tender, + BS  Extremities: no cyanosis or clubbing. Trace edema.     LABs:  CBC:   Recent Labs     01/31/23  0532 02/01/23  0400 02/02/23  0420   WBC 10.6 8.3 9.9   HGB 10.1* 10.5* 10.9*   HCT 32.2* 33.0* 35.1*    146 164       BMP:   Recent Labs     01/31/23  0532 02/01/23  0400 02/02/23  0420    144 145*   K 4.1 4.5 5.0   CO2 24 24 26   BUN 38* 38* 46*   CREATININE 0.63 0.64 0.85   LABGLOM >60 >60 >60   GLUCOSE 179* 239* 148*        Latest Reference Range & Units 1/31/23 00:07 1/31/23 02:44   POC pH 7.350 - 7.450  6.963 (LL) 7.418   POC pCO2 35.0 - 48.0 mm Hg 128.5 (HH) 34.0 (L)   POC PO2 83.0 - 108.0 mm Hg 98.3 148.7 (H)   POC HCO3 21.0 - 28.0 mmol/L 29.1 (H) 22.0   POC O2 SAT 94.0 - 98.0 % 90 (L) 99 (H)   POC Ionized Calcium 1.15 - 1.33 mmol/L 1.60 (H)    POC Potassium 3.5 - 4.5 mmol/L 5.1 (H)       Latest Reference Range & Units Most Recent   Procalcitonin <0.09 ng/mL 0.55 (H)  1/22/23 13:40   (H): Data is abnormally high    Radiology:  X-ray chest: 2/2/2023  Improving aeration of the lung fields    X-ray chest: 2/1/2023  Improving lung aeration     CT brain 1/31: No acute intracranial abnormality     CT chest 1/22/2023      Impression:  Acute hypoxic respiratory failure  COVID pneumonia  Pulmonary edema  Small bilateral pleural effusions right greater than left  Elevated troponin / History of A-fib, not on anticoag d/t age, frailty and hx of GIB per cardiology   Allergic rhinitis, DM, HTN  Ileus    Recommendations:  Assist control ventilation, since she is tolerating her CPAP trials well since 8:00 this morning will proceed with extubation if she has a good cuff leak and once she is more awake. Discontinue Versed drip for sedation with RASS score -2  Seroquel 25 b.I.d. Albuterol every 6 hours as needed  S/p Levaquin course  Continue IV Decadron 6 milligrams daily  Infectious disease on consult, IV antibiotics being stopped. Neurology on the consult/monitor mental status check ammonia  and TSH level and liver  function troponin  Cardiology following/off anticoagulation for A. fib for history of GI bleed  Coreg on hold- monitor BP/HR   Hold tube feeds for now and reassess the need after extubation. For now I will leave NG tube in  Palliative care on consult/full code  Peptic ulcer disease prophylaxis  A detailed discussion with patient's daughter present at bedside. She wants patient to be reintubated if needed at this time. She is not sure about tracheostomy or PEG tube placement down the road if needed.   DVT prophylaxis / heparin 5000 every 12 hours    Electronically signed by     Roberta Miller MD on 2/2/2023 at 10:45 AM  Pulmonary Critical Care and Sleep Medicine,  [facility]: [phone]

## 2023-02-02 NOTE — PROGRESS NOTES
RT shift report 7p-7a: Patient remained on ventilator at charted settings throughout shift. ABG attempted but writer unable to obtain. Orders for CPAP trial again today.

## 2023-02-02 NOTE — PROGRESS NOTES
Progress Note    Patient Name:  Leonarda Alejandra    :  10/23/1932  2023 6:54 PM      SUBJECTIVE       Ms. Bety Gross  is intubated and sedated. OBJECTIVE     Vital signs:    /73   Pulse 82   Temp 97.9 °F (36.6 °C) (Oral)   Resp 26   Ht 5' 4\" (1.626 m)   Wt 154 lb 12.2 oz (70.2 kg)   SpO2 100%   BMI 26.57 kg/m²  2 L/min      Admit Weight:  150 lb (68 kg)    Last 3 weights: Wt Readings from Last 3 Encounters:   23 154 lb 12.2 oz (70.2 kg)   23 150 lb 1.6 oz (68.1 kg)   10/20/22 150 lb (68 kg)       BMI: Body mass index is 26.57 kg/m². Input/Output:       Intake/Output Summary (Last 24 hours) at 2023 1854  Last data filed at 2023 1822  Gross per 24 hour   Intake 647.96 ml   Output 200 ml   Net 447.96 ml         Exam:     General appearance: awake and alert moves all ext   Lungs: no rhonchi, no wheezes, no rales  Heart: S1 and S2 no murmur  Abdomen: positive bowel sounds, no bruits, no masses  Extremities: warm and dry, no cyanosis, no clubbing        Laboratory Studies:     CBC:   Recent Labs     23  0532 23  0400 23  0420   WBC 10.6 8.3 9.9   HGB 10.1* 10.5* 10.9*   HCT 32.2* 33.0* 35.1*   MCV 90.7 86.8 88.6    146 164     BMP:   Recent Labs     23  0532 23  0400 23  0420    144 145*   K 4.1 4.5 5.0   * 112* 113*   CO2 24 24 26   PHOS 1.9* 3.1  --    BUN 38* 38* 46*   CREATININE 0.63 0.64 0.85     PT/INR: No results for input(s): PROTIME, INR in the last 72 hours. APTT: No results for input(s): APTT in the last 72 hours. MAG: No results for input(s): MG in the last 72 hours. D Dimer: No results for input(s): DDIMER in the last 72 hours. Troponin  No results for input(s): TROPONINI in the last 72 hours. No results for input(s): TROPONINT in the last 72 hours. BNP No results for input(s): BNP in the last 72 hours. No results for input(s): PROBNP in the last 72 hours. Pulse Ox:  SpO2  Av.9 %  Min: 64 %  Max: 100 %  Supplemental O2: O2 Flow Rate (L/min): 2 L/min     Current Meds:    dexamethasone  6 mg IntraVENous Daily    heparin (porcine)  5,000 Units SubCUTAneous BID    insulin lispro  0-8 Units SubCUTAneous Q6H    pantoprazole (PROTONIX) 40 mg injection  40 mg IntraVENous Daily    QUEtiapine  25 mg Oral BID    sodium chloride flush  5-40 mL IntraVENous 2 times per day    [Held by provider] carvedilol  12.5 mg Oral BID WC    sacubitril-valsartan  1 tablet Oral BID    [Held by provider] cholestyramine light  4 g Oral BID    [Held by provider] glimepiride  4 mg Oral BID WC     Continuous Infusions:    [Held by provider] dexmedetomidine (PRECEDEX) IV infusion Stopped (02/02/23 1248)    EPINEPHrine (ADRENALIN) 5 mg in sodium chloride 0.9% 250 mL infusion Stopped (02/02/23 1514)    norepinephrine 19.2 mcg/min (02/02/23 1822)    midazolam 3 mg/hr (02/02/23 1706)    sodium chloride Stopped (01/31/23 0556)    dextrose              ASSESSMENT     Principal Problem:    COVID-19  Active Problems:    Non-pressure chronic ulcer of left calf with fat layer exposed ([address]    Encephalopathy acute    Goals of care, counseling/discussion    DNR (do not resuscitate) discussion    ACP (advance care planning)    Palliative care encounter    Acute respiratory failure with hypoxia and hypercapnia ([address]  Resolved Problems:    * No resolved hospital problems. *      PLAN     This is a 81 yo female with PMH of pAfib, HFrEF with EF of 35% with COVID PNA. Patient was intubated, and desaturated. She was severely hypoxic and then became bradycardiac followed by asystole- about 4 min. She did not receive CPR- based on family wishes but was given epi. This is overall consistent with an arrest that is hypoxia driven and not primarily cardiac. Her tube was suctioned with improvements in her ventilation. ECG performed post arrest is unchanged compared to prior.    TTE was repeated, her EF is estimated at 20-25% with global hypokinesis. Currently off all GDMT, and was briefly on pressor support. She is a poor candidate for ischemic workup given her poor prognosis from her current cormobidities, advanced age and fratility. The risks would outweigh any potential benefits. She is not on any AC due to history of bleeding. Continue IV prn diuretics. Poor prognosis given advanced age and co morbidities. Goals of care discussions ongoing. Continue with supportive treatment. Please maintain K>4 and Mg>2. Please call us if you have any further questions.      Skyler Garcia MD

## 2023-02-02 NOTE — PROGRESS NOTES
Dr. Addis Chawla notified of ABG results. Patient waking up and moving around/biting tube, writer asked Dr. Addis Chawla if he prefers precedex to be used for sedation. Dr. Addis Chawla stated he wants Versed gtt at this time, not precedex. Precedex placed on hold at this time.

## 2023-02-03 NOTE — PROGRESS NOTES
Pulmonary Critical Care Progress Note  Colin Lee MD     Patient seen for the follow up of  COVID-19 acute hypoxic respiratory insufficiency, pulmonary edema, pleural effusions    Subjective:  She developed obtundation  without receiving any sedation with hypercarbia requiring intubation after failing BiPAP on 1/31/2023. Yesterday she had a cardiac arrest, patient's daughter who is POA was at bedside did not want CPR done. Patient received epi and bicarb x1 with epi drip started and then Levophed for hypotension she still is on at 8 mics. Epi is off. She had ROSC. This morning her x-ray showed tension pneumothorax on the left, patient had chest tube placed by ER physician. She has FMS system for diarrhea. Examination:  Vitals: BP (!) 148/82   Pulse 96   Temp 99.4 °F (37.4 °C) (Oral)   Resp 16   Ht 5' 4\" (1.626 m)   Wt 152 lb 6.4 oz (69.1 kg)   SpO2 100%   BMI 26.16 kg/m²   General appearance:   Intubated, sedated on the ventilator  Neck: No JVD  Lungs: Decreased breath sounds no significant wheezing, occasional crackles  Heart: regular rate and rhythm, S1, S2 normal, no gallop  Abdomen: Soft, non tender, + BS  Extremities: no cyanosis or clubbing. Trace edema.     LABs:  CBC:   Recent Labs     02/01/23  0400 02/02/23  0420 02/03/23  0345   WBC 8.3 9.9 10.5   HGB 10.5* 10.9* 11.1*   HCT 33.0* 35.1* 34.2*    164 174     BMP:   Recent Labs     02/01/23  0400 02/02/23  0420 02/03/23  0345    145* 146*   K 4.5 5.0 5.3   CO2 24 26 21   BUN 38* 46* 59*   CREATININE 0.64 0.85 0.99*   LABGLOM >60 >60 54*   GLUCOSE 239* 148* 208*      Latest Reference Range & Units 1/31/23 00:07 1/31/23 02:44   POC pH 7.350 - 7.450  6.963 (LL) 7.418   POC pCO2 35.0 - 48.0 mm Hg 128.5 (HH) 34.0 (L)   POC PO2 83.0 - 108.0 mm Hg 98.3 148.7 (H)   POC HCO3 21.0 - 28.0 mmol/L 29.1 (H) 22.0   POC O2 SAT 94.0 - 98.0 % 90 (L) 99 (H)   POC Ionized Calcium 1.15 - 1.33 mmol/L 1.60 (H)    POC Potassium 3.5 - 4.5 mmol/L 5.1 (H)       Latest Reference Range & Units Most Recent   Procalcitonin <0.09 ng/mL 0.55 (H)  1/22/23 13:40   (H): Data is abnormally high    Radiology:  X-ray chest: 2/3/2023  Status post chest tube placement      CT brain 1/31/23: No acute intracranial abnormality     CT chest 1/22/2023      Impression:  Acute hypoxic respiratory failure  COVID pneumonia  Pulmonary edema  Small bilateral pleural effusions right greater than left  Elevated troponin / History of A-fib, not on anticoag d/t age, frailty and hx of GIB per cardiology   Allergic rhinitis, DM, HTN  Ileus    Recommendations:  Assist control ventilation  Versed drip for sedation with RASS score -2  Continue chest tube to suction  Titrate Levophed for MAP 65 to 75 mmHg   Seroquel 25 twice daily  Albuterol every 6 hours as needed  S/p Levaquin course  IV Decadron 6 mg QD  Off IV ATB per Infectious disease on consult  Neurology on the consult  Cardiology following/off anticoagulation for A. fib for history of GI bleed  Coreg on hold- monitor BP/HR   GI prophylaxis, Protonix  DVT prophylaxis, heparin 5000 every 12 hours  Palliative care following, patient is limited code. Detailed discussion had with patient's family regarding prognosis. They are considering withdrawal of care. At this time patient remains a limited code. They will let the nurse know if they decide to proceed with withdrawal of care.   Discussed with RN      Electronically signed by     Anahi Trujillo MD on 2/3/2023 at 1:55 PM  Pulmonary Critical Care and Sleep Medicine,  [facility]  Cell: [phone]  Office: [phone]

## 2023-02-03 NOTE — PROGRESS NOTES
[facility] radiology called a critical xray report to internal medicine NP. Internal med NP called RN to notify critical care. RN notified critical care for the results of left pneumothorax. ER doctor placed a chest tube in the left thorax and new chest xray was obtained.

## 2023-02-03 NOTE — PLAN OF CARE
Problem: Respiratory - Adult  Goal: Achieves optimal ventilation and oxygenation  2/2/2023 1923 by Maia Spear RCP  Outcome: Progressing

## 2023-02-03 NOTE — PROGRESS NOTES
[address] Hospitalist        2/3/2023   12:50 PM    Name:  Krystle Parker  MRN:    [id number]     Srilyside:     [de-identified]   Room:  [address] [place] [address]  IP Day: 15     Admit Date: 1/21/2023 11:39 PM  PCP: Jarett Gonzalez MD    C/C:   Chief Complaint   Patient presents with    Shortness of Breath       Assessment:      Acute on chronic systolic congestive heart failure  Acute hypoxic respiratory failure  COVID-19 pneumonia  On vaccinated against SARS-CoV-2  Pulmonary edema  Bilateral pleural effusions  Hypotension  Acute metabolic encephalopathy  Nonvaccinated against SARS-CoV-2  Respiratory failure with hypoxia requiring oxygen via nasal cannula  Hypomagnesemia  Hypokalemia  Question of bacterial pneumonia  Essential hypertension  Diabetes mellitus type 2  Paroxysmal atrial fibrillation  Congestive heart failure, systolic/HFrEF with EF 20 to 25%  Osteoarthritis, multiple joints  History of colon cancer  History of skin cancer  Right eye vision loss  Status post endotracheal intubation 1/31/2023  Hypotension      Plan:      Patient has become hypotensive, bradycardic, further she is more altered, will transfer patient to medical [id number]  Critical care following  Patient also have minimal p.o. intake patient continues to refuse care, has minimal p.o. intake  Consult dietitian, start patient on TPN    Monitor vitals closely  Keep SPO2 above 90%  I's and O's  IV heplock  Pain control  Antiemetics as needed  Levaquin completed  Decadron  Bumex, Coreg, Entresto  Accu-Cheks before meals and at bedtime  Insulin sliding scale medium  Hypoglycemia protocol  And has elevated blood glucose and is refusing insulin  CBC , BMP, BNP  Pulmonary consulted  Consult cardiology recommended patient was not on anticoagulation due to GI bleed in the past  Prognosis is guarded  DVT and GI prophylaxis.   Discussed with RN  Palliative care on consult  Full code-Limited  Not on anticoagulation secondary to prior GI bleed  Patient had mucous plug  Developed hypotension and arrhythmia likely V-fib  CODE BLUE called  Given epi once which improved rhythm back to normal  Patient placed on Levophed  Palliate care discussed CODE STATUS with family-patient is on limited status now  Discussed with RN  Family considering withdrawal of life support  Continue medication as below  Discussed with daughter at bedside in detail      Scheduled Meds:   dexamethasone  6 mg IntraVENous Daily    heparin (porcine)  5,000 Units SubCUTAneous BID    insulin lispro  0-8 Units SubCUTAneous Q6H    pantoprazole (PROTONIX) 40 mg injection  40 mg IntraVENous Daily    QUEtiapine  25 mg Oral BID    sodium chloride flush  5-40 mL IntraVENous 2 times per day    [Held by provider] carvedilol  12.5 mg Oral BID WC    sacubitril-valsartan  1 tablet Oral BID    [Held by provider] cholestyramine light  4 g Oral BID    [Held by provider] glimepiride  4 mg Oral BID WC     Continuous Infusions:   [Held by provider] dexmedetomidine (PRECEDEX) IV infusion Stopped (02/02/23 1248)    EPINEPHrine (ADRENALIN) 5 mg in sodium chloride 0.9% 250 mL infusion Stopped (02/02/23 1514)    norepinephrine 8 mcg/min (02/03/23 0425)    midazolam 4 mg/hr (02/03/23 1056)    sodium chloride Stopped (01/31/23 0556)    dextrose       PRN Meds:  EPINEPHrine (ADRENALIN) 5 mg in sodium chloride 0.9% 250 mL infusion, 0.01 mcg/kg/min, Continuous PRN  midazolam, 1 mg, Q30 Min PRN  fentanNYL, 50 mcg, Q1H PRN  sodium phosphate IVPB, 10 mmol, PRN   Or  sodium phosphate IVPB, 15 mmol, PRN   Or  sodium phosphate IVPB, 20 mmol, PRN  sodium chloride flush, 10 mL, PRN  sodium chloride, , PRN  magnesium sulfate, 1,000 mg, PRN  ondansetron, 4 mg, Q8H PRN   Or  ondansetron, 4 mg, Q6H PRN  polyethylene glycol, 17 g, Daily PRN  acetaminophen, 650 mg, Q6H PRN   Or  acetaminophen, 650 mg, Q6H PRN  glucose, 4 tablet, PRN  dextrose bolus, 125 mL, PRN   Or  dextrose bolus, 250 mL, PRN  glucagon (rDNA), 1 mg, PRN  dextrose, , Continuous PRN  albuterol, 2.5 mg, Q6H PRN          Subjective:     I have seen and examined patient today, patient last 24 hours events were reviewed also discussed with nursing staff    She is presently intubated  Required epinephrine once  Family do not want any chest compressions  Afebrile      ROS:  Unable to assess due to patient mental status        Physical Examination:      Vitals:  BP (!) 148/82   Pulse 96   Temp 99 °F (37.2 °C) (Oral)   Resp 16   Ht 5' 4\" (1.626 m)   Wt 152 lb 6.4 oz (69.1 kg)   SpO2 100%   BMI 26.16 kg/m²   Temp (24hrs), Av.3 °F (36.8 °C), Min:97.5 °F (36.4 °C), Max:99.4 °F (37.4 °C)    Weight:   Wt Readings from Last 3 Encounters:   23 152 lb 6.4 oz (69.1 kg)   23 150 lb 1.6 oz (68.1 kg)   10/20/22 150 lb (68 kg)     I/O last 3 completed shifts:  I/O last 3 completed shifts: In: 954.7 [I.V.:374.7; NG/GT:580]  Out: 12 [Urine:615]     Recent Labs     23  1837 23  2305 23  0536 23  1159   POCGLU 318* 190* 211* 269*           General appearance -sedated   mental status -on vent   head - normocephalic and atraumatic  Eyes - conjunctiva clear  Ears - ext ears normal  Nose - no drainage noted  Mouth - mucous membranes moist  Neck - supple, no carotid bruits, thyroid not palpable  Chest - clear to auscultation, normal effort  Heart - normal rate, regular rhythm, no murmur  Abdomen - soft, nontender, nondistended, bowel sounds present all four quadrants, no masses, hepatomegaly or splenomegaly  Neurological -unable to assess  Extremities - peripheral pulses palpable, no pedal edema or calf pain with palpation  Skin - no gross lesions, rashes, or induration noted        Medications: Allergies:    Allergies   Allergen Reactions    Acetaminophen-Codeine Nausea Only    Codeine Nausea Only    Furosemide      Other reaction(s): GI Disturbance    Linagliptin      Other reaction(s): SOB    Sitagliptin      Other reaction(s): felt poorly    Other Itching     Animal Dander       Current Meds:   Current Facility-Administered Medications:     [Held by provider] dexmedetomidine (PRECEDEX) 400 mcg in sodium chloride 0.9 % 100 mL infusion, 0.1-1.5 mcg/kg/hr, IntraVENous, Continuous, Josue Pickering MD, Stopped at 02/02/23 1248    EPINEPHrine (EPINEPHrine HCL) 5 mg in sodium chloride 0.9 % 250 mL infusion, 0.01 mcg/kg/min, IntraVENous, Continuous PRN, Josue Pickering MD, Stopped at 02/02/23 1514    norepinephrine (LEVOPHED) 16 mg in sodium chloride 0.9 % 250 mL infusion, 1-100 mcg/min, IntraVENous, Continuous, Alexandre Shaw MD, Last Rate: 7.5 mL/hr at 02/03/23 0425, 8 mcg/min at 02/03/23 0425    midazolam (VERSED) 1 mg/mL in NS infusion, 1-10 mg/hr, IntraVENous, Continuous, Josue Pickering MD, Last Rate: 4 mL/hr at 02/03/23 1056, 4 mg/hr at 02/03/23 1056    midazolam (VERSED) injection 1 mg, 1 mg, IntraVENous, Q30 Min PRN, Alexandre Shaw MD    dexamethasone (PF) (DECADRON) injection 6 mg, 6 mg, IntraVENous, Daily, Alexandre Shaw MD, 6 mg at 02/03/23 0949    fentaNYL (SUBLIMAZE) injection 50 mcg, 50 mcg, IntraVENous, Q1H PRN, Josue Pickering MD, 50 mcg at 02/03/23 0630    heparin (porcine) injection 5,000 Units, 5,000 Units, SubCUTAneous, BID, Alexandre Shaw MD, 5,000 Units at 02/03/23 0949    insulin lispro (HUMALOG) injection vial 0-8 Units, 0-8 Units, SubCUTAneous, Q6H, Nargis Rich, APRN - CNP, 4 Units at 02/03/23 1203    sodium phosphate 10 mmol in sodium chloride 0.9 % 250 mL IVPB, 10 mmol, IntraVENous, PRN, Stopped at 01/29/23 1100 **OR** sodium phosphate 15 mmol in sodium chloride 0.9 % 250 mL IVPB, 15 mmol, IntraVENous, PRN, Stopped at 02/03/23 0925 **OR** sodium phosphate 20 mmol in sodium chloride 0.9 % 500 mL IVPB, 20 mmol, IntraVENous, PRN, Nargis Rich, APRN - CNP    pantoprazole (PROTONIX) 40 mg in sodium chloride (PF) 0.9 % 10 mL injection, 40 mg, IntraVENous, Daily, Adam Campos MD, 40 mg at 02/03/23 7449 QUEtiapine (SEROQUEL) tablet 25 mg, 25 mg, Oral, BID, Taylor Morrow MD, 25 mg at 02/03/23 0935    sodium chloride flush 0.9 % injection 5-40 mL, 5-40 mL, IntraVENous, 2 times per day, Gardenia A Lump, APRN - CNP, 10 mL at 02/03/23 0955    sodium chloride flush 0.9 % injection 10 mL, 10 mL, IntraVENous, PRN, Gardenia A Lump, APRN - CNP, 10 mL at 01/22/23 1616    0.9 % sodium chloride infusion, , IntraVENous, PRN, Isa Renaldo Lump, APRN - CNP, Stopped at 01/31/23 0556    magnesium sulfate 1000 mg in dextrose 5% 100 mL IVPB, 1,000 mg, IntraVENous, PRN, Isa Renaldo Lump, APRN - CNP, Stopped at 01/22/23 1715    ondansetron (ZOFRAN-ODT) disintegrating tablet 4 mg, 4 mg, Oral, Q8H PRN, 4 mg at 01/23/23 2013 **OR** ondansetron (ZOFRAN) injection 4 mg, 4 mg, IntraVENous, Q6H PRN, Gardenia A Lump, APRN - CNP    polyethylene glycol (GLYCOLAX) packet 17 g, 17 g, Oral, Daily PRN, Gardenia A Lump, APRN - CNP    acetaminophen (TYLENOL) tablet 650 mg, 650 mg, Oral, Q6H PRN, 650 mg at 01/24/23 0907 **OR** acetaminophen (TYLENOL) suppository 650 mg, 650 mg, Rectal, Q6H PRN, Gardenia A Lump, APRN - CNP    glucose chewable tablet 16 g, 4 tablet, Oral, PRN, Gardenia A Lump, APRN - CNP    dextrose bolus 10% 125 mL, 125 mL, IntraVENous, PRN **OR** dextrose bolus 10% 250 mL, 250 mL, IntraVENous, PRN, Gardenia A Lump, APRN - CNP    glucagon (rDNA) injection 1 mg, 1 mg, SubCUTAneous, PRN, Gardenia A Lump, APRN - CNP    dextrose 10 % infusion, , IntraVENous, Continuous PRN, Gardenia A Lump, APRN - CNP    albuterol (PROVENTIL) nebulizer solution 2.5 mg, 2.5 mg, Nebulization, Q6H PRN, Gardenia Petit, APRN - CNP, 2.5 mg at 02/02/23 1615    [Held by provider] carvedilol (COREG) tablet 12.5 mg, 12.5 mg, Oral, BID WC, Haleigh Wilson MD, 12.5 mg at 01/26/23 1229    sacubitril-valsartan (ENTRESTO) 24-26 MG per tablet 1 tablet, 1 tablet, Oral, BID, Haleigh Wilson MD, 1 tablet at 02/03/23 0935    [Held by provider] cholestyramine light packet 4 g, 4 g, Oral, BID, Haleigh Wilson MD, 4 g at 01/26/23 46    [Held by provider] glimepiride (AMARYL) tablet 4 mg (Patient Supplied), 4 mg, Oral, BID WC, Haleigh Wilson MD, 4 mg at 01/25/23 1903      I/O (24Hr): Intake/Output Summary (Last 24 hours) at 2/3/2023 1250  Last data filed at 2/3/2023 0600  Gross per 24 hour   Intake 387.92 ml   Output 175 ml   Net 212.92 ml         Data:           Labs:    Hematology:  Recent Labs     02/01/23  0400 02/02/23  0420 02/03/23  0345   WBC 8.3 9.9 10.5   RBC 3.80* 3.96 3.93*   HGB 10.5* 10.9* 11.1*   HCT 33.0* 35.1* 34.2*   MCV 86.8 88.6 87.0   MCH 27.6 27.5 28.2   MCHC 31.8 31.1 32.5   RDW 14.8* 15.0* 15.0*    164 174   MPV 11.5 11.3 11.6       Chemistry:  Recent Labs     01/31/23  1700 02/01/23  0400 02/02/23  0420 02/02/23  1843 02/03/23  0106 02/03/23  0345   NA  --  144 145*  --   --  146*   K  --  4.5 5.0  --   --  5.3   CL  --  112* 113*  --   --  113*   CO2  --  24 26  --   --  21   GLUCOSE  --  239* 148*  --   --  208*   BUN  --  38* 46*  --   --  59*   CREATININE  --  0.64 0.85  --   --  0.99*   ANIONGAP  --  8* 6*  --   --  12   LABGLOM  --  >60 >60  --   --  54*   CALCIUM  --  8.5* 8.8  --   --  8.3*   PHOS  --  3.1  --   --   --  2.2*   TROPHS PENDING  --   --  105* 125*  --    CKTOTAL  --   --   --  33  --   --    MYOGLOBIN  --   --   --  96*  --   --        Recent Labs     01/31/23  1700 01/31/23 2204 02/02/23  1102 02/02/23  1559 02/02/23  1837 02/02/23  2305 02/03/23  0536 02/03/23  1159   PROT 4.1*  --   --   --   --   --   --   --    LABALBU 2.2*  --   --   --   --   --   --   --    AST 33*  --   --   --   --   --   --   --    ALT 25  --   --   --   --   --   --   --    ALKPHOS 148*  --   --   --   --   --   --   --    BILITOT 0.7  --   --   --   --   --   --   --    BILIDIR 0.3*  --   --   --   --   --   --   --    AMMONIA 14  --   --   --   --   --   --   --    POCGLU  --    < > 247* 324* 318* 190* 211* 269*    < > = values in this interval not displayed.          Lab Results   Component Value Date/Time    SPECIAL 1ML LT HAND 01/31/2023 02:09 AM     Lab Results   Component Value Date/Time    CULTURE NO GROWTH 3 DAYS 01/31/2023 02:09 AM       Lab Results   Component Value Date/Time    POCPH 7.366 02/02/2023 02:08 PM    POCPCO2 39.3 02/02/2023 02:08 PM    POCPO2 169.9 02/02/2023 02:08 PM    POCHCO3 22.5 02/02/2023 02:08 PM    NBEA 3 02/02/2023 02:08 PM    PBEA 0 02/01/2023 04:30 AM    ZIAL0PJD 100 02/02/2023 02:08 PM    FIO2 30.0 02/03/2023 03:43 AM       Radiology:    CT CHEST WO CONTRAST    Result Date: 1/22/2023  Multifocal ground-glass opacities indeterminate for COVID-19. Right greater than left pleural effusions. Ileus. Cardiomegaly and coronary artery disease. XR CHEST PORTABLE    Result Date: 1/24/2023  More prominent bilateral interstitial and ground-glass opacities, which may represent developing edema versus multifocal infection. Grossly similar appearance of small effusions. XR CHEST PORTABLE    Result Date: 1/21/2023  New small right effusion and airspace disease. Possible mild CHF. All radiological studies reviewed  Code Status:  Limited        Electronically signed by Phani Greenwood MD on 2/3/2023 at 12:50 PM    This note was created with the assistance of a speech-recognition program.  Although the intention is to generate a document that actually reflects the content of the visit, no guarantees can be provided that every mistake has been identified and corrected by editing. Note was updated later by me after  physical examination and  completion of the assessment.

## 2023-02-03 NOTE — ED PROVIDER NOTES
I was asked to go to the ICU to place a chest tube. The patient is on a ventilator and was found to have a pneumothorax on a chest x-ray on the left side. The x-ray was taken at approximately 3:30 AM and I was called at 6 AM.  The following procedure was performed by me. Consent had been obtained from the patient's daughter and the nursing staff. The chest x-ray was reviewed directly by me. 8 [place] pigtail catheter placed in the left chest cavity. The area was prepped with Betadine x3 and draped sterilely. Scalpel was used to dissect down to the level of the rib and curved hemostats were placed over the rib into the chest cavity with large rush of air. The pigtail was then inserted through this passage and attached to the suction apparatus. Air and clear yellow fluid was obtained through the catheter. The catheter was sutured in place and Vaseline gauze was placed as well as drain sponges. Tape was then placed over this to secure everything in place. The patient tolerated the procedure well and vital signs remained appropriate during the procedure. Post procedure x-ray was reviewed by me and shows the pneumothorax to be essentially resolved. No complications.      Lala Abdi MD  02/03/23 2706

## 2023-02-03 NOTE — PROGRESS NOTES
Wound Ostomy Continence Nursing  Follow Up Visit      NAME:  Cecy Pineda RECORD NUMBER:  [id number]  AGE: 80 y.o. GENDER: female  : 10/23/1932  TODAY'S DATE:  2/3/2023    Subjective        Follow up for RLE wound. Pt obtunded/on vent. Review of Systems:  Unable to obtain      Objective     Vitals:  BP (!) 148/82   Pulse 96   Temp 99.4 °F (37.4 °C) (Oral)   Resp 16   Ht 5' 4\" (1.626 m)   Wt 152 lb 6.4 oz (69.1 kg)   SpO2 100%   BMI 26.16 kg/m²    TEMPERATURE:  Current - Temp: 99.4 °F (37.4 °C); Max - Temp  Av.1 °F (36.7 °C)  Min: 97.5 °F (36.4 °C)  Max: 99.4 °F (37.4 °C)    Jacob Risk Score Jacob Scale Score: 11    INTAKE/OUTPUT:      Intake/Output Summary (Last 24 hours) at 2/3/2023 1201  Last data filed at 2/3/2023 0600  Gross per 24 hour   Intake 388.14 ml   Output 175 ml   Net 213.14 ml                 Physical Exam:  General Appearance: Obtunded/on vent. Head: normocephalic and atraumatic  Pulmonary/Chest: normal air movement, no respiratory distress  Skin: Wound on RLE appears much more dry than previous eval. Wound bed healthy, pink.    Cardiovascular/Circulation: Minimal edema, no cyanosis, palpable peripheral pulses  Extremities: no cyanosis and no clubbing  Musculoskeletal: no joint deformity or tenderness, no extremity amputations  Neurologic: unable to eval due to intubation/sedation      Data Review:  LABS:  CBC:   Recent Labs     23  0400 23  0420 23  0345   WBC 8.3 9.9 10.5   HGB 10.5* 10.9* 11.1*    164 174     BMP:    Lab Results   Component Value Date/Time     2023 03:45 AM    K 5.3 2023 03:45 AM     2023 03:45 AM    CO2 21 2023 03:45 AM    BUN 59 2023 03:45 AM    LABALBU 2.2 2023 05:00 PM    CREATININE 0.99 2023 03:45 AM    CALCIUM 8.3 2023 03:45 AM    GFRAA >60 2022 04:05 PM    LABGLOM 54 2023 03:45 AM    GLUCOSE 208 2023 03:45 AM     Coagulation:   Recent Labs 01/31/23  1700   PROT 4.1*       Flowsheet Documentation    Wound Care Documentation:  Wound 01/10/23 Pretibial Right;Medial (Active)   Wound Image   01/23/23 1742   Wound Etiology Venous 02/01/23 1600   Dressing Status Clean;Dry; Intact 02/03/23 0800   Wound Cleansed Soap and water 02/03/23 0400   Dressing/Treatment ABD;Petroleum impregnated gauze;Gauze dressing/dressing sponge 02/03/23 0800   Dressing Change Due 02/03/23 02/03/23 0800   Wound Length (cm) 3.8 cm 01/25/23 1700   Wound Width (cm) 3.1 cm 01/25/23 1700   Wound Depth (cm) 0.2 cm 01/11/23 1710   Wound Surface Area (cm^2) 11.78 cm^2 01/25/23 1700   Change in Wound Size % (l*w) -9.07 01/25/23 1700   Wound Volume (cm^3) 2.16 cm^3 01/11/23 1710   Wound Assessment Pink/red 01/30/23 1637   Drainage Amount Scant 02/03/23 0800   Drainage Description Serosanguinous 02/03/23 0800   Odor None 02/03/23 0800   Jocelynn-wound Assessment Intact 02/03/23 0400   Margins Attached edges 02/03/23 0400   Wound Thickness Description not for Pressure Injury Full thickness 02/03/23 0400   Number of days: 24         Assessment       Patient Active Problem List   Diagnosis    Lower GI bleed    Acute on chronic diastolic congestive heart failure (HCC)    Allergic rhinitis    Arthritis of right knee    Bilateral lower extremity edema    Chronic allergic conjunctivitis    Congestive heart failure (HCC)    Type 2 diabetes mellitus without complication, without long-term current use of insulin (HCC)    Essential hypertension    Non-pressure chronic ulcer of left calf with fat layer exposed ([address]    Overweight    Paroxysmal atrial fibrillation (HCC)    Venous ulcer of right leg ([facility])    Pseudogout of right knee    Vasomotor rhinitis    Sensorineural hearing loss (SNHL), bilateral    Serum creatinine raised    Tinnitus of both ears    Diarrhea    Chronic combined systolic (congestive) and diastolic (congestive) heart failure ([facility])    COVID-19    Encephalopathy acute    Goals of care, counseling/discussion    DNR (do not resuscitate) discussion    ACP (advance care planning)    Palliative care encounter    Acute respiratory failure with hypoxia and hypercapnia ([facility] [address]         Plan       RLE wound care: will switch to Triad cream to bring more moisture to the wound bed. Keep leg elevated. Will cont to follow case from a distance.

## 2023-02-03 NOTE — PLAN OF CARE
Problem: Discharge Planning  Goal: Discharge to home or other facility with appropriate resources  2/3/2023 1737 by Panda Quiros RN  Outcome: Progressing  2/3/2023 1737 by Panda Quiros RN  Outcome: Progressing     Problem: Skin/Tissue Integrity  Goal: Absence of new skin breakdown  Description: 1. Monitor for areas of redness and/or skin breakdown  2. Assess vascular access sites hourly  3. Every 4-6 hours minimum:  Change oxygen saturation probe site  4. Every 4-6 hours:  If on nasal continuous positive airway pressure, respiratory therapy assess nares and determine need for appliance change or resting period.   2/3/2023 1737 by Panda Quiros RN  Outcome: Progressing  2/3/2023 1737 by Panda Quiros RN  Outcome: Progressing     Problem: Safety - Adult  Goal: Free from fall injury  2/3/2023 1737 by Panda Quiros RN  Outcome: Progressing  Flowsheets (Taken 2/3/2023 1035)  Free From Fall Injury: Instruct family/caregiver on patient safety  2/3/2023 1737 by Panda Quiros RN  Outcome: Progressing  Flowsheets (Taken 2/3/2023 1035)  Free From Fall Injury: Instruct family/caregiver on patient safety     Problem: ABCDS Injury Assessment  Goal: Absence of physical injury  2/3/2023 1737 by Panda Quiros RN  Outcome: Progressing  Flowsheets (Taken 2/3/2023 1035)  Absence of Physical Injury: Implement safety measures based on patient assessment  2/3/2023 1737 by Panda Quiros RN  Outcome: Progressing  Flowsheets (Taken 2/3/2023 1035)  Absence of Physical Injury: Implement safety measures based on patient assessment     Problem: Chronic Conditions and Co-morbidities  Goal: Patient's chronic conditions and co-morbidity symptoms are monitored and maintained or improved  2/3/2023 1737 by Panda Quiros RN  Outcome: Progressing  2/3/2023 1737 by Panda Quiros RN  Outcome: Progressing     Problem: Cardiovascular - Adult  Goal: Maintains optimal cardiac output and hemodynamic stability  2/3/2023 1737 by Panda Quiros RN  Outcome: Progressing  2/3/2023 320 2035 by Ibis Cueto RN  Outcome: Progressing     Problem: Respiratory - Adult  Goal: Achieves optimal ventilation and oxygenation  2/3/2023 1737 by Ibis Cueto RN  Outcome: Progressing  2/3/2023 1737 by Ibis Cueto RN  Outcome: Progressing     Problem: Gastrointestinal - Adult  Goal: Maintains or returns to baseline bowel function  2/3/2023 1737 by Iibs Cueto RN  Outcome: Progressing  2/3/2023 1737 by Ibis Cueto RN  Outcome: Progressing     Problem: Metabolic/Fluid and Electrolytes - Adult  Goal: Glucose maintained within prescribed range  2/3/2023 1737 by Ibis Cueto RN  Outcome: Progressing  2/3/2023 1737 by Ibis Cueto RN  Outcome: Progressing     Problem: Musculoskeletal - Adult  Goal: Return mobility to safest level of function  Outcome: Progressing  Goal: Return ADL status to a safe level of function  Outcome: Progressing     Problem: Pain  Goal: Verbalizes/displays adequate comfort level or baseline comfort level  Outcome: Progressing  Flowsheets  Taken 2/3/2023 1600  Verbalizes/displays adequate comfort level or baseline comfort level:   Assess pain using appropriate pain scale   Administer analgesics based on type and severity of pain and evaluate response   Implement non-pharmacological measures as appropriate and evaluate response  Taken 2/3/2023 0800  Verbalizes/displays adequate comfort level or baseline comfort level:   Assess pain using appropriate pain scale   Administer analgesics based on type and severity of pain and evaluate response   Implement non-pharmacological measures as appropriate and evaluate response     Problem: Nutrition Deficit:  Goal: Optimize nutritional status  Outcome: Progressing     Problem: Neurosensory - Adult  Goal: Achieves maximal functionality and self care  Outcome: Progressing     Problem: Safety - Medical Restraint  Goal: Remains free of injury from restraints (Restraint for Interference with Medical Device)  Description: INTERVENTIONS:  1.  Determine that other, less restrictive measures have been tried or would not be effective before applying the restraint  2. Evaluate the patient's condition at the time of restraint application  3. Inform patient/family regarding the reason for restraint  4.  Q2H: Monitor safety, psychosocial status, comfort, nutrition and hydration  Outcome: Progressing  Flowsheets  Taken 2/3/2023 1623 by Minal Huffman RN  Remains free of injury from restraints (restraint for interference with medical device): Evaluate the patient's condition at the time of restraint application  Taken 3/2/4472 1600 by Minal Huffman RN  Remains free of injury from restraints (restraint for interference with medical device): Evaluate the patient's condition at the time of restraint application  Taken 7/2/2202 1400 by Minal Huffman RN  Remains free of injury from restraints (restraint for interference with medical device): Evaluate the patient's condition at the time of restraint application  Taken 6/7/7229 1200 by Minal Huffman RN  Remains free of injury from restraints (restraint for interference with medical device): Evaluate the patient's condition at the time of restraint application  Taken 5/1/3039 1000 by Minal Huffman RN  Remains free of injury from restraints (restraint for interference with medical device): Evaluate the patient's condition at the time of restraint application  Taken 4/2/3643 0800 by Minal Huffman RN  Remains free of injury from restraints (restraint for interference with medical device): Evaluate the patient's condition at the time of restraint application  Taken 4/2/7708 0600 by Irish Castillo RN  Remains free of injury from restraints (restraint for interference with medical device): Evaluate the patient's condition at the time of restraint application

## 2023-02-03 NOTE — PLAN OF CARE
Problem: Discharge Planning  Goal: Discharge to home or other facility with appropriate resources  2/2/2023 1956 by Orlando Harry RN  Outcome: Progressing     Problem: Safety - Medical Restraint  Goal: Remains free of injury from restraints (Restraint for Interference with Medical Device)  Description: INTERVENTIONS:  1. Determine that other, less restrictive measures have been tried or would not be effective before applying the restraint  2. Evaluate the patient's condition at the time of restraint application  3. Inform patient/family regarding the reason for restraint  4.  Q2H: Monitor safety, psychosocial status, comfort, nutrition and hydration  2/2/2023 1956 by Orlando Harry RN  Outcome: Progressing  Flowsheets (Taken 2/2/2023 1954)  Remains free of injury from restraints (restraint for interference with medical device): Evaluate the patient's condition at the time of restraint application     Problem: Nutrition Deficit:  Goal: Optimize nutritional status  2/2/2023 1956 by Orlando Harry RN  Outcome: Progressing     Problem: Neurosensory - Adult  Goal: Achieves maximal functionality and self care  2/2/2023 1956 by Orlando Harry RN  Outcome: Progressing     Problem: Musculoskeletal - Adult  Goal: Return ADL status to a safe level of function  2/2/2023 1956 by Orlando Harry RN  Outcome: Progressing

## 2023-02-03 NOTE — PROGRESS NOTES
Patients troponin was 105. Dr. Urbano Gaona ordered another troponin 6 hours later and the troponin was 125.  [facility]

## 2023-02-03 NOTE — PROGRESS NOTES
Nutrition Assessment     Type and Reason for Visit: Consult (Tube feeding ordering and management)    Nutrition Recommendations/Plan:   Continue NPO status  Restart tube feeding when appropriate. Glucerna 1.5 Tor goal rate 33 mL/hr (792 mL total volume)  Monitor tube feeding rate, tolerance and labs     Malnutrition Assessment:  Malnutrition Status: At risk for malnutrition (Comment)    Nutrition Assessment:  Patient remains intubated and sedated after C-Pap trial and code blue this afternoon. Family does not want trach/ PEG placement. Earlier today gastric residuals were around 250 mL. Family is still deciding what to do with code status. Restart Glucerna 1.5 Tor is appropriate. Monitor vent status, tube feeding status and labs. Estimated Daily Nutrient Needs:  Energy (kcal):  1180 kcal [facility] 2003) Weight Used for Energy Requirements: Current     Protein (g):  88 gm of protein (1.3 gm/kg) Weight Used for Protein Requirements: Current        Fluid (ml/day):  1180 mL Method Used for Fluid Requirements: 1 ml/kcal    Nutrition Related Findings:   Edema: non-pitting BUE, BLE. Active bowel sounds and diarrhea. Rectum excoriated.  NG tube feedings Wound Type: Venous Stasis    Current Nutrition Therapies:    Diet NPO  ADULT TUBE FEEDING; Nasogastric; Diabetic; Continuous; 15; Yes; 15; Q 4 hours; 33; 30; Q 4 hours    Anthropometric Measures:  Height: 5' 4\" (162.6 cm)  Current Body Wt: 154 lb (69.9 kg)   BMI: 26.4    Nutrition Diagnosis:   Inadequate oral intake related to inadequate protein-energy intake as evidenced by NPO or clear liquid status due to medical condition, intubation, nutrition support - enteral nutrition      Nutrition Interventions:   Food and/or Nutrient Delivery: Continue NPO, Continue Current Tube Feeding  Nutrition Education/Counseling: Education not indicated  Coordination of Nutrition Care: Continue to monitor while inpatient       Goals:     Goals: Meet at least 75% of estimated needs, Tolerate nutrition support at goal rate       Nutrition Monitoring and Evaluation:   Behavioral-Environmental Outcomes: None Identified  Food/Nutrient Intake Outcomes: Enteral Nutrition Intake/Tolerance  Physical Signs/Symptoms Outcomes: Biochemical Data, Fluid Status or Edema, Skin, Weight    Discharge Planning:     Too soon to determine           Dubita GUNDERSON, RDN, LDN  Lead Clinical Dietitian  RD Office Phone [phone]

## 2023-02-03 NOTE — PROGRESS NOTES
[facility]  PROGRESS NOTE    Room # 5370/8986-08   Name: Krystle Parker              Reason for visit: [facility]. I visited the patient's family. Admit Date & Time: 1/21/2023 11:39 PM    Assessment:  Krystle Parker is a 80 y.o. female on palliative care. Upon entering the room patient was sedated and intubated, with adult children bedside. Intervention:  I re-introduced myself and my title as  I offered space for patient's family to express feelings, needs, and concerns and provided a ministry presence. Patient's daughter engaged in brief conversation. Offered words of encouragement as family decides whether or not to withdraw care and offered that chaplains  remain available to offer spiritual and emotional support as needed    Outcome:  Patient's children expressed gratitude for visit. Plan:  Chaplains will remain available to offer spiritual and emotional support as needed.   Electronically signed by Guero Radford on 2/3/2023 at 4:35 PM.  Aditya       02/03/23 1634   Encounter Summary   Service Provided For: Family   Referral/Consult From: Crow Orozco   Last Encounter  02/03/23   Complexity of Encounter Moderate   Encounter    Type Family Care   Palliative Care   Type Palliative Care, Family Care   Assessment/Intervention/Outcome   Assessment Complicated grieving   Intervention Sustaining Presence/Ministry of presence   Outcome Expressed Gratitude   Plan and Referrals   Plan/Referrals Continue Support (comment)

## 2023-02-03 NOTE — PROGRESS NOTES
.. PALLIATIVE CARE TEAM    Patient: Lawanda Joe  Room: 8446/8676-81    Reason For Consult   Goals of care evaluation  Distress management  Symptom Management  Guidance and support  Facilitate communications  Assistance in coordinating care  Recommendations for the above    Impression: Lawanda Joe is a 80y.o. year old female with  has a past medical history of Adenocarcinoma ([facility] [address] Arthritis, Atrial fibrillation ([facility] [address] Cancer ([facility] [address] CHF (congestive heart failure) ([facility] [address] Chronic anticoagulation, Diabetes mellitus ([facility] [address] Diverticulitis, Femur fracture ([facility] [address] Hypertension, Melanoma ([facility] [address] Osteoporosis, Overweight, Sepsis ([facility] [address] and Serum creatinine raised. .  Currently hospitalized for the management of Covid 19. The Palliative Care Team is following to assist with goals of care and family support. Code Status  Limited  PLAN:   -patient remains intubated and sedated  - a left chest tube was placed this am for a tension pneumo  - I talked at length to the patient's daughter Emeka Olivera and son Lukas Boyd. We talked about her recovery , her quality of life, and that she did not ever want to be in a nursing home   - all information about terminal extubation and the protocol was explained as well as hospice care in an inpatient unit and conversion if she survived and was unstable   - family wants to talk to Dr. Timo Russell before they make their final decision   - will follow for goals of care and support.    Vital Signs:  BP (!) 148/82   Pulse 70   Temp 99.4 °F (37.4 °C) (Oral)   Resp 16   Ht 5' 4\" (1.626 m)   Wt 152 lb 6.4 oz (69.1 kg)   SpO2 98%   BMI 26.16 kg/m²     Patient Active Problem List   Diagnosis    Lower GI bleed    Acute on chronic diastolic congestive heart failure (HCC)    Allergic rhinitis    Arthritis of right knee    Bilateral lower extremity edema    Chronic allergic conjunctivitis    Congestive heart failure (HCC)    Type 2 diabetes mellitus without complication, without long-term current use of insulin [facility])    Essential hypertension    Non-pressure chronic ulcer of left calf with fat layer exposed ([facility] [address]    Overweight    Paroxysmal atrial fibrillation (HCC)    Venous ulcer of right leg (HCC)    Pseudogout of right knee    Vasomotor rhinitis    Sensorineural hearing loss (SNHL), bilateral    Serum creatinine raised    Tinnitus of both ears    Diarrhea    Chronic combined systolic (congestive) and diastolic (congestive) heart failure (HCC)    COVID-19    Encephalopathy acute    Goals of care, counseling/discussion    DNR (do not resuscitate) discussion    ACP (advance care planning)    Palliative care encounter    Acute respiratory failure with hypoxia and hypercapnia ([facility] [address]       Palliative Interaction:The patient is intubated and sedated. She remains on Versed and her FIO2 is at 30% and peep of 8. Harsh Copeland R.N states that they placed a left sided chest tube for a tension pneumothorax, and they did so at shift change, and no family was present at that time. I go to the bedside and daughter Giselle Austin and son Skyler Hermosillo are at the bedside. I introduce my palliative role. We talk at length about their Mom's medical struggle. Giselle Austin states that her Mom was admitted for Covid and it progressed and per Giselle Austin the decision was made to intubate as her Mom had mentioned that she would want all treatment. Giselle Austin explained that her Dad, her Mom's ex- was in an ECF and had Covid and he did not want treatment and he passed away. Giselle Austin stated that at that time, her Mom said \" why didn't he go to the hospital and have all the treatment, and Giselle Austin states\" I told her that he did not want that. \" Giselle Austin ' I knew that my Mom did and that's why we decided to intubate her. Giselle Austin is a Pediatrician and knows the medical terms and all the treatments. Giselle Austin talks about her Mom off sedation and able to try to wean off the ventilator and now has more complications.      Amilcar Perera and I talked about he reality of her recovery and that she has been through so much. Mnii Hathaway states that she knows that her Mom would not want to be in any nursing home. We talked about he reality is that she would need rehab, and at this point could she ever tolerate it. It would be a very long journey and could it be successful and could she have any quality of life? We discussed that we have done many aggressive treatments and here we are. Mini Hathaway talked about he limited code and no CPR and her and her brother do understand that her EF is low, and as well Mini Hathaway states that she has altered mentation. We talked through scenarios of terminal extubation and Hospice care if she survived anytime after extubation. I explained that terminal wean process the orders and as well the code status change to [facility]. We as well talk about Hospice care and what that looks likes as well. I explain visitation for end of life and as well update them that if she had family they can visit before they remove the ET tube. Mini Hathaway and Candace Cerna want to talk with Dr. Maryam Briceno today and get anther update, to see what the plan of care is, and of course what the outcome could be. I offer them much emotional support and they are very appreciative. I update Sly [email or web address] and Gerardo MARIA.N    Will follow for goals of care and family support.           Electronically signed by   Adelaida Alberts RN  Palliative Care Team  on 2/3/2023 at 10:15 AM

## 2023-02-04 NOTE — PROGRESS NOTES
Nutrition Assessment     Type and Reason for Visit: Reassess    Nutrition Recommendations/Plan:   Continue NPO diet  Resume Glucerna 1.5 Tor at 33 mL/hr (792 mL total volume) if appropriate  Monitor medical status, vent status, tube feeding status and labs     Malnutrition Assessment:  Malnutrition Status: At risk for malnutrition (Comment)    Nutrition Assessment:  Patient remains intubated and sedated. Tube feeding have been off per RN. Pulmonary note indicates to restart tube feedings at 10 ml/hr. Patient's family is considering withdrawing care but has not made a decision. Will monitor medical status, vent status, tube feeding status and labs. Estimated Daily Nutrient Needs:  Energy (kcal):  1180 kcal [facility] 2003) Weight Used for Energy Requirements: Current     Protein (g):  88 gm of protein (1.3 gm/kg) Weight Used for Protein Requirements: Current        Fluid (ml/day):  1180 mL Method Used for Fluid Requirements: 1 ml/kcal    Nutrition Related Findings:   Edema: non-pitting BUE, BLE. Active bowel sunds.  FMS due to diarrhea Wound Type: Venous Stasis    Current Nutrition Therapies:    Diet NPO  ADULT TUBE FEEDING; Nasogastric; Diabetic; Continuous; 15; Yes; 15; Q 4 hours; 33; 30; Q 4 hours    Anthropometric Measures:  Height: 5' 4\" (162.6 cm)  Current Body Wt: 151 lb (68.5 kg)   BMI: 25.9    Nutrition Diagnosis:   Inadequate oral intake related to inadequate protein-energy intake as evidenced by NPO or clear liquid status due to medical condition, intubation, nutrition support - enteral nutrition    Nutrition Interventions:   Food and/or Nutrient Delivery: Continue NPO (Resume TF is appropriate)  Nutrition Education/Counseling: Education not indicated  Coordination of Nutrition Care: Continue to monitor while inpatient       Goals:  Previous Goal Met: Progress towards Goal(s) Declining  Goals: Meet at least 75% of estimated needs, Tolerate nutrition support at goal rate       Nutrition Monitoring and Evaluation:   Behavioral-Environmental Outcomes: None Identified  Food/Nutrient Intake Outcomes: Enteral Nutrition Intake/Tolerance  Physical Signs/Symptoms Outcomes: Biochemical Data, Fluid Status or Edema, Skin, Weight, GI Status, Diarrhea    Discharge Planning:     Too soon to determine             Castro Meals  NEPTALI, RDN, LDN  Lead Clinical Dietitian  RD Office Phone [phone]

## 2023-02-04 NOTE — PROGRESS NOTES
RT shift report 7p-7a: Patient continued throughout shift on charted vent settings, no changes were made. Suction patient for small amounts of yellow secretions. RT was unable to obtain ABG on patient X2 attempts. Family considering withdrawing vent support.

## 2023-02-04 NOTE — PLAN OF CARE
Problem: Respiratory - Adult  Goal: Achieves optimal ventilation and oxygenation  2/3/2023 1929 by Shaneka Chong RCP  Outcome: Progressing     Problem: Respiratory - Adult  Goal: Will be able to breathe spontaneously, without ventilator support  Description: Will be able to breathe spontaneously, without ventilator support  Outcome: Progressing     Problem: Respiratory - Adult  Goal: Able to breathe comfortably  Outcome: Progressing

## 2023-02-04 NOTE — PROGRESS NOTES
End Of Shift Note  [facility]  Summary of shift: Patient weened off of versed today. Family in room was approached by Dr. Jesenia Briones and was updated on status of patient. Patients family has still not decided on care of patient.      Vitals:    Vitals:    02/03/23 1815 02/03/23 1830 02/03/23 1845 02/03/23 1900   BP: (!) 152/69 (!) 147/64 122/66 (!) 147/59   Pulse: 85 82 75 77   Resp: 16 16 16 16   Temp:       TempSrc:       SpO2: 100% 100% 100% 100%   Weight:       Height:            I&O:   Intake/Output Summary (Last 24 hours) at 2/3/2023 1918  Last data filed at 2/3/2023 1911  Gross per 24 hour   Intake 882.97 ml   Output 578 ml   Net 304.97 ml       Resp Status: Mechanical Vent    Ventilator Settings:  Vent Mode: AC/PRVC Resp Rate (Set): 16 bmp/Vt (Set, mL): 500 mL/ /FiO2 : 30 %    Critical Care IV infusions:   [Held by provider] dexmedetomidine (PRECEDEX) IV infusion Stopped (02/02/23 1248)    EPINEPHrine (ADRENALIN) 5 mg in sodium chloride 0.9% 250 mL infusion Stopped (02/02/23 1514)    norepinephrine 7 mcg/min (02/03/23 1911)    midazolam Stopped (02/03/23 1640)    sodium chloride Stopped (01/31/23 0556)    dextrose          LDA:   CVC Triple Lumen 01/26/23 Right Internal jugular (Active)   Number of days: 8       Peripheral IV 01/22/23 Right Hand (Active)   Number of days: 12       Chest Tube Left Other (Comment) 1 (Active)   Number of days: 0       NG/OG/NJ/NE Tube Nasogastric 16 fr Left nostril (Active)   Number of days: 3       Urinary Catheter 01/31/23 2 Way (Active)   Number of days: 3       Fecal Management System 02/02/23 (Active)   Number of days: 1       ETT  (Active)   Number of days: 3       Wound 01/10/23 Pretibial Right;Medial (Active)   Number of days: 24

## 2023-02-04 NOTE — PROGRESS NOTES
[address] Hospitalist        2/4/2023   5:50 PM    Name:  Cong Bonilla  MRN:    [id number]     Alesiamarjanide:     [de-identified]   Room:  [address] [place] [address]  IP Day: 15     Admit Date: 1/21/2023 11:39 PM  PCP: Maria Esther Beasley MD    C/C:   Chief Complaint   Patient presents with    Shortness of Breath       Assessment:      Acute on chronic systolic congestive heart failure  Acute hypoxic respiratory failure  COVID-19 pneumonia  On vaccinated against SARS-CoV-2  Pulmonary edema  Bilateral pleural effusions  Hypotension  Acute metabolic encephalopathy  Nonvaccinated against SARS-CoV-2  Respiratory failure with hypoxia requiring oxygen via nasal cannula  Hypomagnesemia  Hypokalemia  Question of bacterial pneumonia  Essential hypertension  Diabetes mellitus type 2  Paroxysmal atrial fibrillation  Congestive heart failure, systolic/HFrEF with EF 20 to 25%  Osteoarthritis, multiple joints  History of colon cancer  History of skin cancer  Right eye vision loss  Status post endotracheal intubation 1/31/2023  Hypotension      Plan:      Patient has become hypotensive, bradycardic, further she is more altered, will transfer patient to medical [id number]  Critical care following  Patient also have minimal p.o. intake patient continues to refuse care, has minimal p.o. intake  Consult dietitian, start patient on TPN    Monitor vitals closely  Keep SPO2 above 90%  I's and O's  IV heplock  Pain control  Antiemetics as needed  Levaquin completed  Decadron  Bumex, Coreg, Entresto  Accu-Cheks before meals and at bedtime  Insulin sliding scale medium  Hypoglycemia protocol  And has elevated blood glucose and is refusing insulin  CBC , BMP, BNP  Pulmonary consulted  Consult cardiology recommended patient was not on anticoagulation due to GI bleed in the past  Prognosis is guarded  DVT and GI prophylaxis.   Discussed with RN  Palliative care on consult  Full code-Limited  Not on anticoagulation secondary to prior GI bleed  Patient had mucous plug  Developed hypotension and arrhythmia likely V-fib  CODE BLUE called  Given epi once which improved rhythm back to normal  Patient placed on Levophed  Palliate care discussed CODE STATUS with family-patient is on limited status now  Discussed with RN  Family considering withdrawal of life support  Continue medication as below  Extensive discussion with daughter who is POA  They wish a CPAP trial to determine timing for withdrawal of care  Discussed with daughter at bedside in detail      Scheduled Meds:   heparin (porcine)  5,000 Units SubCUTAneous BID    insulin lispro  0-8 Units SubCUTAneous Q6H    pantoprazole (PROTONIX) 40 mg injection  40 mg IntraVENous Daily    QUEtiapine  25 mg Oral BID    sodium chloride flush  5-40 mL IntraVENous 2 times per day    [Held by provider] carvedilol  12.5 mg Oral BID WC    sacubitril-valsartan  1 tablet Oral BID    [Held by provider] cholestyramine light  4 g Oral BID    [Held by provider] glimepiride  4 mg Oral BID WC     Continuous Infusions:   [Held by provider] dexmedetomidine (PRECEDEX) IV infusion Stopped (02/02/23 1248)    EPINEPHrine (ADRENALIN) 5 mg in sodium chloride 0.9% 250 mL infusion Stopped (02/02/23 1514)    norepinephrine 6 mcg/min (02/04/23 1027)    midazolam Stopped (02/03/23 1640)    sodium chloride Stopped (01/31/23 0556)    dextrose       PRN Meds:  EPINEPHrine (ADRENALIN) 5 mg in sodium chloride 0.9% 250 mL infusion, 0.01 mcg/kg/min, Continuous PRN  midazolam, 1 mg, Q30 Min PRN  fentanNYL, 50 mcg, Q1H PRN  sodium phosphate IVPB, 10 mmol, PRN   Or  sodium phosphate IVPB, 15 mmol, PRN   Or  sodium phosphate IVPB, 20 mmol, PRN  sodium chloride flush, 10 mL, PRN  sodium chloride, , PRN  magnesium sulfate, 1,000 mg, PRN  ondansetron, 4 mg, Q8H PRN   Or  ondansetron, 4 mg, Q6H PRN  polyethylene glycol, 17 g, Daily PRN  acetaminophen, 650 mg, Q6H PRN   Or  acetaminophen, 650 mg, Q6H PRN  glucose, 4 tablet, PRN  dextrose bolus, 125 mL, PRN   Or  dextrose bolus, 250 mL, PRN  glucagon (rDNA), 1 mg, PRN  dextrose, , Continuous PRN  albuterol, 2.5 mg, Q6H PRN          Subjective:     I have seen and examined patient today, patient last 24 hours events were reviewed also discussed with nursing staff    She is presently intubated  Extensive discussion with family about future management  Family do not want any chest compressions  Afebrile      ROS:  Unable to assess due to patient mental status        Physical Examination:      Vitals:  /60   Pulse (!) 104   Temp 96.9 °F (36.1 °C) (Infrared)   Resp 19   Ht 5' 4\" (1.626 m)   Wt 151 lb 12.8 oz (68.9 kg)   SpO2 99%   BMI 26.06 kg/m²   Temp (24hrs), Av.3 °F (36.3 °C), Min:96.9 °F (36.1 °C), Max:97.7 °F (36.5 °C)    Weight:   Wt Readings from Last 3 Encounters:   23 151 lb 12.8 oz (68.9 kg)   23 150 lb 1.6 oz (68.1 kg)   10/20/22 150 lb (68 kg)     I/O last 3 completed shifts:  I/O last 3 completed shifts: In: 948 [I.V.:428; NG/GT:20; IV Piggyback:500]  Out: 65 [Urine:900; Chest Tube:53]     Recent Labs     23  2326 23  0601 23  1133 23  1702   POCGLU 233* 236* 258* 267*           General appearance -sedated   mental status -on vent   head - normocephalic and atraumatic  Eyes - conjunctiva clear  Ears - ext ears normal  Nose - no drainage noted  Mouth - mucous membranes moist  Neck - supple, no carotid bruits, thyroid not palpable  Chest - clear to auscultation, normal effort  Heart - normal rate, regular rhythm, no murmur  Abdomen - soft, nontender, nondistended, bowel sounds present all four quadrants, no masses, hepatomegaly or splenomegaly  Neurological -unable to assess  Extremities - peripheral pulses palpable, no pedal edema or calf pain with palpation  Skin - no gross lesions, rashes, or induration noted        Medications: Allergies:    Allergies   Allergen Reactions    Acetaminophen-Codeine Nausea Only    Codeine Nausea Only Furosemide      Other reaction(s): GI Disturbance    Linagliptin      Other reaction(s): SOB    Sitagliptin      Other reaction(s): felt poorly    Other Itching     Animal Dander       Current Meds:   Current Facility-Administered Medications:     [Held by provider] dexmedetomidine (PRECEDEX) 400 mcg in sodium chloride 0.9 % 100 mL infusion, 0.1-1.5 mcg/kg/hr, IntraVENous, Continuous, Anahi Trujillo MD, Stopped at 02/02/23 1248    EPINEPHrine (EPINEPHrine HCL) 5 mg in sodium chloride 0.9 % 250 mL infusion, 0.01 mcg/kg/min, IntraVENous, Continuous PRN, Anahi Trujillo MD, Stopped at 02/02/23 1514    norepinephrine (LEVOPHED) 16 mg in sodium chloride 0.9 % 250 mL infusion, 1-100 mcg/min, IntraVENous, Continuous, Rhoda Wei MD, Last Rate: 5.6 mL/hr at 02/04/23 1027, 6 mcg/min at 02/04/23 1027    midazolam (VERSED) 1 mg/mL in NS infusion, 1-10 mg/hr, IntraVENous, Continuous, Anahi Trujillo MD, Stopped at 02/03/23 1640    midazolam (VERSED) injection 1 mg, 1 mg, IntraVENous, Q30 Min PRN, Rhoda Wei MD    fentaNYL (SUBLIMAZE) injection 50 mcg, 50 mcg, IntraVENous, Q1H PRN, Anahi Trujillo MD, 50 mcg at 02/03/23 1858    heparin (porcine) injection 5,000 Units, 5,000 Units, SubCUTAneous, BID, Rhoda Wei MD, 5,000 Units at 02/04/23 0840    insulin lispro (HUMALOG) injection vial 0-8 Units, 0-8 Units, SubCUTAneous, Q6H, Nargis Rich, APRN - CNP, 4 Units at 02/04/23 1213    sodium phosphate 10 mmol in sodium chloride 0.9 % 250 mL IVPB, 10 mmol, IntraVENous, PRN, Stopped at 01/29/23 1100 **OR** sodium phosphate 15 mmol in sodium chloride 0.9 % 250 mL IVPB, 15 mmol, IntraVENous, PRN, Stopped at 02/03/23 0925 **OR** sodium phosphate 20 mmol in sodium chloride 0.9 % 500 mL IVPB, 20 mmol, IntraVENous, PRN, Nargis Rich, APRN - CNP    pantoprazole (PROTONIX) 40 mg in sodium chloride (PF) 0.9 % 10 mL injection, 40 mg, IntraVENous, Daily, Sylvia Bolaños MD, 40 mg at 02/04/23 0840    QUEtiapine (SEROQUEL) tablet 25 mg, 25 mg, Oral, BID, Mary Buckley MD, 25 mg at 02/04/23 0840    sodium chloride flush 0.9 % injection 5-40 mL, 5-40 mL, IntraVENous, 2 times per day, Gardenia A Lump, APRN - CNP, 30 mL at 02/04/23 0847    sodium chloride flush 0.9 % injection 10 mL, 10 mL, IntraVENous, PRN, Gardenia A Lump, APRN - CNP, 10 mL at 01/22/23 1616    0.9 % sodium chloride infusion, , IntraVENous, PRN, Lord Frisk Lump, APRN - CNP, Stopped at 01/31/23 0556    magnesium sulfate 1000 mg in dextrose 5% 100 mL IVPB, 1,000 mg, IntraVENous, PRN, Lord Frisk Lump, APRN - CNP, Stopped at 01/22/23 1715    ondansetron (ZOFRAN-ODT) disintegrating tablet 4 mg, 4 mg, Oral, Q8H PRN, 4 mg at 01/23/23 2013 **OR** ondansetron (ZOFRAN) injection 4 mg, 4 mg, IntraVENous, Q6H PRN, Gardenia A Lump, APRN - CNP    polyethylene glycol (GLYCOLAX) packet 17 g, 17 g, Oral, Daily PRN, Gardenia A Lump, APRN - CNP    acetaminophen (TYLENOL) tablet 650 mg, 650 mg, Oral, Q6H PRN, 650 mg at 01/24/23 0907 **OR** acetaminophen (TYLENOL) suppository 650 mg, 650 mg, Rectal, Q6H PRN, Gardenia A Lump, APRN - CNP    glucose chewable tablet 16 g, 4 tablet, Oral, PRN, Gardenia A Lump, APRN - CNP    dextrose bolus 10% 125 mL, 125 mL, IntraVENous, PRN **OR** dextrose bolus 10% 250 mL, 250 mL, IntraVENous, PRN, Gardenia A Lump, APRN - CNP    glucagon (rDNA) injection 1 mg, 1 mg, SubCUTAneous, PRN, Gardenia A Lump, APRN - CNP    dextrose 10 % infusion, , IntraVENous, Continuous PRN, Gardenia A Lump, APRN - CNP    albuterol (PROVENTIL) nebulizer solution 2.5 mg, 2.5 mg, Nebulization, Q6H PRN, Gardenia Petit, APRN - CNP, 2.5 mg at 02/02/23 1615    [Held by provider] carvedilol (COREG) tablet 12.5 mg, 12.5 mg, Oral, BID WC, Haleigh Wilson MD, 12.5 mg at 01/26/23 1229    sacubitril-valsartan (ENTRESTO) 24-26 MG per tablet 1 tablet, 1 tablet, Oral, BID, Haleigh Wilson MD, 1 tablet at 02/04/23 0840    [Held by provider] cholestyramine light packet 4 g, 4 g, Oral, BID, Haleigh Wilson MD, 4 g at 01/26/23 0047    [Held by provider] glimepiride (AMARYL) tablet 4 mg (Patient Supplied), 4 mg, Oral, BID , Haleigh Wilson MD, 4 mg at 01/25/23 1903      I/O (24Hr):     Intake/Output Summary (Last 24 hours) at 2/4/2023 1750  Last data filed at 2/4/2023 1705  Gross per 24 hour   Intake 981.24 ml   Output 978 ml   Net 3.24 ml         Data:           Labs:    Hematology:  Recent Labs     02/02/23  0420 02/03/23 0345 02/04/23 0457   WBC 9.9 10.5 11.6*   RBC 3.96 3.93* 3.95   HGB 10.9* 11.1* 11.1*   HCT 35.1* 34.2* 35.2*   MCV 88.6 87.0 89.1   MCH 27.5 28.2 28.1   MCHC 31.1 32.5 31.5   RDW 15.0* 15.0* 15.3*    174 183   MPV 11.3 11.6 11.2       Chemistry:  Recent Labs     02/02/23  0420 02/02/23  1843 02/03/23  0106 02/03/23 0345 02/04/23  0457   *  --   --  146* 147*   K 5.0  --   --  5.3 4.8   *  --   --  113* 112*   CO2 26  --   --  21 20   GLUCOSE 148*  --   --  208* 249*   BUN 46*  --   --  59* 55*   CREATININE 0.85  --   --  0.99* 1.04*   ANIONGAP 6*  --   --  12 15   LABGLOM >60  --   --  54* 51*   CALCIUM 8.8  --   --  8.3* 8.0*   PHOS  --   --   --  2.2*  --    TROPHS  --  105* 125*  --   --    CKTOTAL  --  33  --   --   --    MYOGLOBIN  --  96*  --   --   --        Recent Labs     02/03/23  1159 02/03/23  1744 02/03/23  2326 02/04/23  0601 02/04/23  1133 02/04/23  1702   POCGLU 269* 185* 233* 236* 258* 267*         Lab Results   Component Value Date/Time    SPECIAL 1ML LT HAND 01/31/2023 02:09 AM     Lab Results   Component Value Date/Time    CULTURE NO GROWTH 4 DAYS 01/31/2023 02:09 AM       Lab Results   Component Value Date/Time    POCPH 7.366 02/02/2023 02:08 PM    POCPCO2 39.3 02/02/2023 02:08 PM    POCPO2 169.9 02/02/2023 02:08 PM    POCHCO3 22.5 02/02/2023 02:08 PM    NBEA 3 02/02/2023 02:08 PM    PBEA 0 02/01/2023 04:30 AM    ZJRT1CZF 100 02/02/2023 02:08 PM    FIO2 30.0 02/03/2023 03:43 AM       Radiology:    CT CHEST WO CONTRAST    Result Date: 1/22/2023  Multifocal ground-glass opacities indeterminate for COVID-19. Right greater than left pleural effusions. Ileus. Cardiomegaly and coronary artery disease. XR CHEST PORTABLE    Result Date: 1/24/2023  More prominent bilateral interstitial and ground-glass opacities, which may represent developing edema versus multifocal infection. Grossly similar appearance of small effusions. XR CHEST PORTABLE    Result Date: 1/21/2023  New small right effusion and airspace disease. Possible mild CHF. All radiological studies reviewed  Code Status:  Limited        Electronically signed by Gertrude Reyes MD on 2/4/2023 at 5:50 PM    This note was created with the assistance of a speech-recognition program.  Although the intention is to generate a document that actually reflects the content of the visit, no guarantees can be provided that every mistake has been identified and corrected by editing. Note was updated later by me after  physical examination and  completion of the assessment.

## 2023-02-04 NOTE — PROGRESS NOTES
Pulmonary Critical Care Progress Note  Jennifer Sarmiento MD     Patient seen for the follow up of  COVID-19 acute hypoxic respiratory insufficiency, pulmonary edema, pleural effusions    Subjective:  She developed obtundation  without receiving any sedation with hypercarbia requiring intubation after failing BiPAP on 1/31/2023. Patient had asystole on ventilator on 2/2/2023 and patient's daughter who is POA was at bedside did not want CPR done. Patient received epi and bicarb x1 with epi drip started and then Levophed for hypotension she still is on at 3 mics. Epi is off. Patient also developed pneumothorax and required a pigtail catheter placement per ER MD on 2/3/2023. She is off sedation since 2/3/2023. Examination:  Vitals: /60   Pulse 100   Temp 97.7 °F (36.5 °C) (Infrared)   Resp 19   Ht 5' 4\" (1.626 m)   Wt 151 lb 12.8 oz (68.9 kg)   SpO2 100%   BMI 26.06 kg/m²   General appearance:   Intubated, sedated on the ventilator  Neck: No JVD  Lungs: Decreased breath sounds no significant wheezing, occasional crackles  Heart: regular rate and rhythm, S1, S2 normal, no gallop  Abdomen: Soft, non tender, + BS  Extremities: no cyanosis or clubbing. Trace edema.     LABs:  CBC:   Recent Labs     02/02/23  0420 02/03/23  0345 02/04/23  0457   WBC 9.9 10.5 11.6*   HGB 10.9* 11.1* 11.1*   HCT 35.1* 34.2* 35.2*    174 183       BMP:   Recent Labs     02/02/23  0420 02/03/23  0345 02/04/23  0457   * 146* 147*   K 5.0 5.3 4.8   CO2 26 21 20   BUN 46* 59* 55*   CREATININE 0.85 0.99* 1.04*   LABGLOM >60 54* 51*   GLUCOSE 148* 208* 249*        Latest Reference Range & Units 1/31/23 00:07 1/31/23 02:44   POC pH 7.350 - 7.450  6.963 (LL) 7.418   POC pCO2 35.0 - 48.0 mm Hg 128.5 (HH) 34.0 (L)   POC PO2 83.0 - 108.0 mm Hg 98.3 148.7 (H)   POC HCO3 21.0 - 28.0 mmol/L 29.1 (H) 22.0   POC O2 SAT 94.0 - 98.0 % 90 (L) 99 (H)   POC Ionized Calcium 1.15 - 1.33 mmol/L 1.60 (H)    POC Potassium 3.5 - 4.5 mmol/L 5.1 (H)       Latest Reference Range & Units Most Recent   Procalcitonin <0.09 ng/mL 0.55 (H)  1/22/23 13:40     Radiology:  X-ray chest: 2/4/2023  No pneumothorax, left-sided pigtail catheter in good place      CT brain 1/31/23: No acute intracranial abnormality     CT chest 1/22/2023      Impression:  Acute hypoxic respiratory failure  COVID pneumonia  Pulmonary edema  Small bilateral pleural effusions right greater than left  Elevated troponin / History of A-fib, not on anticoag d/t age, frailty and hx of GIB per cardiology   Allergic rhinitis, DM, HTN  Ileus    Recommendations:  Assist control ventilation  Watch off sedation   Continue chest tube to suction  Titrate Levophed for MAP 65 to 75 mmHg   Seroquel 25 twice daily  Albuterol every 6 hours as needed  S/p Levaquin course  IV Decadron 6 mg QD  Off IV ATB per Infectious disease on consult  Neurology on the consult  Cardiology following/off anticoagulation for A. fib for history of GI bleed  Coreg on hold- monitor BP/HR   GI prophylaxis, Protonix  Restart tube feeds 10 mL/h as tolerated  DVT prophylaxis, heparin 5000 every 12 hours  Palliative care following, patient is limited code. Detailed discussion had with patient's family regarding prognosis on 2/3/2023. They are considering withdrawal of care. At this time we are monitoring of sedation per family request. At this time patient remains a limited code.     Discussed with RN  We will follow with you    Electronically signed by   Darcy Vincent MD on 2/4/2023 at 9:11 AM  Pulmonary Critical Care and Sleep Medicine,  [facility]  Cell: [phone]  Office: [phone]

## 2023-02-04 NOTE — PROGRESS NOTES
Patient received one dose of fentanyl push and stayed off the versed gtt. Levo was titrated down to 4. Patient occasionally coughs but tolerated the ventilator.

## 2023-02-04 NOTE — PLAN OF CARE
Problem: Skin/Tissue Integrity  Goal: Absence of new skin breakdown  Description: 1. Monitor for areas of redness and/or skin breakdown  2. Assess vascular access sites hourly  3. Every 4-6 hours minimum:  Change oxygen saturation probe site  4. Every 4-6 hours:  If on nasal continuous positive airway pressure, respiratory therapy assess nares and determine need for appliance change or resting period. 2/3/2023 2138 by Keith Montgomery RN  Outcome: Progressing     Problem: Safety - Adult  Goal: Free from fall injury  2/3/2023 2138 by Keith Montgomery RN  Outcome: Progressing     Problem: Safety - Medical Restraint  Goal: Remains free of injury from restraints (Restraint for Interference with Medical Device)  Description: INTERVENTIONS:  1. Determine that other, less restrictive measures have been tried or would not be effective before applying the restraint  2. Evaluate the patient's condition at the time of restraint application  3. Inform patient/family regarding the reason for restraint  4.  Q2H: Monitor safety, psychosocial status, comfort, nutrition and hydration  2/3/2023 2138 by Keith Montgomery RN  Outcome: Progressing  Flowsheets (Taken 2/3/2023 2000)  Remains free of injury from restraints (restraint for interference with medical device): Determine that other, less restrictive measures have been tried or would not be effective before applying the restraint     Problem: Neurosensory - Adult  Goal: Achieves maximal functionality and self care  2/3/2023 2138 by Keith Montgomery RN  Outcome: Progressing

## 2023-02-04 NOTE — PROGRESS NOTES
[facility]  PROGRESS NOTE    Room # 4359/0542-20   Name: Tristin Torres              Reason for visit: Routine    I visited the patient. Admit Date & Time: 1/21/2023 11:39 PM    Assessment:  Tristin Torres is a 80 y.o. female. Family, 5+ in the waiting room Upon entering the room family states about PT:'s  medical condition, states struggles with their medical situation. States dilemma  of possible with drawl. States worries, fears frustrations. Family states well , treated well. family shares about spiritual life, Nondenominational background, shares Nondenominational beliefs. Family shares about PT:'s outside interests. Intervention:   provided a ministry presence, listening and prayer. Outcome:  Patient open to visit. Plan:  Chaplains will remain available to offer spiritual and emotional support as needed. Electronically signed by Chaplain Holly, on 2/4/2023 at 1:21 PM.  Aditya      02/04/23 1317   Encounter Summary   Service Provided For: Patient and family together   Referral/Consult From: Rounding;Nurse;Palliative Care   Support System Children;Family members   Last Encounter  02/04/23   Complexity of Encounter High   Begin Time 1110   End Time  1200   Total Time Calculated 50 min   Encounter    Type Follow up   Spiritual/Emotional needs   Type Spiritual Support   Grief, Loss, and Adjustments   Type End of Life; Anticipatory Grief;Bereavement Care   Palliative Care   Type Palliative Care, Family Care   Assessment/Intervention/Outcome   Assessment Coping;Passive   Intervention Active listening;Confronted/Challenged; Discussed belief system/Nondenominational practices/ivan;Discussed illness injury and its impact; End of Life Care;Grief Care;Prayer (assurance of)/[place];Sustaining Presence/Ministry of presence   Outcome Engaged in conversation;Expressed feelings, needs, and concerns;Expressed Gratitude;Receptive

## 2023-02-04 NOTE — PLAN OF CARE
Problem: Respiratory - Adult  Goal: Achieves optimal ventilation and oxygenation  2/4/2023 1804 by Sarah Rivera RCP  Outcome: Progressing     Problem: Respiratory - Adult  Goal: Will be able to breathe spontaneously, without ventilator support  Description: Will be able to breathe spontaneously, without ventilator support  Outcome: Progressing     Problem: Respiratory - Adult  Goal: Able to breathe comfortably  Outcome: Progressing

## 2023-02-04 NOTE — PLAN OF CARE
Problem: Discharge Planning  Goal: Discharge to home or other facility with appropriate resources  Outcome: Progressing     Problem: Skin/Tissue Integrity  Goal: Absence of new skin breakdown  Description: 1. Monitor for areas of redness and/or skin breakdown  2. Assess vascular access sites hourly  3. Every 4-6 hours minimum:  Change oxygen saturation probe site  4. Every 4-6 hours:  If on nasal continuous positive airway pressure, respiratory therapy assess nares and determine need for appliance change or resting period.   Outcome: Progressing     Problem: Safety - Adult  Goal: Free from fall injury  Outcome: Progressing  Flowsheets (Taken 2/4/2023 7227)  Free From Fall Injury: Instruct family/caregiver on patient safety     Problem: ABCDS Injury Assessment  Goal: Absence of physical injury  Outcome: Progressing  Flowsheets (Taken 2/4/2023 0727)  Absence of Physical Injury: Implement safety measures based on patient assessment     Problem: Chronic Conditions and Co-morbidities  Goal: Patient's chronic conditions and co-morbidity symptoms are monitored and maintained or improved  Outcome: Progressing     Problem: Cardiovascular - Adult  Goal: Maintains optimal cardiac output and hemodynamic stability  Outcome: Progressing     Problem: Respiratory - Adult  Goal: Achieves optimal ventilation and oxygenation  Outcome: Progressing     Problem: Gastrointestinal - Adult  Goal: Maintains or returns to baseline bowel function  Outcome: Progressing     Problem: Metabolic/Fluid and Electrolytes - Adult  Goal: Glucose maintained within prescribed range  Outcome: Progressing     Problem: Musculoskeletal - Adult  Goal: Return mobility to safest level of function  Outcome: Progressing  Goal: Return ADL status to a safe level of function  Outcome: Progressing     Problem: Pain  Goal: Verbalizes/displays adequate comfort level or baseline comfort level  Outcome: Progressing  Flowsheets (Taken 2/4/2023 0800)  Verbalizes/displays adequate comfort level or baseline comfort level: Assess pain using appropriate pain scale     Problem: Nutrition Deficit:  Goal: Optimize nutritional status  Outcome: Progressing     Problem: Neurosensory - Adult  Goal: Achieves maximal functionality and self care  Outcome: Progressing     Problem: Safety - Medical Restraint  Goal: Remains free of injury from restraints (Restraint for Interference with Medical Device)  Description: INTERVENTIONS:  1. Determine that other, less restrictive measures have been tried or would not be effective before applying the restraint  2. Evaluate the patient's condition at the time of restraint application  3. Inform patient/family regarding the reason for restraint  4.  Q2H: Monitor safety, psychosocial status, comfort, nutrition and hydration  Outcome: Progressing  Flowsheets  Taken 2/4/2023 1600  Remains free of injury from restraints (restraint for interference with medical device): Evaluate the patient's condition at the time of restraint application  Taken 4/2/7875 1400  Remains free of injury from restraints (restraint for interference with medical device): Evaluate the patient's condition at the time of restraint application  Taken 0/1/7813 1200  Remains free of injury from restraints (restraint for interference with medical device): Evaluate the patient's condition at the time of restraint application  Taken 5/6/9842 1000  Remains free of injury from restraints (restraint for interference with medical device): Evaluate the patient's condition at the time of restraint application  Taken 2/5/1295 0800  Remains free of injury from restraints (restraint for interference with medical device): Evaluate the patient's condition at the time of restraint application

## 2023-02-05 NOTE — PROGRESS NOTES
[facility]  PROGRESS NOTE    Room # 7911/1426-64   Name: Mila Becker              Reason for visit: Routine    I visited the patient. Admit Date & Time: 1/21/2023 11:39 PM    Assessment:  Mila Becker is a 80 y.o. female. Present was 3 family members. Upon entering the room patient was sleeping. PT: was extubated.  mostly spoke to son Diana Beltran, states he was talking to the PT:. States PT: appeared to hear and understand. When  prayed PT: also was responding to gestures. Intervention:   provided a ministry presence and brief prayer, shared Rosary. Outcome:  Family grateful     Plan:  Chaplains will remain available to offer spiritual and emotional support as needed. Electronically signed by Chaplain Donna, on 2/5/2023 at 2:19 PM.  Aditya      02/05/23 1416   Encounter Summary   Service Provided For: Patient and family together   Referral/Consult From: Nurse;Rounding   Support System Children;Family members   Last Encounter  02/05/23   Complexity of Encounter Moderate   Begin Time 1200   End Time  1230   Total Time Calculated 30 min   Encounter    Type Follow up   Spiritual/Emotional needs   Type Spiritual Support   Palliative Care   Type Palliative Care, Family Care   Assessment/Intervention/Outcome   Assessment Calm;Coping   Intervention Active listening;Discussed belief system/Zoroastrian practices/ivan;Discussed illness injury and its impact; Explored/Affirmed feelings, thoughts, concerns;Prayer (assurance of)/Bruno;Sustaining Presence/Ministry of presence   Outcome Coping;Engaged in conversation;Expressed feelings, needs, and concerns;Expressed Gratitude;Receptive   Plan and Referrals   Plan/Referrals Provided reading/devotional materials

## 2023-02-05 NOTE — PROGRESS NOTES
End Of Shift Note  [facility] ICU  Summary of shift: patient was changed from Full (limited code) to DNR CCA to DNR CC to [facility] (NO REINTUBATION/ NO DEFIBRILLATION), per family request. Levo was stopped at 2018 and never restarted. Suctioned patient continuously throughout the shift to remove secretions until patient started biting on oral suction and using tongue to block suction. Held insulin d/t no nutrition per Macrina Petit orders. Performed oral care, and bathed patient. Changed Chest tube dressing carefully d/t dressing being fully saturated. Placed Scopolamine patch behind patient left ear to help with secretions. Patient started talking when getting a CXR, Writer told patient we needed to get a chest XR and she responded with \"What? What are we doing? \" Towards end of shift. Gave Fentanyl and patient became imelda with HR in lower 50's, HR increased after suctioned. Since extubated patient RR has been 30-40's.      Vitals:    Vitals:    02/05/23 0335 02/05/23 0400 02/05/23 0500 02/05/23 0600   BP:  (!) 104/57 101/60 102/70   Pulse:  77 75 84   Resp:  (!) 32 (!) 33 (!) 34   Temp: 98.4 °F (36.9 °C) 98.4 °F (36.9 °C)     TempSrc: Oral Oral     SpO2:  (!) 86% 98% 100%   Weight:       Height:            I&O:   Intake/Output Summary (Last 24 hours) at 2/5/2023 6180  Last data filed at 2/5/2023 2001  Gross per 24 hour   Intake 320.31 ml   Output 793 ml   Net -472.69 ml       Resp Status: extubated at start of shift, placed on 4 L then increased to 5L NC    Ventilator Settings:  Vent Mode: AC/PRVC Resp Rate (Set): 16 bmp/Vt (Set, mL): 500 mL/ /FiO2 : 30 %    Critical Care IV infusions:   [Held by provider] dexmedetomidine (PRECEDEX) IV infusion Stopped (02/02/23 1248)    EPINEPHrine (ADRENALIN) 5 mg in sodium chloride 0.9% 250 mL infusion Stopped (02/02/23 1514)    norepinephrine Stopped (02/04/23 2017)    sodium chloride Stopped (01/31/23 9856)    dextrose          LDA:   CVC Triple Lumen 01/26/23 Right Internal jugular (Active)   Number of days: 9       Peripheral IV 01/22/23 Right Hand (Active)   Number of days: 14       Chest Tube Left Other (Comment) 1 (Active)   Number of days: 1       Urinary Catheter 01/31/23 2 Way (Active)   Number of days: 5       Fecal Management System 02/02/23 (Active)   Number of days: 2       Wound 01/10/23 Pretibial Right;Medial (Active)   Number of days: 26

## 2023-02-05 NOTE — PROGRESS NOTES
Akshat Obandorocael had discussion with patient who is agreeable to NG tube placement and tube feeds being restarted, but they want to keep her a DNR-CCA, with no intubation, no compressions and no defribrulation. RN contacted Dr. Sylvia Duran and Dr. Kristel Rios to inform of situation and ask for orders to NG tube    Dr. Kristel Rios responded yes to NG placement and consult Dietician for tube feed orders and management of nutrition.  Orders placed by this RN

## 2023-02-05 NOTE — PLAN OF CARE
[facility]  Office: [address], DO, Chyna Baker, DO, Olivia Can, DO, Giulia Bolaños Blood, DO, Karen Mckeon MD, Joanna Troncoso MD, Gabriel Anderson MD, Autry Collet, MD,  Yani Stevenson MD, Nadine Ohara MD, Delano Llanos, DO, Valentin Winters MD,  Dinesh Thacker MD, Riya Bonilla MD, Teofilo Amin DO, Darby Chawla MD, Ann-Marie Ward MD, Mar Cardenas, DO, Dheeraj Carrillo MD, Jacky Pierre MD, Crys Obrien MD, Tamar Tian MD, Tori Pan DO, Emilia Farnsworth MD, Fred Salazar MD, Marli Tyler, CNP,  Stefano Olivera, CNP, Taina Lyons CNP, Doc Viet, CNP,  Brian Freed, [facility], Jay Oseguera, CNP, Osorio Brown, CNP, Migel Giles, CNP, Kraig Mejia, CNP, General Edward, CNP, Jasper Santiago PANicoleC, Khadar Sylvester, CNS, Horton Blizzard, CNP, Crys Saez, Montez Maher    Second Visit Note  For more detailed information please refer to the progress note of the day      2/4/2023    11:20 PM    Name:   Charis Nelson  MRN:     [id number]     [place]:      [de-identified]   Room:   [address] [place], [place] [address] Day:  15  Admit Date:  1/21/2023 11:39 PM    PCP:   Nahid Rivera MD  Code Status:  DNR CC      Pt vitals were reviewed   New labs were reviewed   Patient was seen    Internal medicine is not involved in the care of this patient however the family is at bedside and is requesting the differences between the different CODE STATUS is to be explained by a medical provider. Nursing staff did contact me directly and I was happy to assist with this. The various CODE STATUS is are discussed with the patient's son and daughter. I did explain to them the difference between CCA and CC. At this time the patient's family would like to change her CODE STATUS to a DNR CC and would like to discontinue the use of vasopressors. This information is also witnessed by the nurse.   Internal medicine requested that this information be directed to the primary team by nursing staff and further orders to be directed primary or critical care.     Updated plan :     Change CODE STATUS to DNR CC in accordance with family wishes        PATRICK Mcclendon NP  2/4/2023  11:20 PM

## 2023-02-05 NOTE — PROGRESS NOTES
[address] Hospitalist        2/5/2023   2:46 PM    Name:  Ajit Tineo  MRN:    [id number]     [place]:     [de-identified]   Room:  [address] [place] [address]  IP Day: 15     Admit Date: 1/21/2023 11:39 PM  PCP: Yobany Chaney MD    C/C:   Chief Complaint   Patient presents with    Shortness of Breath       Assessment:      Acute on chronic systolic congestive heart failure  Acute hypoxic respiratory failure  COVID-19 pneumonia  On vaccinated against SARS-CoV-2  Pulmonary edema  Bilateral pleural effusions  Status post left chest tube  Hypotension  Acute metabolic encephalopathy  Nonvaccinated against SARS-CoV-2  Respiratory failure with hypoxia requiring oxygen via nasal cannula  Hypomagnesemia  Hypokalemia  Question of bacterial pneumonia  Essential hypertension  Diabetes mellitus type 2  Paroxysmal atrial fibrillation  Congestive heart failure, systolic/HFrEF with EF 20 to 25%  Osteoarthritis, multiple joints  History of colon cancer  History of skin cancer  Right eye vision loss  Status post endotracheal intubation 1/31/2023  Hypotension  S/p extubation 2/4/2023      Plan:      Patient has become hypotensive, bradycardic, further she is more altered, will transfer patient to medical [id number]  Critical care following  Patient also have minimal p.o. intake patient continues to refuse care, has minimal p.o. intake  Consult dietitian, start patient on TPN    Monitor vitals closely  Keep SPO2 above 90%  I's and O's  IV heplock  Pain control  Antiemetics as needed  Levaquin completed  Decadron  Bumex, Coreg, Entresto  Accu-Cheks before meals and at bedtime  Insulin sliding scale medium  Hypoglycemia protocol  And has elevated blood glucose and is refusing insulin  CBC , BMP, BNP  Pulmonary consulted  Consult cardiology recommended patient was not on anticoagulation due to GI bleed in the past  Prognosis is guarded  DVT and GI prophylaxis.   Discussed with RN  Palliative care on consult  Full code-Limited  Not on anticoagulation secondary to prior GI bleed  Patient had mucous plug  Developed hypotension and arrhythmia likely V-fib  CODE BLUE called  Given epi once which improved rhythm back to normal  Patient placed on Levophed  Palliate care discussed CODE STATUS with family-patient is on limited status now  Discussed with RN  Family considering withdrawal of life support  Continue medication as below  Extensive discussion with daughter who is POA  They wish a CPAP trial to determine timing for withdrawal of care  Patient extubated-now DNR CCA  Post PEG-start tube feeds  Consult nutrition  Discussed with daughter at bedside in detail      Scheduled Meds:   scopolamine  1 patch TransDERmal Q72H    heparin (porcine)  5,000 Units SubCUTAneous BID    insulin lispro  0-8 Units SubCUTAneous Q6H    pantoprazole (PROTONIX) 40 mg injection  40 mg IntraVENous Daily    QUEtiapine  25 mg Oral BID    sodium chloride flush  5-40 mL IntraVENous 2 times per day    [Held by provider] carvedilol  12.5 mg Oral BID WC    sacubitril-valsartan  1 tablet Oral BID    [Held by provider] cholestyramine light  4 g Oral BID    [Held by provider] glimepiride  4 mg Oral BID WC     Continuous Infusions:   [Held by provider] dexmedetomidine (PRECEDEX) IV infusion Stopped (02/02/23 1248)    EPINEPHrine (ADRENALIN) 5 mg in sodium chloride 0.9% 250 mL infusion Stopped (02/02/23 1514)    norepinephrine Stopped (02/04/23 2017)    sodium chloride Stopped (01/31/23 0556)    dextrose       PRN Meds:  EPINEPHrine (ADRENALIN) 5 mg in sodium chloride 0.9% 250 mL infusion, 0.01 mcg/kg/min, Continuous PRN  fentanNYL, 50 mcg, Q1H PRN  sodium phosphate IVPB, 10 mmol, PRN   Or  sodium phosphate IVPB, 15 mmol, PRN   Or  sodium phosphate IVPB, 20 mmol, PRN  sodium chloride flush, 10 mL, PRN  sodium chloride, , PRN  magnesium sulfate, 1,000 mg, PRN  ondansetron, 4 mg, Q8H PRN   Or  ondansetron, 4 mg, Q6H PRN  polyethylene glycol, 17 g, Daily PRN  acetaminophen, 650 mg, Q6H PRN   Or  acetaminophen, 650 mg, Q6H PRN  glucose, 4 tablet, PRN  dextrose bolus, 125 mL, PRN   Or  dextrose bolus, 250 mL, PRN  glucagon (rDNA), 1 mg, PRN  dextrose, , Continuous PRN          Subjective:     I have seen and examined patient today, patient last 24 hours events were reviewed also discussed with nursing staff    Patient is lethargic  Responds to some verbal commands  Patient continues to mumble asked questions  Afebrile  No apparent acute distress      ROS:  Unable to assess due to patient mental status        Physical Examination:      Vitals:  BP (!) 117/58   Pulse 90   Temp 96.8 °F (36 °C) (Infrared)   Resp (!) 34   Ht 5' 4\" (1.626 m)   Wt 151 lb 12.8 oz (68.9 kg)   SpO2 95%   BMI 26.06 kg/m²   Temp (24hrs), Av.7 °F (36.5 °C), Min:96.8 °F (36 °C), Max:98.5 °F (36.9 °C)    Weight:   Wt Readings from Last 3 Encounters:   23 151 lb 12.8 oz (68.9 kg)   23 150 lb 1.6 oz (68.1 kg)   10/20/22 150 lb (68 kg)     I/O last 3 completed shifts:  I/O last 3 completed shifts: In: 1049.6 [I.V.:299.6; NG/GT:250; IV Piggyback:500]  Out: 9778 [Urine:1025; Chest Tube:143]     Recent Labs     23  1702 23  2327 23  0743 23  1126   POCGLU 267* 219* 219* 258*           General appearance -sedated   mental status -lethargic  head - normocephalic and atraumatic  Eyes - conjunctiva clear  Ears - ext ears normal  Nose - no drainage noted  Mouth - mucous membranes moist  Neck - supple, no carotid bruits, thyroid not palpable  Chest - clear to auscultation, normal effort.   Chest tube  Heart - normal rate, regular rhythm, no murmur  Abdomen - soft, nontender, nondistended, bowel sounds present all four quadrants, no masses, hepatomegaly or splenomegaly  Neurological -unable to assess  Extremities - peripheral pulses palpable, no pedal edema or calf pain with palpation  Skin - no gross lesions, rashes, or induration noted        Medications: Allergies:    Allergies   Allergen Reactions    Acetaminophen-Codeine Nausea Only    Codeine Nausea Only    Furosemide      Other reaction(s): GI Disturbance    Linagliptin      Other reaction(s): SOB    Sitagliptin      Other reaction(s): felt poorly    Other Itching     Animal Dander       Current Meds:   Current Facility-Administered Medications:     scopolamine (TRANSDERM-SCOP) transdermal patch 1 patch, 1 patch, TransDERmal, Q72H, Gardenia FRAGOSO Lump, APRN - CNP, 1 patch at 02/04/23 2133    [Held by provider] dexmedetomidine (PRECEDEX) 400 mcg in sodium chloride 0.9 % 100 mL infusion, 0.1-1.5 mcg/kg/hr, IntraVENous, Continuous, Alina Maldonado MD, Stopped at 02/02/23 1248    EPINEPHrine (EPINEPHrine HCL) 5 mg in sodium chloride 0.9 % 250 mL infusion, 0.01 mcg/kg/min, IntraVENous, Continuous PRN, Alina Maldonado MD, Stopped at 02/02/23 1514    norepinephrine (LEVOPHED) 16 mg in sodium chloride 0.9 % 250 mL infusion, 1-100 mcg/min, IntraVENous, Continuous, Sharlene De León MD, Stopped at 02/04/23 2017    fentaNYL (SUBLIMAZE) injection 50 mcg, 50 mcg, IntraVENous, Q1H PRN, Alina Maldonado MD, 50 mcg at 02/05/23 0824    heparin (porcine) injection 5,000 Units, 5,000 Units, SubCUTAneous, BID, Sharlene De León MD, 5,000 Units at 02/05/23 0819    insulin lispro (HUMALOG) injection vial 0-8 Units, 0-8 Units, SubCUTAneous, Q6H, PATRICK Jha CNP, 4 Units at 02/04/23 1817    sodium phosphate 10 mmol in sodium chloride 0.9 % 250 mL IVPB, 10 mmol, IntraVENous, PRN, Stopped at 01/29/23 1100 **OR** sodium phosphate 15 mmol in sodium chloride 0.9 % 250 mL IVPB, 15 mmol, IntraVENous, PRN, Stopped at 02/03/23 0925 **OR** sodium phosphate 20 mmol in sodium chloride 0.9 % 500 mL IVPB, 20 mmol, IntraVENous, PRN, PATRICK Jha - CNP    pantoprazole (PROTONIX) 40 mg in sodium chloride (PF) 0.9 % 10 mL injection, 40 mg, IntraVENous, Daily, Sylvia Bolaños MD, 40 mg at 02/05/23 0819    QUEtiapine (SEROQUEL) tablet 25 mg, 25 mg, Oral, BID, Meryl Nobles MD, 25 mg at 02/04/23 0840    sodium chloride flush 0.9 % injection 5-40 mL, 5-40 mL, IntraVENous, 2 times per day, Gardenia A Lump, APRN - CNP, 10 mL at 02/05/23 0831    sodium chloride flush 0.9 % injection 10 mL, 10 mL, IntraVENous, PRN, Gardenia A Lump, APRN - CNP, 10 mL at 01/22/23 1616    0.9 % sodium chloride infusion, , IntraVENous, PRN, Dorann Paddy Lump, APRN - CNP, Stopped at 01/31/23 0556    magnesium sulfate 1000 mg in dextrose 5% 100 mL IVPB, 1,000 mg, IntraVENous, PRN, Dorann Paddy Lump, APRN - CNP, Stopped at 01/22/23 1715    ondansetron (ZOFRAN-ODT) disintegrating tablet 4 mg, 4 mg, Oral, Q8H PRN, 4 mg at 01/23/23 2013 **OR** ondansetron (ZOFRAN) injection 4 mg, 4 mg, IntraVENous, Q6H PRN, Gardenia A Lump, APRN - CNP    polyethylene glycol (GLYCOLAX) packet 17 g, 17 g, Oral, Daily PRN, Gardenia A Lump, APRN - CNP    acetaminophen (TYLENOL) tablet 650 mg, 650 mg, Oral, Q6H PRN, 650 mg at 01/24/23 0907 **OR** acetaminophen (TYLENOL) suppository 650 mg, 650 mg, Rectal, Q6H PRN, Gardenia A Lump, APRN - CNP    glucose chewable tablet 16 g, 4 tablet, Oral, PRN, Gardenia A Lump, APRN - CNP    dextrose bolus 10% 125 mL, 125 mL, IntraVENous, PRN **OR** dextrose bolus 10% 250 mL, 250 mL, IntraVENous, PRN, Gardenia A Lump, APRN - CNP    glucagon (rDNA) injection 1 mg, 1 mg, SubCUTAneous, PRN, Gardenia A Lump, APRN - CNP    dextrose 10 % infusion, , IntraVENous, Continuous PRN, Gardenia A Lump, APRN - CNP    [Held by provider] carvedilol (COREG) tablet 12.5 mg, 12.5 mg, Oral, BID Haleigh GUARDADO MD, 12.5 mg at 01/26/23 1229    sacubitril-valsartan (ENTRESTO) 24-26 MG per tablet 1 tablet, 1 tablet, Oral, BID, Haleigh Wilson MD, 1 tablet at 02/04/23 0840    [Held by provider] cholestyramine light packet 4 g, 4 g, Oral, BID, Haleigh Wilson MD, 4 g at 01/26/23 0047    [Held by provider] glimepiride (AMARYL) tablet 4 mg (Patient Supplied), 4 mg, Oral, BID Haleigh GUARDADO MD, 4 mg at 01/25/23 0619      I/O (24Hr):     Intake/Output Summary (Last 24 hours) at 2/5/2023 1446  Last data filed at 2/5/2023 0831  Gross per 24 hour   Intake 108.37 ml   Output 793 ml   Net -684.63 ml         Data:           Labs:    Hematology:  Recent Labs     02/03/23  0345 02/04/23  0457 02/05/23  0420   WBC 10.5 11.6* 8.5   RBC 3.93* 3.95 3.99   HGB 11.1* 11.1* 11.0*   HCT 34.2* 35.2* 36.3   MCV 87.0 89.1 91.0   MCH 28.2 28.1 27.6   MCHC 32.5 31.5 30.3   RDW 15.0* 15.3* 15.7*    183 171   MPV 11.6 11.2 11.6       Chemistry:  Recent Labs     02/02/23  1843 02/03/23  0106 02/03/23 0345 02/04/23  0457 02/05/23  0420   NA  --   --  146* 147* 149*   K  --   --  5.3 4.8 5.1   CL  --   --  113* 112* 117*   CO2  --   --  21 20 22   GLUCOSE  --   --  208* 249* 221*   BUN  --   --  59* 55* 58*   CREATININE  --   --  0.99* 1.04* 0.98*   ANIONGAP  --   --  12 15 10   LABGLOM  --   --  54* 51* 55*   CALCIUM  --   --  8.3* 8.0* 8.2*   PHOS  --   --  2.2*  --   --    TROPHS 105* 125*  --   --   --    CKTOTAL 33  --   --   --   --    MYOGLOBIN 96*  --   --   --   --        Recent Labs     02/04/23  0601 02/04/23  1133 02/04/23  1702 02/04/23  2327 02/05/23  0420 02/05/23  0743 02/05/23  1126   PROT  --   --   --   --  5.1*  --   --    LABALBU  --   --   --   --  2.5*  --   --    AST  --   --   --   --  36*  --   --    ALT  --   --   --   --  61*  --   --    ALKPHOS  --   --   --   --  221*  --   --    BILITOT  --   --   --   --  0.6  --   --    POCGLU 236* 258* 267* 219*  --  219* 258*         Lab Results   Component Value Date/Time    SPECIAL 1ML LT HAND 01/31/2023 02:09 AM     Lab Results   Component Value Date/Time    CULTURE NO GROWTH 5 DAYS 01/31/2023 02:09 AM       Lab Results   Component Value Date/Time    POCPH 7.366 02/02/2023 02:08 PM    POCPCO2 39.3 02/02/2023 02:08 PM    POCPO2 169.9 02/02/2023 02:08 PM    POCHCO3 22.5 02/02/2023 02:08 PM    NBEA 3 02/02/2023 02:08 PM    PBEA 0 02/01/2023 04:30 AM    JOES7TTB 100 02/02/2023 02:08 PM FIO2 30.0 02/03/2023 03:43 AM       Radiology:    CT CHEST WO CONTRAST    Result Date: 1/22/2023  Multifocal ground-glass opacities indeterminate for COVID-19. Right greater than left pleural effusions. Ileus. Cardiomegaly and coronary artery disease. XR CHEST PORTABLE    Result Date: 1/24/2023  More prominent bilateral interstitial and ground-glass opacities, which may represent developing edema versus multifocal infection. Grossly similar appearance of small effusions. XR CHEST PORTABLE    Result Date: 1/21/2023  New small right effusion and airspace disease. Possible mild CHF. All radiological studies reviewed  Code Status:  DNR-CCA        Electronically signed by Aman Guerra MD on 2/5/2023 at 2:46 PM    This note was created with the assistance of a speech-recognition program.  Although the intention is to generate a document that actually reflects the content of the visit, no guarantees can be provided that every mistake has been identified and corrected by editing. Note was updated later by me after  physical examination and  completion of the assessment.

## 2023-02-05 NOTE — PROGRESS NOTES
Contacted on call ZACH Toussaint for patient code status change from [facility] to [facility] per family request.

## 2023-02-05 NOTE — PROGRESS NOTES
End Of Shift Note  [address] ICU  Summary of shift: BP dropped and levo was increased to 9mcg. BP started to increase and MAP was around 90. Levo started to be weened down. Patient extubated at 1915 and started on 4L NC. Daughter asked to start weening on the levo. NO CPR, NO REINTUBATION, NO DEFIBRILLATOR. Comfort care.      Vitals:    Vitals:    02/04/23 1200 02/04/23 1431 02/04/23 1521 02/04/23 1616   BP:       Pulse: (!) 113 (!) 104     Resp:  19     Temp:   96.9 °F (36.1 °C)    TempSrc:   Infrared    SpO2:  99%     Weight:       Height:    5' 4\" (1.626 m)        I&O:   Intake/Output Summary (Last 24 hours) at 2/4/2023 1914  Last data filed at 2/4/2023 1705  Gross per 24 hour   Intake 314.99 ml   Output 575 ml   Net -260.01 ml       Resp Status: 4L Nasal cannula    Ventilator Settings:  Vent Mode: AC/PRVC Resp Rate (Set): 16 bmp/Vt (Set, mL): 500 mL/ /FiO2 : 30 %    Critical Care IV infusions:   [Held by provider] dexmedetomidine (PRECEDEX) IV infusion Stopped (02/02/23 1248)    EPINEPHrine (ADRENALIN) 5 mg in sodium chloride 0.9% 250 mL infusion Stopped (02/02/23 1514)    norepinephrine 6 mcg/min (02/04/23 1027)    sodium chloride Stopped (01/31/23 0556)    dextrose          LDA:   CVC Triple Lumen 01/26/23 Right Internal jugular (Active)   Number of days: 9       Peripheral IV 01/22/23 Right Hand (Active)   Number of days: 13       Chest Tube Left Other (Comment) 1 (Active)   Number of days: 1       NG/OG/NJ/NE Tube Nasogastric 16 fr Left nostril (Active)   Number of days: 4       Urinary Catheter 01/31/23 2 Way (Active)   Number of days: 4       Fecal Management System 02/02/23 (Active)   Number of days: 2       ETT  (Active)   Number of days: 4       Wound 01/10/23 Pretibial Right;Medial (Active)   Number of days: 25

## 2023-02-05 NOTE — PROGRESS NOTES
RN sent message to Dr. Lilia Middleton about patient is audible gurgling, unable to cough it up on her own and cannot suction it out orally, can i get an order for NT suction before placing NG? no orders for anything for agitation or anxiety would you like to order anything?

## 2023-02-05 NOTE — PROGRESS NOTES
Contacted On call NP Dheeraj Sarabia d/t famly changing code status. In-house NP talked to family and signed [facility] paperwork. Patient was terminally extubated, restraints taken off and levo stopped.      Awaiting orders

## 2023-02-05 NOTE — PROGRESS NOTES
Contacted On call ZACH Traylor d/t patient BS is 219 and requiring 2 units of insulin but patient is not on TPN or tube feed.      Given orders to hold insulin

## 2023-02-05 NOTE — PLAN OF CARE
Problem: Discharge Planning  Goal: Discharge to home or other facility with appropriate resources  2/5/2023 0026 by Mina Vicente RN  Outcome: Progressing  Flowsheets (Taken 2/4/2023 2115)  Discharge to home or other facility with appropriate resources:   Identify barriers to discharge with patient and caregiver   Arrange for needed discharge resources and transportation as appropriate   Identify discharge learning needs (meds, wound care, etc)   Refer to discharge planning if patient needs post-hospital services based on physician order or complex needs related to functional status, cognitive ability or social support system  2/4/2023 1715 by Gallo Huggins RN  Outcome: Progressing     Problem: Skin/Tissue Integrity  Goal: Absence of new skin breakdown  Description: 1. Monitor for areas of redness and/or skin breakdown  2. Assess vascular access sites hourly  3. Every 4-6 hours minimum:  Change oxygen saturation probe site  4. Every 4-6 hours:  If on nasal continuous positive airway pressure, respiratory therapy assess nares and determine need for appliance change or resting period.   2/5/2023 0026 by Mina Vicente RN  Outcome: Progressing  2/4/2023 1715 by Gallo Huggins RN  Outcome: Progressing     Problem: Safety - Adult  Goal: Free from fall injury  2/5/2023 0026 by Mina Vicente RN  Outcome: Progressing  Flowsheets (Taken 2/4/2023 2000)  Free From Fall Injury:   Instruct family/caregiver on patient safety   Based on caregiver fall risk screen, instruct family/caregiver to ask for assistance with transferring infant if caregiver noted to have fall risk factors  2/4/2023 1715 by Gallo Huggins RN  Outcome: Progressing  Flowsheets (Taken 2/4/2023 0716)  Free From Fall Injury: Instruct family/caregiver on patient safety     Problem: ABCDS Injury Assessment  Goal: Absence of physical injury  2/5/2023 0026 by Mina Vicente RN  Outcome: Progressing  Flowsheets (Taken 2/4/2023 2000)  Absence of Physical Injury: Implement safety measures based on patient assessment  2/4/2023 1715 by Abdon Wooten RN  Outcome: Progressing  Flowsheets (Taken 2/4/2023 0727)  Absence of Physical Injury: Implement safety measures based on patient assessment     Problem: Chronic Conditions and Co-morbidities  Goal: Patient's chronic conditions and co-morbidity symptoms are monitored and maintained or improved  2/5/2023 0026 by Severa Mallard, RN  Outcome: Progressing  Flowsheets (Taken 2/4/2023 2115)  Care Plan - Patient's Chronic Conditions and Co-Morbidity Symptoms are Monitored and Maintained or Improved:   Monitor and assess patient's chronic conditions and comorbid symptoms for stability, deterioration, or improvement   Collaborate with multidisciplinary team to address chronic and comorbid conditions and prevent exacerbation or deterioration   Update acute care plan with appropriate goals if chronic or comorbid symptoms are exacerbated and prevent overall improvement and discharge  2/4/2023 1715 by Abdon Wooten RN  Outcome: Progressing     Problem: Cardiovascular - Adult  Goal: Maintains optimal cardiac output and hemodynamic stability  2/5/2023 0026 by Severa Mallard, RN  Outcome: Progressing  Flowsheets (Taken 2/4/2023 2115)  Maintains optimal cardiac output and hemodynamic stability:   Monitor blood pressure and heart rate   Monitor urine output and notify Licensed Independent Practitioner for values outside of normal range   Assess for signs of decreased cardiac output   Administer fluid and/or volume expanders as ordered   Administer vasoactive medications as ordered  2/4/2023 1715 by Abdon Wooten RN  Outcome: Progressing     Problem: Respiratory - Adult  Goal: Achieves optimal ventilation and oxygenation  2/5/2023 0026 by Severa Mallard, RN  Outcome: Progressing  Flowsheets  Taken 2/4/2023 2115  Achieves optimal ventilation and oxygenation:   Assess for changes in respiratory status   Assess for changes in mentation and behavior   Oxygen supplementation based on oxygen saturation or arterial blood gases   Position to facilitate oxygenation and minimize respiratory effort   Initiate smoking cessation protocol as indicated  Taken 2/4/2023 2100  Achieves optimal ventilation and oxygenation:   Assess for changes in respiratory status   Assess for changes in mentation and behavior   Position to facilitate oxygenation and minimize respiratory effort   Oxygen supplementation based on oxygen saturation or arterial blood gases   Encourage broncho-pulmonary hygiene including cough, deep breathe, incentive spirometry   Assess the need for suctioning and aspirate as needed   Assess and instruct to report shortness of breath or any respiratory difficulty   Respiratory therapy support as indicated  2/4/2023 1804 by Moncho Michele RCP  Outcome: Progressing  2/4/2023 1715 by Eric Delgadillo RN  Outcome: Progressing  Goal: Will be able to breathe spontaneously, without ventilator support  Description: Will be able to breathe spontaneously, without ventilator support  2/4/2023 1804 by Moncho Michele RCP  Outcome: Progressing  Goal: Able to breathe comfortably  2/4/2023 1804 by Moncho Michele RCP  Outcome: Progressing     Problem: Gastrointestinal - Adult  Goal: Maintains or returns to baseline bowel function  2/5/2023 0026 by Orestes Chavez RN  Outcome: Progressing  2/4/2023 1715 by Eric Delgadillo, RN  Outcome: Progressing     Problem: Metabolic/Fluid and Electrolytes - Adult  Goal: Glucose maintained within prescribed range  2/5/2023 0026 by Orestes Chavez RN  Outcome: Progressing  [address] (Taken 2/4/2023 2115)  Glucose maintained within prescribed range:   Monitor blood glucose as ordered   Assess for signs and symptoms of hyperglycemia and hypoglycemia   Administer ordered medications to maintain glucose within target range  2/4/2023 1715 by Eric Delgadillo, RN  Outcome: Progressing     Problem: Musculoskeletal - Adult  Goal: Return mobility to safest level of function  2/5/2023 0026 by Christopher Garcia RN  Outcome: Progressing  Flowsheets (Taken 2/4/2023 2115)  Return Mobility to Safest Level of Function:   Ensure adequate protection for wounds/incisions during mobilization   Instruct patient/family in ordered activity level  2/4/2023 1715 by Jenae Correia RN  Outcome: Progressing  Goal: Return ADL status to a safe level of function  2/5/2023 0026 by Christopher Garcia RN  Outcome: Progressing  Flowsheets (Taken 2/4/2023 2115)  Return ADL Status to a Safe Level of Function:   Administer medication as ordered   Assess activities of daily living deficits and provide assistive devices as needed   Assist and instruct patient to increase activity and self care as tolerated  2/4/2023 1715 by Jenae Correia RN  Outcome: Progressing     Problem: Pain  Goal: Verbalizes/displays adequate comfort level or baseline comfort level  2/5/2023 0026 by Christopher Garcia RN  Outcome: Progressing  Flowsheets (Taken 2/4/2023 2115)  Verbalizes/displays adequate comfort level or baseline comfort level:   Encourage patient to monitor pain and request assistance   Assess pain using appropriate pain scale   Administer analgesics based on type and severity of pain and evaluate response   Implement non-pharmacological measures as appropriate and evaluate response   Notify Licensed Independent Practitioner if interventions unsuccessful or patient reports new pain  2/4/2023 1715 by Jenae Correia RN  Outcome: Progressing  Flowsheets (Taken 2/4/2023 0800)  Verbalizes/displays adequate comfort level or baseline comfort level: Assess pain using appropriate pain scale     Problem: Nutrition Deficit:  Goal: Optimize nutritional status  2/5/2023 0026 by Christopher Garcia RN  Outcome: Progressing  2/4/2023 1715 by Jenae Correia RN  Outcome: Progressing     Problem: Neurosensory - Adult  Goal: Achieves maximal functionality and self care  2/5/2023 0026 by Christopher Garcia RN  Outcome: Progressing  Flowsheets (Taken 2/4/2023 2115)  Achieves maximal functionality and self care: Monitor swallowing and airway patency with patient fatigue and changes in neurological status  2/4/2023 1715 by Gilbert Lim RN  Outcome: Progressing     Problem: Safety - Medical Restraint  Goal: Remains free of injury from restraints (Restraint for Interference with Medical Device)  Description: INTERVENTIONS:  1. Determine that other, less restrictive measures have been tried or would not be effective before applying the restraint  2. Evaluate the patient's condition at the time of restraint application  3. Inform patient/family regarding the reason for restraint  4.  Q2H: Monitor safety, psychosocial status, comfort, nutrition and hydration  2/5/2023 0026 by Brody Sanderson RN  Outcome: Progressing  Flowsheets (Taken 2/4/2023 1800 by Gilbert Lim RN)  Remains free of injury from restraints (restraint for interference with medical device): Evaluate the patient's condition at the time of restraint application  4/2/2690 4363 by Gilbert Lim RN  Outcome: Progressing  Flowsheets  Taken 2/4/2023 1600  Remains free of injury from restraints (restraint for interference with medical device): Evaluate the patient's condition at the time of restraint application  Taken 1/3/4425 1400  Remains free of injury from restraints (restraint for interference with medical device): Evaluate the patient's condition at the time of restraint application  Taken 0/8/1349 1200  Remains free of injury from restraints (restraint for interference with medical device): Evaluate the patient's condition at the time of restraint application  Taken 2/2/7624 1000  Remains free of injury from restraints (restraint for interference with medical device): Evaluate the patient's condition at the time of restraint application  Taken 8/1/6768 0800  Remains free of injury from restraints (restraint for interference with medical device): Evaluate the patient's condition at the time of restraint application

## 2023-02-06 NOTE — PROGRESS NOTES
[address] Hospitalist        2/6/2023   2:57 PM    Name:  Marlee Nicholson  MRN:    [id number]     Acct:     [de-identified]   Room:  [address] [place] [address] Day: 13     Admit Date: 1/21/2023 11:39 PM  PCP: Maryellen Leonardo MD    C/C:   Chief Complaint   Patient presents with    Shortness of Breath       Assessment:      Acute on chronic systolic congestive heart failure  Acute hypoxic respiratory failure  COVID-19 pneumonia  On vaccinated against SARS-CoV-2  Pulmonary edema  Bilateral pleural effusions  Status post left chest tube  Hypotension  Acute metabolic encephalopathy  Nonvaccinated against SARS-CoV-2  Respiratory failure with hypoxia requiring oxygen via nasal cannula  Hypomagnesemia  Hypokalemia  Question of bacterial pneumonia  Essential hypertension  Diabetes mellitus type 2  Paroxysmal atrial fibrillation  Congestive heart failure, systolic/HFrEF with EF 20 to 25%  Osteoarthritis, multiple joints  History of colon cancer  History of skin cancer  Right eye vision loss  Status post endotracheal intubation 1/31/2023  Hypotension  S/p extubation 2/4/2023      Plan:      Patient is admitted to progressive unit now  Patient was transferred to ICU initially then got intubated now extubated on 2/5/2023, requiring 5 L nasal cannula oxygen  Telemetry  Check vitals closely  CBC BMP daily    Patient to have NG tube and continue with tube feeding, attempt for swallow study once she is more awake and appropriate for swallow evaluation    Completed Levaquin, Decadron, monitor off antibiotics  Infectious diseases seen patient during this admission    Patient was also eval by cardiology during this admission, has history of atrial fibrillation, patient is not on anticoagulation due to GI bleed in the past    Patient CODE STATUS is DNR CC arrest, no defibrillation or intubation, it is being discussed with family    Continue Entresto  Continue glimepiride, insulin/scale and close monitoring of blood sugar    Pulmonary critical care on board  Patient is eval by gastroenterology during this admission  Pulmonary consulted  Consult cardiology recommended patient was not on anticoagulation due to GI bleed in the past  Prognosis is poor pulmonology recommending to consider comfort care/hospice        Scheduled Meds:   scopolamine  1 patch TransDERmal Q72H    heparin (porcine)  5,000 Units SubCUTAneous BID    insulin lispro  0-8 Units SubCUTAneous Q6H    pantoprazole (PROTONIX) 40 mg injection  40 mg IntraVENous Daily    QUEtiapine  25 mg Oral BID    sodium chloride flush  5-40 mL IntraVENous 2 times per day    [Held by provider] carvedilol  12.5 mg Oral BID     sacubitril-valsartan  1 tablet Oral BID    [Held by provider] cholestyramine light  4 g Oral BID    [Held by provider] glimepiride  4 mg Oral BID WC     Continuous Infusions:   [Held by provider] dexmedetomidine (PRECEDEX) IV infusion Stopped (02/02/23 1248)    EPINEPHrine (ADRENALIN) 5 mg in sodium chloride 0.9% 250 mL infusion Stopped (02/02/23 1514)    norepinephrine Stopped (02/04/23 2017)    sodium chloride Stopped (01/31/23 0556)    dextrose       PRN Meds:  LORazepam, 0.5 mg, TID PRN  EPINEPHrine (ADRENALIN) 5 mg in sodium chloride 0.9% 250 mL infusion, 0.01 mcg/kg/min, Continuous PRN  fentanNYL, 50 mcg, Q1H PRN  sodium phosphate IVPB, 10 mmol, PRN   Or  sodium phosphate IVPB, 15 mmol, PRN   Or  sodium phosphate IVPB, 20 mmol, PRN  sodium chloride flush, 10 mL, PRN  sodium chloride, , PRN  magnesium sulfate, 1,000 mg, PRN  ondansetron, 4 mg, Q8H PRN   Or  ondansetron, 4 mg, Q6H PRN  polyethylene glycol, 17 g, Daily PRN  acetaminophen, 650 mg, Q6H PRN   Or  acetaminophen, 650 mg, Q6H PRN  glucose, 4 tablet, PRN  dextrose bolus, 125 mL, PRN   Or  dextrose bolus, 250 mL, PRN  glucagon (rDNA), 1 mg, PRN  dextrose, , Continuous PRN          Subjective:     I have seen and examined patient today, patient last 24 hours events were reviewed also discussed with nursing staff    Patient is lethargic  Responds to some verbal commands  Patient continues to mumble asked questions  Afebrile  No apparent acute distress      ROS:  Unable to assess due to patient mental status        Physical Examination:      Vitals:  BP (!) 116/51   Pulse 85   Temp 98.6 °F (37 °C) (Axillary)   Resp 16   Ht 5' 4\" (1.626 m)   Wt 147 lb 1.6 oz (66.7 kg)   SpO2 96%   BMI 25.25 kg/m²   Temp (24hrs), Av.6 °F (36.4 °C), Min:96.9 °F (36.1 °C), Max:98.6 °F (37 °C)    Weight:   Wt Readings from Last 3 Encounters:   23 147 lb 1.6 oz (66.7 kg)   23 150 lb 1.6 oz (68.1 kg)   10/20/22 150 lb (68 kg)     I/O last 3 completed shifts:  I/O last 3 completed shifts: In: 108.4 [I.V.:108.4]  Out: 1293 [Urine:1100; Chest Tube:193]     Recent Labs     23  1126 23  2032 23  2332 23  0620   POCGLU 258* 275* 290* 196*           General appearance -awake  mental status -lethargic  head - normocephalic and atraumatic  Eyes - conjunctiva clear  Ears - ext ears normal  Nose - no drainage noted  Mouth - mucous membranes moist  Neck - supple, no carotid bruits, thyroid not palpable  Chest -bilateral effort sounds to auscultation, normal effort. Chest tube  Heart - normal rate, regular rhythm, no murmur  Abdomen - soft, nontender, nondistended, bowel sounds present all four quadrants, no masses, hepatomegaly or splenomegaly  Neurological -unable to assess  Extremities - peripheral pulses palpable, no pedal edema or calf pain with palpation  Skin - no gross lesions, rashes, or induration noted        Medications: Allergies:    Allergies   Allergen Reactions    Acetaminophen-Codeine Nausea Only    Codeine Nausea Only    Furosemide      Other reaction(s): GI Disturbance    Linagliptin      Other reaction(s): SOB    Sitagliptin      Other reaction(s): felt poorly    Other Itching     Animal Dander       Current Meds:   Current Facility-Administered Medications:     LORazepam (ATIVAN) injection 0.5 mg, 0.5 mg, IntraVENous, TID PRN, Haleigh Wilson MD, 0.5 mg at 02/05/23 2229    scopolamine (TRANSDERM-SCOP) transdermal patch 1 patch, 1 patch, TransDERmal, Q72H, Gardenia FRAGOSO Lump, PATRICK - CNP, 1 patch at 02/04/23 2133    [Held by provider] dexmedetomidine (PRECEDEX) 400 mcg in sodium chloride 0.9 % 100 mL infusion, 0.1-1.5 mcg/kg/hr, IntraVENous, Continuous, Rachel James MD, Stopped at 02/02/23 1248    EPINEPHrine (EPINEPHrine HCL) 5 mg in sodium chloride 0.9 % 250 mL infusion, 0.01 mcg/kg/min, IntraVENous, Continuous PRN, Rachel James MD, Stopped at 02/02/23 1514    norepinephrine (LEVOPHED) 16 mg in sodium chloride 0.9 % 250 mL infusion, 1-100 mcg/min, IntraVENous, Continuous, Tomasa Mcdermott MD, Stopped at 02/04/23 2017    fentaNYL (SUBLIMAZE) injection 50 mcg, 50 mcg, IntraVENous, Q1H PRN, Rachel James MD, 50 mcg at 02/05/23 1918    heparin (porcine) injection 5,000 Units, 5,000 Units, SubCUTAneous, BID, Tomasa Mcdermott MD, 5,000 Units at 02/06/23 0830    insulin lispro (HUMALOG) injection vial 0-8 Units, 0-8 Units, SubCUTAneous, Q6H, PATRICK Jha CNP, 4 Units at 02/06/23 0006    sodium phosphate 10 mmol in sodium chloride 0.9 % 250 mL IVPB, 10 mmol, IntraVENous, PRN, Stopped at 01/29/23 1100 **OR** sodium phosphate 15 mmol in sodium chloride 0.9 % 250 mL IVPB, 15 mmol, IntraVENous, PRN, Stopped at 02/03/23 0925 **OR** sodium phosphate 20 mmol in sodium chloride 0.9 % 500 mL IVPB, 20 mmol, IntraVENous, PRN, Nargis Rich APRN - CNP    pantoprazole (PROTONIX) 40 mg in sodium chloride (PF) 0.9 % 10 mL injection, 40 mg, IntraVENous, Daily, Sylvia Bolaños MD, 40 mg at 02/06/23 0817    QUEtiapine (SEROQUEL) tablet 25 mg, 25 mg, Oral, BID, Taylor Morrow MD, 25 mg at 02/04/23 0840    sodium chloride flush 0.9 % injection 5-40 mL, 5-40 mL, IntraVENous, 2 times per day, PATRICK Sandoval - CNP, 10 mL at 02/06/23 0822    sodium chloride flush 0.9 % injection 10 mL, 10 mL, IntraVENous, PRN, Gardenia A Lump, APRN - CNP, 10 mL at 01/22/23 1616    0.9 % sodium chloride infusion, , IntraVENous, PRN, Mortimer Savers Lump, APRN - CNP, Stopped at 01/31/23 0556    magnesium sulfate 1000 mg in dextrose 5% 100 mL IVPB, 1,000 mg, IntraVENous, PRN, Mortimer Savers Lump, APRN - CNP, Stopped at 01/22/23 1715    ondansetron (ZOFRAN-ODT) disintegrating tablet 4 mg, 4 mg, Oral, Q8H PRN, 4 mg at 01/23/23 2013 **OR** ondansetron (ZOFRAN) injection 4 mg, 4 mg, IntraVENous, Q6H PRN, Gardenia A Lump, APRN - CNP    polyethylene glycol (GLYCOLAX) packet 17 g, 17 g, Oral, Daily PRN, Gardenia A Lump, APRN - CNP    acetaminophen (TYLENOL) tablet 650 mg, 650 mg, Oral, Q6H PRN, 650 mg at 01/24/23 0907 **OR** acetaminophen (TYLENOL) suppository 650 mg, 650 mg, Rectal, Q6H PRN, Gardenia A Lump, APRN - CNP    glucose chewable tablet 16 g, 4 tablet, Oral, PRN, Gardenia A Lump, APRN - CNP    dextrose bolus 10% 125 mL, 125 mL, IntraVENous, PRN **OR** dextrose bolus 10% 250 mL, 250 mL, IntraVENous, PRN, Gardenia A Lump, APRN - CNP    glucagon (rDNA) injection 1 mg, 1 mg, SubCUTAneous, PRN, Gardenia A Lump, APRN - CNP    dextrose 10 % infusion, , IntraVENous, Continuous PRN, Gardenia A Lump, APRN - CNP    [Held by provider] carvedilol (COREG) tablet 12.5 mg, 12.5 mg, Oral, BID WC, Haleigh Wilson MD, 12.5 mg at 01/26/23 1229    sacubitril-valsartan (ENTRESTO) 24-26 MG per tablet 1 tablet, 1 tablet, Oral, BID, Haleigh Wilson MD, 1 tablet at 02/04/23 0840    [Held by provider] cholestyramine light packet 4 g, 4 g, Oral, BID, Haleigh Wilson MD, 4 g at 01/26/23 0047    [Held by provider] glimepiride (AMARYL) tablet 4 mg (Patient Supplied), 4 mg, Oral, BID WC, Haleigh Wilson MD, 4 mg at 01/25/23 1903      I/O (24Hr):     Intake/Output Summary (Last 24 hours) at 2/6/2023 1457  Last data filed at 2/6/2023 6346  Gross per 24 hour   Intake --   Output 700 ml   Net -700 ml         Data:           Labs:    Hematology:  Recent Labs     02/04/23  0457 02/05/23  0420 02/06/23  6492 WBC 11.6* 8.5 9.1   RBC 3.95 3.99 3.77*   HGB 11.1* 11.0* 10.5*   HCT 35.2* 36.3 34.6*   MCV 89.1 91.0 91.8   MCH 28.1 27.6 27.9   MCHC 31.5 30.3 30.3   RDW 15.3* 15.7* 16.2*    171 161   MPV 11.2 11.6 11.9       Chemistry:  Recent Labs     02/04/23 0457 02/05/23 0420 02/06/23  0605   * 149* 151*   K 4.8 5.1 4.9   * 117* 118*   CO2 20 22 26   GLUCOSE 249* 221* 204*   BUN 55* 58* 50*   CREATININE 1.04* 0.98* 0.89   ANIONGAP 15 10 7*   LABGLOM 51* 55* >60   CALCIUM 8.0* 8.2* 8.2*   PHOS  --   --  2.8       Recent Labs     02/04/23 2327 02/05/23 0420 02/05/23  0743 02/05/23  1126 02/05/23 2032 02/05/23 2332 02/06/23  0620   PROT  --  5.1*  --   --   --   --   --    LABALBU  --  2.5*  --   --   --   --   --    AST  --  36*  --   --   --   --   --    ALT  --  61*  --   --   --   --   --    ALKPHOS  --  221*  --   --   --   --   --    BILITOT  --  0.6  --   --   --   --   --    POCGLU 219*  --  219* 258* 275* 290* 196*         Lab Results   Component Value Date/Time    SPECIAL 1ML LT HAND 01/31/2023 02:09 AM     Lab Results   Component Value Date/Time    CULTURE NO GROWTH 5 DAYS 01/31/2023 02:09 AM       Lab Results   Component Value Date/Time    POCPH 7.366 02/02/2023 02:08 PM    POCPCO2 39.3 02/02/2023 02:08 PM    POCPO2 169.9 02/02/2023 02:08 PM    POCHCO3 22.5 02/02/2023 02:08 PM    NBEA 3 02/02/2023 02:08 PM    PBEA 0 02/01/2023 04:30 AM    VHAQ6NFV 100 02/02/2023 02:08 PM    FIO2 30.0 02/03/2023 03:43 AM       Radiology:    CT CHEST WO CONTRAST    Result Date: 1/22/2023  Multifocal ground-glass opacities indeterminate for COVID-19. Right greater than left pleural effusions. Ileus. Cardiomegaly and coronary artery disease. XR CHEST PORTABLE    Result Date: 1/24/2023  More prominent bilateral interstitial and ground-glass opacities, which may represent developing edema versus multifocal infection. Grossly similar appearance of small effusions.      XR CHEST PORTABLE    Result Date: 1/21/2023  New small right effusion and airspace disease. Possible mild CHF. All radiological studies reviewed  Code Status:  DNR-CCA        Electronically signed by Jeanne Delarosa MD on 2/6/2023 at 2:57 PM    This note was created with the assistance of a speech-recognition program.  Although the intention is to generate a document that actually reflects the content of the visit, no guarantees can be provided that every mistake has been identified and corrected by editing. Note was updated later by me after  physical examination and  completion of the assessment.

## 2023-02-06 NOTE — PROGRESS NOTES
.. PALLIATIVE CARE NURSING ASSESSMENT    Patient: Maria Fernanda King  Room: [facility]0/[facility]-    Reason For Consult   Goals of care evaluation  Distress management  Guidance and support  Facilitate communications  Assistance in coordinating care    Code Status: [facility]    PLAN:  -Extensive talk with daughter at bedside regarding goals of care and quality of life moving forward  -Discussed comfort options and family agreeable to hospice meeting  -[facility] to meet family today around 3:45-4pm  -Support offered to family. Will continue to follow. Impression: Maria Fernanda King is a 80y.o. year old female  has a past medical history of Adenocarcinoma ([address] Arthritis, Atrial fibrillation ([address] Cancer ([address] CHF (congestive heart failure) ([address] Chronic anticoagulation, Diabetes mellitus ([address] Diverticulitis, Femur fracture ([address] Hypertension, Melanoma ([address] Osteoporosis, Overweight, Sepsis ([address] and Serum creatinine raised. .  Currently hospitalized for the management of COVID. The Palliative Care Team is following to assist with goals of care, family support. Vital Signs  Blood pressure (!) 116/51, pulse 85, temperature 98.6 °F (37 °C), temperature source Axillary, resp. rate 16, height 5' 4\" (1.626 m), weight 147 lb 1.6 oz (66.7 kg), SpO2 96 %.     Patient Active Problem List   Diagnosis    Lower GI bleed    Acute on chronic diastolic congestive heart failure (HCC)    Allergic rhinitis    Arthritis of right knee    Bilateral lower extremity edema    Chronic allergic conjunctivitis    Congestive heart failure (HCC)    Type 2 diabetes mellitus without complication, without long-term current use of insulin (HCC)    Essential hypertension    Non-pressure chronic ulcer of left calf with fat layer exposed ([address]    Overweight    Paroxysmal atrial fibrillation (HCC)    Venous ulcer of right leg (HCC)    Pseudogout of right knee    Vasomotor rhinitis    Sensorineural hearing loss (SNHL), bilateral    Serum creatinine raised Tinnitus of both ears    Diarrhea    Chronic combined systolic (congestive) and diastolic (congestive) heart failure (HCC)    COVID-19    Encephalopathy acute    Goals of care, counseling/discussion    DNR (do not resuscitate) discussion    ACP (advance care planning)    Palliative care encounter    Acute respiratory failure with hypoxia and hypercapnia ([facility]       Palliative Interaction: Pt minimally responsive on my visit. She will lift her head when her name is called at times, but does not open her eyes. She does not follow commands. She appears in no distress but she does have a loose congested cough. Chest tube is intact and draining pink fluid. Pt's daughter Angelia Brewer is at bedside. Yuliya Cortez from [facility] care is with me during the visit. I introduced myself and she remembers talking with our team this admission. We review patient's condition and events leading up to today. Sonia Moore is anxious at times and states concern that she has \"tried everything to get her better\". Sonia Moore states patient is a fighter and would want to keep fighting to get well. I explained to her that this happens many times but sometimes patient's body will not align with their thoughts and simply cannot get better. Sonia Moore asks if, in my opinion, patient will ever get well enough to go home and live alone. I told her with her condition and age, the chances are very slim that she will recover enough to go home. Chances are high that she will live in a facility the rest of her life. Sonia Moore states patient would not want that. We discuss comfort care at length and hospice care. I told Sonia Moore the patient's prognosis is poor and physicians are recommending exploring comfort care. Pt's son joins us late in conversation and agrees with comfort care. We discuss hospice companies and family chooses [facility]. Referral made and they will be here to see patient at 3:45-4pm.   Emotional support offered to daughter and son.  Explained to daughter that she has been a wonderful advocate for her Mother and she has exhausted all options to get the patient well. Daughter is appreciative of the support. Will continue to follow for support.      Goals/Plan of care  Education/support to family  Discharge planning/helping to coordinate care  Communications with primary service  Providing support for coping/adaptation/distress of family  Discussing meaning/purpose   Caregiver support/education  Continue with current plan of care  Clarification of medical condition to patient and family  Provided information about hospice  Validating patient/family distress  Continued communication updates  Recognizing, reflecting, and empathizing with family members' anticipatory grief      [address] RN, YAN BARTHOLOMEW [facility] Office: [phone] Office: [phone]    For Symptom Management Clinic scheduling please call [phone]

## 2023-02-06 NOTE — PROGRESS NOTES
Comprehensive Nutrition Assessment    Type and Reason for Visit:  Consult (Tube feeding order and management)    Nutrition Recommendations/Plan:   Continue NPO diet  Tube feeding has been reordered Glucerna 1.5 Tor goal rate 40mL/hr (960 mL total volume) if appropriate  Monitor medical plan, decision for Hospice care, tube feeding status and labs     Malnutrition Assessment:  Malnutrition Status: At risk for malnutrition (Comment) (01/24/23 1730)        Nutrition Assessment:    Patient has been extubated and moved out of ICU. Nutrition consult received for tube feeding order and management. Orders restarted for tube feeding Glucerna 1.5 Tor goal rate 40 mL/hr (960 mL total volume). Patient is unable to follow commands and has minimal response. At this time tube feeding plans are uncertain and nasogastric tube has not been placed. Patient's family is willing to meet with Hospice today. Will monitor medical plan, tube feeding status and labs. Nutrition Related Findings:    Edema: +1 pitting BUE, +1 pitting BLE. Active bowel sound. Diarrhea- FMS. Minimal response Wound Type: Venous Stasis       Current Nutrition Intake & Therapies:    Average Meal Intake: NPO  Average Supplements Intake: NPO  Diet NPO  ADULT TUBE FEEDING; Nasogastric; Diabetic; Continuous; 15; Yes; 15; Q 4 hours; 40; 30; Q 4 hours    Anthropometric Measures:  Height: 5' 4\" (162.6 cm)  Ideal Body Weight (IBW): 120 lbs (55 kg)       Current Body Weight: 147 lb (66.7 kg), 122.5 % IBW. Weight Source: Bed Scale  Current BMI (kg/m2): 25.2        Weight Adjustment For: No Adjustment                 BMI Categories: Overweight (BMI 25.0-29. 9)    Estimated Daily Nutrient Needs:  Energy Requirements Based On: Kcal/kg  Weight Used for Energy Requirements: Current  Energy (kcal/day): 7566-8294 kcal (20-22 kcal/kg)  Weight Used for Protein Requirements: Current  Protein (g/day): 80-87 gm of protein (1.2-1.3 gm/kg)  Method Used for Fluid Requirements: 1 ml/kcal  Fluid (ml/day): 1180 mL    Nutrition Diagnosis:   Inadequate oral intake related to inadequate protein-energy intake as evidenced by NPO or clear liquid status due to medical condition (TF is on hold)    Nutrition Interventions:   Food and/or Nutrient Delivery: Continue NPO (restart TF if appropriate)  Nutrition Education/Counseling: Education not indicated  Coordination of Nutrition Care: Continue to monitor while inpatient       Goals:  Previous Goal Met: Progress towards Goal(s) Declining  Goals: Meet at least 75% of estimated needs, Tolerate nutrition support at goal rate       Nutrition Monitoring and Evaluation:   Behavioral-Environmental Outcomes: None Identified  Food/Nutrient Intake Outcomes: Enteral Nutrition Intake/Tolerance  Physical Signs/Symptoms Outcomes: Biochemical Data, Fluid Status or Edema, Skin, Weight, GI Status, Diarrhea    Discharge Planning:     Too soon to determine         Clabe Leyden MFN, RDN, LDN  Lead Clinical Dietitian  RD Office Phone [phone]

## 2023-02-06 NOTE — PROGRESS NOTES
Pulmonary Critical Care Progress Note  Leyla Soliman CNP/Gosia Calle MD     Patient seen for the follow up of  COVID-19 acute hypoxic respiratory insufficiency, pulmonary edema, pleural effusions    Subjective:  Patient was extubated 2/5/2023. She is currently resting in bed on nasal cannula at 5 L her daughter is at bedside. Patient will not open her eyes but does respond minimally to me. She has a loose wet sounding cough. Her pigtail catheter remains to suction. Examination:  Vitals: BP (!) 116/51   Pulse 85   Temp 98.6 °F (37 °C) (Axillary)   Resp 16   Ht 5' 4\" (1.626 m)   Wt 147 lb 1.6 oz (66.7 kg)   SpO2 96%   BMI 25.25 kg/m²   General appearance:   Awake resting in bed, daughter at bedside  Neck: No JVD  Lungs: Decreased breath sounds no significant wheezing, occasional crackles  Heart: regular rate and rhythm, S1, S2 normal, no gallop  Abdomen: Soft, non tender, + BS  Extremities: no cyanosis or clubbing. Trace edema.     LABs:  CBC:   Recent Labs     02/04/23  0457 02/05/23  0420 02/06/23  0605   WBC 11.6* 8.5 9.1   HGB 11.1* 11.0* 10.5*   HCT 35.2* 36.3 34.6*    171 161     BMP:   Recent Labs     02/04/23  0457 02/05/23  0420 02/06/23  0605   * 149* 151*   K 4.8 5.1 4.9   CO2 20 22 26   BUN 55* 58* 50*   CREATININE 1.04* 0.98* 0.89   LABGLOM 51* 55* >60   GLUCOSE 249* 221* 204*      Latest Reference Range & Units 1/31/23 00:07 1/31/23 02:44   POC pH 7.350 - 7.450  6.963 (LL) 7.418   POC pCO2 35.0 - 48.0 mm Hg 128.5 (HH) 34.0 (L)   POC PO2 83.0 - 108.0 mm Hg 98.3 148.7 (H)   POC HCO3 21.0 - 28.0 mmol/L 29.1 (H) 22.0   POC O2 SAT 94.0 - 98.0 % 90 (L) 99 (H)   POC Ionized Calcium 1.15 - 1.33 mmol/L 1.60 (H)    POC Potassium 3.5 - 4.5 mmol/L 5.1 (H)       Latest Reference Range & Units Most Recent   Procalcitonin <0.09 ng/mL 0.55 (H)  1/22/23 13:40     Radiology:  X-ray chest 2/6/2023  Stable left basilar pigtail pleural catheter, slight interval increase in size of small left apical pneumothorax. Slight interval progression of bibasilar airspace disease. Stable mild pulmonary edema. Stable cardiomegaly. X-ray chest: 2/5/2023      CT brain 1/31/23: No acute intracranial abnormality     CT chest 1/22/2023      Impression:  Acute hypoxic respiratory failure  COVID pneumonia  Pulmonary edema  Small bilateral pleural effusions right greater than left  Elevated troponin / History of A-fib, not on anticoag d/t age, frailty and hx of GIB per cardiology   Allergic rhinitis, DM, HTN  Ileus  Bradycardia     Recommendations:  Oxygen via nasal cannula  Check VBG per patient's daughter's request  Continue chest tube to suction  Off Levophed drip   Seroquel 25 twice daily  Albuterol every 6 hours as needed  Insert NG tube and start tube feeds   May attempt swallow study once patient is more awake, currently she is not appropriate for swallow evaluation  Off ATB per Infectious disease, s/p Levaquin course  Completed decadron course   Neurology on the consult  Cardiology following/off anticoagulation for A. fib d/t history of GI bleed  Coreg on hold- monitor BP/HR   GI prophylaxis, Protonix  DVT prophylaxis, subQ heparin 5000 every 12 hours  Palliative care following  DNR CCA, no defibrillation or reintubation. Detailed discussion had with daughter today multiple questions answered. Overall poor prognosis, consider comfort care/hospice  Patient is being seen in collaboration with Dr. Paulette Knox. Final/further recommendations and plan to follow once evaluated by collaborating physician.   Discussed with RN  We will follow with you    Electronically signed by     PATRICK Schaefer CNP on 2/6/2023 at 10:35 AM  Pulmonary Critical Care and Sleep Medicine,  [facility] AT [place]: [phone]

## 2023-02-06 NOTE — CARE COORDINATION
Discharge planning    Patient transferred out of ICU. Known to writer from earlier in admission. Patient lives alone. Admitted with a/c CHF. Covid 19 and encephalopathy. She is currently on 5 liters NC> patient has minimal intake. Continues to refuse care and TPN being discussed. Currently on NG TF. Patient currently a DNR CCA. No re intubation or defibrillation. SS noted that mike gonzáles and tray were following prior to being transferred back to ICU. Sent face sheet to [facility] to follow. [facility] to meet with family today. will follow.

## 2023-02-06 NOTE — PLAN OF CARE
Problem: Discharge Planning  Goal: Discharge to home or other facility with appropriate resources  Outcome: Progressing  Flowsheets  Taken 2/5/2023 1200  Discharge to home or other facility with appropriate resources:   Identify barriers to discharge with patient and caregiver   Arrange for needed discharge resources and transportation as appropriate   Identify discharge learning needs (meds, wound care, etc)   Refer to discharge planning if patient needs post-hospital services based on physician order or complex needs related to functional status, cognitive ability or social support system  Taken 2/5/2023 0800  Discharge to home or other facility with appropriate resources:   Identify barriers to discharge with patient and caregiver   Arrange for needed discharge resources and transportation as appropriate   Identify discharge learning needs (meds, wound care, etc)   Refer to discharge planning if patient needs post-hospital services based on physician order or complex needs related to functional status, cognitive ability or social support system     Problem: Skin/Tissue Integrity  Goal: Absence of new skin breakdown  Description: 1. Monitor for areas of redness and/or skin breakdown  2. Assess vascular access sites hourly  3. Every 4-6 hours minimum:  Change oxygen saturation probe site  4. Every 4-6 hours:  If on nasal continuous positive airway pressure, respiratory therapy assess nares and determine need for appliance change or resting period.   Outcome: Progressing     Problem: Safety - Adult  Goal: Free from fall injury  Outcome: Progressing  Flowsheets (Taken 2/5/2023 1234)  Free From Fall Injury: Instruct family/caregiver on patient safety     Problem: ABCDS Injury Assessment  Goal: Absence of physical injury  Outcome: Progressing  Flowsheets (Taken 2/5/2023 1234)  Absence of Physical Injury: Implement safety measures based on patient assessment     Problem: Chronic Conditions and Co-morbidities  Goal: Patient's chronic conditions and co-morbidity symptoms are monitored and maintained or improved  Outcome: Progressing  Flowsheets  Taken 2/5/2023 1200  Care Plan - Patient's Chronic Conditions and Co-Morbidity Symptoms are Monitored and Maintained or Improved:   Monitor and assess patient's chronic conditions and comorbid symptoms for stability, deterioration, or improvement   Collaborate with multidisciplinary team to address chronic and comorbid conditions and prevent exacerbation or deterioration   Update acute care plan with appropriate goals if chronic or comorbid symptoms are exacerbated and prevent overall improvement and discharge  Taken 2/5/2023 0800  Care Plan - Patient's Chronic Conditions and Co-Morbidity Symptoms are Monitored and Maintained or Improved:   Monitor and assess patient's chronic conditions and comorbid symptoms for stability, deterioration, or improvement   Collaborate with multidisciplinary team to address chronic and comorbid conditions and prevent exacerbation or deterioration   Update acute care plan with appropriate goals if chronic or comorbid symptoms are exacerbated and prevent overall improvement and discharge     Problem: Cardiovascular - Adult  Goal: Maintains optimal cardiac output and hemodynamic stability  Outcome: Progressing  Flowsheets  Taken 2/5/2023 1200  Maintains optimal cardiac output and hemodynamic stability:   Monitor blood pressure and heart rate   Monitor urine output and notify Licensed Independent Practitioner for values outside of normal range   Assess for signs of decreased cardiac output   Administer fluid and/or volume expanders as ordered   Administer vasoactive medications as ordered  Taken 2/5/2023 0800  Maintains optimal cardiac output and hemodynamic stability:   Monitor blood pressure and heart rate   Monitor urine output and notify Licensed Independent Practitioner for values outside of normal range   Assess for signs of decreased cardiac output Administer fluid and/or volume expanders as ordered   Administer vasoactive medications as ordered     Problem: Respiratory - Adult  Goal: Achieves optimal ventilation and oxygenation  Outcome: Progressing  Flowsheets  Taken 2/5/2023 1200  Achieves optimal ventilation and oxygenation:   Assess for changes in respiratory status   Assess for changes in mentation and behavior   Position to facilitate oxygenation and minimize respiratory effort   Oxygen supplementation based on oxygen saturation or arterial blood gases   Encourage broncho-pulmonary hygiene including cough, deep breathe, incentive spirometry   Assess the need for suctioning and aspirate as needed   Assess and instruct to report shortness of breath or any respiratory difficulty   Respiratory therapy support as indicated  Taken 2/5/2023 0800  Achieves optimal ventilation and oxygenation:   Assess for changes in respiratory status   Assess for changes in mentation and behavior   Position to facilitate oxygenation and minimize respiratory effort   Assess the need for suctioning and aspirate as needed   Assess and instruct to report shortness of breath or any respiratory difficulty   Respiratory therapy support as indicated   Oxygen supplementation based on oxygen saturation or arterial blood gases   Encourage broncho-pulmonary hygiene including cough, deep breathe, incentive spirometry     Problem: Gastrointestinal - Adult  Goal: Maintains or returns to baseline bowel function  Outcome: Progressing  Flowsheets  Taken 2/5/2023 1200  Maintains or returns to baseline bowel function:   Assess bowel function   Encourage oral fluids to ensure adequate hydration   Administer IV fluids as ordered to ensure adequate hydration   Administer ordered medications as needed   Encourage mobilization and activity   Nutrition consult to assist patient with appropriate food choices  Taken 2/5/2023 0800  Maintains or returns to baseline bowel function:   Assess bowel function Encourage oral fluids to ensure adequate hydration   Administer IV fluids as ordered to ensure adequate hydration   Administer ordered medications as needed   Encourage mobilization and activity   Nutrition consult to assist patient with appropriate food choices     Problem: Metabolic/Fluid and Electrolytes - Adult  Goal: Glucose maintained within prescribed range  Outcome: Progressing  Flowsheets  Taken 2/5/2023 1200  Glucose maintained within prescribed range:   Monitor blood glucose as ordered   Assess for signs and symptoms of hyperglycemia and hypoglycemia   Administer ordered medications to maintain glucose within target range   Assess barriers to adequate nutritional intake and initiate nutrition consult as needed   Instruct patient on self management of diabetes and initiate consult as needed  Taken 2/5/2023 0800  Glucose maintained within prescribed range:   Monitor blood glucose as ordered   Assess for signs and symptoms of hyperglycemia and hypoglycemia   Administer ordered medications to maintain glucose within target range   Assess barriers to adequate nutritional intake and initiate nutrition consult as needed   Instruct patient on self management of diabetes and initiate consult as needed     Problem: Musculoskeletal - Adult  Goal: Return mobility to safest level of function  Outcome: Progressing  Flowsheets  Taken 2/5/2023 1200  Return Mobility to Safest Level of Function:   Assess patient stability and activity tolerance for standing, transferring and ambulating with or without assistive devices   Assist with transfers and ambulation using safe patient handling equipment as needed   Ensure adequate protection for wounds/incisions during mobilization   Obtain physical therapy/occupational therapy consults as needed   Instruct patient/family in ordered activity level  Taken 2/5/2023 0800  Return Mobility to Safest Level of Function:   Assess patient stability and activity tolerance for standing, transferring and ambulating with or without assistive devices   Assist with transfers and ambulation using safe patient handling equipment as needed   Ensure adequate protection for wounds/incisions during mobilization   Obtain physical therapy/occupational therapy consults as needed   Instruct patient/family in ordered activity level  Goal: Return ADL status to a safe level of function  Outcome: Progressing  Flowsheets  Taken 2/5/2023 1200  Return ADL Status to a Safe Level of Function:   Administer medication as ordered   Assess activities of daily living deficits and provide assistive devices as needed   Obtain physical therapy/occupational therapy consults as needed   Assist and instruct patient to increase activity and self care as tolerated  Taken 2/5/2023 0800  Return ADL Status to a Safe Level of Function:   Administer medication as ordered   Assess activities of daily living deficits and provide assistive devices as needed   Obtain physical therapy/occupational therapy consults as needed   Assist and instruct patient to increase activity and self care as tolerated     Problem: Pain  Goal: Verbalizes/displays adequate comfort level or baseline comfort level  Outcome: Progressing  Flowsheets (Taken 2/5/2023 0815)  Verbalizes/displays adequate comfort level or baseline comfort level:   Encourage patient to monitor pain and request assistance   Assess pain using appropriate pain scale   Administer analgesics based on type and severity of pain and evaluate response   Implement non-pharmacological measures as appropriate and evaluate response   Consider cultural and social influences on pain and pain management   Notify Licensed Independent Practitioner if interventions unsuccessful or patient reports new pain     Problem: Nutrition Deficit:  Goal: Optimize nutritional status  Outcome: Progressing     Problem: Neurosensory - Adult  Goal: Achieves maximal functionality and self care  Outcome: Progressing  Flowsheets  Taken 2/5/2023 1200  Achieves maximal functionality and self care:   Monitor swallowing and airway patency with patient fatigue and changes in neurological status   Encourage and assist patient to increase activity and self care with guidance from physical therapy/occupational therapy   Encourage visually impaired, hearing impaired and aphasic patients to use assistive/communication devices  Taken 2/5/2023 0800  Achieves maximal functionality and self care:   Monitor swallowing and airway patency with patient fatigue and changes in neurological status   Encourage and assist patient to increase activity and self care with guidance from physical therapy/occupational therapy   Encourage visually impaired, hearing impaired and aphasic patients to use assistive/communication devices     Problem: Safety - Medical Restraint  Goal: Remains free of injury from restraints (Restraint for Interference with Medical Device)  Description: INTERVENTIONS:  1. Determine that other, less restrictive measures have been tried or would not be effective before applying the restraint  2. Evaluate the patient's condition at the time of restraint application  3. Inform patient/family regarding the reason for restraint  4. Q2H: Monitor safety, psychosocial status, comfort, nutrition and hydration  Outcome: Progressing     Problem: Change in Body Image  Goal: Pt/Family communicate acceptance of loss or change in body image and feel psychological comfort and peace  Description: INTERVENTIONS:  1. Assess patient/family anxiety and grief process related to change in body image, loss of functional status, loss of sense of self, and forgiveness  2. Provide emotional and spiritual support  3. Provide information about the patient's health status with consideration of family and cultural values  4. Communicate willingness to discuss loss and facilitate grief process with patient/family as appropriate  5.  Emphasize sustaining relationships within family system and community, or ivan/spiritual traditions  6.  Initiate Spiritual Care, Psychosocial Clinical Specialist consult as needed  Outcome: Progressing  Flowsheets  Taken 2/5/2023 1200  Patient/family communicate acceptance of loss or change in body image and feel psychological comfort and peace:   Assess patient/family anxiety and grief process related to change in body image, loss of functional status, loss of sense of self, and forgiveness   Provide emotional and spiritual support   Provide information about the patients health status with consideration of family and cultural values   Communicate willingness to discuss loss and facilitate grief process with patient/family as appropriate   Emphasize sustaining relationships within family system and community, or ivan/spiritual traditions   [address]idy, Psychosocial Clinical Specialist consult as needed  Taken 2/5/2023 0800  Patient/family communicate acceptance of loss or change in body image and feel psychological comfort and peace:   Assess patient/family anxiety and grief process related to change in body image, loss of functional status, loss of sense of self, and forgiveness   Provide emotional and spiritual support   Provide information about the patients health status with consideration of family and cultural values   Communicate willingness to discuss loss and facilitate grief process with patient/family as appropriate   Emphasize sustaining relationships within family system and community, or ivan/spiritual traditions   Initiate Spiritual Care, Psychosocial Clinical Specialist consult as needed     Problem: Skin/Tissue Integrity - Adult  Goal: Skin integrity remains intact  Outcome: Progressing  Flowsheets  Taken 2/5/2023 1234  Skin Integrity Remains Intact:   Monitor for areas of redness and/or skin breakdown   Assess vascular access sites hourly  Taken 2/5/2023 1200  Skin Integrity Remains Intact:   Monitor for areas of redness and/or skin breakdown   Assess vascular access sites hourly  Taken 2/5/2023 0800  Skin Integrity Remains Intact:   Monitor for areas of redness and/or skin breakdown   Assess vascular access sites hourly

## 2023-02-06 NOTE — PROGRESS NOTES
Physical Therapy  DATE: 2023    NAME: Barrera Miller  MRN: [id number]   : 10/23/1932    Patient not seen this date for Physical Therapy due to:      [x] Cancel by RN or physician due to: RN Alena López reporting pt not appropriate for therapy eval at this time. States pt unable to follow commands. PT will continue to follow and ck back if pt becomes appropriate. [] Hemodialysis    [] Critical Lab Value Level     [] Blood transfusion in progress    [] Acute or unstable cardiovascular status   _MAP < 55 or more than >115  _HR < 40 or > 130    [] Acute or unstable pulmonary status   -FiO2 > 60%   _RR < 5 or >40    _O2 sats < 85%    [] Strict Bedrest    [] Off Unit for surgery or procedure    [] Off Unit for testing       [] Pending imaging to R/O fracture    [] Refusal by Patient      [] Other      [] PT being discontinued at this time. Patient independent. No further needs. [] PT being discontinued at this time as the patient has been transferred to hospice care. No further needs.       Berry Tiwari, PT

## 2023-02-06 NOTE — PROGRESS NOTES
Patient was sleeping as writer entered the room (daughter at bedside). Daughter engaged in a brief conversation with writer and shared that the patient remains stable with nothing unchanged (medically). Daughter stated the family is still trying to make decisions concerning further care, and the decisions are very hard. Writer was able to provide words of comfort and a silent prayer of healing for the patient, as well as inner strength and peace for the family. Daughter is grateful for the continued support and prayers given by spiritual care. Spiritual care will maintain daily follow ups and visits as needed or requested. 02/06/23 1004   Encounter Summary   Service Provided For: Patient and family together   Referral/Consult From: Crow Orozco   Last Encounter  02/06/23   Complexity of Encounter Low   Begin Time 0940   End Time  0950   Total Time Calculated 10 min   Spiritual/Emotional needs   Type Spiritual Support   Palliative Care   Type Palliative Care, Follow-up   Assessment/Intervention/Outcome   Assessment Calm;Coping;Decisional conflict; Peaceful   Intervention Active listening;Nurtured Hope;Sustaining Presence/Ministry of presence   Outcome Engaged in conversation;Expressed Gratitude   Plan and Referrals   Plan/Referrals Continue Support (comment)

## 2023-02-06 NOTE — PLAN OF CARE
Pt arrived from ICU via transfer. Pt on 5L NC and saturating. Valenzuela in place and draining, FMS draining, and chest tube in place. Pt resting, safety maintained, bed in lowest position, call light within reach, bed alarm on for safety, wcm. Problem: Discharge Planning  Goal: Discharge to home or other facility with appropriate resources  2/6/2023 0501 by Drake Mccarty RN  Outcome: Progressing     Problem: Skin/Tissue Integrity  Goal: Absence of new skin breakdown  Description: 1. Monitor for areas of redness and/or skin breakdown  2. Assess vascular access sites hourly  3. Every 4-6 hours minimum:  Change oxygen saturation probe site  4. Every 4-6 hours:  If on nasal continuous positive airway pressure, respiratory therapy assess nares and determine need for appliance change or resting period.   2/6/2023 0501 by Drake Mccarty RN  Outcome: Progressing     Problem: Safety - Adult  Goal: Free from fall injury  2/6/2023 0501 by Drake Mccarty RN  Outcome: Progressing     Problem: ABCDS Injury Assessment  Goal: Absence of physical injury  2/6/2023 0501 by Drake Mccarty RN  Outcome: Progressing     Problem: Chronic Conditions and Co-morbidities  Goal: Patient's chronic conditions and co-morbidity symptoms are monitored and maintained or improved  2/6/2023 0501 by Drake Mccarty RN  Outcome: Progressing     Problem: Cardiovascular - Adult  Goal: Maintains optimal cardiac output and hemodynamic stability  2/6/2023 0501 by Drake Mccarty RN  Outcome: Progressing     Problem: Respiratory - Adult  Goal: Achieves optimal ventilation and oxygenation  2/6/2023 0501 by Drake Mccarty RN  Outcome: Progressing     Problem: Gastrointestinal - Adult  Goal: Maintains or returns to baseline bowel function  2/6/2023 0501 by Drake Mccarty RN  Outcome: Progressing     Problem: Musculoskeletal - Adult  Goal: Return mobility to safest level of function  2/6/2023 0501 by Roslyn Palm RN  Outcome: Progressing     Problem: Musculoskeletal - Adult  Goal: Return ADL status to a safe level of function  2/6/2023 0501 by Roslyn Palm RN  Outcome: Progressing     Problem: Pain  Goal: Verbalizes/displays adequate comfort level or baseline comfort level  2/6/2023 0501 by Roslyn Palm RN  Outcome: Progressing     Problem: Nutrition Deficit:  Goal: Optimize nutritional status  2/6/2023 0501 by Roslyn Palm RN  Outcome: Progressing     Problem: Change in Body Image  Goal: Pt/Family communicate acceptance of loss or change in body image and feel psychological comfort and peace  Description: INTERVENTIONS:  1. Assess patient/family anxiety and grief process related to change in body image, loss of functional status, loss of sense of self, and forgiveness  2. Provide emotional and spiritual support  3. Provide information about the patient's health status with consideration of family and cultural values  4. Communicate willingness to discuss loss and facilitate grief process with patient/family as appropriate  5. Emphasize sustaining relationships within family system and community, or ivan/spiritual traditions  6. Initiate Spiritual Care, Psychosocial Clinical Specialist consult as needed  2/6/2023 0501 by Roslyn Palm RN  Outcome: Progressing     Problem: Confusion  Goal: Confusion, delirium, dementia, or psychosis is improved or at baseline  Description: INTERVENTIONS:  1. Assess for possible contributors to thought disturbance, including medications, impaired vision or hearing, underlying metabolic abnormalities, dehydration, psychiatric diagnoses, and notify attending LIP  2. [place] high risk fall precautions, as indicated  3. Provide frequent short contacts to provide reality reorientation, refocusing and direction  4. Decrease environmental stimuli, including noise as appropriate  5.  Monitor and intervene to maintain adequate nutrition, hydration, elimination, sleep and activity  6. If unable to ensure safety without constant attention obtain sitter and review sitter guidelines with assigned personnel  7.  Initiate Psychosocial CNS and Spiritual Care consult, as indicated  Outcome: Progressing     Problem: Skin/Tissue Integrity - Adult  Goal: Skin integrity remains intact  2/6/2023 0501 by Raquel Pittman RN  Outcome: Progressing

## 2023-02-06 NOTE — PROGRESS NOTES
Patient agitated when completing assessment. Family would like NG placed to start Tube feedings. Dietitian has left for the day and tube feeding can not start tonight. Messaged NP Lump to ask if we can hold oral meds for tonight and hold on placing NG due to patient's getting agitated easily. The NP was ok with holding meds and NG tube placement.

## 2023-02-06 NOTE — PROGRESS NOTES
Occupational Therapy  . [facility]  Occupational Therapy Not Seen Note    Patient not available for Occupational Therapy due to:    [] Testing:    [] Hemodialysis    [x] Cancelled by RN: HALEY Mendoza reporting pt not appropriate for therapy eval at this time. States pt unable to follow commands. OT will continue to follow and ck back if pt becomes appropriate.       []Refusal by Patient:    [] Surgery:     [] Intubation:     [] Pain Medication:    [] Sedation:     [] Spine Precautions :    [] Medical Instability:    [] Other:    Daniel Amin, OT

## 2023-02-06 NOTE — PLAN OF CARE
Problem: Discharge Planning  Goal: Discharge to home or other facility with appropriate resources  2/6/2023 0043 by Kristi Koyanagi, RN  Outcome: Progressing  Flowsheets (Taken 2/5/2023 1930)  Discharge to home or other facility with appropriate resources:   Identify barriers to discharge with patient and caregiver   Arrange for needed discharge resources and transportation as appropriate  2/5/2023 1928 by Ritchie Haider, RN  Outcome: Progressing  Flowsheets  Taken 2/5/2023 1200  Discharge to home or other facility with appropriate resources:   Identify barriers to discharge with patient and caregiver   Arrange for needed discharge resources and transportation as appropriate   Identify discharge learning needs (meds, wound care, etc)   Refer to discharge planning if patient needs post-hospital services based on physician order or complex needs related to functional status, cognitive ability or social support system  Taken 2/5/2023 0800  Discharge to home or other facility with appropriate resources:   Identify barriers to discharge with patient and caregiver   Arrange for needed discharge resources and transportation as appropriate   Identify discharge learning needs (meds, wound care, etc)   Refer to discharge planning if patient needs post-hospital services based on physician order or complex needs related to functional status, cognitive ability or social support system     Problem: Skin/Tissue Integrity  Goal: Absence of new skin breakdown  Description: 1. Monitor for areas of redness and/or skin breakdown  2. Assess vascular access sites hourly  3. Every 4-6 hours minimum:  Change oxygen saturation probe site  4. Every 4-6 hours:  If on nasal continuous positive airway pressure, respiratory therapy assess nares and determine need for appliance change or resting period.   2/6/2023 0043 by Kristi Koyanagi, RN  Outcome: Progressing  2/5/2023 1928 by Ritchie Haider, RN  Outcome: Progressing     Problem: Safety - Adult  Goal: Free from fall injury  2/6/2023 0043 by Keesha Hardin RN  Outcome: Progressing  2/5/2023 1928 by Jaclyn Da Silva RN  Outcome: Progressing  Flowsheets (Taken 2/5/2023 1234)  Free From Fall Injury: Instruct family/caregiver on patient safety     Problem: ABCDS Injury Assessment  Goal: Absence of physical injury  2/6/2023 0043 by Keesha Hardin RN  Outcome: Progressing  2/5/2023 1928 by Jaclyn Da Silva RN  Outcome: Progressing  Flowsheets (Taken 2/5/2023 1234)  Absence of Physical Injury: Implement safety measures based on patient assessment     Problem: Chronic Conditions and Co-morbidities  Goal: Patient's chronic conditions and co-morbidity symptoms are monitored and maintained or improved  2/6/2023 0043 by Keesha Hardin RN  Outcome: Progressing  Flowsheets (Taken 2/5/2023 1930)  Care Plan - Patient's Chronic Conditions and Co-Morbidity Symptoms are Monitored and Maintained or Improved:   Monitor and assess patient's chronic conditions and comorbid symptoms for stability, deterioration, or improvement   Collaborate with multidisciplinary team to address chronic and comorbid conditions and prevent exacerbation or deterioration  2/5/2023 1928 by Jaclyn Da Silva RN  Outcome: Progressing  Flowsheets  Taken 2/5/2023 1200  Care Plan - Patient's Chronic Conditions and Co-Morbidity Symptoms are Monitored and Maintained or Improved:   Monitor and assess patient's chronic conditions and comorbid symptoms for stability, deterioration, or improvement   Collaborate with multidisciplinary team to address chronic and comorbid conditions and prevent exacerbation or deterioration   Update acute care plan with appropriate goals if chronic or comorbid symptoms are exacerbated and prevent overall improvement and discharge  Taken 2/5/2023 0800  Care Plan - Patient's Chronic Conditions and Co-Morbidity Symptoms are Monitored and Maintained or Improved:   Monitor and assess patient's chronic conditions and comorbid symptoms for stability, deterioration, or improvement   Collaborate with multidisciplinary team to address chronic and comorbid conditions and prevent exacerbation or deterioration   Update acute care plan with appropriate goals if chronic or comorbid symptoms are exacerbated and prevent overall improvement and discharge     Problem: Cardiovascular - Adult  Goal: Maintains optimal cardiac output and hemodynamic stability  2/6/2023 0043 by Burgess Michael RN  Outcome: Progressing  2/5/2023 1928 by Anton Choi RN  Outcome: Progressing  Flowsheets  Taken 2/5/2023 1200  Maintains optimal cardiac output and hemodynamic stability:   Monitor blood pressure and heart rate   Monitor urine output and notify Licensed Independent Practitioner for values outside of normal range   Assess for signs of decreased cardiac output   Administer fluid and/or volume expanders as ordered   Administer vasoactive medications as ordered  Taken 2/5/2023 0800  Maintains optimal cardiac output and hemodynamic stability:   Monitor blood pressure and heart rate   Monitor urine output and notify Licensed Independent Practitioner for values outside of normal range   Assess for signs of decreased cardiac output   Administer fluid and/or volume expanders as ordered   Administer vasoactive medications as ordered     Problem: Respiratory - Adult  Goal: Achieves optimal ventilation and oxygenation  2/6/2023 0043 by Burgess Michael RN  Outcome: Progressing  2/5/2023 1928 by Anton Choi RN  Outcome: Progressing  Flowsheets  Taken 2/5/2023 1200  Achieves optimal ventilation and oxygenation:   Assess for changes in respiratory status   Assess for changes in mentation and behavior   Position to facilitate oxygenation and minimize respiratory effort   Oxygen supplementation based on oxygen saturation or arterial blood gases   Encourage broncho-pulmonary hygiene including cough, deep breathe, incentive spirometry   Assess the need for suctioning and aspirate as needed   Assess and instruct to report shortness of breath or any respiratory difficulty   Respiratory therapy support as indicated  Taken 2/5/2023 0800  Achieves optimal ventilation and oxygenation:   Assess for changes in respiratory status   Assess for changes in mentation and behavior   Position to facilitate oxygenation and minimize respiratory effort   Assess the need for suctioning and aspirate as needed   Assess and instruct to report shortness of breath or any respiratory difficulty   Respiratory therapy support as indicated   Oxygen supplementation based on oxygen saturation or arterial blood gases   Encourage broncho-pulmonary hygiene including cough, deep breathe, incentive spirometry     Problem: Gastrointestinal - Adult  Goal: Maintains or returns to baseline bowel function  2/6/2023 0043 by Melyssa Hartman RN  Outcome: Progressing  2/5/2023 1928 by Eduardo Cruz RN  Outcome: Progressing  Flowsheets  Taken 2/5/2023 1200  Maintains or returns to baseline bowel function:   Assess bowel function   Encourage oral fluids to ensure adequate hydration   Administer IV fluids as ordered to ensure adequate hydration   Administer ordered medications as needed   Encourage mobilization and activity   Nutrition consult to assist patient with appropriate food choices  Taken 2/5/2023 0800  Maintains or returns to baseline bowel function:   Assess bowel function   Encourage oral fluids to ensure adequate hydration   Administer IV fluids as ordered to ensure adequate hydration   Administer ordered medications as needed   Encourage mobilization and activity   Nutrition consult to assist patient with appropriate food choices     Problem: Metabolic/Fluid and Electrolytes - Adult  Goal: Glucose maintained within prescribed range  2/6/2023 0043 by Melyssa Hartman RN  Outcome: Progressing  Flowsheets (Taken 2/5/2023 1930)  Glucose maintained within prescribed range: Monitor blood glucose as ordered  2/5/2023 1928 by Beverly Calhoun RN  Outcome: Progressing  Flowsheets  Taken 2/5/2023 1200  Glucose maintained within prescribed range:   Monitor blood glucose as ordered   Assess for signs and symptoms of hyperglycemia and hypoglycemia   Administer ordered medications to maintain glucose within target range   Assess barriers to adequate nutritional intake and initiate nutrition consult as needed   Instruct patient on self management of diabetes and initiate consult as needed  Taken 2/5/2023 0800  Glucose maintained within prescribed range:   Monitor blood glucose as ordered   Assess for signs and symptoms of hyperglycemia and hypoglycemia   Administer ordered medications to maintain glucose within target range   Assess barriers to adequate nutritional intake and initiate nutrition consult as needed   Instruct patient on self management of diabetes and initiate consult as needed     Problem: Musculoskeletal - Adult  Goal: Return mobility to safest level of function  2/6/2023 0043 by Ludivina Cohen RN  Outcome: Progressing  2/5/2023 1928 by Beverly Calhoun RN  Outcome: Progressing  Flowsheets  Taken 2/5/2023 1200  Return Mobility to Safest Level of Function:   Assess patient stability and activity tolerance for standing, transferring and ambulating with or without assistive devices   Assist with transfers and ambulation using safe patient handling equipment as needed   Ensure adequate protection for wounds/incisions during mobilization   Obtain physical therapy/occupational therapy consults as needed   Instruct patient/family in ordered activity level  Taken 2/5/2023 0800  Return Mobility to Safest Level of Function:   Assess patient stability and activity tolerance for standing, transferring and ambulating with or without assistive devices   Assist with transfers and ambulation using safe patient handling equipment as needed   Ensure adequate protection for wounds/incisions during mobilization   Obtain physical therapy/occupational therapy consults as needed   Instruct patient/family in ordered activity level  Goal: Return ADL status to a safe level of function  2/6/2023 0043 by Kelle Phelps RN  Outcome: Progressing  2/5/2023 1928 by Kendell Goodwin RN  Outcome: Progressing  Flowsheets  Taken 2/5/2023 1200  Return ADL Status to a Safe Level of Function:   Administer medication as ordered   Assess activities of daily living deficits and provide assistive devices as needed   Obtain physical therapy/occupational therapy consults as needed   Assist and instruct patient to increase activity and self care as tolerated  Taken 2/5/2023 0800  Return ADL Status to a Safe Level of Function:   Administer medication as ordered   Assess activities of daily living deficits and provide assistive devices as needed   Obtain physical therapy/occupational therapy consults as needed   Assist and instruct patient to increase activity and self care as tolerated     Problem: Pain  Goal: Verbalizes/displays adequate comfort level or baseline comfort level  2/6/2023 0043 by Kelle Phelps RN  Outcome: Progressing  2/5/2023 1928 by Kendell Goodwin RN  Outcome: Progressing  Flowsheets (Taken 2/5/2023 0815)  Verbalizes/displays adequate comfort level or baseline comfort level:   Encourage patient to monitor pain and request assistance   Assess pain using appropriate pain scale   Administer analgesics based on type and severity of pain and evaluate response   Implement non-pharmacological measures as appropriate and evaluate response   Consider cultural and social influences on pain and pain management   Notify Licensed Independent Practitioner if interventions unsuccessful or patient reports new pain     Problem: Nutrition Deficit:  Goal: Optimize nutritional status  2/6/2023 0043 by Kelle Phelps RN  Outcome: Progressing  2/5/2023 1928 by Kendell Goodwin RN  Outcome: Progressing     Problem: Neurosensory - Adult  Goal: Achieves maximal functionality and self care  2/6/2023 0043 by Whitney Mendoza RN  Outcome: Progressing  2/5/2023 1928 by Sang Monteiro RN  Outcome: Progressing  Flowsheets  Taken 2/5/2023 1200  Achieves maximal functionality and self care:   Monitor swallowing and airway patency with patient fatigue and changes in neurological status   Encourage and assist patient to increase activity and self care with guidance from physical therapy/occupational therapy   Encourage visually impaired, hearing impaired and aphasic patients to use assistive/communication devices  Taken 2/5/2023 0800  Achieves maximal functionality and self care:   Monitor swallowing and airway patency with patient fatigue and changes in neurological status   Encourage and assist patient to increase activity and self care with guidance from physical therapy/occupational therapy   Encourage visually impaired, hearing impaired and aphasic patients to use assistive/communication devices     Problem: Safety - Medical Restraint  Goal: Remains free of injury from restraints (Restraint for Interference with Medical Device)  Description: INTERVENTIONS:  1. Determine that other, less restrictive measures have been tried or would not be effective before applying the restraint  2. Evaluate the patient's condition at the time of restraint application  3. Inform patient/family regarding the reason for restraint  4. Q2H: Monitor safety, psychosocial status, comfort, nutrition and hydration  2/6/2023 0043 by Whitney Mendoza RN  Outcome: Progressing  2/5/2023 1928 by Sang Monteiro RN  Outcome: Progressing     Problem: Change in Body Image  Goal: Pt/Family communicate acceptance of loss or change in body image and feel psychological comfort and peace  Description: INTERVENTIONS:  1. Assess patient/family anxiety and grief process related to change in body image, loss of functional status, loss of sense of self, and forgiveness  2.  Provide emotional and spiritual support  3. Provide information about the patient's health status with consideration of family and cultural values  4. Communicate willingness to discuss loss and facilitate grief process with patient/family as appropriate  5. Emphasize sustaining relationships within family system and community, or ivan/spiritual traditions  6.  Initiate Spiritual Care, Psychosocial Clinical Specialist consult as needed  2/6/2023 0043 by Chino Peres RN  Outcome: Progressing  2/5/2023 1928 by Yolis Velasquez RN  Outcome: Progressing  Flowsheets  Taken 2/5/2023 1200  Patient/family communicate acceptance of loss or change in body image and feel psychological comfort and peace:   Assess patient/family anxiety and grief process related to change in body image, loss of functional status, loss of sense of self, and forgiveness   Provide emotional and spiritual support   Provide information about the patients health status with consideration of family and cultural values   Communicate willingness to discuss loss and facilitate grief process with patient/family as appropriate   Emphasize sustaining relationships within family system and community, or ivan/spiritual traditions   [address] Zayra Cassidy, Psychosocial Clinical Specialist consult as needed  Taken 2/5/2023 0800  Patient/family communicate acceptance of loss or change in body image and feel psychological comfort and peace:   Assess patient/family anxiety and grief process related to change in body image, loss of functional status, loss of sense of self, and forgiveness   Provide emotional and spiritual support   Provide information about the patients health status with consideration of family and cultural values   Communicate willingness to discuss loss and facilitate grief process with patient/family as appropriate   Emphasize sustaining relationships within family system and community, or ivan/spiritual traditions   Initiate Spiritual Care, Psychosocial Clinical Specialist consult as needed     Problem: Skin/Tissue Integrity - Adult  Goal: Skin integrity remains intact  2/6/2023 0043 by Roxana Vee RN  Outcome: Progressing  2/5/2023 1928 by Vianey Chakraborty RN  Outcome: Progressing  Flowsheets  Taken 2/5/2023 1234  Skin Integrity Remains Intact:   Monitor for areas of redness and/or skin breakdown   Assess vascular access sites hourly  Taken 2/5/2023 1200  Skin Integrity Remains Intact:   Monitor for areas of redness and/or skin breakdown   Assess vascular access sites hourly  Taken 2/5/2023 0800  Skin Integrity Remains Intact:   Monitor for areas of redness and/or skin breakdown   Assess vascular access sites hourly

## 2023-02-06 NOTE — PROGRESS NOTES
[facility]  PROGRESS NOTE    Room # 1020/1020-01   Name: Cong Bonilla              Reason for visit: Palliative Care Interdisciplinary Meeting. I visited the patient's family. Admit Date & Time: 1/21/2023 11:39 PM    Assessment:  Cong Bonilla is a 80 y.o. female. Upon entering the room patient was restless. Patient's daughter was sitting bedside      Intervention:  I introduced myself and reminded patient's daughter of my title as . I introduced Oscar Lyles from Palliative Care. We offered space for patient's daughter to express feelings, needs, and concerns and provided a ministry presence. Patient's daughter engaged in conversation and agreed to meet with [facility] and cease nutrition for patient. Offered words of encouragement. Outcome:  Patient's daughter and son Arpita Herrmann) expressed gratitude for visit. Plan:  Chaplains will remain available to offer spiritual and emotional support as needed. Electronically signed by Kristin Cowart on 2/6/2023 at 2:54 PM.  Aditya     02/06/23 2091   Encounter Summary   Service Provided For: Family   Referral/Consult From: Palliative Care   Support System Children   Last Encounter  02/06/23   Complexity of Encounter Moderate   Encounter    Type Family Care   Spiritual/Emotional needs   Type Spiritual Support   Grief, Loss, and Adjustments   Type End of Life; Anticipatory Grief   Palliative Care   Type Palliative Care, Family Care   Assessment/Intervention/Outcome   Assessment Coping   Intervention Active listening;Explored/Affirmed feelings, thoughts, concerns; Facilitated/Participated in Patient/Family Care Conference;Grief Care;Sustaining Presence/Ministry of presence   Outcome Engaged in conversation;Expressed Gratitude   Plan and Referrals   Plan/Referrals Continue Support (comment)

## 2023-02-07 NOTE — PROGRESS NOTES
[facility].

## 2023-02-07 NOTE — PROGRESS NOTES
[address]   Patient Death Note  DEATH                  Room # 1020/1020-01   Name: Aide Butler            Age: 80 y.o. Gender: female          Yazdanism: Traci Hsieh Date & Time: 2023 11:39 PM        Actual date of death: 2023   TOD: 0111       Referral:  Unit: Progressive  Nurse: Aliza Members AT DEATH:  Patient was to be transferred to [facility], but passed away under our care. IS THIS A 'S CASE? No    SPIRITUAL/EMOTIONAL INTERVENTION:   met with patient's daughter, Dr. Chana Bloch [phone] and secured signature for release of body to [facility] in [place]. Dr. Salma Weaver expressed gratitude for medical staff's  patience with family as they worked with family for the care her mother received. DOCTOR SIGNING DEATH CERTIFICATE:  Name: Jared Howard MD  Phone number: [phone]    Copy of COMPLETED Release of Body Form Received? Yes    Patient's belongings: N/A     HOME:  Contacted Time: 5149  Name: [facility]: [place]  Phone Number: [phone]    NEXT OF KIN:  Name: Chana Bloch   Relationship: Daughter   Street Address: [address] [place] [address]  Zip code: [address]   Phone Number: [phone]    [facility]? No    IF SO, WHAT?   N/A    Electronically signed by Cassi Ardon, on 2023 at 3:29 AM.  Aditya  [phone]

## 2023-02-07 NOTE — PROGRESS NOTES
Spoke to [employer] from [employer]. She verified that pt family would like to change her to a [facility] and proceed with inpatient hospice. Call to Dr Gracelyn Harada - 2 RN telephone verification to change code status complete - form signed and order placed in the computer. Elana Fierro stated she is going to try to see if she can move the patient tonight, if not it would be tomorrow morning.

## 2023-02-14 NOTE — DISCHARGE SUMMARY
[address] [place] [address].,    Adult Hospitalist      Patient ID: Janae Marrufo  MRN: [id number]     Acct:  [de-identified]       Patient's PCP: Grace Yadav MD    Admit Date: 1/21/2023     Discharge Date: 2/7/2023      Admitting Physician: Marisol Kiran MD    Discharge Physician: Fadi Fragoso MD     CONSULTANTS: Patient Care Team:  Grace Yadav MD as PCP - General (Internal Medicine)  Grace Yadav MD as PCP - Empaneled Provider        Active Discharge Diagnoses:  Acute on chronic systolic congestive heart failure  Acute hypoxic respiratory failure  COVID-19 pneumonia  On vaccinated against SARS-CoV-2  Pulmonary edema  Bilateral pleural effusions  Status post left chest tube  Hypotension  Acute metabolic encephalopathy  Nonvaccinated against SARS-CoV-2  Respiratory failure with hypoxia requiring oxygen via nasal cannula  Hypomagnesemia  Hypokalemia  Question of bacterial pneumonia  Essential hypertension  Diabetes mellitus type 2  Paroxysmal atrial fibrillation  Congestive heart failure, systolic/HFrEF with EF 20 to 25%  Osteoarthritis, multiple joints  History of colon cancer  History of skin cancer  Right eye vision loss  Status post endotracheal intubation 1/31/2023  Hypotension  S/p extubation 2/4/2023      Hospital Course:  Patient admitted to the emergency room at [facility] where she presented with dyspnea and neck pain. Patient had recently been discharged from the hospital where she was [facility] by [facility] for congestive heart failure. Patient says she was progressively getting dyspneic initially with exertion and recently even at rest.  As she had recently had cough as well. Says it has mostly been dry. Her daughter was recently found to have COVID-19. Patient says she has not been immunized against COVID, influenza or pneumonia. She says flu and pneumonia shots made her sick. Never got them again.   Regarding COVID-19 she says she lives in her own space and does not interact with people so she did not get it. Says she feels better since admission to the hospital.  Says her breathing and cough are better. She denies any chest pain, nausea, vomiting or abdominal pain. She is more concerned about her right IJ. 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 Denies headache, low back pain, dysuria, rash or hematuria     Patient was not found to be on any anticoagulation. Cardiology recommended keeping her on heparin infusion until they could figure that out. RN will inform daughter Jean-Paul Cano that additional input from her   Patient was admitted for acute on chronic systolic congestive heart failure also noticed to have COVID-19 pneumonia, patient had respiratory failure secondary to that along with pulmonary edema pleural effusion, further patient had a prolonged stay in the hospital was admitted to  progressive unit was later transferred to ICU due to respiratory distress, patient did require intubation, ventilator management, further patient respiratory status and mental status did not improve, patient continued to have decline in that, patient had completed Levaquin, Decadron and other antibiotics in the hospital Infectious Disease was involved, further patient also continued require NG tube feeding, patient was also seen by Cardiology they recommended no anticoagulation due to GI bleed in the past, patient  was eventually extubated by pulmonology, code status was changed to DNR cc arrest with no defibrillation intubation, patient still continue to have oxygen requirement with 5 L nasal cannula along with no improvement in mental status, patient for this reason daughter decided has poor prognosis and a bit patient comfort care and patient is discharged to hospice    The plan was discussed in detail with  patient daughter who agreed with the plan and verbalized understanding .     The patient was seen and examined on day of discharge and this discharge summary is in conjunction with any daily progress note from day of discharge. Hospital Data:    Labs:    Hematology:No results for input(s): WBC, RBC, HGB, HCT, MCV, MCH, MCHC, RDW, PLT, MPV, SEDRATE, CRP, INR, DDIMER, UY5HDPPD, LABABSO in the last 72 hours. Invalid input(s): PT  Chemistry:No results for input(s): NA, K, CL, CO2, GLUCOSE, BUN, CREATININE, MG, ANIONGAP, LABGLOM, GFRAA, CALCIUM, CAION, PHOS, PSA, PROBNP, TROPHS, CKTOTAL, CKMB, CKMBINDEX, MYOGLOBIN, DIGOXIN, LACTACIDWB in the last 72 hours. No results for input(s): PROT, LABALBU, LABA1C, U4BCIRI, W2ULJHD, FT4, TSH, AST, ALT, LDH, GGT, ALKPHOS, LABGGT, BILITOT, BILIDIR, AMMONIA, AMYLASE, LIPASE, LACTATE, CHOL, HDL, LDLCHOLESTEROL, CHOLHDLRATIO, TRIG, VLDL, FII96RV, PHENYTOIN, PHENYF, URICACID, POCGLU in the last 72 hours. Lab Results   Component Value Date    INR 1.2 01/22/2023    INR 1.1 01/11/2023    PROTIME 14.9 (H) 01/22/2023    PROTIME 14.4 (H) 01/11/2023     Lab Results   Component Value Date/Time    SPECIAL 1ML LT HAND 01/31/2023 02:09 AM     Lab Results   Component Value Date/Time    CULTURE NO GROWTH 5 DAYS 01/31/2023 02:09 AM       Lab Results   Component Value Date/Time    POCPH 7.366 02/02/2023 02:08 PM    POCPCO2 39.3 02/02/2023 02:08 PM    POCPO2 169.9 02/02/2023 02:08 PM    POCHCO3 22.5 02/02/2023 02:08 PM    NBEA 3 02/02/2023 02:08 PM    PBEA 0 02/01/2023 04:30 AM    NOPY4QZQ 100 02/02/2023 02:08 PM    FIO2 30.0 02/03/2023 03:43 AM       Radiology:    No results found. All radiological studies reviewed      Reviews of Symptoms:     Unable to assess due to patient mental status    Physical Exam:    Vitals:  /62   Pulse 90   Temp 98.6 °F (37 °C) (Axillary)   Resp 20   Ht 5' 4\" (1.626 m)   Wt 147 lb 1.6 oz (66.7 kg)   SpO2 96%   BMI 25.25 kg/m²   No data recorded.       General appearance -  patient is obtunded  Mental status -  notoriented to person, place, and time with normal affect  Head - normocephalic and atraumatic  Eyes - pupils equal and reactive, conjunctiva clear  Ears - hearing appears to be intact  Nose - no drainage noted  Mouth - mucous membranes moist  Neck - supple, no carotid bruits, thyroid not palpable  Chest - clear to auscultation, normal effort  Heart - normal rate, regular rhythm, no murmur  Abdomen - soft, nontender, nondistended, bowel sounds present all four quadrants, no masses, hepatomegaly or splenomegaly  Neurological -  unable to assess  Extremities - peripheral pulses palpable, no pedal edema or calf pain with palpation  Skin - no gross lesions, rashes, or induration noted      Consults:  IP CONSULT TO PULMONOLOGY  IP CONSULT TO INFECTIOUS DISEASES  IP CONSULT TO CARDIOLOGY  IP CONSULT TO NEUROLOGY  IP CONSULT TO PALLIATIVE CARE  IP CONSULT TO DIETITIAN  IP CONSULT TO DIETITIAN  IP CONSULT TO DIETITIAN  IP CONSULT TO NEUROLOGY  IP CONSULT TO DIETITIAN  IP CONSULT TO ETHICS    Disposition:  hospice    Discharged Condition: Stable    Follow Up: [facility]  [facility]     Phone [phone]  Fax [phone]  Follow up  they will provide your home oxygen. call them on arrival home to set up rest of your equipment.             Diet: No diet orders on file    Discharge Medications:      Medication List        ASK your doctor about these medications      bumetanide 2 MG tablet  Commonly known as: BUMEX     carvedilol 12.5 MG tablet  Commonly known as: COREG     Cholecalciferol 50 MCG (2000 UT) Caps     cholestyramine light 4 g packet  Take 1 packet by mouth 2 times daily     Cyanocobalamin 1000 MCG/15ML Liqd     Entresto 24-26 MG per tablet  Generic drug: sacubitril-valsartan     fexofenadine 180 MG tablet  Commonly known as: ALLEGRA     glimepiride 4 MG tablet  Commonly known as: AMARYL     ibuprofen 200 MG tablet  Commonly known as: ADVIL;MOTRIN     lactobacillus Tabs  Take 1 tablet by mouth daily     pantoprazole 40 MG tablet  Commonly known as: PROTONIX  Take 1 tablet by mouth every morning (before breakfast)              Code Status:  Prior    Time Spent on discharge is  35 mins in patient examination, evaluation, counseling as well as medication reconciliation, prescriptions for required medications, discharge plan and follow up. Electronically signed by Kali Ramon MD on 2/14/2023 at 4:47 PM     Thank you Dr. Grace Moses MD for the opportunity to be involved in this patient's care. This note was created with the assistance of a speech-recognition program.  Although the intention is to generate a document that actually reflects the content of the visit, no guarantees can be provided that every mistake has been identified and corrected by editing. Note was updated later by me after  physical examination and  completion of the assessment.

## 2024-04-19 NOTE — H&P
[facility]  Office: [address], DO, Nick Givens, DO, Ger Elly, DO, Julian Ledezma, DO, Julio Leon MD, Ariana Peralta MD, Isa Boyd MD, Gabriel Moreno MD,  Saamra Montes De Oca MD, Issac Esposito MD, Edil Arriaza DO, Maria Fernanda Ace MD,  Teofilo Villanueva MD, Fito Vogel MD, Jennie Worthy DO, Luca Ibrahim MD, Andreas Navarrete MD, Tony Gerard DO, Nory Castro MD, Troy Shultz MD, Elvira Duncan MD, Eugenia Park MD, Cristhian Lizama DO, Joan Valera MD, Giuseppe Reina MD, Romayne Muscat, CNP,  Olivier Schwartz, CNP, Constantino Stone, CNP, Bill Shahid, CNP,  Nikole Story, DNP, Santana Zepeda, CNP, Diego Conn, CNP, Debora Triana, CNP, Letty Forbes, CNP, Marielena Pruett, CNP, Ottoniel Champagne PANicoleC, Sander Del Rosario, CNS, Antonino Wing, CNP, Kenroy Rodriguez, CNP         [address]    HISTORY AND PHYSICAL EXAMINATION            Date:   1/11/2023  Patient name:  Matt Patrick  Date of admission:  1/10/2023  4:39 AM  MRN:   [id number]  Account:  [de-identified]  YOB: 1932  PCP:    Balta Grady MD  Room:   [facility]/9222-70  Code Status:    Full Code    Chief Complaint:     Chief Complaint   Patient presents with    Diarrhea     Patient states she was released from [facility] on Sunday for improved diarrhea. Patient states she has diarrhea just pouring out of her. History Obtained From:     patient, Quality of history:  vague    History of Present Illness:     Matt Patrick is a 80 y.o. Non- / non  female who presents with Diarrhea (Patient states she was released from [facility] on Sunday for improved diarrhea./Patient states she has diarrhea just pouring out of her. )   and is admitted to the hospital for the management of Lower GI bleed. Patient has a known history of adenocarcinoma of the colon. She says because of this she has chronic diarrhea. She says her diarrhea has been very bad for the last couple of days. She denies abdominal pain and fever and chills, however she has felt short of breath has been experiencing cough and has felt fatigued. She has also had some nausea although she has no vomiting. She has a past medical history of diabetes, hypertension, stasis ulcer to the right lower leg, proximal A. fib on Eliquis, arthritis, and CHF. She was initially evaluated in the [facility] ED. Chest x-ray there revealed stable cardiomegaly with mild pulmonary vascular congestion. She was given Lomotil for her diarrhea and started on a Protonix drip. Hemoglobin was noted to be 10 and it is usually in the 11-12 range. She lives alone and her daughter lives at another residence close by. Her stool was positive for occult blood. Arrangements were made to transfer to [facility] for further management of suspected lower GI bleed and intractable diarrhea and dehydration. Past Medical History:     Past Medical History:   Diagnosis Date    Adenocarcinoma ([facility] [address]     colon    Arthritis     Atrial fibrillation ([facility] [address]     r/t sepsis     Cancer ([address]     colon, skin    CHF (congestive heart failure) (HCC)     Chronic anticoagulation     Eliquis for a fib stroke prophylaxis    Diabetes mellitus ([address]     Diverticulitis     Femur fracture (HCC)     Hypertension     Melanoma ([address]     nose    Osteoporosis     Overweight     Sepsis ([facility] [address]     Serum creatinine raised         Past Surgical History:     Past Surgical History:   Procedure Laterality Date    APPENDECTOMY      CHOLECYSTECTOMY      COLON SURGERY      FEMUR SURGERY      s/p fracture        Medications Prior to Admission:     Prior to Admission medications    Medication Sig Start Date End Date Taking?  Authorizing Provider   bumetanide (BUMEX) 0.5 MG tablet Take 2 mg by mouth daily   Yes Historical Provider, MD   carvedilol (COREG) 6.25 MG tablet Take 12.5 mg by mouth 2 times daily (with meals)   Yes Historical Provider, MD   sacubitril-valsartan (ENTRESTO) 24-26 MG per tablet Take 1 tablet by mouth 2 times daily   Yes Historical Provider, MD   spironolactone (ALDACTONE) 25 MG tablet Take 12.5 mg by mouth daily   Yes Historical Provider, MD   loperamide (IMODIUM) 2 MG capsule Take 2 mg by mouth daily   Yes Historical Provider, MD   ibuprofen (ADVIL;MOTRIN) 200 MG tablet Take 400 mg by mouth every 6 hours as needed for Pain   Yes Historical Provider, MD   potassium chloride (KLOR-CON M) 20 MEQ extended release tablet Take 1 tablet by mouth daily  Patient taking differently: Take 10 mEq by mouth 2 times daily 3/31/20   Laron Allred DO   GLIMEPIRIDE PO Take 4 mg by mouth 2 times daily    Historical Provider, MD   Omega-3 Fatty Acids (FISH OIL PO) Take by mouth    Historical Provider, MD   Cyanocobalamin (VITAMIN B-12 SL) Place under the tongue three times a week    Historical Provider, MD        Allergies:     Acetaminophen-codeine, Codeine, Furosemide, Linagliptin, Sitagliptin, and Other    Social History:     Tobacco:    reports that she has never smoked. She has never used smokeless tobacco.  Alcohol:      reports no history of alcohol use. Drug Use:  reports no history of drug use. Family History:     History reviewed. No pertinent family history. Review of Systems:     Positive and Negative as described in HPI. Review of Systems   Constitutional:  Positive for fatigue. Negative for appetite change, chills and fever. HENT:  Positive for hearing loss. Negative for congestion, rhinorrhea and sore throat. Squaxin   Eyes:  Negative for photophobia, pain and visual disturbance. Patient complains of blepharitis and eye dryness   Respiratory:  Positive for cough and shortness of breath. Cardiovascular: Negative. Gastrointestinal:  Positive for diarrhea and nausea. Negative for abdominal pain, constipation and vomiting. Genitourinary: Negative.     Musculoskeletal:  Positive for neck Detail Level: Zone pain. Negative for arthralgias, back pain and myalgias. Skin:  Positive for wound. Right calf ulcer   Neurological: Negative. Psychiatric/Behavioral:  Negative for agitation, confusion and sleep disturbance. The patient is nervous/anxious. Physical Exam:   /61   Pulse 59   Temp 98.1 °F (36.7 °C) (Oral)   Resp 16   Ht 5' 4\" (1.626 m)   Wt 149 lb (67.6 kg)   SpO2 97%   BMI 25.58 kg/m²   Temp (24hrs), Av.9 °F (36.6 °C), Min:97.5 °F (36.4 °C), Max:98.2 °F (36.8 °C)    Recent Labs     01/10/23  1010 01/10/23  2331   POCGLU 146* 177*       Intake/Output Summary (Last 24 hours) at 2023 0151  Last data filed at 1/10/2023 2030  Gross per 24 hour   Intake 350 ml   Output --   Net 350 ml       Physical Exam  Vitals and nursing note reviewed. Constitutional:       General: She is not in acute distress. Appearance: She is ill-appearing. She is not toxic-appearing or diaphoretic. HENT:      Head: Normocephalic and atraumatic. Right Ear: External ear normal.      Left Ear: External ear normal.      Nose: Nose normal. No rhinorrhea. Mouth/Throat:      Mouth: Mucous membranes are dry. Eyes:      General: No scleral icterus. Right eye: No discharge. Left eye: No discharge. Extraocular Movements: Extraocular movements intact. Conjunctiva/sclera: Conjunctivae normal.      Pupils: Pupils are equal, round, and reactive to light. Cardiovascular:      Rate and Rhythm: Normal rate and regular rhythm. Pulses: Normal pulses. Heart sounds: Normal heart sounds. No murmur heard. No friction rub. No gallop. Pulmonary:      Effort: Pulmonary effort is normal. No respiratory distress. Breath sounds: Normal breath sounds. No wheezing, rhonchi or rales. Abdominal:      General: There is distension. Palpations: Abdomen is soft. Tenderness: There is no abdominal tenderness. There is no guarding. Hernia: No hernia is present. Comments: Hypoactive bowel sound   Musculoskeletal:         General: Tenderness present. Normal range of motion. Right lower leg: No edema. Left lower leg: No edema. Comments: Tenderness to right posterior calf   Skin:     General: Skin is warm and dry. Coloration: Skin is pale. Skin is not jaundiced. Findings: Lesion present. No bruising, erythema or rash. Neurological:      General: No focal deficit present. Mental Status: She is alert and oriented to person, place, and time. Sensory: Sensory deficit present. Comments: Very hard of hearing   Psychiatric:         Attention and Perception: Attention normal.         Mood and Affect: Mood is anxious. Affect is not inappropriate. Speech: Speech normal.         Behavior: Behavior normal. Behavior is cooperative. Thought Content:  Thought content normal.         Cognition and Memory: Cognition and memory normal.         Judgment: Judgment normal.       Investigations:      Laboratory Testing:  Recent Results (from the past 24 hour(s))   CBC with Diff    Collection Time: 01/10/23  5:31 AM   Result Value Ref Range    WBC 5.2 3.5 - 11.0 k/uL    RBC 3.76 (L) 4.0 - 5.2 m/uL    Hemoglobin 10.9 (L) 12.0 - 16.0 g/dL    Hematocrit 33.5 (L) 36 - 46 %    MCV 89.1 80 - 100 fL    MCH 29.0 26 - 34 pg    MCHC 32.5 31 - 37 g/dL    RDW 14.9 12.5 - 15.4 %    Platelets 248 190 - 310 k/uL    MPV 8.7 6.0 - 12.0 fL    Seg Neutrophils 74 (H) 36 - 66 %    Lymphocytes 10 (L) 24 - 44 %    Monocytes 13 (H) 2 - 11 %    Eosinophils % 2 1 - 4 %    Basophils 1 0 - 2 %    Segs Absolute 3.90 1.8 - 7.7 k/uL    Absolute Lymph # 0.50 (L) 1.0 - 4.8 k/uL    Absolute Mono # 0.70 0.1 - 1.2 k/uL    Absolute Eos # 0.10 0.0 - 0.4 k/uL    Basophils Absolute 0.10 0.0 - 0.2 k/uL   CMP    Collection Time: 01/10/23  5:31 AM   Result Value Ref Range    Glucose 193 (H) 70 - 99 mg/dL    BUN 48 (H) 8 - 23 mg/dL    Creatinine 1.10 (H) 0.50 - 0.90 mg/dL    Est, Glom Filt Rate 48 (L) >60 mL/min/1.73m2    Calcium 8.2 (L) 8.6 - 10.4 mg/dL    Sodium 139 135 - 144 mmol/L    Potassium 5.0 3.7 - 5.3 mmol/L    Chloride 100 98 - 107 mmol/L    CO2 29 20 - 31 mmol/L    Anion Gap 10 9 - 17 mmol/L    Alkaline Phosphatase 168 (H) 35 - 104 U/L    ALT 21 5 - 33 U/L    AST 26 <32 U/L    Total Bilirubin 0.3 0.3 - 1.2 mg/dL    Total Protein 5.5 (L) 6.4 - 8.3 g/dL    Albumin 3.0 (L) 3.5 - 5.2 g/dL    Albumin/Globulin Ratio 1.2 1.0 - 2.5   Urinalysis with Reflex to Culture    Collection Time: 01/10/23  5:31 AM    Specimen: Urine   Result Value Ref Range    Color, UA Yellow Yellow    Turbidity UA Clear Clear    Glucose, Ur NEGATIVE NEGATIVE    Bilirubin Urine NEGATIVE NEGATIVE    Ketones, Urine NEGATIVE NEGATIVE    Specific Gravity, UA 1.020 1.005 - 1.030    Urine Hgb NEGATIVE NEGATIVE    pH, UA 5.5 5.0 - 8.0    Protein, UA NEGATIVE NEGATIVE    Urobilinogen, Urine Normal Normal    Nitrite, Urine NEGATIVE NEGATIVE    Leukocyte Esterase, Urine NEGATIVE NEGATIVE    Urinalysis Comments       Microscopic exam not performed based on chemical results unless requested in original order. Urinalysis Comments          Urinalysis Comments       Utilizing a urinalysis as the only screening method to exclude a potential uropathogen can be unreliable in many patient populations. Rapid screening tests are less sensitive than culture and if UTI is a clinical possibility, culture should be considered despite a negative urinalysis.      POC Glucose Fingerstick    Collection Time: 01/10/23 10:10 AM   Result Value Ref Range    POC Glucose 146 (H) 65 - 105 mg/dL   Hemoglobin and Hematocrit    Collection Time: 01/10/23  9:46 PM   Result Value Ref Range    Hemoglobin 10.7 (L) 11.9 - 15.1 g/dL    Hematocrit 34.6 (L) 36.3 - 47.1 %   POC Glucose Fingerstick    Collection Time: 01/10/23 11:31 PM   Result Value Ref Range    POC Glucose 177 (H) 65 - 105 mg/dL       Imaging/Diagnostics:  XR CHEST PORTABLE    Result Date: 1/10/2023  Stable cardiomegaly. Mild pulmonary vascular congestion. Assessment :      Hospital Problems             Last Modified POA    * (Principal) Lower GI bleed 1/11/2023 Yes    Congestive heart failure ([facility] [address] 1/11/2023 Yes    Diabetes mellitus ([facility] [address] 1/11/2023 Yes    Essential hypertension 1/11/2023 Yes    Paroxysmal atrial fibrillation ([facility] [address] 1/11/2023 Yes    Venous ulcer of right leg ([facility] [address] 1/11/2023 Yes       Plan:     Patient status inpatient in the  Med/Surge    Lower GI bleed: Clear liquid diet. Consult GI. Check hemoglobin every 6 hours. Send stool for C. difficile. Gentle IV hydration. Protonix drip. Intractable diarrhea: Hold antidiarrheals until stool sent for culture and to check for C. difficile. CHF: Gentle IV hydration. Daily weights. Monitor intake and output. Monitor respiratory status for signs and symptoms of fluid overload. Diabetes: Monitor blood glucose before meals and at bedtime. Low correction sliding scale insulin  Hypertension: Hold losartan, hold diuretics due to slight increase in creatinine. Carvedilol twice daily. Parameters in place. Monitor vital signs as ordered. Paroxysmal A. fib: Patient on Eliquis. Hold Eliquis for now due to GI bleed. Continue carvedilol for rate control. Telemetry. Venous ulcer right leg: Patient follows with outpatient wound clinic. Consult wound ostomy care. EPC cuffs for DVT prophylaxis    Consultations:   IP CONSULT TO GI  IP CONSULT TO PHARMACY     Patient is admitted as inpatient status because of co-morbidities listed above, severity of signs and symptoms as outlined, requirement for current medical therapies and most importantly because of direct risk to patient if care not provided in a hospital setting. Expected length of stay > 48 hours.     PATRICK Gaona NP  1/11/2023  1:51 AM    Copy sent to Dr. Lázaro Figueroa MD

## 2025-01-12 NOTE — PROGRESS NOTES
Pulmonary Critical Care Progress Note  Jad Lomeli MD     Patient seen for the follow up of  COVID-19 acute hypoxic respiratory insufficiency, pulmonary edema, pleural effusions    Subjective:  Patient was extubated 2/5/2023 in the evening time after patient's family decided to change CODE STATUS to DNR CC and proceed with comfort care. Sometime during the evening patient's family decided to change her CODE STATUS back to DNR CCA. I had detailed discussion with the patient's daughter at bedside. She is going to speak with the rest of the family and decide whether or not they want to proceed with DNR CC and comfort care only or if we are going to stay DNR CCA and start NG tube feedings and arrange for rehab. Pigtail catheter remains to suction. Examination:  Vitals: BP (!) 91/53   Pulse 73   Temp 97.2 °F (36.2 °C) (Axillary)   Resp (!) 32   Ht 5' 4\" (1.626 m)   Wt 151 lb 12.8 oz (68.9 kg)   SpO2 100%   BMI 26.06 kg/m²   General appearance:   Awake resting in bed, daughter at bedside  Neck: No JVD  Lungs: Decreased breath sounds no significant wheezing, occasional crackles  Heart: regular rate and rhythm, S1, S2 normal, no gallop  Abdomen: Soft, non tender, + BS  Extremities: no cyanosis or clubbing. Trace edema.     LABs:  CBC:   Recent Labs     02/03/23  0345 02/04/23  0457 02/05/23  0420   WBC 10.5 11.6* 8.5   HGB 11.1* 11.1* 11.0*   HCT 34.2* 35.2* 36.3    183 171     BMP:   Recent Labs     02/03/23  0345 02/04/23  0457 02/05/23  0420   * 147* 149*   K 5.3 4.8 5.1   CO2 21 20 22   BUN 59* 55* 58*   CREATININE 0.99* 1.04* 0.98*   LABGLOM 54* 51* 55*   GLUCOSE 208* 249* 221*      Latest Reference Range & Units 1/31/23 00:07 1/31/23 02:44   POC pH 7.350 - 7.450  6.963 (LL) 7.418   POC pCO2 35.0 - 48.0 mm Hg 128.5 (HH) 34.0 (L)   POC PO2 83.0 - 108.0 mm Hg 98.3 148.7 (H)   POC HCO3 21.0 - 28.0 mmol/L 29.1 (H) 22.0   POC O2 SAT 94.0 - 98.0 % 90 (L) 99 (H)   POC Ionized Calcium 1.15 - 1.33 mmol/L 1.60 (H)    POC Potassium 3.5 - 4.5 mmol/L 5.1 (H)       Latest Reference Range & Units Most Recent   Procalcitonin <0.09 ng/mL 0.55 (H)  1/22/23 13:40     Radiology:  X-ray chest: 2/5/2023      CT brain 1/31/23: No acute intracranial abnormality     CT chest 1/22/2023      Impression:  Acute hypoxic respiratory failure  COVID pneumonia  Pulmonary edema  Small bilateral pleural effusions right greater than left  Elevated troponin / History of A-fib, not on anticoag d/t age, frailty and hx of GIB per cardiology   Allergic rhinitis, DM, HTN  Ileus  Bradycardia     Recommendations:  Oxygen via nasal cannula  Continue chest tube to suction  Off Levophed drip   Seroquel 25 twice daily  Albuterol every 6 hours as needed  Start tube feeds if patient's family decides to continue DNR CCA  Off ATB per Infectious disease, s/p Levaquin course  Completed decadron course   Neurology on the consult  Cardiology following/off anticoagulation for A. fib d/t history of GI bleed  Coreg on hold- monitor BP/HR   GI prophylaxis, Protonix  DVT prophylaxis, subQ heparin 5000 every 12 hours  Palliative care following  Patients family changed code status to [facility] and vasopressors stopped and patient was extubated yesterday evening.  Patient's family then decided to change code status to [facility] with no reintubation or defibrillation  Discussed with RN  We will follow with you    Electronically signed by     Cookie Hernandez MD on 2/5/2023 at 10:50 AM  Pulmonary Critical Care and Sleep Medicine,  [facility]  Cell: [phone]  Office: [phone] MST Score 2 or >